# Patient Record
Sex: MALE | Race: WHITE | Employment: OTHER | URBAN - METROPOLITAN AREA
[De-identification: names, ages, dates, MRNs, and addresses within clinical notes are randomized per-mention and may not be internally consistent; named-entity substitution may affect disease eponyms.]

---

## 2017-01-06 ENCOUNTER — TELEPHONE (OUTPATIENT)
Dept: NEPHROLOGY | Facility: CLINIC | Age: 62
End: 2017-01-06

## 2017-01-06 DIAGNOSIS — E11.9 TYPE II OR UNSPECIFIED TYPE DIABETES MELLITUS WITHOUT MENTION OF COMPLICATION, NOT STATED AS UNCONTROLLED: Primary | ICD-10-CM

## 2017-01-06 NOTE — TELEPHONE ENCOUNTER
Spoke to wife. Patient has been fasting for 14 hours and she is afraid his blood sugar is going to go too low. I suggested they go home and we would call when lab orders are placed but she said they would not go home and have to come back again.  Dr. Gamal Wolf

## 2017-01-06 NOTE — TELEPHONE ENCOUNTER
Spoke to Dr. Zeeshan Cevallos order given for Renal panel, CBC and Glyco hgb. Lab orders were entered in Epic. Notified Shiraz Lynch at [address] [place] [address] lab. Patient's wife Marco Antonio Saldivar also contacted and informed.

## 2017-01-06 NOTE — TELEPHONE ENCOUNTER
Jeny/ZHANNA lab states that pt is there to have labs and orders not in EPIC. Please advise. Attempted to reach RN/no answer. Please call Kandice Kovacs at ext [phone].

## 2017-01-07 ENCOUNTER — LAB ENCOUNTER (OUTPATIENT)
Dept: LAB | Age: 62
End: 2017-01-07
Attending: INTERNAL MEDICINE
Payer: MEDICARE

## 2017-01-07 DIAGNOSIS — E11.9 TYPE II OR UNSPECIFIED TYPE DIABETES MELLITUS WITHOUT MENTION OF COMPLICATION, NOT STATED AS UNCONTROLLED: ICD-10-CM

## 2017-01-07 LAB
ALBUMIN SERPL BCP-MCNC: 4 G/DL (ref 3.5–4.8)
ANION GAP SERPL CALC-SCNC: 9 MMOL/L (ref 0–18)
BASOPHILS # BLD: 0 K/UL (ref 0–0.2)
BASOPHILS NFR BLD: 1 %
BUN SERPL-MCNC: 56 MG/DL (ref 8–20)
BUN/CREAT SERPL: 16.5 (ref 10–20)
CALCIUM SERPL-MCNC: 9.1 MG/DL (ref 8.5–10.5)
CHLORIDE SERPL-SCNC: 108 MMOL/L (ref 95–110)
CO2 SERPL-SCNC: 24 MMOL/L (ref 22–32)
CREAT SERPL-MCNC: 3.39 MG/DL (ref 0.5–1.5)
EOSINOPHIL # BLD: 0.1 K/UL (ref 0–0.7)
EOSINOPHIL NFR BLD: 3 %
ERYTHROCYTE [DISTWIDTH] IN BLOOD BY AUTOMATED COUNT: 14.5 % (ref 11–15)
GLUCOSE SERPL-MCNC: 223 MG/DL (ref 70–99)
HCT VFR BLD AUTO: 33.5 % (ref 41–52)
HGB BLD-MCNC: 11.2 G/DL (ref 13.5–17.5)
LYMPHOCYTES # BLD: 1.2 K/UL (ref 1–4)
LYMPHOCYTES NFR BLD: 26 %
MCH RBC QN AUTO: 33.5 PG (ref 27–32)
MCHC RBC AUTO-ENTMCNC: 33.4 G/DL (ref 32–37)
MCV RBC AUTO: 100.5 FL (ref 80–100)
MONOCYTES # BLD: 0.4 K/UL (ref 0–1)
MONOCYTES NFR BLD: 9 %
NEUTROPHILS # BLD AUTO: 2.9 K/UL (ref 1.8–7.7)
NEUTROPHILS NFR BLD: 63 %
OSMOLALITY UR CALC.SUM OF ELEC: 314 MOSM/KG (ref 275–295)
PHOSPHATE SERPL-MCNC: 3.9 MG/DL (ref 2.4–4.7)
PLATELET # BLD AUTO: 129 K/UL (ref 140–400)
PMV BLD AUTO: 10.1 FL (ref 7.4–10.3)
POTASSIUM SERPL-SCNC: 4.9 MMOL/L (ref 3.3–5.1)
RBC # BLD AUTO: 3.34 M/UL (ref 4.5–5.9)
SODIUM SERPL-SCNC: 141 MMOL/L (ref 136–144)
WBC # BLD AUTO: 4.6 K/UL (ref 4–11)

## 2017-01-07 PROCEDURE — 80069 RENAL FUNCTION PANEL: CPT

## 2017-01-07 PROCEDURE — 83036 HEMOGLOBIN GLYCOSYLATED A1C: CPT

## 2017-01-07 PROCEDURE — 36415 COLL VENOUS BLD VENIPUNCTURE: CPT

## 2017-01-07 PROCEDURE — 85025 COMPLETE CBC W/AUTO DIFF WBC: CPT

## 2017-01-08 LAB — HBA1C MFR BLD: 7.1 % (ref 4–6)

## 2017-01-09 RX ORDER — FENOFIBRATE 48 MG/1
TABLET, COATED ORAL
Qty: 90 TABLET | Refills: 1 | Status: SHIPPED | OUTPATIENT
Start: 2017-01-09 | End: 2017-01-11

## 2017-01-11 ENCOUNTER — OFFICE VISIT (OUTPATIENT)
Dept: ENDOCRINOLOGY CLINIC | Facility: CLINIC | Age: 62
End: 2017-01-11

## 2017-01-11 VITALS
WEIGHT: 250.19 LBS | HEART RATE: 91 BPM | BODY MASS INDEX: 37.05 KG/M2 | HEIGHT: 69 IN | SYSTOLIC BLOOD PRESSURE: 131 MMHG | DIASTOLIC BLOOD PRESSURE: 80 MMHG

## 2017-01-11 DIAGNOSIS — E11.65 UNCONTROLLED TYPE 2 DIABETES MELLITUS WITH HYPERGLYCEMIA, WITH LONG-TERM CURRENT USE OF INSULIN (HCC): Primary | ICD-10-CM

## 2017-01-11 DIAGNOSIS — Z79.4 UNCONTROLLED TYPE 2 DIABETES MELLITUS WITH HYPERGLYCEMIA, WITH LONG-TERM CURRENT USE OF INSULIN (HCC): Primary | ICD-10-CM

## 2017-01-11 LAB
GLUCOSE BLOOD: 300
TEST STRIP LOT #: NORMAL NUMERIC

## 2017-01-11 PROCEDURE — 82962 GLUCOSE BLOOD TEST: CPT | Performed by: INTERNAL MEDICINE

## 2017-01-11 PROCEDURE — 99213 OFFICE O/P EST LOW 20 MIN: CPT | Performed by: INTERNAL MEDICINE

## 2017-01-11 PROCEDURE — 36416 COLLJ CAPILLARY BLOOD SPEC: CPT | Performed by: INTERNAL MEDICINE

## 2017-01-11 RX ORDER — FENOFIBRATE 48 MG/1
TABLET, COATED ORAL
Qty: 90 TABLET | Refills: 1 | Status: SHIPPED | OUTPATIENT
Start: 2017-01-11 | End: 2017-04-12

## 2017-01-11 RX ORDER — ATORVASTATIN CALCIUM 40 MG/1
TABLET, FILM COATED ORAL
Qty: 30 TABLET | Refills: 11 | Status: SHIPPED | OUTPATIENT
Start: 2017-01-11 | End: 2017-01-16

## 2017-01-11 NOTE — PROGRESS NOTES
Name: Elizabeth Hills  Date: 1/11/2017    Referring Physician: No ref. provider found    HISTORY OF PRESENT ILLNESS   Elizabeth Hills is a 59year old male who presents for diabetes mellitus.       Prior HbA, C or glycohemoglobin were 9.8% 7/2014; 7.7 pregabalin 300 MG Oral Cap, Take 1 capsule (300 mg total) by mouth daily. , Disp: 90 capsule, Rfl: 1  •  HYDROcodone-acetaminophen  MG Oral Tab, Take 1 tablet by mouth every 8 (eight) hours as needed for Pain., Disp: 90 tablet, Rfl: 0  •  Insulin Syri Concern  Caffeine Concern        Yes    Comment:1 cup coffee per day  Exercise                No        Medical History:   Past Medical History   Diagnosis Date   • Hyperlipidemia    • Diabetes type 2, uncontrolled (HCC)    • Proteinuria    • Chronic importance of SBGM  -Discussed importance of low CHO diet  -Concerned about difficulty with weight loss although unable to give weight loss medication or GLP-2 given CKD  -Safest for renal function to continue insulin at this time  -Continue lantus 34 unit

## 2017-01-16 ENCOUNTER — TELEPHONE (OUTPATIENT)
Dept: NEPHROLOGY | Facility: CLINIC | Age: 62
End: 2017-01-16

## 2017-01-16 ENCOUNTER — OFFICE VISIT (OUTPATIENT)
Dept: NEPHROLOGY | Facility: CLINIC | Age: 62
End: 2017-01-16

## 2017-01-16 VITALS
WEIGHT: 249 LBS | RESPIRATION RATE: 20 BRPM | HEART RATE: 74 BPM | TEMPERATURE: 99 F | HEIGHT: 69 IN | BODY MASS INDEX: 36.88 KG/M2 | SYSTOLIC BLOOD PRESSURE: 124 MMHG | DIASTOLIC BLOOD PRESSURE: 77 MMHG

## 2017-01-16 DIAGNOSIS — E11.22 CKD STAGE 4 DUE TO TYPE 2 DIABETES MELLITUS (HCC): Primary | ICD-10-CM

## 2017-01-16 DIAGNOSIS — N18.4 CKD STAGE 4 DUE TO TYPE 2 DIABETES MELLITUS (HCC): Primary | ICD-10-CM

## 2017-01-16 PROCEDURE — G0463 HOSPITAL OUTPT CLINIC VISIT: HCPCS | Performed by: INTERNAL MEDICINE

## 2017-01-16 PROCEDURE — 99214 OFFICE O/P EST MOD 30 MIN: CPT | Performed by: INTERNAL MEDICINE

## 2017-01-16 RX ORDER — FLUOCINONIDE 0.5 MG/G
OINTMENT TOPICAL
Qty: 15 G | Refills: 0 | Status: SHIPPED | OUTPATIENT
Start: 2017-01-16 | End: 2017-12-22

## 2017-01-16 RX ORDER — GLIMEPIRIDE 2 MG/1
2 TABLET ORAL
Qty: 90 TABLET | Refills: 3 | Status: SHIPPED | OUTPATIENT
Start: 2017-01-16 | End: 2017-10-23

## 2017-01-16 RX ORDER — ATORVASTATIN CALCIUM 40 MG/1
TABLET, FILM COATED ORAL
Qty: 90 TABLET | Refills: 4 | Status: SHIPPED | OUTPATIENT
Start: 2017-01-16 | End: 2018-06-06

## 2017-01-16 RX ORDER — HYDROCODONE BITARTRATE AND ACETAMINOPHEN 10; 325 MG/1; MG/1
1 TABLET ORAL EVERY 8 HOURS PRN
Qty: 90 TABLET | Refills: 0 | Status: SHIPPED | OUTPATIENT
Start: 2017-01-16 | End: 2017-01-16

## 2017-01-16 RX ORDER — PANTOPRAZOLE SODIUM 40 MG/1
40 TABLET, DELAYED RELEASE ORAL
Qty: 90 TABLET | Refills: 3 | Status: SHIPPED | OUTPATIENT
Start: 2017-01-16 | End: 2017-02-20

## 2017-01-16 RX ORDER — PREGABALIN 300 MG/1
300 CAPSULE ORAL DAILY
Qty: 90 CAPSULE | Refills: 3 | Status: SHIPPED | OUTPATIENT
Start: 2017-01-16 | End: 2017-04-11

## 2017-01-16 RX ORDER — HYDROCODONE BITARTRATE AND ACETAMINOPHEN 10; 325 MG/1; MG/1
1 TABLET ORAL EVERY 8 HOURS PRN
Qty: 90 TABLET | Refills: 0 | Status: SHIPPED | OUTPATIENT
Start: 2017-03-14 | End: 2017-01-18

## 2017-01-16 RX ORDER — HYDROCODONE BITARTRATE AND ACETAMINOPHEN 10; 325 MG/1; MG/1
1 TABLET ORAL EVERY 8 HOURS PRN
Qty: 90 TABLET | Refills: 0 | Status: SHIPPED | OUTPATIENT
Start: 2017-02-15 | End: 2017-01-16

## 2017-01-16 NOTE — TELEPHONE ENCOUNTER
Current Outpatient Prescriptions:  Clotrimazole 1 % External Ointment Apply 1 Application topically every morning.  Disp: 15 g Rfl: 1     Per pharmacy Alevazol 1% ointment not covered pls send for Clotrimazole Cream

## 2017-01-16 NOTE — PATIENT INSTRUCTIONS
1.  meds    2. See me in July   CALL FOR ORDERS    3. LET ME KNOW HOW RASH IS DOING    4. One ointment in am,, one in evening    5. Add baby aspirin 81mg day    6.   Good job Bianca crooks labs

## 2017-01-16 NOTE — TELEPHONE ENCOUNTER
Patient in seeing [facility] today.  Saw [facility] [place] [facility] on Wednesday but requesting refills for freestyle strips with dx code to go to [facility] and atorvastatin 90 days sent to [facility]

## 2017-01-17 NOTE — PROGRESS NOTES
NEPHROLOGY PROGRESS NOTE  Tootie Petersen    MRN:  [id number]   Date of Service:  1/16/17     HISTORY OF PRESENT ILLNESS: The patient is here for followup. He is a type 2 diabetic. He is doing fairly well.  He has no complaints except for a rash on his left

## 2017-01-18 ENCOUNTER — TELEPHONE (OUTPATIENT)
Dept: NEPHROLOGY | Facility: CLINIC | Age: 62
End: 2017-01-18

## 2017-01-18 RX ORDER — HYDROCODONE BITARTRATE AND ACETAMINOPHEN 10; 325 MG/1; MG/1
1 TABLET ORAL EVERY 8 HOURS PRN
Qty: 90 TABLET | Refills: 0 | Status: SHIPPED | OUTPATIENT
Start: 2017-03-14 | End: 2017-04-11

## 2017-02-20 ENCOUNTER — TELEPHONE (OUTPATIENT)
Dept: NEPHROLOGY | Facility: CLINIC | Age: 62
End: 2017-02-20

## 2017-02-20 RX ORDER — PANTOPRAZOLE SODIUM 40 MG/1
40 TABLET, DELAYED RELEASE ORAL
Qty: 60 TABLET | Refills: 3 | Status: SHIPPED | OUTPATIENT
Start: 2017-02-20 | End: 2017-03-13

## 2017-02-20 NOTE — TELEPHONE ENCOUNTER
Pts wife states that pt is having stomach pain and heartburn and went to refill and RX is not correct. She states that pt was told to take 1 in AM and 1 in PM so she is requesting corrected RX. Please call.       Current outpatient prescriptions:     Pant

## 2017-03-13 ENCOUNTER — TELEPHONE (OUTPATIENT)
Dept: NEPHROLOGY | Facility: CLINIC | Age: 62
End: 2017-03-13

## 2017-03-13 RX ORDER — PANTOPRAZOLE SODIUM 40 MG/1
40 TABLET, DELAYED RELEASE ORAL
Qty: 60 TABLET | Refills: 6 | Status: SHIPPED | OUTPATIENT
Start: 2017-03-13 | End: 2017-07-28

## 2017-04-11 ENCOUNTER — OFFICE VISIT (OUTPATIENT)
Dept: NEPHROLOGY | Facility: CLINIC | Age: 62
End: 2017-04-11

## 2017-04-11 VITALS
WEIGHT: 242.81 LBS | HEART RATE: 103 BPM | BODY MASS INDEX: 35.96 KG/M2 | SYSTOLIC BLOOD PRESSURE: 92 MMHG | TEMPERATURE: 98 F | HEIGHT: 68.75 IN | DIASTOLIC BLOOD PRESSURE: 60 MMHG

## 2017-04-11 DIAGNOSIS — E11.22 CKD STAGE 4 DUE TO TYPE 2 DIABETES MELLITUS (HCC): ICD-10-CM

## 2017-04-11 DIAGNOSIS — N18.4 CKD STAGE 4 DUE TO TYPE 2 DIABETES MELLITUS (HCC): ICD-10-CM

## 2017-04-11 DIAGNOSIS — I10 ESSENTIAL HYPERTENSION: Primary | ICD-10-CM

## 2017-04-11 DIAGNOSIS — K62.89 ANAL PAIN: ICD-10-CM

## 2017-04-11 DIAGNOSIS — E78.9 DISORDER OF LIPID METABOLISM: ICD-10-CM

## 2017-04-11 PROCEDURE — G0463 HOSPITAL OUTPT CLINIC VISIT: HCPCS | Performed by: INTERNAL MEDICINE

## 2017-04-11 PROCEDURE — 99214 OFFICE O/P EST MOD 30 MIN: CPT | Performed by: INTERNAL MEDICINE

## 2017-04-11 RX ORDER — PREGABALIN 300 MG/1
300 CAPSULE ORAL DAILY
Qty: 90 CAPSULE | Refills: 6 | Status: SHIPPED | OUTPATIENT
Start: 2017-04-11 | End: 2017-07-28

## 2017-04-11 RX ORDER — HYDROCODONE BITARTRATE AND ACETAMINOPHEN 10; 325 MG/1; MG/1
1 TABLET ORAL EVERY 8 HOURS PRN
Qty: 90 TABLET | Refills: 0 | Status: SHIPPED | OUTPATIENT
Start: 2017-04-25 | End: 2017-04-11

## 2017-04-11 RX ORDER — HYDROCODONE BITARTRATE AND ACETAMINOPHEN 10; 325 MG/1; MG/1
1 TABLET ORAL EVERY 8 HOURS PRN
Qty: 90 TABLET | Refills: 0 | Status: SHIPPED | OUTPATIENT
Start: 2017-05-25 | End: 2017-04-11

## 2017-04-11 RX ORDER — HYDROCODONE BITARTRATE AND ACETAMINOPHEN 10; 325 MG/1; MG/1
1 TABLET ORAL EVERY 8 HOURS PRN
Qty: 90 TABLET | Refills: 0 | Status: SHIPPED | OUTPATIENT
Start: 2017-06-26 | End: 2017-07-28

## 2017-04-11 RX ORDER — IBUPROFEN 800 MG
1 TABLET ORAL DAILY
COMMUNITY
End: 2017-12-22

## 2017-04-12 ENCOUNTER — TELEPHONE (OUTPATIENT)
Dept: ENDOCRINOLOGY CLINIC | Facility: CLINIC | Age: 62
End: 2017-04-12

## 2017-04-12 ENCOUNTER — OFFICE VISIT (OUTPATIENT)
Dept: ENDOCRINOLOGY CLINIC | Facility: CLINIC | Age: 62
End: 2017-04-12

## 2017-04-12 VITALS
WEIGHT: 245 LBS | HEIGHT: 69 IN | BODY MASS INDEX: 36.29 KG/M2 | SYSTOLIC BLOOD PRESSURE: 120 MMHG | HEART RATE: 96 BPM | DIASTOLIC BLOOD PRESSURE: 67 MMHG

## 2017-04-12 DIAGNOSIS — E11.65 UNCONTROLLED TYPE 2 DIABETES MELLITUS WITH HYPERGLYCEMIA, WITH LONG-TERM CURRENT USE OF INSULIN (HCC): Primary | ICD-10-CM

## 2017-04-12 DIAGNOSIS — Z79.4 UNCONTROLLED TYPE 2 DIABETES MELLITUS WITH HYPERGLYCEMIA, WITH LONG-TERM CURRENT USE OF INSULIN (HCC): Primary | ICD-10-CM

## 2017-04-12 PROCEDURE — 36416 COLLJ CAPILLARY BLOOD SPEC: CPT | Performed by: INTERNAL MEDICINE

## 2017-04-12 PROCEDURE — 83036 HEMOGLOBIN GLYCOSYLATED A1C: CPT | Performed by: INTERNAL MEDICINE

## 2017-04-12 PROCEDURE — 82962 GLUCOSE BLOOD TEST: CPT | Performed by: INTERNAL MEDICINE

## 2017-04-12 PROCEDURE — 99214 OFFICE O/P EST MOD 30 MIN: CPT | Performed by: INTERNAL MEDICINE

## 2017-04-12 RX ORDER — FENOFIBRATE 48 MG/1
TABLET, COATED ORAL
Qty: 90 TABLET | Refills: 1 | Status: SHIPPED | OUTPATIENT
Start: 2017-04-12 | End: 2017-09-06

## 2017-04-12 NOTE — TELEPHONE ENCOUNTER
Humalog and Toujeo have been removed from patient's medication list already. Just lists current medications of Novolog and Lantus. Possible this didn't update before he received after visit paperwork.

## 2017-04-12 NOTE — TELEPHONE ENCOUNTER
Shayan Youngblood requesting changes on pt's medication list.  States pt does not take Toujeo and Humalog- requesting this to be taken off. Pls call. Thank you.

## 2017-04-12 NOTE — PROGRESS NOTES
Name: Brandon Libman  Date: 4/12/2017    Referring Physician: No ref. provider found    HISTORY OF PRESENT ILLNESS   Brandon Libman is a 59year old male who presents for diabetes mellitus.   Since last visit he has been ill with significant viral g Take 1 tablet (40 mg total) by mouth 2 (two) times daily before meals. , Disp: 60 tablet, Rfl: 6  •  Glucose Blood (FREESTYLE LITE TEST) In Vitro Strip, Use to check BG level 3 times per day., Disp: 300 each, Rfl: 5  •  Atorvastatin Calcium 40 MG Oral Tab, Suppl (FREESTYLE FREEDOM LITE) W/DEVICE Does not apply Kit, , Disp: , Rfl:      Allergies:   No Known Allergies    Social History:   Social History    Marital Status:              Spouse Name:                       Years of Education: organomegaly or tenderness   Musculoskeletal:  normal muscle strength and tone  Skin:  normal moisture and skin texture  Neuro:  sensory grossly intact and motor grossly intact  Psychiatric:  oriented to time, self, and place  Nutritional:  no abnormal oralia

## 2017-04-12 NOTE — PROGRESS NOTES
NEPHROLOGY PROGRESS NOTE  Janelle Perez     MRN:  [id number]   Date of Service:    4/11/2017    HISTORY OF PRESENT ILLNESS: The patient is here. He has a history of diabetes, CKD stage 4, and hypertension.  He recently got over a stomach virus that lasted

## 2017-04-12 NOTE — PATIENT INSTRUCTIONS
Toujeo 34 units SQ daily    Humalog  INSULIN SLIDING SCALE  Base Values  Breakfast: 8  Lunch: 12  Dinner: 12  Ranges:  80-99: -2  100-119: 0  120-139: 0  140-159: 1  160-179: 1  180-199: 2  200-219: 2  220-239: 3  240-259: 3  260-279: 4  280-299: 4  300-31

## 2017-04-25 ENCOUNTER — OFFICE VISIT (OUTPATIENT)
Dept: INTERNAL MEDICINE CLINIC | Facility: CLINIC | Age: 62
End: 2017-04-25

## 2017-04-25 VITALS
WEIGHT: 169.13 LBS | DIASTOLIC BLOOD PRESSURE: 72 MMHG | HEIGHT: 69 IN | TEMPERATURE: 98 F | RESPIRATION RATE: 18 BRPM | BODY MASS INDEX: 25.05 KG/M2 | SYSTOLIC BLOOD PRESSURE: 120 MMHG | HEART RATE: 75 BPM

## 2017-04-25 DIAGNOSIS — E11.21 UNCONTROLLED TYPE 2 DIABETES MELLITUS WITH DIABETIC NEPHROPATHY, WITHOUT LONG-TERM CURRENT USE OF INSULIN (HCC): ICD-10-CM

## 2017-04-25 DIAGNOSIS — E11.65 UNCONTROLLED TYPE 2 DIABETES MELLITUS WITH DIABETIC NEPHROPATHY, WITHOUT LONG-TERM CURRENT USE OF INSULIN (HCC): ICD-10-CM

## 2017-04-25 DIAGNOSIS — J06.9 UPPER RESPIRATORY TRACT INFECTION, UNSPECIFIED TYPE: Primary | ICD-10-CM

## 2017-04-25 PROCEDURE — G0463 HOSPITAL OUTPT CLINIC VISIT: HCPCS | Performed by: INTERNAL MEDICINE

## 2017-04-25 PROCEDURE — 99213 OFFICE O/P EST LOW 20 MIN: CPT | Performed by: INTERNAL MEDICINE

## 2017-04-25 RX ORDER — LIDOCAINE AND PRILOCAINE 25; 25 MG/G; MG/G
CREAM TOPICAL
COMMUNITY
Start: 2017-04-11 | End: 2018-03-15 | Stop reason: ALTCHOICE

## 2017-04-25 RX ORDER — AZITHROMYCIN 250 MG/1
TABLET, FILM COATED ORAL
Qty: 6 TABLET | Refills: 0 | Status: SHIPPED | OUTPATIENT
Start: 2017-04-25 | End: 2017-05-02

## 2017-04-25 RX ORDER — CODEINE PHOSPHATE AND GUAIFENESIN 10; 100 MG/5ML; MG/5ML
5 SOLUTION ORAL EVERY 6 HOURS PRN
Qty: 180 ML | Refills: 0 | Status: SHIPPED | OUTPATIENT
Start: 2017-04-25 | End: 2017-05-02

## 2017-04-25 NOTE — PROGRESS NOTES
HPI:    Patient ID: Waylon Addison is a 59year old male. Cough  This is a new problem. The current episode started in the past 7 days. The problem has been unchanged. The problem occurs every few minutes. The cough is productive of purulent sputum. Tube topically daily. Apply topically to affected area in the morning Disp: 15 g Rfl: 1   insulin glargine 100 UNIT/ML Subcutaneous Solution Take 40 units of Lantus insulin at bedtime.  (Patient taking differently: Take 34 units of Lantus insulin at bedtime respiratory distress. Lymphadenopathy:     He has no cervical adenopathy. ASSESSMENT/PLAN:   1. Upper respiratory tract infection, unspecified type  Odell written information given to pt  - azithromycin (ZITHROMAX Z-PUJA) 250 MG Oral Tab;  Ta

## 2017-05-02 ENCOUNTER — HOSPITAL ENCOUNTER (OUTPATIENT)
Dept: GENERAL RADIOLOGY | Age: 62
Discharge: HOME | End: 2017-05-02
Attending: INTERNAL MEDICINE | Admitting: INTERNAL MEDICINE
Payer: MEDICARE

## 2017-05-02 ENCOUNTER — OFFICE VISIT (OUTPATIENT)
Dept: INTERNAL MEDICINE CLINIC | Facility: CLINIC | Age: 62
End: 2017-05-02

## 2017-05-02 VITALS
RESPIRATION RATE: 20 BRPM | TEMPERATURE: 98 F | HEART RATE: 86 BPM | BODY MASS INDEX: 36 KG/M2 | WEIGHT: 246 LBS | DIASTOLIC BLOOD PRESSURE: 71 MMHG | SYSTOLIC BLOOD PRESSURE: 127 MMHG

## 2017-05-02 DIAGNOSIS — J20.9 BRONCHITIS WITH BRONCHOSPASM: ICD-10-CM

## 2017-05-02 DIAGNOSIS — J20.9 BRONCHITIS WITH BRONCHOSPASM: Primary | ICD-10-CM

## 2017-05-02 PROCEDURE — 71020 XR CHEST PA + LAT CHEST (CPT=71020): CPT

## 2017-05-02 PROCEDURE — G0463 HOSPITAL OUTPT CLINIC VISIT: HCPCS | Performed by: INTERNAL MEDICINE

## 2017-05-02 PROCEDURE — 99214 OFFICE O/P EST MOD 30 MIN: CPT | Performed by: INTERNAL MEDICINE

## 2017-05-02 RX ORDER — CEFDINIR 300 MG/1
300 CAPSULE ORAL 2 TIMES DAILY
Qty: 20 CAPSULE | Refills: 0 | Status: SHIPPED | OUTPATIENT
Start: 2017-05-02 | End: 2017-07-28

## 2017-05-02 RX ORDER — ALBUTEROL SULFATE 90 UG/1
2 AEROSOL, METERED RESPIRATORY (INHALATION) EVERY 4 HOURS PRN
Qty: 1 INHALER | Refills: 1 | Status: SHIPPED | OUTPATIENT
Start: 2017-05-02 | End: 2017-09-06 | Stop reason: ALTCHOICE

## 2017-05-02 RX ORDER — ALBUTEROL SULFATE 2.5 MG/3ML
2.5 SOLUTION RESPIRATORY (INHALATION) ONCE
Status: DISCONTINUED | OUTPATIENT
Start: 2017-05-02 | End: 2018-03-15 | Stop reason: ALTCHOICE

## 2017-05-05 ENCOUNTER — TELEPHONE (OUTPATIENT)
Dept: INTERNAL MEDICINE CLINIC | Facility: CLINIC | Age: 62
End: 2017-05-05

## 2017-05-05 NOTE — TELEPHONE ENCOUNTER
Pt has been talking the antibiotics given to him and now he has very bad diarrhea. Very watery and now painful to go. Pt doesn't want to take the antibiotics anymore because of this.

## 2017-05-05 NOTE — TELEPHONE ENCOUNTER
Pt's wife called back &  informed of MD pending response. She said pt on cefdinir for bronchitis but he stopped taking it due to he developed watery stool. Pt had rice for lunch but still having diarrhea. .  pls advise, thanks in advance.    pls call pt's

## 2017-05-05 NOTE — TELEPHONE ENCOUNTER
Informed pt per DR. Chakraborty's message below. Continue to monitor and better off antibiotics for now. Verbalized understanding.

## 2017-05-05 NOTE — TELEPHONE ENCOUNTER
Actions Requested: Dr. Anjelica Salinas, please advise if anything can be given for diarrhea.     Situation/Background   Problem: diarrhea   Onset: 2 days ago (WED)   Associated Symptoms: constant bouts of diarrhea every hour, denied stomach pain; no weakness, no na Food and Nutrition during Mild-Moderate Diarrhea

## 2017-05-05 NOTE — TELEPHONE ENCOUNTER
Informed spouse per Dr. Morgan Home instructions to stop antibiotics. Take probiotics. Call back in 2 days if no change or symptoms worsen. Spouse verbalized understanding. Spouse would like to know what will pt take for Bronchitis? Worried it will return.

## 2017-05-11 NOTE — PROGRESS NOTES
HPI:    Patient ID: Drew Vogt is a 59year old male. HPI    Review of Systems   Constitutional: Positive for fatigue. Negative for fever, chills and activity change. HENT: Positive for congestion and postnasal drip.  Negative for sinus pressur application at bed Disp: 15 g Rfl: 0   Clotrimazole 1 % External Ointment Apply 1 Tube topically daily.  Apply topically to affected area in the morning Disp: 15 g Rfl: 1   insulin glargine 100 UNIT/ML Subcutaneous Solution Take 40 units of Lantus insulin a CHOLECYSTECTOMY  2012    APPENDECTOMY      HERNIA SURGERY      ELECTROCARDIOGRAM, COMPLETE  4/23/2012    Comment scanned to media tab      Family History   Problem Relation Age of Onset   • Diabetes Other      close relative   • Diabetes Maternal Grandmoth solution 2.5 mg, XR CHEST PA +        LAT CHEST (PDK=50881)        No orders of the defined types were placed in this encounter.        Meds This Visit:  Signed Prescriptions Disp Refills    cefdinir 300 MG Oral Cap 20 capsule 0      Sig: Take 1 capsule (30

## 2017-07-05 NOTE — TELEPHONE ENCOUNTER
Pts wife called to request 90 day supply. Pt is going out of town tomorrow AM and needs this before he leaves. Current Outpatient Prescriptions:     •  insulin glargine 100 UNIT/ML Subcutaneous Solution, Take 40 units of Lantus insulin at bedtime.  (P

## 2017-07-26 NOTE — TELEPHONE ENCOUNTER
Insulin Syringe 31G X 5/16\" 0.5 ML Does not apply Misc Use with insulin 4 times daily Disp: 400 each Rfl: 3

## 2017-07-27 RX ORDER — NAPROXEN SODIUM 220 MG
TABLET ORAL
Qty: 400 EACH | Refills: 3 | Status: SHIPPED | OUTPATIENT
Start: 2017-07-27 | End: 2018-03-15

## 2017-07-28 ENCOUNTER — OFFICE VISIT (OUTPATIENT)
Dept: NEPHROLOGY | Facility: CLINIC | Age: 62
End: 2017-07-28

## 2017-07-28 VITALS
DIASTOLIC BLOOD PRESSURE: 67 MMHG | SYSTOLIC BLOOD PRESSURE: 104 MMHG | HEIGHT: 69 IN | BODY MASS INDEX: 36.49 KG/M2 | HEART RATE: 77 BPM | WEIGHT: 246.38 LBS

## 2017-07-28 DIAGNOSIS — N18.30 CKD STAGE 3 DUE TO TYPE 1 DIABETES MELLITUS (HCC): ICD-10-CM

## 2017-07-28 DIAGNOSIS — I10 ESSENTIAL HYPERTENSION: ICD-10-CM

## 2017-07-28 DIAGNOSIS — E11.9 TYPE 2 DIABETES MELLITUS WITHOUT COMPLICATION, WITHOUT LONG-TERM CURRENT USE OF INSULIN (HCC): ICD-10-CM

## 2017-07-28 DIAGNOSIS — E78.9 DISORDER OF LIPID METABOLISM: Primary | ICD-10-CM

## 2017-07-28 DIAGNOSIS — E10.22 CKD STAGE 3 DUE TO TYPE 1 DIABETES MELLITUS (HCC): ICD-10-CM

## 2017-07-28 PROCEDURE — G0463 HOSPITAL OUTPT CLINIC VISIT: HCPCS | Performed by: INTERNAL MEDICINE

## 2017-07-28 PROCEDURE — 99214 OFFICE O/P EST MOD 30 MIN: CPT | Performed by: INTERNAL MEDICINE

## 2017-07-28 RX ORDER — PREGABALIN 300 MG/1
300 CAPSULE ORAL DAILY
Qty: 90 CAPSULE | Refills: 6 | Status: SHIPPED | OUTPATIENT
Start: 2017-07-28 | End: 2018-02-21

## 2017-07-28 RX ORDER — HYDROCODONE BITARTRATE AND ACETAMINOPHEN 10; 325 MG/1; MG/1
1 TABLET ORAL EVERY 8 HOURS PRN
Qty: 90 TABLET | Refills: 0 | Status: SHIPPED | OUTPATIENT
Start: 2017-07-28 | End: 2017-07-28

## 2017-07-28 RX ORDER — LISINOPRIL 5 MG/1
TABLET ORAL
Qty: 90 TABLET | Refills: 3 | Status: SHIPPED | OUTPATIENT
Start: 2017-07-28 | End: 2017-09-06 | Stop reason: ALTCHOICE

## 2017-07-28 RX ORDER — PANTOPRAZOLE SODIUM 40 MG/1
40 TABLET, DELAYED RELEASE ORAL
Qty: 90 TABLET | Refills: 6 | Status: SHIPPED | OUTPATIENT
Start: 2017-07-28 | End: 2018-08-20

## 2017-07-28 RX ORDER — HYDROCODONE BITARTRATE AND ACETAMINOPHEN 10; 325 MG/1; MG/1
1 TABLET ORAL EVERY 8 HOURS PRN
Qty: 90 TABLET | Refills: 0 | Status: SHIPPED | OUTPATIENT
Start: 2017-08-28 | End: 2017-07-28

## 2017-07-28 RX ORDER — HYDROCODONE BITARTRATE AND ACETAMINOPHEN 10; 325 MG/1; MG/1
1 TABLET ORAL EVERY 8 HOURS PRN
Qty: 90 TABLET | Refills: 0 | Status: SHIPPED | OUTPATIENT
Start: 2017-09-28 | End: 2017-10-23

## 2017-07-28 NOTE — PATIENT INSTRUCTIONS
1   meds    2. Capsaicin  To affected area  2-3 x a day  Watch eyes    3..  getlabs august      4.  See me three months

## 2017-08-03 NOTE — PROGRESS NOTES
NEPHROLOGY PROGRESS NOTE  Rik Blizzard     MRN:  [id number]   Date of Service:        HISTORY OF PRESENT ILLNESS: Toi Collier is a white male who has  history of type 2 diabetes, hypertension, peripheral vascular disease, severe neuropathy of his feet.  He i

## 2017-08-21 ENCOUNTER — TELEPHONE (OUTPATIENT)
Dept: NEPHROLOGY | Facility: CLINIC | Age: 62
End: 2017-08-21

## 2017-08-21 ENCOUNTER — APPOINTMENT (OUTPATIENT)
Dept: LAB | Age: 62
End: 2017-08-21
Attending: INTERNAL MEDICINE
Payer: MEDICARE

## 2017-08-21 DIAGNOSIS — N18.30 CKD STAGE 3 DUE TO TYPE 1 DIABETES MELLITUS (HCC): ICD-10-CM

## 2017-08-21 DIAGNOSIS — E78.9 DISORDER OF LIPID METABOLISM: ICD-10-CM

## 2017-08-21 DIAGNOSIS — E10.22 CKD STAGE 3 DUE TO TYPE 1 DIABETES MELLITUS (HCC): ICD-10-CM

## 2017-08-21 DIAGNOSIS — E11.9 TYPE 2 DIABETES MELLITUS WITHOUT COMPLICATION, WITHOUT LONG-TERM CURRENT USE OF INSULIN (HCC): ICD-10-CM

## 2017-08-21 DIAGNOSIS — I10 ESSENTIAL HYPERTENSION: ICD-10-CM

## 2017-08-21 LAB
ALBUMIN SERPL BCP-MCNC: 3.8 G/DL (ref 3.5–4.8)
ANION GAP SERPL CALC-SCNC: 7 MMOL/L (ref 0–18)
BASOPHILS # BLD: 0 K/UL (ref 0–0.2)
BASOPHILS NFR BLD: 1 %
BUN SERPL-MCNC: 92 MG/DL (ref 8–20)
BUN/CREAT SERPL: 20.4 (ref 10–20)
CALCIUM SERPL-MCNC: 9.6 MG/DL (ref 8.5–10.5)
CHLORIDE SERPL-SCNC: 112 MMOL/L (ref 95–110)
CO2 SERPL-SCNC: 23 MMOL/L (ref 22–32)
CREAT SERPL-MCNC: 4.5 MG/DL (ref 0.5–1.5)
CREAT UR-MCNC: 82.5 MG/DL
EOSINOPHIL # BLD: 0.1 K/UL (ref 0–0.7)
EOSINOPHIL NFR BLD: 3 %
ERYTHROCYTE [DISTWIDTH] IN BLOOD BY AUTOMATED COUNT: 14.6 % (ref 11–15)
GLUCOSE SERPL-MCNC: 186 MG/DL (ref 70–99)
HBA1C MFR BLD: 7.1 % (ref 4–6)
HCT VFR BLD AUTO: 28.5 % (ref 41–52)
HGB BLD-MCNC: 9.6 G/DL (ref 13.5–17.5)
LYMPHOCYTES # BLD: 1.2 K/UL (ref 1–4)
LYMPHOCYTES NFR BLD: 23 %
MCH RBC QN AUTO: 34.2 PG (ref 27–32)
MCHC RBC AUTO-ENTMCNC: 33.8 G/DL (ref 32–37)
MCV RBC AUTO: 101.2 FL (ref 80–100)
MICROALBUMIN UR-MCNC: 5.4 MG/DL (ref 0–1.8)
MICROALBUMIN/CREAT UR: 65.5 MG/G{CREAT} (ref 0–20)
MONOCYTES # BLD: 0.5 K/UL (ref 0–1)
MONOCYTES NFR BLD: 10 %
NEUTROPHILS # BLD AUTO: 3.1 K/UL (ref 1.8–7.7)
NEUTROPHILS NFR BLD: 63 %
OSMOLALITY UR CALC.SUM OF ELEC: 327 MOSM/KG (ref 275–295)
PHOSPHATE SERPL-MCNC: 4 MG/DL (ref 2.4–4.7)
PLATELET # BLD AUTO: 161 K/UL (ref 140–400)
PMV BLD AUTO: 9.6 FL (ref 7.4–10.3)
POTASSIUM SERPL-SCNC: 5.8 MMOL/L (ref 3.3–5.1)
RBC # BLD AUTO: 2.81 M/UL (ref 4.5–5.9)
SODIUM SERPL-SCNC: 142 MMOL/L (ref 136–144)
WBC # BLD AUTO: 5 K/UL (ref 4–11)

## 2017-08-21 PROCEDURE — 82043 UR ALBUMIN QUANTITATIVE: CPT

## 2017-08-21 PROCEDURE — 83036 HEMOGLOBIN GLYCOSYLATED A1C: CPT | Performed by: INTERNAL MEDICINE

## 2017-08-21 PROCEDURE — 36415 COLL VENOUS BLD VENIPUNCTURE: CPT

## 2017-08-21 PROCEDURE — 85025 COMPLETE CBC W/AUTO DIFF WBC: CPT | Performed by: INTERNAL MEDICINE

## 2017-08-21 PROCEDURE — 82570 ASSAY OF URINE CREATININE: CPT

## 2017-08-21 PROCEDURE — 80069 RENAL FUNCTION PANEL: CPT

## 2017-08-21 NOTE — TELEPHONE ENCOUNTER
Pts wife states that pt was released from [address] [place] [address] on 8/9/17 and was told to follow up in 10 days with [facility]. No appts available. Please call.

## 2017-08-22 NOTE — TELEPHONE ENCOUNTER
Spoke to [employer], patient's SO. Advised that per Dr. Violeta Galvez he can see patient on Wednesday 8/30/17 as add on 4:30PM. MINE will book appointment as this date is closed and I am unable to access this date.

## 2017-08-30 ENCOUNTER — OFFICE VISIT (OUTPATIENT)
Dept: NEPHROLOGY | Facility: CLINIC | Age: 62
End: 2017-08-30

## 2017-08-30 VITALS
BODY MASS INDEX: 35.34 KG/M2 | WEIGHT: 238.63 LBS | HEIGHT: 69 IN | HEART RATE: 74 BPM | SYSTOLIC BLOOD PRESSURE: 87 MMHG | DIASTOLIC BLOOD PRESSURE: 54 MMHG

## 2017-08-30 DIAGNOSIS — N17.0 ACUTE RENAL FAILURE WITH TUBULAR NECROSIS (HCC): Primary | ICD-10-CM

## 2017-08-30 PROCEDURE — 99213 OFFICE O/P EST LOW 20 MIN: CPT | Performed by: INTERNAL MEDICINE

## 2017-08-30 PROCEDURE — G0463 HOSPITAL OUTPT CLINIC VISIT: HCPCS | Performed by: INTERNAL MEDICINE

## 2017-08-30 NOTE — PATIENT INSTRUCTIONS
1  HOLD LISINOPRIL. Garrett Husseino NIACIN    2. STAY HYDRATED    3.   LAB TEST NON FASTING TWO WEEKS AND CALL ME     THANKS FOR COMING IN

## 2017-08-31 NOTE — PROGRESS NOTES
NEPHROLOGY PROGRESS NOTE  Eli Iraheta     MRN:  [id number]   Date of Service:  8/30/17     HISTORY OF PRESENT ILLNESS: The patient is here for followup. He was at [facility] recently with acute renal failure, had dehydration.  Currently today pr

## 2017-09-06 ENCOUNTER — OFFICE VISIT (OUTPATIENT)
Dept: ENDOCRINOLOGY CLINIC | Facility: CLINIC | Age: 62
End: 2017-09-06

## 2017-09-06 ENCOUNTER — TELEPHONE (OUTPATIENT)
Dept: ENDOCRINOLOGY CLINIC | Facility: CLINIC | Age: 62
End: 2017-09-06

## 2017-09-06 VITALS
HEART RATE: 69 BPM | WEIGHT: 237.81 LBS | BODY MASS INDEX: 35.22 KG/M2 | DIASTOLIC BLOOD PRESSURE: 70 MMHG | HEIGHT: 69 IN | SYSTOLIC BLOOD PRESSURE: 116 MMHG

## 2017-09-06 DIAGNOSIS — E11.65 UNCONTROLLED TYPE 2 DIABETES MELLITUS WITH HYPERGLYCEMIA, WITH LONG-TERM CURRENT USE OF INSULIN (HCC): Primary | ICD-10-CM

## 2017-09-06 DIAGNOSIS — Z79.4 UNCONTROLLED TYPE 2 DIABETES MELLITUS WITH HYPERGLYCEMIA, WITH LONG-TERM CURRENT USE OF INSULIN (HCC): Primary | ICD-10-CM

## 2017-09-06 LAB
GLUCOSE BLOOD: 262
TEST STRIP LOT #: NORMAL NUMERIC

## 2017-09-06 PROCEDURE — 99214 OFFICE O/P EST MOD 30 MIN: CPT | Performed by: INTERNAL MEDICINE

## 2017-09-06 PROCEDURE — 36416 COLLJ CAPILLARY BLOOD SPEC: CPT | Performed by: INTERNAL MEDICINE

## 2017-09-06 PROCEDURE — 82962 GLUCOSE BLOOD TEST: CPT | Performed by: INTERNAL MEDICINE

## 2017-09-06 RX ORDER — INSULIN ASPART 100 [IU]/ML
INJECTION, SOLUTION INTRAVENOUS; SUBCUTANEOUS
Qty: 60 ML | Refills: 2 | Status: SHIPPED | OUTPATIENT
Start: 2017-09-06 | End: 2018-10-11

## 2017-09-06 RX ORDER — FENOFIBRATE 48 MG/1
TABLET, COATED ORAL
Qty: 90 TABLET | Refills: 1 | Status: SHIPPED | OUTPATIENT
Start: 2017-09-06 | End: 2018-05-10

## 2017-09-06 NOTE — PROGRESS NOTES
Name: Ana Martinez  Date: 9/6/2017    Referring Physician: No ref. provider found    HISTORY OF PRESENT ILLNESS   Ana Martinez is a 57year old male who presents for diabetes mellitus.       He was in the hospital 8/5-8/9 due to recurrent N/V an 9/28/2017] HYDROcodone-acetaminophen  MG Oral Tab, Take 1 tablet by mouth every 8 (eight) hours as needed for Pain., Disp: 90 tablet, Rfl: 0  •  Fluocinonide 0.05 % External Cream, Apply 1 Tube topically 2 (two) times daily. , Disp: 30 g, Rfl: 2  •  I Disp: , Rfl:   •  Multiple Vitamins-Minerals (CENTRUM SILVER) Oral Tab, Take 1 tablet by mouth daily. , Disp: , Rfl:   •  Lancets 28G Does not apply Misc, , Disp: , Rfl:   •  Blood Glucose Monitoring Suppl (FREESTYLE FREEDOM LITE) W/DEVICE Does not apply Ki scanned to media tab  No date: HERNIA SURGERY      PHYSICAL EXAM  /70 (BP Location: Right arm, Patient Position: Sitting, Cuff Size: large)   Pulse 69   Ht 5' 9\" (1.753 m)   Wt 237 lb 12.8 oz (107.9 kg)   BMI 35.12 kg/m²     General Appearance:  merry

## 2017-09-06 NOTE — PATIENT INSTRUCTIONS
Lantus 30 units SQ bedtime    INSULIN SLIDING SCALE  Base Values  Breakfast: 6  Lunch: 6  Dinner: 10  Ranges:  80-99: -2  100-119: 0  120-139: 0  140-159: 1  160-179: 1  180-199: 2  200-219: 2  220-239: 3  240-259: 3  260-279: 4  280-299: 4  300-319: 5  32

## 2017-09-18 ENCOUNTER — APPOINTMENT (OUTPATIENT)
Dept: LAB | Age: 62
End: 2017-09-18
Attending: INTERNAL MEDICINE
Payer: MEDICARE

## 2017-09-18 DIAGNOSIS — E11.22 CKD STAGE 4 DUE TO TYPE 2 DIABETES MELLITUS (HCC): ICD-10-CM

## 2017-09-18 DIAGNOSIS — N17.0 ACUTE RENAL FAILURE WITH TUBULAR NECROSIS (HCC): ICD-10-CM

## 2017-09-18 DIAGNOSIS — N18.4 CKD STAGE 4 DUE TO TYPE 2 DIABETES MELLITUS (HCC): ICD-10-CM

## 2017-09-18 LAB
ALBUMIN SERPL BCP-MCNC: 3.9 G/DL (ref 3.5–4.8)
ANION GAP SERPL CALC-SCNC: 7 MMOL/L (ref 0–18)
BASOPHILS # BLD: 0 K/UL (ref 0–0.2)
BASOPHILS NFR BLD: 0 %
BUN SERPL-MCNC: 51 MG/DL (ref 8–20)
BUN/CREAT SERPL: 18.1 (ref 10–20)
CALCIUM SERPL-MCNC: 9.5 MG/DL (ref 8.5–10.5)
CHLORIDE SERPL-SCNC: 110 MMOL/L (ref 95–110)
CO2 SERPL-SCNC: 23 MMOL/L (ref 22–32)
CREAT SERPL-MCNC: 2.81 MG/DL (ref 0.5–1.5)
EOSINOPHIL # BLD: 0.1 K/UL (ref 0–0.7)
EOSINOPHIL NFR BLD: 3 %
ERYTHROCYTE [DISTWIDTH] IN BLOOD BY AUTOMATED COUNT: 15.3 % (ref 11–15)
GLUCOSE SERPL-MCNC: 105 MG/DL (ref 70–99)
HCT VFR BLD AUTO: 27.4 % (ref 41–52)
HGB BLD-MCNC: 9.3 G/DL (ref 13.5–17.5)
LYMPHOCYTES # BLD: 0.8 K/UL (ref 1–4)
LYMPHOCYTES NFR BLD: 19 %
MCH RBC QN AUTO: 34.8 PG (ref 27–32)
MCHC RBC AUTO-ENTMCNC: 33.8 G/DL (ref 32–37)
MCV RBC AUTO: 103 FL (ref 80–100)
MONOCYTES # BLD: 0.4 K/UL (ref 0–1)
MONOCYTES NFR BLD: 9 %
NEUTROPHILS # BLD AUTO: 2.9 K/UL (ref 1.8–7.7)
NEUTROPHILS NFR BLD: 69 %
OSMOLALITY UR CALC.SUM OF ELEC: 304 MOSM/KG (ref 275–295)
PHOSPHATE SERPL-MCNC: 3.2 MG/DL (ref 2.4–4.7)
PLATELET # BLD AUTO: 128 K/UL (ref 140–400)
PMV BLD AUTO: 9.7 FL (ref 7.4–10.3)
POTASSIUM SERPL-SCNC: 5.1 MMOL/L (ref 3.3–5.1)
PTH-INTACT SERPL-MCNC: 256.3 PG/ML (ref 12–88)
RBC # BLD AUTO: 2.66 M/UL (ref 4.5–5.9)
SODIUM SERPL-SCNC: 140 MMOL/L (ref 136–144)
VIT B12 SERPL-MCNC: >1500 PG/ML (ref 181–914)
WBC # BLD AUTO: 4.2 K/UL (ref 4–11)

## 2017-09-18 PROCEDURE — 83970 ASSAY OF PARATHORMONE: CPT

## 2017-09-18 PROCEDURE — 80069 RENAL FUNCTION PANEL: CPT

## 2017-09-18 PROCEDURE — 85025 COMPLETE CBC W/AUTO DIFF WBC: CPT | Performed by: INTERNAL MEDICINE

## 2017-09-18 PROCEDURE — 82607 VITAMIN B-12: CPT

## 2017-09-18 PROCEDURE — 36415 COLL VENOUS BLD VENIPUNCTURE: CPT

## 2017-09-20 ENCOUNTER — TELEPHONE (OUTPATIENT)
Dept: NEPHROLOGY | Facility: CLINIC | Age: 62
End: 2017-09-20

## 2017-09-20 DIAGNOSIS — D50.8 IRON DEFICIENCY ANEMIA SECONDARY TO INADEQUATE DIETARY IRON INTAKE: Primary | ICD-10-CM

## 2017-09-20 DIAGNOSIS — D64.9 ANEMIA, UNSPECIFIED TYPE: Primary | ICD-10-CM

## 2017-09-20 NOTE — TELEPHONE ENCOUNTER
Can we please add an iron/tibc level to labs done yeasterday?   I may have plaCED THE ORDER BUT NOT SURE

## 2017-09-29 ENCOUNTER — TELEPHONE (OUTPATIENT)
Dept: ENDOCRINOLOGY CLINIC | Facility: CLINIC | Age: 62
End: 2017-09-29

## 2017-09-29 RX ORDER — BLOOD-GLUCOSE METER
1 EACH MISCELLANEOUS DAILY
Qty: 1 KIT | Refills: 0 | Status: SHIPPED | OUTPATIENT
Start: 2017-09-29 | End: 2017-10-05

## 2017-09-29 RX ORDER — BLOOD SUGAR DIAGNOSTIC
4 STRIP MISCELLANEOUS DAILY
Qty: 200 STRIP | Refills: 3 | Status: SHIPPED | OUTPATIENT
Start: 2017-09-29 | End: 2017-10-05

## 2017-09-29 RX ORDER — LANCETS
EACH MISCELLANEOUS
Qty: 200 EACH | Refills: 4 | Status: SHIPPED | OUTPATIENT
Start: 2017-09-29 | End: 2017-10-10

## 2017-09-29 NOTE — TELEPHONE ENCOUNTER
Per [facility] protocol ok to switch to preferred brand of testing meter and supplies.

## 2017-10-05 ENCOUNTER — TELEPHONE (OUTPATIENT)
Dept: ENDOCRINOLOGY CLINIC | Facility: CLINIC | Age: 62
End: 2017-10-05

## 2017-10-05 RX ORDER — BLOOD-GLUCOSE METER
1 EACH MISCELLANEOUS DAILY
Qty: 1 KIT | Refills: 0 | Status: SHIPPED | OUTPATIENT
Start: 2017-10-05 | End: 2017-10-09

## 2017-10-05 RX ORDER — BLOOD SUGAR DIAGNOSTIC
4 STRIP MISCELLANEOUS DAILY
Qty: 200 STRIP | Refills: 3 | Status: SHIPPED | OUTPATIENT
Start: 2017-10-05 | End: 2017-10-09

## 2017-10-09 ENCOUNTER — TELEPHONE (OUTPATIENT)
Dept: ENDOCRINOLOGY CLINIC | Facility: CLINIC | Age: 62
End: 2017-10-09

## 2017-10-09 RX ORDER — BLOOD SUGAR DIAGNOSTIC
STRIP MISCELLANEOUS
Qty: 200 STRIP | Refills: 3 | Status: SHIPPED | OUTPATIENT
Start: 2017-10-09 | End: 2017-10-10

## 2017-10-09 RX ORDER — BLOOD-GLUCOSE METER
EACH MISCELLANEOUS
Qty: 1 KIT | Refills: 0 | Status: SHIPPED | OUTPATIENT
Start: 2017-10-09 | End: 2017-10-23

## 2017-10-09 NOTE — TELEPHONE ENCOUNTER
Received fax from [facility] [employer]. Accu check Guide meter is not covered but Radha Mings is. Per  protocol changed prescription to preferred meter with supplies. Dx code included for Medicare.

## 2017-10-10 ENCOUNTER — TELEPHONE (OUTPATIENT)
Dept: ENDOCRINOLOGY CLINIC | Facility: CLINIC | Age: 62
End: 2017-10-10

## 2017-10-10 RX ORDER — BLOOD SUGAR DIAGNOSTIC
STRIP MISCELLANEOUS
Qty: 200 STRIP | Refills: 3 | Status: SHIPPED | OUTPATIENT
Start: 2017-10-10 | End: 2017-10-23

## 2017-10-10 RX ORDER — LANCETS
EACH MISCELLANEOUS
Qty: 200 EACH | Refills: 4 | Status: SHIPPED | OUTPATIENT
Start: 2017-10-10 | End: 2017-10-23

## 2017-10-10 NOTE — TELEPHONE ENCOUNTER
Fax received from [facility] requesting Rx for accu chek fast clix lancets and ismael test strips be resent with dx codes written on Rx. Sent.

## 2017-10-17 ENCOUNTER — APPOINTMENT (OUTPATIENT)
Dept: LAB | Age: 62
End: 2017-10-17
Attending: INTERNAL MEDICINE
Payer: MEDICARE

## 2017-10-17 DIAGNOSIS — D50.8 IRON DEFICIENCY ANEMIA SECONDARY TO INADEQUATE DIETARY IRON INTAKE: ICD-10-CM

## 2017-10-17 DIAGNOSIS — D64.9 ANEMIA, UNSPECIFIED TYPE: ICD-10-CM

## 2017-10-17 PROCEDURE — 85025 COMPLETE CBC W/AUTO DIFF WBC: CPT | Performed by: INTERNAL MEDICINE

## 2017-10-17 PROCEDURE — 36415 COLL VENOUS BLD VENIPUNCTURE: CPT

## 2017-10-17 PROCEDURE — 82043 UR ALBUMIN QUANTITATIVE: CPT

## 2017-10-17 PROCEDURE — 83540 ASSAY OF IRON: CPT

## 2017-10-17 PROCEDURE — 84466 ASSAY OF TRANSFERRIN: CPT

## 2017-10-17 PROCEDURE — 80069 RENAL FUNCTION PANEL: CPT

## 2017-10-17 PROCEDURE — 82570 ASSAY OF URINE CREATININE: CPT

## 2017-10-20 ENCOUNTER — TELEPHONE (OUTPATIENT)
Dept: NEPHROLOGY | Facility: CLINIC | Age: 62
End: 2017-10-20

## 2017-10-20 DIAGNOSIS — E86.0 DEHYDRATION, MODERATE: Primary | ICD-10-CM

## 2017-10-20 NOTE — TELEPHONE ENCOUNTER
Spoke to wife. They went out to eat to [address] last night. 2 hours ago, he threw up a couple times and then 30-45 minutes ago he started having diarrhea. He didn't eat any raw food (believes he ate rice & shrimp) and no one else is sick. The last time he went to a buffet-type place, he ended up in the hospital; this was in August. That time, diarrhea and vomiting were more constant and he ended up having kidney problems. She is trying to get him to drink some Gatorade; he's reluctant but drinking small amounts. He has not eaten breakfast. Blood sugar 1 hour ago was 179. He hasn't taken insulin in fear BS will drop. She's not going to be able to bring him in without him having an accident in the car so she'll plan on rescheduling him. She mentioned he gets hydrocodone from [facility] so she's wondering if [address] [place] [address] Road would be able to refill this.

## 2017-10-20 NOTE — TELEPHONE ENCOUNTER
Wife states pt has an appt today and for the past hour and a half he has been throwing up and having diarrhea. Wife would like to receive a call regarding what she should do.  Please call [phone] thank you

## 2017-10-23 ENCOUNTER — OFFICE VISIT (OUTPATIENT)
Dept: NEPHROLOGY | Facility: CLINIC | Age: 62
End: 2017-10-23

## 2017-10-23 ENCOUNTER — APPOINTMENT (OUTPATIENT)
Dept: LAB | Age: 62
End: 2017-10-23
Attending: INTERNAL MEDICINE
Payer: MEDICARE

## 2017-10-23 VITALS
HEART RATE: 64 BPM | HEIGHT: 69 IN | WEIGHT: 235.63 LBS | BODY MASS INDEX: 34.9 KG/M2 | DIASTOLIC BLOOD PRESSURE: 60 MMHG | SYSTOLIC BLOOD PRESSURE: 100 MMHG

## 2017-10-23 DIAGNOSIS — N18.30 CKD STAGE 3 DUE TO TYPE 2 DIABETES MELLITUS (HCC): Primary | ICD-10-CM

## 2017-10-23 DIAGNOSIS — E11.22 CKD STAGE 3 DUE TO TYPE 2 DIABETES MELLITUS (HCC): Primary | ICD-10-CM

## 2017-10-23 DIAGNOSIS — A09 DIARRHEA OF INFECTIOUS ORIGIN: ICD-10-CM

## 2017-10-23 PROCEDURE — 99213 OFFICE O/P EST LOW 20 MIN: CPT | Performed by: INTERNAL MEDICINE

## 2017-10-23 PROCEDURE — 85025 COMPLETE CBC W/AUTO DIFF WBC: CPT | Performed by: INTERNAL MEDICINE

## 2017-10-23 PROCEDURE — 80048 BASIC METABOLIC PNL TOTAL CA: CPT | Performed by: INTERNAL MEDICINE

## 2017-10-23 PROCEDURE — G0463 HOSPITAL OUTPT CLINIC VISIT: HCPCS | Performed by: INTERNAL MEDICINE

## 2017-10-23 RX ORDER — HYDROCODONE BITARTRATE AND ACETAMINOPHEN 10; 325 MG/1; MG/1
1 TABLET ORAL EVERY 8 HOURS PRN
Qty: 90 TABLET | Refills: 0 | Status: SHIPPED | OUTPATIENT
Start: 2017-10-23 | End: 2017-10-23

## 2017-10-23 RX ORDER — GLIMEPIRIDE 2 MG/1
2 TABLET ORAL
Qty: 90 TABLET | Refills: 3 | Status: SHIPPED | OUTPATIENT
Start: 2017-10-23 | End: 2018-10-08

## 2017-10-23 RX ORDER — HYDROCODONE BITARTRATE AND ACETAMINOPHEN 10; 325 MG/1; MG/1
1 TABLET ORAL EVERY 8 HOURS PRN
Qty: 90 TABLET | Refills: 0 | Status: SHIPPED | OUTPATIENT
Start: 2017-11-22 | End: 2017-12-22

## 2017-10-23 NOTE — PROGRESS NOTES
Olivier Yoandy is here after an episode of food food poisoning last week.   He went to a buffet and he feels he got food poisoning from that had some nausea or vomiting I instructed him over the phone to have Jell-O clear liquids etc.  He states his sugars are doi

## 2017-12-06 ENCOUNTER — OFFICE VISIT (OUTPATIENT)
Dept: ENDOCRINOLOGY CLINIC | Facility: CLINIC | Age: 62
End: 2017-12-06

## 2017-12-06 VITALS
HEART RATE: 76 BPM | DIASTOLIC BLOOD PRESSURE: 74 MMHG | WEIGHT: 238 LBS | HEIGHT: 69 IN | SYSTOLIC BLOOD PRESSURE: 128 MMHG | BODY MASS INDEX: 35.25 KG/M2

## 2017-12-06 DIAGNOSIS — Z79.4 UNCONTROLLED TYPE 2 DIABETES MELLITUS WITH COMPLICATION, WITH LONG-TERM CURRENT USE OF INSULIN (HCC): Primary | ICD-10-CM

## 2017-12-06 DIAGNOSIS — E11.8 UNCONTROLLED TYPE 2 DIABETES MELLITUS WITH COMPLICATION, WITH LONG-TERM CURRENT USE OF INSULIN (HCC): Primary | ICD-10-CM

## 2017-12-06 DIAGNOSIS — E11.65 UNCONTROLLED TYPE 2 DIABETES MELLITUS WITH COMPLICATION, WITH LONG-TERM CURRENT USE OF INSULIN (HCC): Primary | ICD-10-CM

## 2017-12-06 PROCEDURE — 36416 COLLJ CAPILLARY BLOOD SPEC: CPT | Performed by: INTERNAL MEDICINE

## 2017-12-06 PROCEDURE — 82962 GLUCOSE BLOOD TEST: CPT | Performed by: INTERNAL MEDICINE

## 2017-12-06 PROCEDURE — 99214 OFFICE O/P EST MOD 30 MIN: CPT | Performed by: INTERNAL MEDICINE

## 2017-12-06 PROCEDURE — 90686 IIV4 VACC NO PRSV 0.5 ML IM: CPT | Performed by: INTERNAL MEDICINE

## 2017-12-06 PROCEDURE — 83036 HEMOGLOBIN GLYCOSYLATED A1C: CPT | Performed by: INTERNAL MEDICINE

## 2017-12-06 PROCEDURE — G0008 ADMIN INFLUENZA VIRUS VAC: HCPCS | Performed by: INTERNAL MEDICINE

## 2017-12-06 NOTE — PROGRESS NOTES
Name: Justice Macias  Date: 12/6/2017    Referring Physician: No ref. provider found    HISTORY OF PRESENT ILLNESS   Justice Macias is a 57year old male who presents for diabetes mellitus.       Prior HbA, C or glycohemoglobin were 9.8% 7/2014; 7.7 ONCE DAILY, Disp: 90 tablet, Rfl: 1  •  insulin aspart (NOVOLOG) 100 UNIT/ML Subcutaneous Solution, 8 units with breakfast, 8 units with lunch, 12 units with dinner (Patient taking differently: 6 units with breakfast, 6 units with lunch, 8 units with dinne Packs/day: 0.00      Years: 0.00           Quit date: 11/11/1989    Smokeless tobacco: Never Used                        Comment: quit about 30 years ago. Alcohol use:  No              Drug use: No            Other Topics            Concern  Caf 7.2% -->stable   -Congratulated patient on significantly improved BG levels and improved diet  -Discussed importance of glycemic control to prevent complications of diabetes  -Discussed complications of diabetes include retinopathy, neuropathy, nephropathy

## 2017-12-22 ENCOUNTER — OFFICE VISIT (OUTPATIENT)
Dept: NEPHROLOGY | Facility: CLINIC | Age: 62
End: 2017-12-22

## 2017-12-22 VITALS
HEIGHT: 69 IN | WEIGHT: 236.63 LBS | HEART RATE: 73 BPM | BODY MASS INDEX: 35.05 KG/M2 | SYSTOLIC BLOOD PRESSURE: 99 MMHG | DIASTOLIC BLOOD PRESSURE: 62 MMHG

## 2017-12-22 DIAGNOSIS — E10.22 CKD STAGE 4 DUE TO TYPE 1 DIABETES MELLITUS (HCC): ICD-10-CM

## 2017-12-22 DIAGNOSIS — N18.4 CKD STAGE 4 DUE TO TYPE 1 DIABETES MELLITUS (HCC): ICD-10-CM

## 2017-12-22 DIAGNOSIS — I10 ESSENTIAL HYPERTENSION: ICD-10-CM

## 2017-12-22 DIAGNOSIS — E11.9 TYPE 2 DIABETES MELLITUS WITHOUT COMPLICATION, WITHOUT LONG-TERM CURRENT USE OF INSULIN (HCC): Primary | ICD-10-CM

## 2017-12-22 PROCEDURE — G0463 HOSPITAL OUTPT CLINIC VISIT: HCPCS | Performed by: INTERNAL MEDICINE

## 2017-12-22 PROCEDURE — 99213 OFFICE O/P EST LOW 20 MIN: CPT | Performed by: INTERNAL MEDICINE

## 2017-12-22 RX ORDER — HYDROCODONE BITARTRATE AND ACETAMINOPHEN 10; 325 MG/1; MG/1
1 TABLET ORAL EVERY 8 HOURS PRN
Qty: 90 TABLET | Refills: 0 | Status: SHIPPED | OUTPATIENT
Start: 2018-01-29 | End: 2018-02-21

## 2017-12-22 RX ORDER — HYDROCODONE BITARTRATE AND ACETAMINOPHEN 10; 325 MG/1; MG/1
1 TABLET ORAL EVERY 8 HOURS PRN
Qty: 90 TABLET | Refills: 0 | Status: SHIPPED | OUTPATIENT
Start: 2017-12-22 | End: 2017-12-22

## 2017-12-22 RX ORDER — SYRINGE AND NEEDLE,INSULIN,1ML 31GX15/64"
SYRINGE, EMPTY DISPOSABLE MISCELLANEOUS
COMMUNITY
Start: 2017-11-09 | End: 2018-03-15

## 2017-12-22 RX ORDER — MELATONIN
325
COMMUNITY
End: 2017-12-22 | Stop reason: CLARIF

## 2017-12-27 NOTE — PROGRESS NOTES
Salina Mccarthy is here with his wife Boogie Marti for follow-up he is type II diabetic with CKD stage IV.   He has been stable his blood pressures been good denies any chest pain shortness of breath his neuropathy still persists he is taking Lyrica and he is taking Nor

## 2018-01-26 ENCOUNTER — HOSPITAL ENCOUNTER (OUTPATIENT)
Dept: GENERAL RADIOLOGY | Age: 63
Discharge: HOME | End: 2018-01-26
Attending: INTERNAL MEDICINE
Payer: MEDICARE

## 2018-01-26 ENCOUNTER — HOSPITAL ENCOUNTER (OUTPATIENT)
Dept: GENERAL RADIOLOGY | Age: 63
Discharge: HOME | End: 2018-01-26
Attending: INTERNAL MEDICINE | Admitting: INTERNAL MEDICINE
Payer: MEDICARE

## 2018-01-26 ENCOUNTER — OFFICE VISIT (OUTPATIENT)
Dept: INTERNAL MEDICINE CLINIC | Facility: CLINIC | Age: 63
End: 2018-01-26

## 2018-01-26 VITALS
HEIGHT: 69 IN | SYSTOLIC BLOOD PRESSURE: 130 MMHG | HEART RATE: 70 BPM | DIASTOLIC BLOOD PRESSURE: 79 MMHG | BODY MASS INDEX: 34.07 KG/M2 | TEMPERATURE: 98 F | WEIGHT: 230 LBS

## 2018-01-26 DIAGNOSIS — M25.511 PAIN OF BOTH SHOULDER JOINTS: Primary | ICD-10-CM

## 2018-01-26 DIAGNOSIS — M25.511 PAIN OF BOTH SHOULDER JOINTS: ICD-10-CM

## 2018-01-26 DIAGNOSIS — E78.5 HYPERLIPIDEMIA, UNSPECIFIED HYPERLIPIDEMIA TYPE: ICD-10-CM

## 2018-01-26 DIAGNOSIS — M25.512 PAIN OF BOTH SHOULDER JOINTS: Primary | ICD-10-CM

## 2018-01-26 DIAGNOSIS — M25.512 PAIN OF BOTH SHOULDER JOINTS: ICD-10-CM

## 2018-01-26 DIAGNOSIS — I10 ESSENTIAL HYPERTENSION: ICD-10-CM

## 2018-01-26 DIAGNOSIS — E11.65 UNCONTROLLED TYPE 2 DIABETES MELLITUS WITH DIABETIC NEPHROPATHY, WITHOUT LONG-TERM CURRENT USE OF INSULIN (HCC): ICD-10-CM

## 2018-01-26 DIAGNOSIS — E11.21 UNCONTROLLED TYPE 2 DIABETES MELLITUS WITH DIABETIC NEPHROPATHY, WITHOUT LONG-TERM CURRENT USE OF INSULIN (HCC): ICD-10-CM

## 2018-01-26 DIAGNOSIS — M47.22 OSTEOARTHRITIS OF SPINE WITH RADICULOPATHY, CERVICAL REGION: ICD-10-CM

## 2018-01-26 PROCEDURE — 73030 X-RAY EXAM OF SHOULDER: CPT | Performed by: INTERNAL MEDICINE

## 2018-01-26 PROCEDURE — G0463 HOSPITAL OUTPT CLINIC VISIT: HCPCS | Performed by: INTERNAL MEDICINE

## 2018-01-26 PROCEDURE — 99214 OFFICE O/P EST MOD 30 MIN: CPT | Performed by: INTERNAL MEDICINE

## 2018-02-01 PROBLEM — M47.22 OSTEOARTHRITIS OF SPINE WITH RADICULOPATHY, CERVICAL REGION: Status: ACTIVE | Noted: 2018-02-01

## 2018-02-01 PROBLEM — M19.019 PRIMARY OSTEOARTHRITIS OF SHOULDER: Status: ACTIVE | Noted: 2018-02-01

## 2018-02-01 PROBLEM — M48.02 SPINAL STENOSIS OF CERVICAL REGION: Status: ACTIVE | Noted: 2018-02-01

## 2018-02-06 ENCOUNTER — HOSPITAL ENCOUNTER (OUTPATIENT)
Dept: MRI IMAGING | Age: 63
Discharge: HOME | End: 2018-02-06
Attending: ORTHOPAEDIC SURGERY
Payer: MEDICARE

## 2018-02-06 DIAGNOSIS — M19.011 PRIMARY OSTEOARTHRITIS OF BOTH SHOULDERS: ICD-10-CM

## 2018-02-06 DIAGNOSIS — M47.22 OSTEOARTHRITIS OF SPINE WITH RADICULOPATHY, CERVICAL REGION: ICD-10-CM

## 2018-02-06 DIAGNOSIS — M19.012 PRIMARY OSTEOARTHRITIS OF BOTH SHOULDERS: ICD-10-CM

## 2018-02-06 DIAGNOSIS — M48.02 SPINAL STENOSIS OF CERVICAL REGION: ICD-10-CM

## 2018-02-06 PROCEDURE — 72141 MRI NECK SPINE W/O DYE: CPT | Performed by: ORTHOPAEDIC SURGERY

## 2018-02-06 NOTE — PROGRESS NOTES
HPI:    Patient ID: Lucina Lei is a 57year old male.     HPI    Bilateral shoulder pain chronic  Worsening  Hx of cervical spinal osteoarthritis and  Stenosis    /79 (BP Location: Left arm, Patient Position: Sitting, Cuff Size: large)   Pulse and headaches. Hematological: Negative for adenopathy. Does not bruise/bleed easily. Psychiatric/Behavioral: Negative for agitation and behavioral problems.            Current Outpatient Prescriptions:  RELION INSULIN SYRINGE 31G X 15/64\" 0.5 ML Does n Lozenge Take 5,000 mcg by mouth daily. Disp: 30 lozenge Rfl: 0   Omega-3 Fatty Acids (FISH OIL) 600 MG Oral Cap Take 1 capsule by mouth daily. Disp:  Rfl:    Multiple Vitamins-Minerals (CENTRUM SILVER) Oral Tab Take 1 tablet by mouth daily.  Disp:  Rfl: icterus. Neck: Neck supple. No thyromegaly present. Cardiovascular: Normal rate, regular rhythm and normal heart sounds. No murmur heard. Pulmonary/Chest: Effort normal and breath sounds normal. No respiratory distress. He has no wheezes.  He has no CAD discussed. Patient voiced understanding and agrees with current plan and management.   Patient voiced understanding  and agrees with plan        Meds This Visit:  No prescriptions requested or ordered in this encounter    Imaging & Referrals:  Carter Camara

## 2018-02-15 RX ORDER — BLOOD-GLUCOSE METER
KIT MISCELLANEOUS
Qty: 300 STRIP | Refills: 1 | Status: SHIPPED | OUTPATIENT
Start: 2018-02-15 | End: 2018-02-22

## 2018-02-16 ENCOUNTER — APPOINTMENT (OUTPATIENT)
Dept: LAB | Age: 63
End: 2018-02-16
Attending: INTERNAL MEDICINE
Payer: MEDICARE

## 2018-02-16 DIAGNOSIS — E11.8 UNCONTROLLED TYPE 2 DIABETES MELLITUS WITH COMPLICATION, WITH LONG-TERM CURRENT USE OF INSULIN (HCC): ICD-10-CM

## 2018-02-16 DIAGNOSIS — E11.65 UNCONTROLLED TYPE 2 DIABETES MELLITUS WITH COMPLICATION, WITH LONG-TERM CURRENT USE OF INSULIN (HCC): ICD-10-CM

## 2018-02-16 DIAGNOSIS — Z79.4 UNCONTROLLED TYPE 2 DIABETES MELLITUS WITH COMPLICATION, WITH LONG-TERM CURRENT USE OF INSULIN (HCC): ICD-10-CM

## 2018-02-16 LAB
CHOLEST SERPL-MCNC: 99 MG/DL (ref 110–200)
HDLC SERPL-MCNC: 32 MG/DL
LDLC SERPL CALC-MCNC: 42 MG/DL (ref 0–99)
NONHDLC SERPL-MCNC: 67 MG/DL
TRIGL SERPL-MCNC: 124 MG/DL (ref 1–149)

## 2018-02-16 PROCEDURE — 80061 LIPID PANEL: CPT

## 2018-02-16 PROCEDURE — 36415 COLL VENOUS BLD VENIPUNCTURE: CPT

## 2018-02-21 ENCOUNTER — OFFICE VISIT (OUTPATIENT)
Dept: NEPHROLOGY | Facility: CLINIC | Age: 63
End: 2018-02-21

## 2018-02-21 VITALS
HEIGHT: 69 IN | BODY MASS INDEX: 34.42 KG/M2 | WEIGHT: 232.38 LBS | HEART RATE: 62 BPM | DIASTOLIC BLOOD PRESSURE: 66 MMHG | SYSTOLIC BLOOD PRESSURE: 101 MMHG

## 2018-02-21 DIAGNOSIS — I10 ESSENTIAL HYPERTENSION: ICD-10-CM

## 2018-02-21 DIAGNOSIS — E11.9 TYPE 2 DIABETES MELLITUS WITHOUT COMPLICATION, WITHOUT LONG-TERM CURRENT USE OF INSULIN (HCC): Primary | ICD-10-CM

## 2018-02-21 DIAGNOSIS — N18.4 CKD (CHRONIC KIDNEY DISEASE) STAGE 4, GFR 15-29 ML/MIN (HCC): ICD-10-CM

## 2018-02-21 PROCEDURE — 99213 OFFICE O/P EST LOW 20 MIN: CPT | Performed by: INTERNAL MEDICINE

## 2018-02-21 PROCEDURE — G0463 HOSPITAL OUTPT CLINIC VISIT: HCPCS | Performed by: INTERNAL MEDICINE

## 2018-02-21 RX ORDER — HYDROCODONE BITARTRATE AND ACETAMINOPHEN 10; 325 MG/1; MG/1
1 TABLET ORAL EVERY 8 HOURS PRN
Qty: 90 TABLET | Refills: 0 | Status: SHIPPED | OUTPATIENT
Start: 2018-02-21 | End: 2018-02-21

## 2018-02-21 RX ORDER — PREGABALIN 300 MG/1
300 CAPSULE ORAL DAILY
Qty: 90 CAPSULE | Refills: 6 | Status: SHIPPED | OUTPATIENT
Start: 2018-02-21 | End: 2018-09-12

## 2018-02-21 RX ORDER — HYDROCODONE BITARTRATE AND ACETAMINOPHEN 10; 325 MG/1; MG/1
1 TABLET ORAL EVERY 8 HOURS PRN
Qty: 90 TABLET | Refills: 0 | Status: SHIPPED | OUTPATIENT
Start: 2018-03-21 | End: 2018-04-18

## 2018-02-22 ENCOUNTER — TELEPHONE (OUTPATIENT)
Dept: ENDOCRINOLOGY CLINIC | Facility: CLINIC | Age: 63
End: 2018-02-22

## 2018-02-22 RX ORDER — BLOOD-GLUCOSE METER
KIT MISCELLANEOUS
Qty: 300 STRIP | Refills: 1 | Status: SHIPPED | OUTPATIENT
Start: 2018-02-22 | End: 2018-03-15

## 2018-02-22 NOTE — TELEPHONE ENCOUNTER
Pts wife states that RX for test strips needs ICD 10 codes on it for Medicare. Pt is out of strips. Please call when RX is refaxed.       Current Outpatient Prescriptions:     •  FREESTYLE LITE TEST In Vitro Strip, USE ONE STRIP TO CHECK GLUCOSE THREE T

## 2018-02-22 NOTE — TELEPHONE ENCOUNTER
[facility] faxed request for new test strip prescription with ICD-10 codes written on prescription. Called pharmacy and verified order is being processed. No action needed at this time.

## 2018-02-22 NOTE — PROGRESS NOTES
Ck Mcmahon is stable today. Refilled his Lyrica for his neuropathy I also filled his Norco which he takes 3 times a day. He states his sugars are doing well in his pressures are doing well. He has had no GI disturbances he will follow-up with Dr. Tashia Sy.   H

## 2018-03-02 ENCOUNTER — OFFICE VISIT (OUTPATIENT)
Dept: NEUROLOGY | Facility: CLINIC | Age: 63
End: 2018-03-02

## 2018-03-02 VITALS — BODY MASS INDEX: 34.36 KG/M2 | WEIGHT: 232 LBS | HEIGHT: 69 IN

## 2018-03-02 DIAGNOSIS — G56.23 ULNAR NEUROPATHY OF BOTH UPPER EXTREMITIES: ICD-10-CM

## 2018-03-02 DIAGNOSIS — G56.03 BILATERAL CARPAL TUNNEL SYNDROME: Primary | ICD-10-CM

## 2018-03-02 DIAGNOSIS — G62.9 PERIPHERAL POLYNEUROPATHY: ICD-10-CM

## 2018-03-02 DIAGNOSIS — M54.12 CERVICAL RADICULOPATHY: ICD-10-CM

## 2018-03-02 PROCEDURE — 95913 NRV CNDJ TEST 13/> STUDIES: CPT | Performed by: OTHER

## 2018-03-02 PROCEDURE — 95886 MUSC TEST DONE W/N TEST COMP: CPT | Performed by: OTHER

## 2018-03-02 NOTE — PROCEDURES
[address]  [employer]  Phone: [phone]  Fax: [phone]    ELECTRODIAGNOSTIC REPORT          Patient: Tolu Mobley YOB: 1955  Patient ID: [id number] Hand Dominance: o the peak-to-peak amplitude was reduced for Wrist stimulation  ? In the R ULNAR - Digit  V study  o the response was considered absent for Wrist stimulation  ?  In the L RADIAL - Wrist study  o the peak amplitude was reduced for Forearm stimulation    The Wrist ADM 2.97 8.4 6.30 Wrist - ADM 8        B. Elbow ADM 8.91 8.6 6.77 B. Elbow - Wrist 30 5.94 51       Sensory NCS      Nerve / Sites Rec.  Site Onset Lat Peak Lat NP Amp PP Amp Segments Distance Velocity     ms ms µV µV  cm m/s   L MEDIAN - Digit II

## 2018-03-03 ENCOUNTER — LAB ENCOUNTER (OUTPATIENT)
Dept: LAB | Age: 63
End: 2018-03-03
Attending: INTERNAL MEDICINE
Payer: MEDICARE

## 2018-03-03 DIAGNOSIS — N18.4 CKD STAGE 4 DUE TO TYPE 1 DIABETES MELLITUS (HCC): ICD-10-CM

## 2018-03-03 DIAGNOSIS — E11.9 TYPE 2 DIABETES MELLITUS WITHOUT COMPLICATION, WITHOUT LONG-TERM CURRENT USE OF INSULIN (HCC): ICD-10-CM

## 2018-03-03 DIAGNOSIS — I10 ESSENTIAL HYPERTENSION: ICD-10-CM

## 2018-03-03 DIAGNOSIS — E10.22 CKD STAGE 4 DUE TO TYPE 1 DIABETES MELLITUS (HCC): ICD-10-CM

## 2018-03-03 LAB
ALBUMIN SERPL BCP-MCNC: 3.9 G/DL (ref 3.5–4.8)
ANION GAP SERPL CALC-SCNC: 7 MMOL/L (ref 0–18)
BASOPHILS # BLD: 0 K/UL (ref 0–0.2)
BASOPHILS NFR BLD: 1 %
BUN SERPL-MCNC: 50 MG/DL (ref 8–20)
BUN/CREAT SERPL: 18.5 (ref 10–20)
CALCIUM SERPL-MCNC: 9.7 MG/DL (ref 8.5–10.5)
CHLORIDE SERPL-SCNC: 109 MMOL/L (ref 95–110)
CO2 SERPL-SCNC: 27 MMOL/L (ref 22–32)
CREAT SERPL-MCNC: 2.7 MG/DL (ref 0.5–1.5)
EOSINOPHIL # BLD: 0.1 K/UL (ref 0–0.7)
EOSINOPHIL NFR BLD: 3 %
ERYTHROCYTE [DISTWIDTH] IN BLOOD BY AUTOMATED COUNT: 16 % (ref 11–15)
GLUCOSE SERPL-MCNC: 205 MG/DL (ref 70–99)
HCT VFR BLD AUTO: 31.2 % (ref 41–52)
HGB BLD-MCNC: 10.6 G/DL (ref 13.5–17.5)
LYMPHOCYTES # BLD: 1.1 K/UL (ref 1–4)
LYMPHOCYTES NFR BLD: 31 %
MCH RBC QN AUTO: 34.1 PG (ref 27–32)
MCHC RBC AUTO-ENTMCNC: 33.8 G/DL (ref 32–37)
MCV RBC AUTO: 100.8 FL (ref 80–100)
MONOCYTES # BLD: 0.3 K/UL (ref 0–1)
MONOCYTES NFR BLD: 10 %
NEUTROPHILS # BLD AUTO: 2 K/UL (ref 1.8–7.7)
NEUTROPHILS NFR BLD: 57 %
OSMOLALITY UR CALC.SUM OF ELEC: 315 MOSM/KG (ref 275–295)
PHOSPHATE SERPL-MCNC: 3.5 MG/DL (ref 2.4–4.7)
PLATELET # BLD AUTO: 102 K/UL (ref 140–400)
PMV BLD AUTO: 9.6 FL (ref 7.4–10.3)
POTASSIUM SERPL-SCNC: 5.4 MMOL/L (ref 3.3–5.1)
RBC # BLD AUTO: 3.1 M/UL (ref 4.5–5.9)
SODIUM SERPL-SCNC: 143 MMOL/L (ref 136–144)
WBC # BLD AUTO: 3.5 K/UL (ref 4–11)

## 2018-03-03 PROCEDURE — 36415 COLL VENOUS BLD VENIPUNCTURE: CPT

## 2018-03-03 PROCEDURE — 80069 RENAL FUNCTION PANEL: CPT

## 2018-03-03 PROCEDURE — 85025 COMPLETE CBC W/AUTO DIFF WBC: CPT

## 2018-03-06 PROBLEM — G56.03 BILATERAL CARPAL TUNNEL SYNDROME: Status: ACTIVE | Noted: 2018-03-06

## 2018-03-06 PROBLEM — M19.012 PRIMARY OSTEOARTHRITIS OF BOTH SHOULDERS: Status: ACTIVE | Noted: 2018-02-01

## 2018-03-06 PROBLEM — M19.011 PRIMARY OSTEOARTHRITIS OF BOTH SHOULDERS: Status: ACTIVE | Noted: 2018-02-01

## 2018-03-07 ENCOUNTER — OFFICE VISIT (OUTPATIENT)
Dept: ENDOCRINOLOGY CLINIC | Facility: CLINIC | Age: 63
End: 2018-03-07

## 2018-03-07 VITALS
HEIGHT: 69 IN | WEIGHT: 238 LBS | DIASTOLIC BLOOD PRESSURE: 67 MMHG | BODY MASS INDEX: 35.25 KG/M2 | HEART RATE: 73 BPM | SYSTOLIC BLOOD PRESSURE: 107 MMHG

## 2018-03-07 DIAGNOSIS — E11.65 UNCONTROLLED TYPE 2 DIABETES MELLITUS WITH HYPERGLYCEMIA, WITH LONG-TERM CURRENT USE OF INSULIN (HCC): Primary | ICD-10-CM

## 2018-03-07 DIAGNOSIS — Z79.4 UNCONTROLLED TYPE 2 DIABETES MELLITUS WITH HYPERGLYCEMIA, WITH LONG-TERM CURRENT USE OF INSULIN (HCC): Primary | ICD-10-CM

## 2018-03-07 LAB
CARTRIDGE LOT#: ABNORMAL NUMERIC
GLUCOSE BLOOD: 193
HEMOGLOBIN A1C: 7.4 % (ref 4.3–5.6)
TEST STRIP LOT #: NORMAL NUMERIC

## 2018-03-07 PROCEDURE — 36416 COLLJ CAPILLARY BLOOD SPEC: CPT | Performed by: INTERNAL MEDICINE

## 2018-03-07 PROCEDURE — 83036 HEMOGLOBIN GLYCOSYLATED A1C: CPT | Performed by: INTERNAL MEDICINE

## 2018-03-07 PROCEDURE — 82962 GLUCOSE BLOOD TEST: CPT | Performed by: INTERNAL MEDICINE

## 2018-03-07 PROCEDURE — 99214 OFFICE O/P EST MOD 30 MIN: CPT | Performed by: INTERNAL MEDICINE

## 2018-03-07 NOTE — PATIENT INSTRUCTIONS
Dexcom Continuous Glucose Monitor     Lantus 34 units SQ daily     INSULIN SLIDING SCALE  Base Values  Breakfast: 6  Lunch: 6  Dinner: 8  Ranges:  80-99: -2  100-119: 0  120-139: 0  140-159: 1  160-179: 1  180-199: 1  200-219: 2  220-239: 2  240-259: 2  26

## 2018-03-07 NOTE — PROGRESS NOTES
Name: Drew Vogt  Date: 3/7/2018    Referring Physician: No ref. provider found    HISTORY OF PRESENT ILLNESS   Drew Vogt is a 57year old male who presents for diabetes mellitus.       He has developed significant exacerbation of pain in h tablet by mouth every 8 (eight) hours as needed for Pain., Disp: 90 tablet, Rfl: 0  •  pregabalin 300 MG Oral Cap, Take 1 capsule (300 mg total) by mouth daily. , Disp: 90 capsule, Rfl: 6  •  RELION INSULIN SYRINGE 31G X 15/64\" 0.5 ML Does not apply Misc, Vitamins-Minerals (CENTRUM SILVER) Oral Tab, Take 1 tablet by mouth daily. , Disp: , Rfl:   •  Lancets 28G Does not apply Misc, , Disp: , Rfl:      Allergies:     Cefdinir                Diarrhea    Social History:   Social History    Marital status: [place] tenderness  Respiratory:  clear to auscultation bilaterally  Cardiovascular:  regular rate, rhythm, , no murmurs, S3 or S4  Gastrointestinal:  normal bowel sounds and no palpable masses in abdomen, organomegaly or tenderness   Musculoskeletal:  normal musc

## 2018-03-10 ENCOUNTER — TELEPHONE (OUTPATIENT)
Dept: NEPHROLOGY | Facility: CLINIC | Age: 63
End: 2018-03-10

## 2018-03-11 PROBLEM — G56.21 ULNAR NEUROPATHY OF RIGHT UPPER EXTREMITY: Status: ACTIVE | Noted: 2018-03-11

## 2018-03-12 RX ORDER — ATORVASTATIN CALCIUM 40 MG/1
TABLET, FILM COATED ORAL
Qty: 90 TABLET | Refills: 1 | Status: SHIPPED | OUTPATIENT
Start: 2018-03-12 | End: 2018-10-03

## 2018-03-15 ENCOUNTER — TELEPHONE (OUTPATIENT)
Dept: NEUROLOGY | Facility: CLINIC | Age: 63
End: 2018-03-15

## 2018-03-15 ENCOUNTER — OFFICE VISIT (OUTPATIENT)
Dept: NEUROLOGY | Facility: CLINIC | Age: 63
End: 2018-03-15

## 2018-03-15 VITALS
WEIGHT: 242 LBS | HEART RATE: 84 BPM | BODY MASS INDEX: 35.84 KG/M2 | SYSTOLIC BLOOD PRESSURE: 110 MMHG | HEIGHT: 69 IN | RESPIRATION RATE: 17 BRPM | DIASTOLIC BLOOD PRESSURE: 64 MMHG

## 2018-03-15 DIAGNOSIS — M50.20 CERVICAL HERNIATED DISC: ICD-10-CM

## 2018-03-15 DIAGNOSIS — M54.2 NECK PAIN: ICD-10-CM

## 2018-03-15 DIAGNOSIS — M25.512 CHRONIC PAIN OF BOTH SHOULDERS: ICD-10-CM

## 2018-03-15 DIAGNOSIS — M19.012 PRIMARY OSTEOARTHRITIS OF BOTH SHOULDERS: ICD-10-CM

## 2018-03-15 DIAGNOSIS — M79.642 LEFT HAND PAIN: ICD-10-CM

## 2018-03-15 DIAGNOSIS — G89.29 CHRONIC PAIN OF BOTH SHOULDERS: ICD-10-CM

## 2018-03-15 DIAGNOSIS — M19.011 PRIMARY OSTEOARTHRITIS OF BOTH SHOULDERS: ICD-10-CM

## 2018-03-15 DIAGNOSIS — G56.22 ULNAR NEUROPATHY AT ELBOW OF LEFT UPPER EXTREMITY: ICD-10-CM

## 2018-03-15 DIAGNOSIS — G56.03 BILATERAL CARPAL TUNNEL SYNDROME: Primary | ICD-10-CM

## 2018-03-15 DIAGNOSIS — M48.02 SPINAL STENOSIS OF CERVICAL REGION: ICD-10-CM

## 2018-03-15 DIAGNOSIS — M54.12 CERVICAL RADICULOPATHY: ICD-10-CM

## 2018-03-15 DIAGNOSIS — M50.90 CERVICAL DISC DISEASE: ICD-10-CM

## 2018-03-15 DIAGNOSIS — M25.511 CHRONIC PAIN OF BOTH SHOULDERS: ICD-10-CM

## 2018-03-15 PROBLEM — G56.21 ULNAR NEUROPATHY OF RIGHT UPPER EXTREMITY: Status: RESOLVED | Noted: 2018-03-11 | Resolved: 2018-03-15

## 2018-03-15 PROCEDURE — 99204 OFFICE O/P NEW MOD 45 MIN: CPT | Performed by: PHYSICAL MEDICINE & REHABILITATION

## 2018-03-15 NOTE — TELEPHONE ENCOUNTER
Medicare Online for insurance coverage of left C7-T1 ILESI  cpt code 29477. Insurance was verified and procedure is a covered benefit and does not require authorization. Procedure is scheduled on 03/20/18. Will inform Nursing.

## 2018-03-15 NOTE — PATIENT INSTRUCTIONS
As of October 6th 2014, the Drug Enforcement Agency [employer]) is reclassifying all hydrocodone combination medications from Schedule III to Schedule II. This includes medications such as Norco, Vicodin, Lortab, Zohydro, and Vicoprofen.     What this means for y will do a C7-T1 ILESI under MAC. The patient will follow up in 3 months, but the patient will call me 2 weeks after having the injection to let me know how the injection worked.     He will need about 4 sessions of PT after the injections to make sure th

## 2018-03-15 NOTE — PROGRESS NOTES
Patient has been scheduled for a  Left C7-T1 ILESI under MAC   on 3/20/18 at the [facility]. Medications and allergies reviewed. Patient informed to hold aspirins, nsaids, blood thinners, vitamins and fish oils 3-7 days prior to procedure.  Patient informed we wi

## 2018-03-15 NOTE — PROGRESS NOTES
Cervical Pain H & P    Chief Complaint:  Patient presents with:  Neck Pain: former [facility] patient. new right handed patient here with chronic hx of neck pain radiating in both shoulders with tingling in the left arm.  pt had cervical injections with Dr. Yolande Mancilla gland dysfunction    • Pancreatitis 2012   • Proteinuria    • Type II or unspecified type diabetes mellitus without mention of complication, not stated as uncontrolled     Pills & Insulin   • Vitreous floaters        Past Surgical History   Past Surgical H environment allergies, food allergies, seasonal allergies. Gait:   The patient has no difficulty walking. PE:  The patient does appear in his stated age in no distress. The patient is well groomed.     Psychiatric:  The patient is alert and oriented bilateral upper extremities. Darby's sign Right: Negative   Darby's sigh Left: Negative     Hands:  Left 1st CMC joint is tender to palpation    Shoulder: The shoulders are stable.     Medial Border Scapular Winging: absent   Right Scapula: normal ali injection in the future. The patient understands and agrees with the stated plan. Bill Cates MD  3/15/2018

## 2018-03-20 ENCOUNTER — OFFICE VISIT (OUTPATIENT)
Dept: SURGERY | Facility: CLINIC | Age: 63
End: 2018-03-20

## 2018-03-20 DIAGNOSIS — M50.20 CERVICAL HERNIATED DISC: ICD-10-CM

## 2018-03-20 DIAGNOSIS — M50.90 CERVICAL DISC DISEASE: ICD-10-CM

## 2018-03-20 DIAGNOSIS — M54.12 CERVICAL RADICULOPATHY: Primary | ICD-10-CM

## 2018-03-20 PROCEDURE — 62321 NJX INTERLAMINAR CRV/THRC: CPT | Performed by: PHYSICAL MEDICINE & REHABILITATION

## 2018-03-20 NOTE — PROCEDURES
[facility].    CERVICAL INTERLAMINAR  NAME:  Diana Long    MR #:    [id number] :  1955     PHYSICIAN:  Aneesh Tate        Operative Report    DATE OF PROCEDURE: 3/20/2018   PREOPERATIVE DIAGNOSES: 1. left > right C5 The patient was given discharge instructions and will follow up in the clinic as scheduled. Throughout the whole procedure, the patient's pulse oximetry and vital signs were monitored and they remained completely stable.   Also, throughout the whole proce

## 2018-04-17 ENCOUNTER — PATIENT OUTREACH (OUTPATIENT)
Dept: CASE MANAGEMENT | Age: 63
End: 2018-04-17

## 2018-04-17 NOTE — PROGRESS NOTES
Outreached to patient in regards to enrollment to Chronic Care Management program. Spoke to patient's spouse (verified consent), and she stated that he would not be interest.     Patient identified with a potential need for Chronic Care Management services

## 2018-04-18 ENCOUNTER — OFFICE VISIT (OUTPATIENT)
Dept: NEPHROLOGY | Facility: CLINIC | Age: 63
End: 2018-04-18

## 2018-04-18 VITALS
WEIGHT: 235.81 LBS | HEART RATE: 73 BPM | BODY MASS INDEX: 34.93 KG/M2 | DIASTOLIC BLOOD PRESSURE: 73 MMHG | SYSTOLIC BLOOD PRESSURE: 123 MMHG | HEIGHT: 69 IN

## 2018-04-18 DIAGNOSIS — I10 ESSENTIAL HYPERTENSION: Primary | ICD-10-CM

## 2018-04-18 DIAGNOSIS — E11.9 TYPE 2 DIABETES MELLITUS WITHOUT COMPLICATION, WITHOUT LONG-TERM CURRENT USE OF INSULIN (HCC): ICD-10-CM

## 2018-04-18 DIAGNOSIS — N18.4 CKD STAGE 4 DUE TO TYPE 2 DIABETES MELLITUS (HCC): ICD-10-CM

## 2018-04-18 DIAGNOSIS — E11.22 CKD STAGE 4 DUE TO TYPE 2 DIABETES MELLITUS (HCC): ICD-10-CM

## 2018-04-18 PROCEDURE — 99212 OFFICE O/P EST SF 10 MIN: CPT | Performed by: INTERNAL MEDICINE

## 2018-04-18 PROCEDURE — G0463 HOSPITAL OUTPT CLINIC VISIT: HCPCS | Performed by: INTERNAL MEDICINE

## 2018-04-18 RX ORDER — HYDROCODONE BITARTRATE AND ACETAMINOPHEN 10; 325 MG/1; MG/1
1 TABLET ORAL EVERY 8 HOURS PRN
Qty: 90 TABLET | Refills: 0 | Status: SHIPPED | OUTPATIENT
Start: 2018-06-18 | End: 2018-05-23

## 2018-04-18 RX ORDER — HYDROCODONE BITARTRATE AND ACETAMINOPHEN 10; 325 MG/1; MG/1
1 TABLET ORAL EVERY 8 HOURS PRN
Qty: 90 TABLET | Refills: 0 | Status: SHIPPED | OUTPATIENT
Start: 2018-05-18 | End: 2018-04-18

## 2018-04-18 RX ORDER — HYDROCODONE BITARTRATE AND ACETAMINOPHEN 10; 325 MG/1; MG/1
1 TABLET ORAL EVERY 8 HOURS PRN
Qty: 90 TABLET | Refills: 0 | Status: SHIPPED | OUTPATIENT
Start: 2018-04-18 | End: 2018-04-18

## 2018-04-19 NOTE — PROGRESS NOTES
Dion Daily is here with his wife Gold Churchill. He is doing fine he is CKD stage IV and diabetes. He sees Dr. Sommer Hurley for his diabetes he has chronic neuropathy for which he takes Norco 3 times per day.   He does not abuse he says there is nothing different with his

## 2018-04-26 ENCOUNTER — TELEPHONE (OUTPATIENT)
Dept: NEPHROLOGY | Facility: CLINIC | Age: 63
End: 2018-04-26

## 2018-04-26 RX ORDER — HYDROCODONE BITARTRATE AND ACETAMINOPHEN 7.5; 325 MG/1; MG/1
1 TABLET ORAL EVERY 6 HOURS PRN
Qty: 90 TABLET | Refills: 0 | Status: SHIPPED | OUTPATIENT
Start: 2018-04-26 | End: 2018-05-23

## 2018-04-26 RX ORDER — HYDROCODONE BITARTRATE AND ACETAMINOPHEN 7.5; 325 MG/1; MG/1
1 TABLET ORAL EVERY 6 HOURS PRN
Qty: 90 TABLET | Refills: 0 | Status: SHIPPED | OUTPATIENT
Start: 2018-04-26 | End: 2018-06-06

## 2018-04-26 NOTE — TELEPHONE ENCOUNTER
Spoke to wife. States [facility] has told them that they do not have Norco  mg tablets available and they are not sure when they will be available. They do have the 5-325 mg tablets available.  He has some 7.5-325 mg tablets available but pt will need to

## 2018-04-26 NOTE — TELEPHONE ENCOUNTER
[facility] is going to write for the 7.5-325 tablets. Notified wife that I will call her in the morning to let her know script is available. [facility] is going to sign tonight.

## 2018-04-26 NOTE — TELEPHONE ENCOUNTER
Vanesa states [address] does not have Hydrocodone in stock - requesting to speak with RN. Pls call. Thank you.

## 2018-05-10 RX ORDER — FENOFIBRATE 48 MG/1
TABLET, COATED ORAL
Qty: 90 TABLET | Refills: 1 | Status: SHIPPED | OUTPATIENT
Start: 2018-05-10 | End: 2018-11-03

## 2018-05-22 PROBLEM — H35.00 BACKGROUND RETINOPATHY: Status: ACTIVE | Noted: 2018-05-22

## 2018-05-23 RX ORDER — HYDROCODONE BITARTRATE AND ACETAMINOPHEN 7.5; 325 MG/1; MG/1
1 TABLET ORAL EVERY 6 HOURS PRN
Qty: 90 TABLET | Refills: 0 | Status: SHIPPED | OUTPATIENT
Start: 2018-05-23 | End: 2018-07-09

## 2018-05-23 RX ORDER — HYDROCODONE BITARTRATE AND ACETAMINOPHEN 10; 325 MG/1; MG/1
1 TABLET ORAL EVERY 8 HOURS PRN
Qty: 90 TABLET | Refills: 0 | Status: SHIPPED | OUTPATIENT
Start: 2018-06-18 | End: 2018-07-09

## 2018-05-23 NOTE — TELEPHONE ENCOUNTER
Pt  is having a issue with rx for hydrocodone pharmacy is not getting it in. Needs to adjust dosage.        Current Outpatient Prescriptions:     •  hydrocodone-acetaminophen 7.5-325 MG Oral Tab, Take 1 tablet by mouth every 6 (six) hours as needed for Pain

## 2018-05-23 NOTE — TELEPHONE ENCOUNTER
Prescription for Hydrocodone cannot be faxed. Patient has to  the written, signed prescription from the doctor at the office and hand carry it to the pharmacy. This is a [place] law for the last several years.

## 2018-05-23 NOTE — TELEPHONE ENCOUNTER
What wife meant when she called is that when Beverli Burkitt had his Norco filled last time, the pharmacy didn't have [place]  mg tabs available. I spoke to the pharmacist and he confirms they still do not have the 10's available.  They do have some of the 7.5's l

## 2018-05-23 NOTE — TELEPHONE ENCOUNTER
[facility] signed script for Norco 7.5-325 mg tabs. Script that he approved earlier today for  mg discarded. Left pt's wife message that script is ready for .

## 2018-05-29 ENCOUNTER — TELEPHONE (OUTPATIENT)
Dept: ENDOCRINOLOGY CLINIC | Facility: CLINIC | Age: 63
End: 2018-05-29

## 2018-05-29 NOTE — TELEPHONE ENCOUNTER
Pt wife states pt is having cataract surgery and was told he needs a preop . Pt wife would like to know if dr Swetha Shah can see pt sooner then scheduled 06/13/18 and cataract surgery is scheduled for 06/14/18 Please call thank you.

## 2018-05-29 NOTE — TELEPHONE ENCOUNTER
Dr. Chucky Gonzalez please see below. Can you double book the patient sooner? Checked schedule and there are no MD approvals or openings in [facility]. However there is an MD approval slot tomorrow afternoon in [place]. OK to offer that appt slot?      Called patient's wife an

## 2018-05-30 NOTE — TELEPHONE ENCOUNTER
I am happy to provide clearance from a diabetic standpoint but looking at his records it does appear that he needs clearance from his PCP which includes cardiac clearance. I'm sure one of Dr. Yao Outhouse partners will be happy to see him prior to surgery.

## 2018-05-30 NOTE — TELEPHONE ENCOUNTER
Spoke with wife. She was able to get appt with Dr. Daniel Pizarro office on 6/6.  She did state again wasn't sure why [facility] couldn't provide full clearance but just explained that we don't do general surgical clearance in our office and better done with PCP office

## 2018-06-06 ENCOUNTER — APPOINTMENT (OUTPATIENT)
Dept: LAB | Age: 63
End: 2018-06-06
Attending: INTERNAL MEDICINE
Payer: MEDICARE

## 2018-06-06 ENCOUNTER — LAB ENCOUNTER (OUTPATIENT)
Dept: LAB | Age: 63
End: 2018-06-06
Attending: INTERNAL MEDICINE
Payer: MEDICARE

## 2018-06-06 ENCOUNTER — OFFICE VISIT (OUTPATIENT)
Dept: INTERNAL MEDICINE CLINIC | Facility: CLINIC | Age: 63
End: 2018-06-06

## 2018-06-06 ENCOUNTER — TELEPHONE (OUTPATIENT)
Dept: INTERNAL MEDICINE CLINIC | Facility: CLINIC | Age: 63
End: 2018-06-06

## 2018-06-06 VITALS
BODY MASS INDEX: 34.51 KG/M2 | TEMPERATURE: 98 F | HEART RATE: 70 BPM | SYSTOLIC BLOOD PRESSURE: 127 MMHG | DIASTOLIC BLOOD PRESSURE: 74 MMHG | HEIGHT: 69 IN | WEIGHT: 233 LBS

## 2018-06-06 DIAGNOSIS — E11.65 UNCONTROLLED TYPE 2 DIABETES MELLITUS WITH DIABETIC NEPHROPATHY, WITH LONG-TERM CURRENT USE OF INSULIN (HCC): ICD-10-CM

## 2018-06-06 DIAGNOSIS — Z79.4 TYPE 2 DIABETES MELLITUS WITH DIABETIC POLYNEUROPATHY, WITH LONG-TERM CURRENT USE OF INSULIN (HCC): ICD-10-CM

## 2018-06-06 DIAGNOSIS — Z01.818 PREOP EXAM FOR INTERNAL MEDICINE: Primary | ICD-10-CM

## 2018-06-06 DIAGNOSIS — E11.21 UNCONTROLLED TYPE 2 DIABETES MELLITUS WITH DIABETIC NEPHROPATHY, WITH LONG-TERM CURRENT USE OF INSULIN (HCC): ICD-10-CM

## 2018-06-06 DIAGNOSIS — E11.42 TYPE 2 DIABETES MELLITUS WITH DIABETIC POLYNEUROPATHY, WITH LONG-TERM CURRENT USE OF INSULIN (HCC): ICD-10-CM

## 2018-06-06 DIAGNOSIS — E11.22 CKD STAGE 4 DUE TO TYPE 2 DIABETES MELLITUS (HCC): ICD-10-CM

## 2018-06-06 DIAGNOSIS — M15.9 PRIMARY OSTEOARTHRITIS INVOLVING MULTIPLE JOINTS: ICD-10-CM

## 2018-06-06 DIAGNOSIS — E78.5 HYPERLIPIDEMIA, UNSPECIFIED HYPERLIPIDEMIA TYPE: ICD-10-CM

## 2018-06-06 DIAGNOSIS — H25.13 AGE-RELATED NUCLEAR CATARACT OF BOTH EYES: ICD-10-CM

## 2018-06-06 DIAGNOSIS — E11.9 TYPE 2 DIABETES MELLITUS WITHOUT COMPLICATION, WITHOUT LONG-TERM CURRENT USE OF INSULIN (HCC): ICD-10-CM

## 2018-06-06 DIAGNOSIS — N18.4 CKD STAGE 4 DUE TO TYPE 2 DIABETES MELLITUS (HCC): ICD-10-CM

## 2018-06-06 DIAGNOSIS — H91.90 HEARING LOSS, UNSPECIFIED HEARING LOSS TYPE, UNSPECIFIED LATERALITY: ICD-10-CM

## 2018-06-06 DIAGNOSIS — R80.9 TYPE 2 DIABETES MELLITUS WITH MICROALBUMINURIA, WITH LONG-TERM CURRENT USE OF INSULIN (HCC): ICD-10-CM

## 2018-06-06 DIAGNOSIS — Z79.4 UNCONTROLLED TYPE 2 DIABETES MELLITUS WITH DIABETIC NEPHROPATHY, WITH LONG-TERM CURRENT USE OF INSULIN (HCC): ICD-10-CM

## 2018-06-06 DIAGNOSIS — I10 ESSENTIAL HYPERTENSION: ICD-10-CM

## 2018-06-06 DIAGNOSIS — E11.29 TYPE 2 DIABETES MELLITUS WITH MICROALBUMINURIA, WITH LONG-TERM CURRENT USE OF INSULIN (HCC): ICD-10-CM

## 2018-06-06 DIAGNOSIS — Z01.818 PREOP EXAM FOR INTERNAL MEDICINE: ICD-10-CM

## 2018-06-06 DIAGNOSIS — Z79.4 TYPE 2 DIABETES MELLITUS WITH MICROALBUMINURIA, WITH LONG-TERM CURRENT USE OF INSULIN (HCC): ICD-10-CM

## 2018-06-06 PROCEDURE — 93005 ELECTROCARDIOGRAM TRACING: CPT

## 2018-06-06 PROCEDURE — 36415 COLL VENOUS BLD VENIPUNCTURE: CPT

## 2018-06-06 PROCEDURE — 81015 MICROSCOPIC EXAM OF URINE: CPT

## 2018-06-06 PROCEDURE — 83036 HEMOGLOBIN GLYCOSYLATED A1C: CPT

## 2018-06-06 PROCEDURE — 85025 COMPLETE CBC W/AUTO DIFF WBC: CPT

## 2018-06-06 PROCEDURE — 80053 COMPREHEN METABOLIC PANEL: CPT

## 2018-06-06 PROCEDURE — 84100 ASSAY OF PHOSPHORUS: CPT

## 2018-06-06 PROCEDURE — G0463 HOSPITAL OUTPT CLINIC VISIT: HCPCS | Performed by: INTERNAL MEDICINE

## 2018-06-06 PROCEDURE — 93010 ELECTROCARDIOGRAM REPORT: CPT | Performed by: INTERNAL MEDICINE

## 2018-06-06 PROCEDURE — 99214 OFFICE O/P EST MOD 30 MIN: CPT | Performed by: INTERNAL MEDICINE

## 2018-06-06 NOTE — TELEPHONE ENCOUNTER
Phone call from [address] [place] [address] lab. Courtesy call reporting lab results from [facility]. Naima Nipple states the glucose is 54. Message fwd to Dr. Robert Calero.

## 2018-06-06 NOTE — PROGRESS NOTES
HPI:    Patient ID: Hayde Lowery is a 57year old male.     HPI  pre-op surgery 06/14/18 Dr Lauren Hernández right eye catarcact removal 07/09/18 left eye cataract removal Phone:[phone] Connie no fax information     /74 (BP Location: Right arm, Eliane kg)  04/18/18 : 235 lb 12.8 oz (107 kg)  03/15/18 : 242 lb (109.8 kg)  03/07/18 : 238 lb (108 kg)  03/02/18 : 232 lb (105.2 kg)  02/21/18 : 232 lb 6.4 oz (105.4 kg)    Body mass index is 34.41 kg/m².   HGBA1C:    Lab Results  Component Value Date   A1C 7.4 1 tablet (40 mg total) by mouth every morning before breakfast. Disp: 90 tablet Rfl: 6   Fluocinonide 0.05 % External Cream Apply 1 Tube topically 2 (two) times daily. Disp: 30 g Rfl: 2   Clotrimazole 1 % External Ointment Apply 1 Tube topically daily.  Edson atraumatic. Right Ear: External ear normal.   Left Ear: External ear normal.   Mouth/Throat: Oropharynx is clear and moist. No oropharyngeal exudate. Eyes: Conjunctivae are normal. Pupils are equal, round, and reactive to light.  Right eye exhibits no d Hemoglobin      13.5 - 17.5 g/dL 10.1 (L)   Hematocrit      41.0 - 52.0 % 30.0 (L)   MCV      80.0 - 100.0 fL 102.6 (H)   MCH      27.0 - 32.0 pg 34.5 (H)   MCHC      32.0 - 37.0 g/dl 33.7   RDW      11.0 - 15.0 % 15.2 (H)   Platelet Count      603 - 400 Results  Component Value Date   A1C 6.5 (H) 06/06/2018   A1C 7.4 (A) 03/07/2018   A1C 7.2 (A) 12/06/2017     Hypokalemia  Asymptomatic  DM controlled    (E11.42,  Z79.4) Type 2 diabetes mellitus with diabetic polyneuropathy, with long-term current use of i

## 2018-06-11 ENCOUNTER — TELEPHONE (OUTPATIENT)
Dept: NEPHROLOGY | Facility: CLINIC | Age: 63
End: 2018-06-11

## 2018-06-11 ENCOUNTER — TELEPHONE (OUTPATIENT)
Dept: INTERNAL MEDICINE CLINIC | Facility: CLINIC | Age: 63
End: 2018-06-11

## 2018-06-11 NOTE — TELEPHONE ENCOUNTER
Waited for the Pharmacist for over 7 minutes. Garland Rodríguezter up as another call came in. Will try calling the pharmacy back later when they are not so busy. There was no question in this encounter message so I don't know what they were asking about.

## 2018-06-11 NOTE — TELEPHONE ENCOUNTER
Pharmacy requesting to speak to RN regarding medication questions for Lyrica and Norco. Please call thank you [phone]

## 2018-06-12 NOTE — TELEPHONE ENCOUNTER
[address]. They are asking for a verbal ok from Dr. Yaneth Enamorado to fill Hydrocodone and Lyrica sooner than due. Routed to Dr. Yaneth Enamorado to advise.

## 2018-06-12 NOTE — TELEPHONE ENCOUNTER
Spoke to Pharmacist at [facility] and informed them that Dr. Dillan Calzada said not to fill either prescription early.

## 2018-06-13 ENCOUNTER — OFFICE VISIT (OUTPATIENT)
Dept: ENDOCRINOLOGY CLINIC | Facility: CLINIC | Age: 63
End: 2018-06-13

## 2018-06-13 VITALS
SYSTOLIC BLOOD PRESSURE: 107 MMHG | DIASTOLIC BLOOD PRESSURE: 63 MMHG | HEART RATE: 75 BPM | HEIGHT: 69 IN | WEIGHT: 230 LBS | BODY MASS INDEX: 34.07 KG/M2

## 2018-06-13 DIAGNOSIS — Z79.4 UNCONTROLLED TYPE 2 DIABETES MELLITUS WITH COMPLICATION, WITH LONG-TERM CURRENT USE OF INSULIN (HCC): Primary | ICD-10-CM

## 2018-06-13 DIAGNOSIS — E11.65 UNCONTROLLED TYPE 2 DIABETES MELLITUS WITH COMPLICATION, WITH LONG-TERM CURRENT USE OF INSULIN (HCC): Primary | ICD-10-CM

## 2018-06-13 DIAGNOSIS — E11.8 UNCONTROLLED TYPE 2 DIABETES MELLITUS WITH COMPLICATION, WITH LONG-TERM CURRENT USE OF INSULIN (HCC): Primary | ICD-10-CM

## 2018-06-13 PROCEDURE — 36416 COLLJ CAPILLARY BLOOD SPEC: CPT | Performed by: INTERNAL MEDICINE

## 2018-06-13 PROCEDURE — 99213 OFFICE O/P EST LOW 20 MIN: CPT | Performed by: INTERNAL MEDICINE

## 2018-06-13 PROCEDURE — 82962 GLUCOSE BLOOD TEST: CPT | Performed by: INTERNAL MEDICINE

## 2018-06-13 NOTE — PROGRESS NOTES
Name: Drew Vogt  Date: 6/13/2018    Referring Physician: No ref. provider found    HISTORY OF PRESENT ILLNESS   Drew Vogt is a 57year old male who presents for diabetes mellitus.       Prior HbA, C or glycohemoglobin were 9.8% 7/2014; 7.7 1  •  FENOFIBRATE 48 MG Oral Tab, TAKE ONE TABLET BY MOUTH ONCE DAILY, Disp: 90 tablet, Rfl: 1  •  ATORVASTATIN 40 MG Oral Tab, TAKE 1 TABLET BY MOUTH ONE TIME A DAY , Disp: 90 tablet, Rfl: 1  •  aspirin 81 MG Oral Tab, Take 81 mg by mouth daily. , Disp: , Number of children:               Social History Main Topics    Smoking status: Former Smoker                                                                Packs/day: 0.00      Years: 0.00           Quit date: 11/11/1989    Smokeless tobacco: Never Used and place  Nutritional:  no abnormal weight gain or loss    ASSESSMENT/PLAN:      1.  Diabetes Mellitus, Type 2 Uncontrolled  -Uncontrolled, HgA1c 6.5% -->improved   -Congratulated patient on overall glycemic control   -Discussed importance of glycemic cont

## 2018-07-09 ENCOUNTER — OFFICE VISIT (OUTPATIENT)
Dept: NEPHROLOGY | Facility: CLINIC | Age: 63
End: 2018-07-09

## 2018-07-09 VITALS
BODY MASS INDEX: 34.69 KG/M2 | HEIGHT: 69 IN | HEART RATE: 65 BPM | WEIGHT: 234.19 LBS | DIASTOLIC BLOOD PRESSURE: 69 MMHG | SYSTOLIC BLOOD PRESSURE: 113 MMHG

## 2018-07-09 DIAGNOSIS — E11.9 TYPE 2 DIABETES MELLITUS WITHOUT COMPLICATION, WITHOUT LONG-TERM CURRENT USE OF INSULIN (HCC): Primary | ICD-10-CM

## 2018-07-09 DIAGNOSIS — N18.4 CKD STAGE 4 DUE TO TYPE 1 DIABETES MELLITUS (HCC): ICD-10-CM

## 2018-07-09 DIAGNOSIS — E10.22 CKD STAGE 4 DUE TO TYPE 1 DIABETES MELLITUS (HCC): ICD-10-CM

## 2018-07-09 PROCEDURE — G0463 HOSPITAL OUTPT CLINIC VISIT: HCPCS | Performed by: INTERNAL MEDICINE

## 2018-07-09 PROCEDURE — 99213 OFFICE O/P EST LOW 20 MIN: CPT | Performed by: INTERNAL MEDICINE

## 2018-07-09 RX ORDER — HYDROCODONE BITARTRATE AND ACETAMINOPHEN 10; 325 MG/1; MG/1
1 TABLET ORAL EVERY 8 HOURS PRN
Qty: 90 TABLET | Refills: 0 | Status: SHIPPED | OUTPATIENT
Start: 2018-08-10 | End: 2018-07-09

## 2018-07-09 RX ORDER — HYDROCODONE BITARTRATE AND ACETAMINOPHEN 10; 325 MG/1; MG/1
1 TABLET ORAL EVERY 8 HOURS PRN
Qty: 90 TABLET | Refills: 0 | Status: SHIPPED | OUTPATIENT
Start: 2018-07-11 | End: 2018-07-09

## 2018-07-09 RX ORDER — HYDROCODONE BITARTRATE AND ACETAMINOPHEN 10; 325 MG/1; MG/1
1 TABLET ORAL EVERY 8 HOURS PRN
Qty: 90 TABLET | Refills: 0 | Status: SHIPPED | OUTPATIENT
Start: 2018-09-11 | End: 2018-10-08

## 2018-07-09 NOTE — PROGRESS NOTES
Julianna Alvarenga is here for follow-up. He needs a refill on his Norco which he takes 3 times a day he is not abusing. He states his diabetes is doing well he sees Dr. Lindquist Se sees Dr. Kj Bowman for medical issues. He is CKD stage IV diabetes.   Denies any problems w

## 2018-07-16 ENCOUNTER — TELEPHONE (OUTPATIENT)
Dept: NEPHROLOGY | Facility: CLINIC | Age: 63
End: 2018-07-16

## 2018-07-16 NOTE — TELEPHONE ENCOUNTER
Per [address] [facility] its their new policy that they requires a dx  Code for norco prescriptions. States they need to know if its a chronic or an acute issue. Note dx code on problem list. Given to pharmacist. No further questions.

## 2018-07-24 RX ORDER — SYRINGE-NEEDLE,INSULIN,0.5 ML 31 GX5/16"
SYRINGE, EMPTY DISPOSABLE MISCELLANEOUS
Qty: 400 EACH | Refills: 0 | Status: SHIPPED | OUTPATIENT
Start: 2018-07-24 | End: 2018-07-30

## 2018-07-24 NOTE — TELEPHONE ENCOUNTER
Faxed refill request received for relion insulin syringes from [facility] in [place]. LOV 6/13/18. OK to refill 6 months per [facility] protocol.

## 2018-07-27 ENCOUNTER — TELEPHONE (OUTPATIENT)
Dept: ENDOCRINOLOGY CLINIC | Facility: CLINIC | Age: 63
End: 2018-07-27

## 2018-07-30 RX ORDER — SYRINGE-NEEDLE,INSULIN,0.5 ML 31 GX5/16"
SYRINGE, EMPTY DISPOSABLE MISCELLANEOUS
Qty: 400 EACH | Refills: 0 | Status: SHIPPED | OUTPATIENT
Start: 2018-07-30 | End: 2019-02-15

## 2018-08-09 ENCOUNTER — NURSE TRIAGE (OUTPATIENT)
Dept: OTHER | Age: 63
End: 2018-08-09

## 2018-08-09 NOTE — TELEPHONE ENCOUNTER
Action Requested: Summary for Provider     []  Critical Lab, Recommendations Needed  [] Need Additional Advice  []   FYI    []   Need Orders  [] Need Medications Sent to Pharmacy  []  Other     SUMMARY: appt scheduled 8/13/18  Spouse called stated onset >

## 2018-08-13 ENCOUNTER — OFFICE VISIT (OUTPATIENT)
Dept: INTERNAL MEDICINE CLINIC | Facility: CLINIC | Age: 63
End: 2018-08-13
Payer: MEDICARE

## 2018-08-13 VITALS
WEIGHT: 234 LBS | HEART RATE: 70 BPM | BODY MASS INDEX: 34.66 KG/M2 | TEMPERATURE: 98 F | DIASTOLIC BLOOD PRESSURE: 80 MMHG | SYSTOLIC BLOOD PRESSURE: 142 MMHG | HEIGHT: 69 IN

## 2018-08-13 DIAGNOSIS — L21.9 SEBORRHEIC DERMATITIS: ICD-10-CM

## 2018-08-13 DIAGNOSIS — R21 RASH: Primary | ICD-10-CM

## 2018-08-13 PROCEDURE — 99213 OFFICE O/P EST LOW 20 MIN: CPT | Performed by: INTERNAL MEDICINE

## 2018-08-13 PROCEDURE — G0463 HOSPITAL OUTPT CLINIC VISIT: HCPCS | Performed by: INTERNAL MEDICINE

## 2018-08-13 RX ORDER — HYDROXYZINE HYDROCHLORIDE 25 MG/1
25 TABLET, FILM COATED ORAL 3 TIMES DAILY PRN
Qty: 40 TABLET | Refills: 0 | Status: SHIPPED | OUTPATIENT
Start: 2018-08-13 | End: 2018-08-23

## 2018-08-20 RX ORDER — PANTOPRAZOLE SODIUM 40 MG/1
TABLET, DELAYED RELEASE ORAL
Qty: 90 TABLET | Refills: 3 | Status: SHIPPED | OUTPATIENT
Start: 2018-08-20 | End: 2019-08-30

## 2018-08-27 NOTE — PROGRESS NOTES
HPI:    Patient ID: Cira King is a 70year old male.     HPI    Scaly rash eyebrow forehead  Pruritic and pain  /80 (BP Location: Right arm, Patient Position: Sitting, Cuff Size: large)   Pulse 70   Temp 98.1 °F (36.7 °C) (Oral)   Ht 5' 9\" ( UNIT/ML Subcutaneous Solution 8 units with breakfast, 8 units with lunch, 12 units with dinner (Patient taking differently: 6 units with breakfast, 6 units with lunch, 8 units with dinner ) Disp: 60 mL Rfl: 2   Fluocinonide 0.05 % External Cream Apply 1 Tu • Glaucoma Neg      family h/o      Social History: Smoking status: Former Smoker                                                              Packs/day: 0.00      Years: 0.00         Quit date: 11/11/1989  Smokeless tobacco: Never Used Itching.            Imaging & Referrals:  [facility] - INTERNAL        BQ#[id number]

## 2018-08-30 ENCOUNTER — OFFICE VISIT (OUTPATIENT)
Dept: DERMATOLOGY CLINIC | Facility: CLINIC | Age: 63
End: 2018-08-30
Payer: MEDICARE

## 2018-08-30 DIAGNOSIS — L30.9 DERMATITIS: Primary | ICD-10-CM

## 2018-08-30 DIAGNOSIS — L21.9 SEBORRHEIC DERMATITIS: ICD-10-CM

## 2018-08-30 PROCEDURE — 99202 OFFICE O/P NEW SF 15 MIN: CPT | Performed by: DERMATOLOGY

## 2018-08-30 PROCEDURE — G0463 HOSPITAL OUTPT CLINIC VISIT: HCPCS | Performed by: DERMATOLOGY

## 2018-08-30 RX ORDER — HYDROXYZINE HYDROCHLORIDE 25 MG/1
25 TABLET, FILM COATED ORAL 3 TIMES DAILY PRN
COMMUNITY
End: 2019-12-30 | Stop reason: ALTCHOICE

## 2018-08-30 RX ORDER — CICLOPIROX 7.7 MG/G
GEL TOPICAL
Qty: 60 G | Refills: 3 | Status: SHIPPED | OUTPATIENT
Start: 2018-08-30 | End: 2018-12-10 | Stop reason: ALTCHOICE

## 2018-08-30 RX ORDER — HYDROCORTISONE 25 MG/ML
LOTION TOPICAL
Qty: 60 G | Refills: 3 | Status: SHIPPED | OUTPATIENT
Start: 2018-08-30 | End: 2019-11-07

## 2018-09-03 ENCOUNTER — HOSPITAL ENCOUNTER (EMERGENCY)
Facility: HOSPITAL | Age: 63
Discharge: HOME | End: 2018-09-03
Attending: EMERGENCY MEDICINE
Payer: MEDICARE

## 2018-09-03 ENCOUNTER — APPOINTMENT (OUTPATIENT)
Dept: CT IMAGING | Facility: HOSPITAL | Age: 63
End: 2018-09-03
Attending: EMERGENCY MEDICINE
Payer: MEDICARE

## 2018-09-03 VITALS
RESPIRATION RATE: 13 BRPM | HEART RATE: 88 BPM | OXYGEN SATURATION: 97 % | TEMPERATURE: 99 F | HEIGHT: 69 IN | BODY MASS INDEX: 34.8 KG/M2 | WEIGHT: 235 LBS | SYSTOLIC BLOOD PRESSURE: 158 MMHG | DIASTOLIC BLOOD PRESSURE: 80 MMHG

## 2018-09-03 DIAGNOSIS — E83.42 HYPOMAGNESEMIA: ICD-10-CM

## 2018-09-03 DIAGNOSIS — K52.9 GASTROENTERITIS: Primary | ICD-10-CM

## 2018-09-03 LAB
ALBUMIN SERPL BCP-MCNC: 3.7 G/DL (ref 3.5–4.8)
ALP SERPL-CCNC: 44 U/L (ref 32–100)
ALT SERPL-CCNC: 18 U/L (ref 17–63)
ANION GAP SERPL CALC-SCNC: 8 MMOL/L (ref 0–18)
AST SERPL-CCNC: 14 U/L (ref 15–41)
BASOPHILS # BLD: 0 K/UL (ref 0–0.2)
BASOPHILS NFR BLD: 0 %
BILIRUB DIRECT SERPL-MCNC: 0.2 MG/DL (ref 0–0.2)
BILIRUB SERPL-MCNC: 1.1 MG/DL (ref 0.3–1.2)
BILIRUB UR QL: NEGATIVE
BUN SERPL-MCNC: 57 MG/DL (ref 8–20)
BUN/CREAT SERPL: 20.4 (ref 10–20)
CALCIUM SERPL-MCNC: 9.2 MG/DL (ref 8.5–10.5)
CHLORIDE SERPL-SCNC: 110 MMOL/L (ref 95–110)
CLARITY UR: CLEAR
CO2 SERPL-SCNC: 23 MMOL/L (ref 22–32)
COLOR UR: YELLOW
CREAT SERPL-MCNC: 2.79 MG/DL (ref 0.5–1.5)
EOSINOPHIL # BLD: 0.1 K/UL (ref 0–0.7)
EOSINOPHIL NFR BLD: 2 %
ERYTHROCYTE [DISTWIDTH] IN BLOOD BY AUTOMATED COUNT: 14.6 % (ref 11–15)
GLUCOSE SERPL-MCNC: 232 MG/DL (ref 70–99)
GLUCOSE UR-MCNC: >=500 MG/DL
HCT VFR BLD AUTO: 29.7 % (ref 41–52)
HGB BLD-MCNC: 10 G/DL (ref 13.5–17.5)
HYALINE CASTS #/AREA URNS AUTO: 1 /LPF
LEUKOCYTE ESTERASE UR QL STRIP.AUTO: NEGATIVE
LIPASE SERPL-CCNC: 29 U/L (ref 22–51)
LYMPHOCYTES # BLD: 0.8 K/UL (ref 1–4)
LYMPHOCYTES NFR BLD: 16 %
MAGNESIUM SERPL-MCNC: 1.5 MG/DL (ref 1.8–2.5)
MCH RBC QN AUTO: 34.3 PG (ref 27–32)
MCHC RBC AUTO-ENTMCNC: 33.8 G/DL (ref 32–37)
MCV RBC AUTO: 101.5 FL (ref 80–100)
MONOCYTES # BLD: 0.5 K/UL (ref 0–1)
MONOCYTES NFR BLD: 10 %
NEUTROPHILS # BLD AUTO: 3.7 K/UL (ref 1.8–7.7)
NEUTROPHILS NFR BLD: 72 %
NITRITE UR QL STRIP.AUTO: NEGATIVE
OSMOLALITY UR CALC.SUM OF ELEC: 315 MOSM/KG (ref 275–295)
PH UR: 5 [PH] (ref 5–8)
PLATELET # BLD AUTO: 107 K/UL (ref 140–400)
PMV BLD AUTO: 9.2 FL (ref 7.4–10.3)
POTASSIUM SERPL-SCNC: 5 MMOL/L (ref 3.3–5.1)
PROT SERPL-MCNC: 6.8 G/DL (ref 5.9–8.4)
PROT UR-MCNC: NEGATIVE MG/DL
RBC # BLD AUTO: 2.92 M/UL (ref 4.5–5.9)
RBC #/AREA URNS AUTO: <1 /HPF
SODIUM SERPL-SCNC: 141 MMOL/L (ref 136–144)
SP GR UR STRIP: 1.01 (ref 1–1.03)
UROBILINOGEN UR STRIP-ACNC: <2
VIT C UR-MCNC: NEGATIVE MG/DL
WBC # BLD AUTO: 5.1 K/UL (ref 4–11)
WBC #/AREA URNS AUTO: <1 /HPF

## 2018-09-03 PROCEDURE — 81001 URINALYSIS AUTO W/SCOPE: CPT | Performed by: EMERGENCY MEDICINE

## 2018-09-03 PROCEDURE — 83690 ASSAY OF LIPASE: CPT | Performed by: EMERGENCY MEDICINE

## 2018-09-03 PROCEDURE — 80048 BASIC METABOLIC PNL TOTAL CA: CPT

## 2018-09-03 PROCEDURE — 93005 ELECTROCARDIOGRAM TRACING: CPT

## 2018-09-03 PROCEDURE — A4216 STERILE WATER/SALINE, 10 ML: HCPCS | Performed by: EMERGENCY MEDICINE

## 2018-09-03 PROCEDURE — 96361 HYDRATE IV INFUSION ADD-ON: CPT

## 2018-09-03 PROCEDURE — 83735 ASSAY OF MAGNESIUM: CPT | Performed by: EMERGENCY MEDICINE

## 2018-09-03 PROCEDURE — C9113 INJ PANTOPRAZOLE SODIUM, VIA: HCPCS | Performed by: EMERGENCY MEDICINE

## 2018-09-03 PROCEDURE — 85025 COMPLETE CBC W/AUTO DIFF WBC: CPT | Performed by: EMERGENCY MEDICINE

## 2018-09-03 PROCEDURE — 93010 ELECTROCARDIOGRAM REPORT: CPT | Performed by: EMERGENCY MEDICINE

## 2018-09-03 PROCEDURE — 96365 THER/PROPH/DIAG IV INF INIT: CPT

## 2018-09-03 PROCEDURE — 80076 HEPATIC FUNCTION PANEL: CPT | Performed by: EMERGENCY MEDICINE

## 2018-09-03 PROCEDURE — 99285 EMERGENCY DEPT VISIT HI MDM: CPT

## 2018-09-03 PROCEDURE — 80048 BASIC METABOLIC PNL TOTAL CA: CPT | Performed by: EMERGENCY MEDICINE

## 2018-09-03 PROCEDURE — 85025 COMPLETE CBC W/AUTO DIFF WBC: CPT

## 2018-09-03 PROCEDURE — 74176 CT ABD & PELVIS W/O CONTRAST: CPT | Performed by: EMERGENCY MEDICINE

## 2018-09-03 PROCEDURE — 96375 TX/PRO/DX INJ NEW DRUG ADDON: CPT

## 2018-09-03 RX ORDER — METOCLOPRAMIDE 10 MG/1
5 TABLET ORAL 3 TIMES DAILY PRN
Qty: 5 TABLET | Refills: 0 | Status: SHIPPED | OUTPATIENT
Start: 2018-09-03 | End: 2018-09-12 | Stop reason: ALTCHOICE

## 2018-09-03 RX ORDER — PREGABALIN 75 MG/1
300 CAPSULE ORAL ONCE
Status: COMPLETED | OUTPATIENT
Start: 2018-09-03 | End: 2018-09-03

## 2018-09-03 RX ORDER — MAGNESIUM SULFATE HEPTAHYDRATE 40 MG/ML
2 INJECTION, SOLUTION INTRAVENOUS ONCE
Status: COMPLETED | OUTPATIENT
Start: 2018-09-03 | End: 2018-09-03

## 2018-09-03 RX ORDER — ONDANSETRON 4 MG/1
4 TABLET, ORALLY DISINTEGRATING ORAL EVERY 8 HOURS PRN
Qty: 6 TABLET | Refills: 0 | Status: SHIPPED | OUTPATIENT
Start: 2018-09-03 | End: 2018-09-12 | Stop reason: ALTCHOICE

## 2018-09-03 RX ORDER — ONDANSETRON 2 MG/ML
4 INJECTION INTRAMUSCULAR; INTRAVENOUS ONCE
Status: COMPLETED | OUTPATIENT
Start: 2018-09-03 | End: 2018-09-03

## 2018-09-03 NOTE — ED INITIAL ASSESSMENT (HPI)
N/v starting last night. States he was released from [facility] yesterday after being admitted for high potassium.

## 2018-09-03 NOTE — ED NOTES
Presents to ER with wife for c/o persistent nausea and poor appetite. Pt was admitted to [facility] on Friday 08/31 for vomiting and diarrhea // hyperkalemia & hypoglycemia, he was discharged home yesterday.  Hx of renal insufficiency but denies uri

## 2018-09-03 NOTE — ED NOTES
No c/o nausea at this time. Pt is resting quietly, watching [employer] [place] [employer] with his wife. Magnesium infusing via pump at 1hr rate.

## 2018-09-03 NOTE — ED PROVIDER NOTES
Patient Seen in: [facility] AND [facility] Emergency Department    History   Patient presents with:  Nausea/Vomiting/Diarrhea (gastrointestinal)    Stated Complaint:     HPI    49-year-old male with chronic kidney disease, poorly controlled diabetes and neuropat reviewed. All other systems reviewed and negative except as noted above.     Physical Exam   ED Triage Vitals [09/03/18 0243]  BP: (!) 161/86  Pulse: 94  Resp: 11  Temp: 98.3 °F (36.8 °C)  Temp src: Temporal  SpO2: 99 %  O2 Device: None (Room air)    C Urine Few (*)     All other components within normal limits   HEPATIC FUNCTION PANEL (7) - Abnormal; Notable for the following:     AST 14 (*)     All other components within normal limits   MAGNESIUM - Abnormal; Notable for the following:     Magnesium 1. on the patient's size. Use of iterative reconstruction technique for dose reduction was used. FINDINGS:  LIVER: Enlarged right lobe of the liver measuring 20.9 cm in longitudinal length. GALLBLADDER: Status post cholecystectomy.  BILIARY: No visible dilat grossly unchanged. Unremarkable right kidney. No hydroureter or kidney stone. 3. Mild hepatosplenomegaly as discussed above. Status post cholecystectomy and appendectomy. Small hiatal hernia. Moderate right inguinal hernia containing fat.  4. Bilateral pars

## 2018-09-05 ENCOUNTER — TELEPHONE (OUTPATIENT)
Dept: NEPHROLOGY | Facility: CLINIC | Age: 63
End: 2018-09-05

## 2018-09-05 NOTE — TELEPHONE ENCOUNTER
Pts wife/Vanesa calling for pt requesting hosp f/up asap, if needed by has appt howard 10/8, pls call at:[phone],thanks.

## 2018-09-09 NOTE — PROGRESS NOTES
Dov Pinedo is a 70year old male.     Patient presents with:  Rash: NEW PT here for rash to his eyebrows that started about a year ago c/o severe itching may have it on his ears as well treated with cortisone for a while but didnt seem to help no 100 UNIT/ML Subcutaneous Solution 8 units with breakfast, 8 units with lunch, 12 units with dinner (Patient taking differently: 6 units with breakfast, 6 units with lunch, 8 units with dinner ) Disp: 60 mL Rfl: 2   Fluocinonide 0.05 % External Cream Apply bid to rash on face Disp: 60 g Rfl: 3   Hydrocortisone 2.5 % External Lotion Use bid to rash on face Disp: 60 g Rfl: 3   PANTOPRAZOLE SODIUM 40 MG Oral Tab EC TAKE ONE TABLET BY MOUTH IN THE MORNING BEFORE BREAKFAST (Patient taking differently: TAKE ONE TA Lozenge Take 5,000 mcg by mouth daily. Disp: 30 lozenge Rfl: 0   Omega-3 Fatty Acids (FISH OIL) 600 MG Oral Cap Take 1 capsule by mouth nightly. Disp:  Rfl:    Multiple Vitamins-Minerals (CENTRUM SILVER) Oral Tab Take 1 tablet by mouth daily.  Disp:  Rfl: Tobacco Use      Smoking status: Former Smoker        Quit date: 1989        Years since quittin.8      Smokeless tobacco: Never Used      Tobacco comment: quit about 30 years ago.     Substance and Sexual Activity      Alcohol use: No      Drug night sweats, photosensitivity, lymph node swelling. No other skin complaints. Physical examination:  Well-developed well-nourished patient alert oriented in no acute distress.       Exam performed, including scalp, head, neck, face,nails, hair, exter results found for this or any previous visit (from the past 50 hour(s)). Meds This Visit:      Imaging Orders:  None     Referral Orders:  No orders of the defined types were placed in this encounter.         9/9/2018  Christofer Arteaga      The patient ind

## 2018-09-12 ENCOUNTER — OFFICE VISIT (OUTPATIENT)
Dept: NEPHROLOGY | Facility: CLINIC | Age: 63
End: 2018-09-12
Payer: MEDICARE

## 2018-09-12 VITALS
DIASTOLIC BLOOD PRESSURE: 62 MMHG | HEART RATE: 67 BPM | BODY MASS INDEX: 34.07 KG/M2 | WEIGHT: 230 LBS | HEIGHT: 69 IN | SYSTOLIC BLOOD PRESSURE: 100 MMHG

## 2018-09-12 DIAGNOSIS — N17.9 AKI (ACUTE KIDNEY INJURY) (HCC): Primary | ICD-10-CM

## 2018-09-12 PROCEDURE — 99213 OFFICE O/P EST LOW 20 MIN: CPT | Performed by: INTERNAL MEDICINE

## 2018-09-12 PROCEDURE — G0463 HOSPITAL OUTPT CLINIC VISIT: HCPCS | Performed by: INTERNAL MEDICINE

## 2018-09-12 RX ORDER — BLOOD-GLUCOSE METER
KIT MISCELLANEOUS
Qty: 300 STRIP | Refills: 1 | Status: SHIPPED | OUTPATIENT
Start: 2018-09-12 | End: 2018-09-17

## 2018-09-12 NOTE — PROGRESS NOTES
Dr. Suhail Sosa dictating a Manual Sprang doing well he was at [facility] with acute renal failure they wanted to dialyze him as his K was high but he refused is now doing fine came back to [facility] ER and his creatinine was 2.79 which is stable.   He is reva

## 2018-09-17 RX ORDER — BLOOD-GLUCOSE METER
KIT MISCELLANEOUS
Qty: 500 STRIP | Refills: 1 | Status: SHIPPED | OUTPATIENT
Start: 2018-09-17 | End: 2019-02-15

## 2018-09-17 NOTE — TELEPHONE ENCOUNTER
Wife states diagnosis code is needed for refill for test strips. Wife states pt is currently out of test strips. Wife also requesting to have quantity increased. Thank you. Wife requesting a call back once script has been sent to pharmacy.   [phone]

## 2018-09-17 NOTE — TELEPHONE ENCOUNTER
Called patient's wife. She states Carlos Hayes has been out of test strips for a few days and was recently discharged from the hospital. She states he checks his sugar up to 5 times per day. Order pending with updated instructions and Dx code.

## 2018-09-19 ENCOUNTER — LAB ENCOUNTER (OUTPATIENT)
Dept: LAB | Age: 63
End: 2018-09-19
Attending: INTERNAL MEDICINE
Payer: MEDICARE

## 2018-09-19 ENCOUNTER — OFFICE VISIT (OUTPATIENT)
Dept: ENDOCRINOLOGY CLINIC | Facility: CLINIC | Age: 63
End: 2018-09-19
Payer: MEDICARE

## 2018-09-19 VITALS
DIASTOLIC BLOOD PRESSURE: 74 MMHG | BODY MASS INDEX: 34.07 KG/M2 | WEIGHT: 230 LBS | HEART RATE: 62 BPM | HEIGHT: 69 IN | SYSTOLIC BLOOD PRESSURE: 116 MMHG

## 2018-09-19 DIAGNOSIS — E11.9 TYPE 2 DIABETES MELLITUS WITHOUT COMPLICATION, WITHOUT LONG-TERM CURRENT USE OF INSULIN (HCC): ICD-10-CM

## 2018-09-19 DIAGNOSIS — E10.22 CKD STAGE 4 DUE TO TYPE 1 DIABETES MELLITUS (HCC): ICD-10-CM

## 2018-09-19 DIAGNOSIS — E11.8 UNCONTROLLED TYPE 2 DIABETES MELLITUS WITH COMPLICATION, WITH LONG-TERM CURRENT USE OF INSULIN (HCC): Primary | ICD-10-CM

## 2018-09-19 DIAGNOSIS — E11.65 UNCONTROLLED TYPE 2 DIABETES MELLITUS WITH COMPLICATION, WITH LONG-TERM CURRENT USE OF INSULIN (HCC): Primary | ICD-10-CM

## 2018-09-19 DIAGNOSIS — N17.9 AKI (ACUTE KIDNEY INJURY) (HCC): ICD-10-CM

## 2018-09-19 DIAGNOSIS — N18.4 CKD STAGE 4 DUE TO TYPE 1 DIABETES MELLITUS (HCC): ICD-10-CM

## 2018-09-19 DIAGNOSIS — Z79.4 UNCONTROLLED TYPE 2 DIABETES MELLITUS WITH COMPLICATION, WITH LONG-TERM CURRENT USE OF INSULIN (HCC): Primary | ICD-10-CM

## 2018-09-19 LAB
ALBUMIN SERPL BCP-MCNC: 3.7 G/DL (ref 3.5–4.8)
ANION GAP SERPL CALC-SCNC: 5 MMOL/L (ref 0–18)
BASOPHILS # BLD: 0 K/UL (ref 0–0.2)
BASOPHILS NFR BLD: 1 %
BUN SERPL-MCNC: 42 MG/DL (ref 8–20)
BUN/CREAT SERPL: 17.7 (ref 10–20)
CALCIUM SERPL-MCNC: 9.2 MG/DL (ref 8.5–10.5)
CARTRIDGE LOT#: ABNORMAL NUMERIC
CHLORIDE SERPL-SCNC: 110 MMOL/L (ref 95–110)
CO2 SERPL-SCNC: 25 MMOL/L (ref 22–32)
CREAT SERPL-MCNC: 2.37 MG/DL (ref 0.5–1.5)
EOSINOPHIL # BLD: 0.1 K/UL (ref 0–0.7)
EOSINOPHIL NFR BLD: 2 %
ERYTHROCYTE [DISTWIDTH] IN BLOOD BY AUTOMATED COUNT: 15.4 % (ref 11–15)
GLUCOSE BLOOD: 199
GLUCOSE SERPL-MCNC: 178 MG/DL (ref 70–99)
HBA1C MFR BLD: 6.6 % (ref 4–6)
HCT VFR BLD AUTO: 27.9 % (ref 41–52)
HEMOGLOBIN A1C: 6.9 % (ref 4.3–5.6)
HGB BLD-MCNC: 9.4 G/DL (ref 13.5–17.5)
LYMPHOCYTES # BLD: 0.8 K/UL (ref 1–4)
LYMPHOCYTES NFR BLD: 22 %
MAGNESIUM SERPL-MCNC: 1.7 MG/DL (ref 1.8–2.5)
MCH RBC QN AUTO: 34.6 PG (ref 27–32)
MCHC RBC AUTO-ENTMCNC: 33.8 G/DL (ref 32–37)
MCV RBC AUTO: 102.3 FL (ref 80–100)
MONOCYTES # BLD: 0.5 K/UL (ref 0–1)
MONOCYTES NFR BLD: 12 %
NEUTROPHILS # BLD AUTO: 2.5 K/UL (ref 1.8–7.7)
NEUTROPHILS NFR BLD: 64 %
OSMOLALITY UR CALC.SUM OF ELEC: 305 MOSM/KG (ref 275–295)
PHOSPHATE SERPL-MCNC: 2.8 MG/DL (ref 2.4–4.7)
PLATELET # BLD AUTO: 117 K/UL (ref 140–400)
PMV BLD AUTO: 10.3 FL (ref 7.4–10.3)
POTASSIUM SERPL-SCNC: 5.5 MMOL/L (ref 3.3–5.1)
RBC # BLD AUTO: 2.73 M/UL (ref 4.5–5.9)
SODIUM SERPL-SCNC: 140 MMOL/L (ref 136–144)
TEST STRIP LOT #: NORMAL NUMERIC
WBC # BLD AUTO: 3.9 K/UL (ref 4–11)

## 2018-09-19 PROCEDURE — 85025 COMPLETE CBC W/AUTO DIFF WBC: CPT

## 2018-09-19 PROCEDURE — 36416 COLLJ CAPILLARY BLOOD SPEC: CPT | Performed by: INTERNAL MEDICINE

## 2018-09-19 PROCEDURE — 83036 HEMOGLOBIN GLYCOSYLATED A1C: CPT | Performed by: INTERNAL MEDICINE

## 2018-09-19 PROCEDURE — 82962 GLUCOSE BLOOD TEST: CPT | Performed by: INTERNAL MEDICINE

## 2018-09-19 PROCEDURE — 83735 ASSAY OF MAGNESIUM: CPT

## 2018-09-19 PROCEDURE — 36415 COLL VENOUS BLD VENIPUNCTURE: CPT

## 2018-09-19 PROCEDURE — 83036 HEMOGLOBIN GLYCOSYLATED A1C: CPT

## 2018-09-19 PROCEDURE — 99213 OFFICE O/P EST LOW 20 MIN: CPT | Performed by: INTERNAL MEDICINE

## 2018-09-19 PROCEDURE — 80069 RENAL FUNCTION PANEL: CPT

## 2018-09-19 NOTE — PROGRESS NOTES
Name: Catracho Triplett  Date: 9/19/2018    Referring Physician: No ref. provider found    HISTORY OF PRESENT ILLNESS   Catracho Triplett is a 70year old male who presents for diabetes mellitus.       Prior HbA, C or glycohemoglobin were 9.8% 7/2014; 7.7 TAKE ONE TABLET BY MOUTH IN THE EVENING), Disp: 90 tablet, Rfl: 3  •  RELION INSULIN SYRINGE 31G X 5/16\" 0.5 ML Does not apply Misc, Use syringes to injection insulin 4 times per day, Disp: 400 each, Rfl: 0  •  HYDROcodone-acetaminophen  MG Oral Tab Itching., Disp: , Rfl:      Allergies:     Cefdinir                DIARRHEA    Social History:   Social History    Socioeconomic History      Marital status:       Spouse name: Not on file      Number of children: Not on file      Years of education unspecified type diabetes mellitus without mention of complication, not stated as uncontrolled     Pills & Insulin   • Vitreous floaters        Surgical history:   Past Surgical History:  No date: APPENDECTOMY  06/14/2018: CATARACT;  Right      Comment:   control to prevent complications of diabetes  -Discussed complications of diabetes include retinopathy, neuropathy, nephropathy and cardiovascular disease  -Discussed importance of SBGM  -Discussed importance of low CHO diet  -Safest for renal function to

## 2018-09-26 ENCOUNTER — OFFICE VISIT (OUTPATIENT)
Dept: INTERNAL MEDICINE CLINIC | Facility: CLINIC | Age: 63
End: 2018-09-26
Payer: MEDICARE

## 2018-09-26 ENCOUNTER — HOSPITAL ENCOUNTER (OUTPATIENT)
Dept: GENERAL RADIOLOGY | Age: 63
Discharge: HOME | End: 2018-09-26
Attending: INTERNAL MEDICINE | Admitting: INTERNAL MEDICINE
Payer: MEDICARE

## 2018-09-26 VITALS
SYSTOLIC BLOOD PRESSURE: 117 MMHG | WEIGHT: 232 LBS | TEMPERATURE: 98 F | HEART RATE: 67 BPM | BODY MASS INDEX: 34.36 KG/M2 | DIASTOLIC BLOOD PRESSURE: 63 MMHG | HEIGHT: 69 IN

## 2018-09-26 DIAGNOSIS — M79.641 RIGHT HAND PAIN: ICD-10-CM

## 2018-09-26 DIAGNOSIS — M79.641 RIGHT HAND PAIN: Primary | ICD-10-CM

## 2018-09-26 DIAGNOSIS — M79.644 PAIN OF RIGHT THUMB: ICD-10-CM

## 2018-09-26 PROCEDURE — G0008 ADMIN INFLUENZA VIRUS VAC: HCPCS | Performed by: INTERNAL MEDICINE

## 2018-09-26 PROCEDURE — G0463 HOSPITAL OUTPT CLINIC VISIT: HCPCS | Performed by: INTERNAL MEDICINE

## 2018-09-26 PROCEDURE — 73130 X-RAY EXAM OF HAND: CPT | Performed by: INTERNAL MEDICINE

## 2018-09-26 PROCEDURE — 99213 OFFICE O/P EST LOW 20 MIN: CPT | Performed by: INTERNAL MEDICINE

## 2018-09-26 PROCEDURE — 90653 IIV ADJUVANT VACCINE IM: CPT | Performed by: INTERNAL MEDICINE

## 2018-09-26 PROCEDURE — 90732 PPSV23 VACC 2 YRS+ SUBQ/IM: CPT | Performed by: INTERNAL MEDICINE

## 2018-09-26 PROCEDURE — G0009 ADMIN PNEUMOCOCCAL VACCINE: HCPCS | Performed by: INTERNAL MEDICINE

## 2018-09-26 RX ORDER — SULFAMETHOXAZOLE AND TRIMETHOPRIM 400; 80 MG/1; MG/1
1 TABLET ORAL 2 TIMES DAILY
Qty: 20 TABLET | Refills: 0 | Status: SHIPPED | OUTPATIENT
Start: 2018-09-26 | End: 2018-12-10 | Stop reason: ALTCHOICE

## 2018-10-03 RX ORDER — ATORVASTATIN CALCIUM 40 MG/1
TABLET, FILM COATED ORAL
Qty: 90 TABLET | Refills: 0 | Status: SHIPPED | OUTPATIENT
Start: 2018-10-03 | End: 2018-12-10

## 2018-10-04 RX ORDER — ATORVASTATIN CALCIUM 40 MG/1
TABLET, FILM COATED ORAL
Qty: 90 TABLET | Refills: 1 | Status: SHIPPED | OUTPATIENT
Start: 2018-10-04 | End: 2018-12-19

## 2018-10-06 NOTE — PROGRESS NOTES
HPI:    Patient ID: Tahira Moya is a 70year old male.     HPI    Swelling and pain right thumb  For more than aweek  Pain moderate   Worse with movment  Not relieved with OTC med    /63 (BP Location: Right arm, Patient Position: Sitting, Cuff tablet Rfl: 0   FENOFIBRATE 48 MG Oral Tab TAKE ONE TABLET BY MOUTH ONCE DAILY Disp: 90 tablet Rfl: 1   Ferrous Gluconate 225 (27 Fe) MG Oral Tab Take 27 mg by mouth daily.  Disp:  Rfl:    glimepiride 2 MG Oral Tab Take 1 tablet (2 mg total) by mouth daily stated as uncontrolled     Pills & Insulin   • Vitreous floaters       Past Surgical History:  No date: APPENDECTOMY  06/14/2018: CATARACT; Right      Comment:  Dr. Fiona Brink  07/12/2018: CATARACT;  Left      Comment:  Dr. Fiona Brink  06/11/2018: CATARACT EXTRAC PNEUMOCOCCAL IMM, 23  Patient voiced understanding  and agrees with plan      Meds This Visit:  Requested Prescriptions     Signed Prescriptions Disp Refills   • sulfamethoxazole-trimethoprim 400-80 MG Oral Tab 20 tablet 0     Sig: Take 1 tablet by mouth 2

## 2018-10-08 ENCOUNTER — OFFICE VISIT (OUTPATIENT)
Dept: NEPHROLOGY | Facility: CLINIC | Age: 63
End: 2018-10-08
Payer: MEDICARE

## 2018-10-08 VITALS
BODY MASS INDEX: 34.6 KG/M2 | HEART RATE: 79 BPM | DIASTOLIC BLOOD PRESSURE: 77 MMHG | SYSTOLIC BLOOD PRESSURE: 125 MMHG | HEIGHT: 69 IN | WEIGHT: 233.63 LBS

## 2018-10-08 DIAGNOSIS — E11.9 TYPE 2 DIABETES MELLITUS WITHOUT COMPLICATION, WITHOUT LONG-TERM CURRENT USE OF INSULIN (HCC): ICD-10-CM

## 2018-10-08 DIAGNOSIS — I10 ESSENTIAL HYPERTENSION: ICD-10-CM

## 2018-10-08 DIAGNOSIS — N18.2 CHRONIC KIDNEY DISEASE, STAGE II (MILD): Primary | ICD-10-CM

## 2018-10-08 PROCEDURE — 99212 OFFICE O/P EST SF 10 MIN: CPT | Performed by: INTERNAL MEDICINE

## 2018-10-08 PROCEDURE — G0463 HOSPITAL OUTPT CLINIC VISIT: HCPCS | Performed by: INTERNAL MEDICINE

## 2018-10-08 RX ORDER — GLIMEPIRIDE 2 MG/1
2 TABLET ORAL
Qty: 90 TABLET | Refills: 3 | Status: SHIPPED | OUTPATIENT
Start: 2018-10-08 | End: 2019-11-01

## 2018-10-08 RX ORDER — HYDROCODONE BITARTRATE AND ACETAMINOPHEN 10; 325 MG/1; MG/1
1 TABLET ORAL EVERY 8 HOURS PRN
Qty: 90 TABLET | Refills: 0 | Status: SHIPPED | OUTPATIENT
Start: 2018-11-08 | End: 2018-12-10

## 2018-10-08 RX ORDER — HYDROCODONE BITARTRATE AND ACETAMINOPHEN 10; 325 MG/1; MG/1
1 TABLET ORAL EVERY 8 HOURS PRN
Qty: 90 TABLET | Refills: 0 | Status: SHIPPED | OUTPATIENT
Start: 2018-10-08 | End: 2018-10-08

## 2018-10-08 NOTE — PROGRESS NOTES
Tesfaye Zhang is here and he is doing well.   I refilled his Norco for a total of 2 months he denies any chest pain or shortness of breath he is watching his diet for high potassium denies any chest pain shortness of breath etc.  States his sugars are doing well

## 2018-10-11 RX ORDER — INSULIN ASPART 100 [IU]/ML
INJECTION, SOLUTION INTRAVENOUS; SUBCUTANEOUS
Qty: 20 ML | Refills: 1 | Status: SHIPPED | OUTPATIENT
Start: 2018-10-11 | End: 2019-03-20

## 2018-10-11 RX ORDER — INSULIN GLARGINE 100 [IU]/ML
INJECTION, SOLUTION SUBCUTANEOUS
Qty: 30 ML | Refills: 1 | Status: SHIPPED | OUTPATIENT
Start: 2018-10-11 | End: 2019-03-20

## 2018-10-11 NOTE — TELEPHONE ENCOUNTER
Pt wife calling for a refill request on the following medication. Pt will be out of town this sat please call       Current Outpatient Medications:     •  insulin glargine 100 UNIT/ML Subcutaneous Solution, Inject 30 Units into the skin nightly.  Take 40 un

## 2018-10-17 ENCOUNTER — TELEPHONE (OUTPATIENT)
Dept: INTERNAL MEDICINE CLINIC | Facility: CLINIC | Age: 63
End: 2018-10-17

## 2018-10-17 NOTE — TELEPHONE ENCOUNTER
Pt's spouse called in requesting pt's XR results from 9/26.   Please advise   Alex Ocampo is on HIP[employer]

## 2018-10-18 NOTE — TELEPHONE ENCOUNTER
PROCEDURE:  XR HAND (MIN 3 VIEWS), RIGHT (CPT=73130)     COMPARISON: None. INDICATIONS:  Pain and swelling in right thumb and 1st metacarpal for 1 week. TECHNIQUE:    3 views were obtained.        FINDINGS:          BONES:             Normal.  No si

## 2018-11-05 RX ORDER — FENOFIBRATE 48 MG/1
TABLET, COATED ORAL
Qty: 90 TABLET | Refills: 1 | Status: SHIPPED | OUTPATIENT
Start: 2018-11-05 | End: 2019-03-20

## 2018-12-03 ENCOUNTER — TELEPHONE (OUTPATIENT)
Dept: DERMATOLOGY CLINIC | Facility: CLINIC | Age: 63
End: 2018-12-03

## 2018-12-03 RX ORDER — FLUOCINOLONE ACETONIDE 0.25 MG/G
CREAM TOPICAL
Qty: 60 G | Refills: 1 | Status: SHIPPED | OUTPATIENT
Start: 2018-12-03 | End: 2019-07-23 | Stop reason: ALTCHOICE

## 2018-12-03 NOTE — TELEPHONE ENCOUNTER
LOV 8/30/18 pt with hx of dermatitis, states his face is dry and itchy. He is using ciclopirox gel and HC lotion 2.5% 1-2 times per day with no results - requesting an alternative. Please advise. He is open to F/U but there is no openings until next week.

## 2018-12-07 ENCOUNTER — LAB ENCOUNTER (OUTPATIENT)
Dept: LAB | Age: 63
End: 2018-12-07
Attending: INTERNAL MEDICINE
Payer: MEDICARE

## 2018-12-07 DIAGNOSIS — E11.9 TYPE 2 DIABETES MELLITUS WITHOUT COMPLICATION, WITHOUT LONG-TERM CURRENT USE OF INSULIN (HCC): ICD-10-CM

## 2018-12-07 DIAGNOSIS — I10 ESSENTIAL HYPERTENSION: ICD-10-CM

## 2018-12-07 PROCEDURE — 85025 COMPLETE CBC W/AUTO DIFF WBC: CPT

## 2018-12-07 PROCEDURE — 80069 RENAL FUNCTION PANEL: CPT

## 2018-12-07 PROCEDURE — 36415 COLL VENOUS BLD VENIPUNCTURE: CPT

## 2018-12-10 ENCOUNTER — OFFICE VISIT (OUTPATIENT)
Dept: NEPHROLOGY | Facility: CLINIC | Age: 63
End: 2018-12-10
Payer: MEDICARE

## 2018-12-10 VITALS
HEIGHT: 69 IN | WEIGHT: 231.81 LBS | DIASTOLIC BLOOD PRESSURE: 62 MMHG | BODY MASS INDEX: 34.33 KG/M2 | SYSTOLIC BLOOD PRESSURE: 112 MMHG | HEART RATE: 68 BPM

## 2018-12-10 DIAGNOSIS — N18.4 CKD STAGE 4 DUE TO TYPE 2 DIABETES MELLITUS (HCC): ICD-10-CM

## 2018-12-10 DIAGNOSIS — I10 ESSENTIAL HYPERTENSION: Primary | ICD-10-CM

## 2018-12-10 DIAGNOSIS — E11.9 TYPE 2 DIABETES MELLITUS WITHOUT COMPLICATION, WITHOUT LONG-TERM CURRENT USE OF INSULIN (HCC): ICD-10-CM

## 2018-12-10 DIAGNOSIS — E11.22 CKD STAGE 4 DUE TO TYPE 2 DIABETES MELLITUS (HCC): ICD-10-CM

## 2018-12-10 PROCEDURE — 99213 OFFICE O/P EST LOW 20 MIN: CPT | Performed by: INTERNAL MEDICINE

## 2018-12-10 PROCEDURE — G0463 HOSPITAL OUTPT CLINIC VISIT: HCPCS | Performed by: INTERNAL MEDICINE

## 2018-12-10 RX ORDER — HYDROCODONE BITARTRATE AND ACETAMINOPHEN 10; 325 MG/1; MG/1
1 TABLET ORAL EVERY 8 HOURS PRN
Qty: 90 TABLET | Refills: 0 | Status: SHIPPED | OUTPATIENT
Start: 2018-12-10 | End: 2018-12-10

## 2018-12-10 RX ORDER — HYDROCODONE BITARTRATE AND ACETAMINOPHEN 10; 325 MG/1; MG/1
1 TABLET ORAL EVERY 8 HOURS PRN
Qty: 90 TABLET | Refills: 0 | Status: SHIPPED | OUTPATIENT
Start: 2019-01-09 | End: 2019-02-11

## 2018-12-13 NOTE — PROGRESS NOTES
Bianca Valerio is here he is doing okay he is type 2 diabetes he sees Dr. Gamaliel Haddad states his sugars are running between 1/21/1942.   He is stage IV kidney disease  And is watching his urination labs etc. denies any chest pain shortness of breath edema or urinary pr

## 2018-12-18 NOTE — PROGRESS NOTES
Name: Brian Soares  Date: 12/19/2018    Referring Physician: Tomas Gill   Brian Soares is a 70year old male who presents for diabetes mellitus.       Prior HbA, C or glycohemoglobin were 9.8% 7/2014; 7.7% 12/2015; 7.2 1  •  FENOFIBRATE 48 MG Oral Tab, TAKE 1 TABLET BY MOUTH ONCE DAILY, Disp: 90 tablet, Rfl: 1  •  LANTUS 100 UNIT/ML Subcutaneous Solution, INJECT 30 UNITS SUBCUTANEOUSLY INTO THE SKIN NIGHTLY, Disp: 30 mL, Rfl: 1  •  insulin aspart (NOVOLOG) 100 UNIT/ML Randall Multiple Vitamins-Minerals (CENTRUM SILVER) Oral Tab, Take 1 tablet by mouth daily. , Disp: , Rfl:      Allergies:     Cefdinir                DIARRHEA    Social History:   Social History    Socioeconomic History      Marital status:       Spouse nam 6/14/2018    Performed by Issa Palma MD at [address]  /74 (BP Location: Left arm)   Pulse 70   Resp 16   Ht 5' 9\" (1.753 m)   Wt 232 lb (105.2 kg)   BMI 34.26 kg/m²     General Appearance:  alert, well develop Diabetes.     FANY Rice    Orders Placed This Encounter      POC Finger stick glucose [59398]      POC Glycohemoglobin [98583]      Lipid Panel [E]          Standing Status: Future          Standing Expiration Date: 12/19/2019      Latisha/Crea

## 2018-12-19 ENCOUNTER — OFFICE VISIT (OUTPATIENT)
Dept: ENDOCRINOLOGY CLINIC | Facility: CLINIC | Age: 63
End: 2018-12-19
Payer: MEDICARE

## 2018-12-19 ENCOUNTER — OFFICE VISIT (OUTPATIENT)
Dept: DERMATOLOGY CLINIC | Facility: CLINIC | Age: 63
End: 2018-12-19
Payer: MEDICARE

## 2018-12-19 VITALS
HEART RATE: 70 BPM | SYSTOLIC BLOOD PRESSURE: 119 MMHG | HEIGHT: 69 IN | WEIGHT: 232 LBS | BODY MASS INDEX: 34.36 KG/M2 | DIASTOLIC BLOOD PRESSURE: 74 MMHG | RESPIRATION RATE: 16 BRPM

## 2018-12-19 DIAGNOSIS — E11.65 UNCONTROLLED TYPE 2 DIABETES MELLITUS WITH HYPERGLYCEMIA (HCC): Primary | ICD-10-CM

## 2018-12-19 DIAGNOSIS — Z79.4 UNCONTROLLED TYPE 2 DIABETES MELLITUS WITH COMPLICATION, WITH LONG-TERM CURRENT USE OF INSULIN (HCC): ICD-10-CM

## 2018-12-19 DIAGNOSIS — E11.65 UNCONTROLLED TYPE 2 DIABETES MELLITUS WITH COMPLICATION, WITH LONG-TERM CURRENT USE OF INSULIN (HCC): ICD-10-CM

## 2018-12-19 DIAGNOSIS — L21.9 SEBORRHEIC DERMATITIS: ICD-10-CM

## 2018-12-19 DIAGNOSIS — L30.9 DERMATITIS: Primary | ICD-10-CM

## 2018-12-19 DIAGNOSIS — E11.8 UNCONTROLLED TYPE 2 DIABETES MELLITUS WITH COMPLICATION, WITH LONG-TERM CURRENT USE OF INSULIN (HCC): ICD-10-CM

## 2018-12-19 PROCEDURE — G0463 HOSPITAL OUTPT CLINIC VISIT: HCPCS | Performed by: DERMATOLOGY

## 2018-12-19 PROCEDURE — 99213 OFFICE O/P EST LOW 20 MIN: CPT | Performed by: DERMATOLOGY

## 2018-12-19 PROCEDURE — G0463 HOSPITAL OUTPT CLINIC VISIT: HCPCS | Performed by: NURSE PRACTITIONER

## 2018-12-19 PROCEDURE — 83036 HEMOGLOBIN GLYCOSYLATED A1C: CPT | Performed by: NURSE PRACTITIONER

## 2018-12-19 PROCEDURE — 82962 GLUCOSE BLOOD TEST: CPT | Performed by: NURSE PRACTITIONER

## 2018-12-19 PROCEDURE — 99213 OFFICE O/P EST LOW 20 MIN: CPT | Performed by: NURSE PRACTITIONER

## 2018-12-19 PROCEDURE — 36416 COLLJ CAPILLARY BLOOD SPEC: CPT | Performed by: NURSE PRACTITIONER

## 2018-12-19 RX ORDER — SELENIUM SULFIDE 2.5 MG/100ML
LOTION TOPICAL
Qty: 120 ML | Refills: 3 | Status: SHIPPED | OUTPATIENT
Start: 2018-12-19

## 2018-12-19 RX ORDER — CLOTRIMAZOLE 1 %
CREAM (GRAM) TOPICAL
Qty: 60 G | Refills: 3 | Status: SHIPPED | OUTPATIENT
Start: 2018-12-19

## 2018-12-19 RX ORDER — ATORVASTATIN CALCIUM 40 MG/1
TABLET, FILM COATED ORAL
Qty: 90 TABLET | Refills: 1 | Status: SHIPPED | OUTPATIENT
Start: 2018-12-19 | End: 2019-07-03

## 2018-12-19 NOTE — PATIENT INSTRUCTIONS
No Change in Diabetic medication   Lantus 30 units SQ QHS, if low patient decreases dose to 25 units   Humalog 6-6-8 units SQ TID with meals plus CF    Follow up in 3 months     Dr Susanna Gottron - foot rash follow up

## 2018-12-31 NOTE — PROGRESS NOTES
Charles Kebede is a 70year old male. Patient presents with:  Eczema: established pt, presents with eczema F/U to left foot, eyebrows and chin.  \"it's a little worse\" pt tried ciclopirox gel,  fluocinolone cream and HC 2.5% lotion with no results Oral Tab Take 27 mg by mouth daily. Disp:  Rfl:    Cyanocobalamin (VITAMIN B-12) 5000 MCG Oral Lozenge Take 5,000 mcg by mouth daily. Disp: 30 lozenge Rfl: 0   Omega-3 Fatty Acids (FISH OIL) 600 MG Oral Cap Take 1 capsule by mouth nightly.    Disp:  Rfl: HYDROcodone-acetaminophen  MG Oral Tab Take 1 tablet by mouth every 8 (eight) hours as needed for Pain.  Disp: 90 tablet Rfl: 0   FENOFIBRATE 48 MG Oral Tab TAKE 1 TABLET BY MOUTH ONCE DAILY Disp: 90 tablet Rfl: 1   LANTUS 100 UNIT/ML Subcutaneous Sol Fluocinonide 0.05 % External Cream Apply 1 Tube topically 2 (two) times daily. Disp: 30 g Rfl: 2   Clotrimazole 1 % External Ointment Apply 1 Tube topically daily.  Apply topically to affected area in the morning Disp: 15 g Rfl: 1     Allergies:     Cefdi Not on file    Tobacco Use      Smoking status: Former Smoker        Quit date: 1989        Years since quittin.1      Smokeless tobacco: Never Used      Tobacco comment: quit about 30 years ago.     Substance and Sexual Activity      Alcohol use allergies as noted. Nothing new or different no unusual exposures. No changes in soaps, detergents, skin care products. No recent travel. No else at home itching. No recent illnesses. ROS:   Denies any other systemic complaints.   No fevers, maintenance therapy will be needed and may have flareups intermittently stressed. No other suspicious lesions presently    Meds in grid. Skin care instructions reviewed.   The use various products including moisturizers, creams soaps cleansers detergent

## 2019-01-24 ENCOUNTER — LAB ENCOUNTER (OUTPATIENT)
Dept: LAB | Age: 64
End: 2019-01-24
Attending: INTERNAL MEDICINE
Payer: MEDICARE

## 2019-01-24 ENCOUNTER — TELEPHONE (OUTPATIENT)
Dept: ENDOCRINOLOGY CLINIC | Facility: CLINIC | Age: 64
End: 2019-01-24

## 2019-01-24 DIAGNOSIS — E11.9 TYPE 2 DIABETES MELLITUS WITHOUT COMPLICATION, WITHOUT LONG-TERM CURRENT USE OF INSULIN (HCC): ICD-10-CM

## 2019-01-24 DIAGNOSIS — N18.4 CKD STAGE 4 DUE TO TYPE 2 DIABETES MELLITUS (HCC): ICD-10-CM

## 2019-01-24 DIAGNOSIS — E11.22 CKD STAGE 4 DUE TO TYPE 2 DIABETES MELLITUS (HCC): ICD-10-CM

## 2019-01-24 DIAGNOSIS — E11.65 UNCONTROLLED TYPE 2 DIABETES MELLITUS WITH HYPERGLYCEMIA (HCC): ICD-10-CM

## 2019-01-24 DIAGNOSIS — I10 ESSENTIAL HYPERTENSION: ICD-10-CM

## 2019-01-24 LAB
ALBUMIN SERPL BCP-MCNC: 4.1 G/DL (ref 3.5–4.8)
ALBUMIN/GLOB SERPL: 1.6 {RATIO} (ref 1–2)
ALP SERPL-CCNC: 51 U/L (ref 32–100)
ALT SERPL-CCNC: 21 U/L (ref 17–63)
ANION GAP SERPL CALC-SCNC: 8 MMOL/L (ref 0–18)
AST SERPL-CCNC: 19 U/L (ref 15–41)
BASOPHILS # BLD: 0 K/UL (ref 0–0.2)
BASOPHILS NFR BLD: 1 %
BILIRUB SERPL-MCNC: 0.9 MG/DL (ref 0.3–1.2)
BILIRUB UR QL: NEGATIVE
BUN SERPL-MCNC: 57 MG/DL (ref 8–20)
BUN/CREAT SERPL: 18.8 (ref 10–20)
CALCIUM SERPL-MCNC: 9.5 MG/DL (ref 8.5–10.5)
CHLORIDE SERPL-SCNC: 108 MMOL/L (ref 95–110)
CHOLEST SERPL-MCNC: 99 MG/DL (ref 110–200)
CLARITY UR: CLEAR
CO2 SERPL-SCNC: 24 MMOL/L (ref 22–32)
COLOR UR: YELLOW
CREAT SERPL-MCNC: 3.03 MG/DL (ref 0.5–1.5)
CREAT UR-MCNC: 55.1 MG/DL
EOSINOPHIL # BLD: 0.1 K/UL (ref 0–0.7)
EOSINOPHIL NFR BLD: 3 %
ERYTHROCYTE [DISTWIDTH] IN BLOOD BY AUTOMATED COUNT: 15 % (ref 11–15)
GLOBULIN PLAS-MCNC: 2.6 G/DL (ref 2.5–3.7)
GLUCOSE SERPL-MCNC: 165 MG/DL (ref 70–99)
GLUCOSE UR-MCNC: 50 MG/DL
HCT VFR BLD AUTO: 31.3 % (ref 41–52)
HDLC SERPL-MCNC: 38 MG/DL
HGB BLD-MCNC: 10.8 G/DL (ref 13.5–17.5)
HGB UR QL STRIP.AUTO: NEGATIVE
IRON SATN MFR SERPL: 41 % (ref 20–55)
IRON SERPL-MCNC: 113 MCG/DL (ref 45–182)
KETONES UR-MCNC: NEGATIVE MG/DL
LDLC SERPL CALC-MCNC: 45 MG/DL (ref 0–99)
LEUKOCYTE ESTERASE UR QL STRIP.AUTO: NEGATIVE
LYMPHOCYTES # BLD: 1.3 K/UL (ref 1–4)
LYMPHOCYTES NFR BLD: 33 %
MCH RBC QN AUTO: 35 PG (ref 27–32)
MCHC RBC AUTO-ENTMCNC: 34.4 G/DL (ref 32–37)
MCV RBC AUTO: 101.7 FL (ref 80–100)
MICROALBUMIN UR-MCNC: 4 MG/DL (ref 0–1.8)
MICROALBUMIN/CREAT UR: 72.6 MG/G{CREAT} (ref 0–20)
MONOCYTES # BLD: 0.4 K/UL (ref 0–1)
MONOCYTES NFR BLD: 11 %
NEUTROPHILS # BLD AUTO: 2 K/UL (ref 1.8–7.7)
NEUTROPHILS NFR BLD: 53 %
NITRITE UR QL STRIP.AUTO: NEGATIVE
NONHDLC SERPL-MCNC: 61 MG/DL
OSMOLALITY UR CALC.SUM OF ELEC: 310 MOSM/KG (ref 275–295)
PATIENT FASTING: YES
PH UR: 6 [PH] (ref 5–8)
PHOSPHATE SERPL-MCNC: 3.5 MG/DL (ref 2.4–4.7)
PLATELET # BLD AUTO: 114 K/UL (ref 140–400)
PMV BLD AUTO: 9.6 FL (ref 7.4–10.3)
POTASSIUM SERPL-SCNC: 5.7 MMOL/L (ref 3.3–5.1)
PROT SERPL-MCNC: 6.7 G/DL (ref 5.9–8.4)
PROT UR-MCNC: NEGATIVE MG/DL
RBC # BLD AUTO: 3.08 M/UL (ref 4.5–5.9)
SODIUM SERPL-SCNC: 140 MMOL/L (ref 136–144)
SP GR UR STRIP: 1.01 (ref 1–1.03)
TIBC SERPL-MCNC: 277 MCG/DL (ref 228–428)
TRANSFERRIN SERPL-MCNC: 210 MG/DL (ref 180–329)
TRIGL SERPL-MCNC: 82 MG/DL (ref 1–149)
UROBILINOGEN UR STRIP-ACNC: <2
VIT B12 SERPL-MCNC: >1500 PG/ML (ref 181–914)
VIT C UR-MCNC: NEGATIVE MG/DL
WBC # BLD AUTO: 3.8 K/UL (ref 4–11)

## 2019-01-24 PROCEDURE — 83540 ASSAY OF IRON: CPT

## 2019-01-24 PROCEDURE — 80053 COMPREHEN METABOLIC PANEL: CPT

## 2019-01-24 PROCEDURE — 84100 ASSAY OF PHOSPHORUS: CPT

## 2019-01-24 PROCEDURE — 82607 VITAMIN B-12: CPT

## 2019-01-24 PROCEDURE — 84466 ASSAY OF TRANSFERRIN: CPT

## 2019-01-24 PROCEDURE — 82570 ASSAY OF URINE CREATININE: CPT

## 2019-01-24 PROCEDURE — 36415 COLL VENOUS BLD VENIPUNCTURE: CPT

## 2019-01-24 PROCEDURE — 80061 LIPID PANEL: CPT

## 2019-01-24 PROCEDURE — 82043 UR ALBUMIN QUANTITATIVE: CPT

## 2019-01-24 PROCEDURE — 85025 COMPLETE CBC W/AUTO DIFF WBC: CPT

## 2019-02-04 ENCOUNTER — TELEPHONE (OUTPATIENT)
Dept: NEPHROLOGY | Facility: CLINIC | Age: 64
End: 2019-02-04

## 2019-02-04 NOTE — TELEPHONE ENCOUNTER
Pts wife is concerned about the pt. vomiting and having diarrhea this morning, as it is the same symptoms as the last time the pt. Had Ended up at [address] [facility] [place] [facility] Dept.

## 2019-02-04 NOTE — TELEPHONE ENCOUNTER
Spoke to Cintia meléndez, patient's wife. Symptoms started this morning. No visible blood in emesis or stool. Denies fever no chills no thermometer but feels cold. Has been able to hold down [employer] so far. Advised will send this encounter to Dr. Justin Edge and will call back with his advice.

## 2019-02-11 ENCOUNTER — OFFICE VISIT (OUTPATIENT)
Dept: NEPHROLOGY | Facility: CLINIC | Age: 64
End: 2019-02-11
Payer: MEDICARE

## 2019-02-11 VITALS
DIASTOLIC BLOOD PRESSURE: 74 MMHG | HEART RATE: 60 BPM | HEIGHT: 69 IN | WEIGHT: 229.19 LBS | SYSTOLIC BLOOD PRESSURE: 121 MMHG | BODY MASS INDEX: 33.95 KG/M2

## 2019-02-11 DIAGNOSIS — N18.30 CHRONIC KIDNEY DISEASE, STAGE III (MODERATE) (HCC): Primary | ICD-10-CM

## 2019-02-11 DIAGNOSIS — E11.65 UNCONTROLLED TYPE 2 DIABETES MELLITUS WITH HYPERGLYCEMIA (HCC): ICD-10-CM

## 2019-02-11 PROCEDURE — G0463 HOSPITAL OUTPT CLINIC VISIT: HCPCS | Performed by: INTERNAL MEDICINE

## 2019-02-11 PROCEDURE — 99213 OFFICE O/P EST LOW 20 MIN: CPT | Performed by: INTERNAL MEDICINE

## 2019-02-11 RX ORDER — PREGABALIN 300 MG/1
CAPSULE ORAL
Qty: 90 CAPSULE | Refills: 3 | Status: SHIPPED | OUTPATIENT
Start: 2019-03-01 | End: 2019-09-04

## 2019-02-11 RX ORDER — HYDROCODONE BITARTRATE AND ACETAMINOPHEN 10; 325 MG/1; MG/1
1 TABLET ORAL EVERY 8 HOURS PRN
Qty: 90 TABLET | Refills: 0 | Status: SHIPPED | OUTPATIENT
Start: 2019-02-11 | End: 2019-02-11

## 2019-02-11 RX ORDER — HYDROCODONE BITARTRATE AND ACETAMINOPHEN 10; 325 MG/1; MG/1
1 TABLET ORAL EVERY 8 HOURS PRN
Qty: 90 TABLET | Refills: 0 | Status: SHIPPED | OUTPATIENT
Start: 2019-03-12 | End: 2019-04-12

## 2019-02-12 NOTE — PROGRESS NOTES
Funmi Griffin is here for follow-up he recently got over the flu but is doing better now. He states his blood pressure is feeling good sugars are running about 120-130 he denies any edema any shortness of breath or any chest pain.   He is taking Lyrica which she

## 2019-02-18 RX ORDER — BLOOD-GLUCOSE METER
KIT MISCELLANEOUS
Qty: 500 STRIP | Refills: 1 | Status: SHIPPED | OUTPATIENT
Start: 2019-02-18 | End: 2019-04-04

## 2019-02-18 RX ORDER — SYRINGE AND NEEDLE,INSULIN,1ML 31GX15/64"
SYRINGE, EMPTY DISPOSABLE MISCELLANEOUS
Qty: 400 EACH | Refills: 1 | Status: SHIPPED | OUTPATIENT
Start: 2019-02-18 | End: 2019-07-03

## 2019-03-11 ENCOUNTER — LAB ENCOUNTER (OUTPATIENT)
Dept: LAB | Age: 64
End: 2019-03-11
Attending: NURSE PRACTITIONER
Payer: MEDICARE

## 2019-03-11 DIAGNOSIS — E11.65 UNCONTROLLED TYPE 2 DIABETES MELLITUS WITH HYPERGLYCEMIA (HCC): ICD-10-CM

## 2019-03-11 LAB
CREAT UR-SCNC: 74 MG/DL
MICROALBUMIN UR-MCNC: 12.3 MG/DL
MICROALBUMIN/CREAT 24H UR-RTO: 166.2 UG/MG (ref ?–30)

## 2019-03-11 PROCEDURE — 82043 UR ALBUMIN QUANTITATIVE: CPT

## 2019-03-11 PROCEDURE — 82570 ASSAY OF URINE CREATININE: CPT

## 2019-03-15 NOTE — PROGRESS NOTES
Name: Blanca Camacho  Date: 03/20 /2019    Referring Physician: Tl KIRBY   Blanca Camacho is a 70year old male who presents for diabetes mellitus follow up     significant medical history includes chronic kidney diseas Current Outpatient Medications:   •  Fenofibrate 48 MG Oral Tab, Take 1 tablet (48 mg total) by mouth once daily. , Disp: 90 tablet, Rfl: 1  •  insulin aspart (NOVOLOG) 100 UNIT/ML Subcutaneous Solution, INJECT 6 UNITS SUBCUTANEOUSLY WITH BREAKFAST, THE Tube topically 2 (two) times daily. , Disp: 30 g, Rfl: 2  •  Clotrimazole 1 % External Ointment, Apply 1 Tube topically daily.  Apply topically to affected area in the morning, Disp: 15 g, Rfl: 1  •  Cyanocobalamin (VITAMIN B-12) 5000 MCG Oral Lozenge, Take Jessica   • CATARACT Left 07/12/2018    Dr. Oma Tuttle   • CATARACT EXTRACTION Right 06/11/2018   • CATARACT EXTRACTION W/  INTRAOCULAR LENS IMPLANT Left 07/12/2018   • CHOLECYSTECTOMY  2012   • ELECTROCARDIOGRAM, COMPLETE  4/23/2012    scanned to media tab Yonis Schmitz today   -Continue Lantus 30 units SQ daily   -Continue Humalog 6-6-8 +CF / reviewed adding CF bolus humalog for high CHO hs snack BS> 200  -Discussed CGM but he is not interested at this time  -Normal lipids/ lipids to goal / cpm statin/ fen

## 2019-03-20 ENCOUNTER — OFFICE VISIT (OUTPATIENT)
Dept: ENDOCRINOLOGY CLINIC | Facility: CLINIC | Age: 64
End: 2019-03-20
Payer: MEDICARE

## 2019-03-20 ENCOUNTER — TELEPHONE (OUTPATIENT)
Dept: ENDOCRINOLOGY CLINIC | Facility: CLINIC | Age: 64
End: 2019-03-20

## 2019-03-20 VITALS
WEIGHT: 231.81 LBS | BODY MASS INDEX: 34.33 KG/M2 | DIASTOLIC BLOOD PRESSURE: 75 MMHG | SYSTOLIC BLOOD PRESSURE: 129 MMHG | HEIGHT: 69 IN | RESPIRATION RATE: 14 BRPM | HEART RATE: 71 BPM

## 2019-03-20 DIAGNOSIS — E11.65 UNCONTROLLED TYPE 2 DIABETES MELLITUS WITH HYPERGLYCEMIA (HCC): Primary | ICD-10-CM

## 2019-03-20 LAB
CARTRIDGE EXPIRATION DATE: ABNORMAL DATE
CARTRIDGE LOT#: ABNORMAL NUMERIC
GLUCOSE BLOOD: 190
HEMOGLOBIN A1C: 6.5 % (ref 4.3–5.6)
TEST STRIP EXPIRATION DATE: NORMAL DATE
TEST STRIP LOT #: NORMAL NUMERIC

## 2019-03-20 PROCEDURE — G0463 HOSPITAL OUTPT CLINIC VISIT: HCPCS | Performed by: NURSE PRACTITIONER

## 2019-03-20 PROCEDURE — 82962 GLUCOSE BLOOD TEST: CPT | Performed by: NURSE PRACTITIONER

## 2019-03-20 PROCEDURE — 83036 HEMOGLOBIN GLYCOSYLATED A1C: CPT | Performed by: NURSE PRACTITIONER

## 2019-03-20 PROCEDURE — 36416 COLLJ CAPILLARY BLOOD SPEC: CPT | Performed by: NURSE PRACTITIONER

## 2019-03-20 PROCEDURE — 99214 OFFICE O/P EST MOD 30 MIN: CPT | Performed by: NURSE PRACTITIONER

## 2019-03-20 RX ORDER — INSULIN ASPART 100 [IU]/ML
INJECTION, SOLUTION INTRAVENOUS; SUBCUTANEOUS
Qty: 20 ML | Refills: 1 | Status: SHIPPED | OUTPATIENT
Start: 2019-03-20 | End: 2019-07-03

## 2019-03-20 RX ORDER — FENOFIBRATE 48 MG/1
48 TABLET, COATED ORAL
Qty: 90 TABLET | Refills: 1 | Status: SHIPPED | OUTPATIENT
Start: 2019-03-20 | End: 2019-11-20

## 2019-03-20 NOTE — TELEPHONE ENCOUNTER
Saw Luis E Pate today reviewed labs and B12 supplementation. Since levels >1500 asked him to f/u with Dr Jake De Leon regarding continued supplementation.    He also asked if he should have a magnesium test. I told him this was not something Endocrine followed for D

## 2019-03-20 NOTE — TELEPHONE ENCOUNTER
MAGNESIUM IS ALREADY ON THE DOCKET FROM ME IN COMPUTER   ALSO B12 NEEDS TO BE ADDRESSED BY HIS PCP. Kathy Kenny THANKS.

## 2019-03-20 NOTE — PATIENT INSTRUCTIONS
Lantus 30-35 units SQ QHS  Humalog 6-6-8 units three time a day with meals plus CF    CF for night time snack for Blood sugars over 200    Follow up in 3 months     Review Vitamin B 12 with Dr Niraj Rodriguez

## 2019-04-04 ENCOUNTER — TELEPHONE (OUTPATIENT)
Dept: ENDOCRINOLOGY CLINIC | Facility: CLINIC | Age: 64
End: 2019-04-04

## 2019-04-04 ENCOUNTER — APPOINTMENT (OUTPATIENT)
Dept: LAB | Age: 64
End: 2019-04-04
Attending: INTERNAL MEDICINE
Payer: MEDICARE

## 2019-04-04 DIAGNOSIS — N18.30 CHRONIC KIDNEY DISEASE, STAGE III (MODERATE) (HCC): ICD-10-CM

## 2019-04-04 DIAGNOSIS — E11.65 UNCONTROLLED TYPE 2 DIABETES MELLITUS WITH HYPERGLYCEMIA (HCC): ICD-10-CM

## 2019-04-04 PROCEDURE — 83036 HEMOGLOBIN GLYCOSYLATED A1C: CPT

## 2019-04-04 PROCEDURE — 83970 ASSAY OF PARATHORMONE: CPT

## 2019-04-04 PROCEDURE — 83735 ASSAY OF MAGNESIUM: CPT

## 2019-04-04 PROCEDURE — 36415 COLL VENOUS BLD VENIPUNCTURE: CPT

## 2019-04-04 PROCEDURE — 80069 RENAL FUNCTION PANEL: CPT

## 2019-04-04 RX ORDER — BLOOD-GLUCOSE METER
KIT MISCELLANEOUS
Qty: 500 STRIP | Refills: 1 | Status: SHIPPED | OUTPATIENT
Start: 2019-04-04 | End: 2019-07-03

## 2019-04-04 NOTE — TELEPHONE ENCOUNTER
Vanesa requesting refills for Freestyle Lite Test Strips - pt tests 5 times daily. Pls call. Thank you.       Current Outpatient Medications:  FREESTYLE LITE TEST In Vitro Strip USE ONE STRIP TO CHECK BLOOD SUGAR FIVE TIMES DAILY Disp: 500 strip Rfl: 1

## 2019-04-12 ENCOUNTER — OFFICE VISIT (OUTPATIENT)
Dept: NEPHROLOGY | Facility: CLINIC | Age: 64
End: 2019-04-12
Payer: MEDICARE

## 2019-04-12 VITALS
HEART RATE: 72 BPM | WEIGHT: 230 LBS | RESPIRATION RATE: 18 BRPM | BODY MASS INDEX: 34.07 KG/M2 | HEIGHT: 69 IN | DIASTOLIC BLOOD PRESSURE: 62 MMHG | TEMPERATURE: 97 F | SYSTOLIC BLOOD PRESSURE: 109 MMHG

## 2019-04-12 DIAGNOSIS — N18.4 CKD (CHRONIC KIDNEY DISEASE) STAGE 4, GFR 15-29 ML/MIN (HCC): ICD-10-CM

## 2019-04-12 DIAGNOSIS — E11.9 TYPE 2 DIABETES MELLITUS WITHOUT COMPLICATION, WITHOUT LONG-TERM CURRENT USE OF INSULIN (HCC): Primary | ICD-10-CM

## 2019-04-12 PROCEDURE — 99213 OFFICE O/P EST LOW 20 MIN: CPT | Performed by: INTERNAL MEDICINE

## 2019-04-12 PROCEDURE — G0463 HOSPITAL OUTPT CLINIC VISIT: HCPCS | Performed by: INTERNAL MEDICINE

## 2019-04-12 RX ORDER — HYDROCODONE BITARTRATE AND ACETAMINOPHEN 10; 325 MG/1; MG/1
1 TABLET ORAL EVERY 8 HOURS PRN
Qty: 90 TABLET | Refills: 0 | Status: SHIPPED | OUTPATIENT
Start: 2019-07-01 | End: 2019-07-26

## 2019-04-12 RX ORDER — HYDROCODONE BITARTRATE AND ACETAMINOPHEN 10; 325 MG/1; MG/1
1 TABLET ORAL EVERY 8 HOURS PRN
Qty: 90 TABLET | Refills: 0 | Status: SHIPPED | OUTPATIENT
Start: 2019-06-01 | End: 2019-04-12

## 2019-04-12 RX ORDER — HYDROCODONE BITARTRATE AND ACETAMINOPHEN 10; 325 MG/1; MG/1
1 TABLET ORAL EVERY 8 HOURS PRN
Qty: 90 TABLET | Refills: 0 | Status: SHIPPED | OUTPATIENT
Start: 2019-05-01 | End: 2019-04-12

## 2019-04-12 NOTE — PATIENT INSTRUCTIONS
Great job [employer] for labs prior to next appt    See me later in July     thanks for the girl  cookies and the good effort!!    Good rashmi with the garden

## 2019-04-13 NOTE — PROGRESS NOTES
Ryanne Pereira is here he is doing well. He just got his haircut he looks great his pressures are doing good he has had no GI symptoms diarrhea he is urinating fine and his neuropathy is stable.   He did ask me to refill his Norco which I will do he takes 3 a d

## 2019-07-03 ENCOUNTER — OFFICE VISIT (OUTPATIENT)
Dept: ENDOCRINOLOGY CLINIC | Facility: CLINIC | Age: 64
End: 2019-07-03
Payer: MEDICARE

## 2019-07-03 VITALS
SYSTOLIC BLOOD PRESSURE: 122 MMHG | DIASTOLIC BLOOD PRESSURE: 71 MMHG | WEIGHT: 230 LBS | HEART RATE: 84 BPM | BODY MASS INDEX: 34 KG/M2

## 2019-07-03 DIAGNOSIS — Z79.4 UNCONTROLLED TYPE 2 DIABETES MELLITUS WITH COMPLICATION, WITH LONG-TERM CURRENT USE OF INSULIN (HCC): Primary | ICD-10-CM

## 2019-07-03 DIAGNOSIS — E11.8 UNCONTROLLED TYPE 2 DIABETES MELLITUS WITH COMPLICATION, WITH LONG-TERM CURRENT USE OF INSULIN (HCC): Primary | ICD-10-CM

## 2019-07-03 DIAGNOSIS — E11.65 UNCONTROLLED TYPE 2 DIABETES MELLITUS WITH COMPLICATION, WITH LONG-TERM CURRENT USE OF INSULIN (HCC): Primary | ICD-10-CM

## 2019-07-03 DIAGNOSIS — E11.65 UNCONTROLLED TYPE 2 DIABETES MELLITUS WITH HYPERGLYCEMIA (HCC): ICD-10-CM

## 2019-07-03 LAB
CARTRIDGE LOT#: ABNORMAL NUMERIC
GLUCOSE BLOOD: 238
HEMOGLOBIN A1C: 6.7 % (ref 4.3–5.6)
TEST STRIP LOT #: NORMAL NUMERIC

## 2019-07-03 PROCEDURE — 36416 COLLJ CAPILLARY BLOOD SPEC: CPT | Performed by: INTERNAL MEDICINE

## 2019-07-03 PROCEDURE — 99213 OFFICE O/P EST LOW 20 MIN: CPT | Performed by: INTERNAL MEDICINE

## 2019-07-03 PROCEDURE — 82962 GLUCOSE BLOOD TEST: CPT | Performed by: INTERNAL MEDICINE

## 2019-07-03 PROCEDURE — G0463 HOSPITAL OUTPT CLINIC VISIT: HCPCS | Performed by: INTERNAL MEDICINE

## 2019-07-03 PROCEDURE — 83036 HEMOGLOBIN GLYCOSYLATED A1C: CPT | Performed by: INTERNAL MEDICINE

## 2019-07-03 RX ORDER — BLOOD-GLUCOSE METER
KIT MISCELLANEOUS
Qty: 500 STRIP | Refills: 1 | Status: SHIPPED | OUTPATIENT
Start: 2019-07-03 | End: 2020-03-24

## 2019-07-03 RX ORDER — INSULIN ASPART 100 [IU]/ML
INJECTION, SOLUTION INTRAVENOUS; SUBCUTANEOUS
Qty: 30 ML | Refills: 3 | Status: SHIPPED | OUTPATIENT
Start: 2019-07-03 | End: 2020-12-07

## 2019-07-03 RX ORDER — ATORVASTATIN CALCIUM 40 MG/1
TABLET, FILM COATED ORAL
Qty: 90 TABLET | Refills: 1 | Status: SHIPPED | OUTPATIENT
Start: 2019-07-03 | End: 2019-10-03

## 2019-07-03 RX ORDER — SYRINGE AND NEEDLE,INSULIN,1ML 31GX15/64"
1 SYRINGE, EMPTY DISPOSABLE MISCELLANEOUS 4 TIMES DAILY
Qty: 400 EACH | Refills: 1 | Status: SHIPPED | OUTPATIENT
Start: 2019-07-03 | End: 2020-02-03

## 2019-07-03 NOTE — PROGRESS NOTES
Name: Sandrine Cardenas  Date: 7/3/2019    Referring Physician: No ref. provider found    HISTORY OF PRESENT ILLNESS   Sandrine Cardenas is a 70year old male who presents for diabetes mellitus.       Prior HbA, C or glycohemoglobin were 9.8% 7/2014; 7.7% UNIT/ML Subcutaneous Solution, INJECT 6 UNITS SUBCUTANEOUSLY WITH BREAKFAST, THEN 6 UNITS WITH LUNCH AND 8 UNITS WITH DINNER, Disp: 20 mL, Rfl: 1  •  insulin glargine (LANTUS) 100 UNIT/ML Subcutaneous Solution, INJECT 30 UNITS SUBCUTANEOUSLY INTO THE SKIN Vitamins-Minerals (CENTRUM SILVER) Oral Tab, Take 1 tablet by mouth daily. , Disp: , Rfl:      Allergies:     Cefdinir                DIARRHEA  Zosyn [Piperacillin*    OTHER (SEE COMMENTS)    Comment:Heightened sense of smell; dry heaves, vomiting    [employer]   • RIGHT PHACOEMULSIFICATION OF CATARACT WITH INTRAOCULAR LENS IMPLANT 34100 Right 6/14/2018    Performed by Cherrie Maurer MD at [address] [place] [address]         PHYSICAL EXAM  /71   Pulse 84   Wt 230 lb (104.3 kg)   BMI 33.97 kg/m²     [employer]

## 2019-07-22 ENCOUNTER — TELEPHONE (OUTPATIENT)
Dept: NEPHROLOGY | Facility: CLINIC | Age: 64
End: 2019-07-22

## 2019-07-22 DIAGNOSIS — N18.30 CHRONIC KIDNEY DISEASE, STAGE III (MODERATE) (HCC): Primary | ICD-10-CM

## 2019-07-22 NOTE — TELEPHONE ENCOUNTER
Pls call pts wife at [phone] today. Pt has an appt with [facility] Friday 7/26/19. She stated he needs an order for labs prior to his appt so that he can complete and have results by Friday.

## 2019-07-23 ENCOUNTER — OFFICE VISIT (OUTPATIENT)
Dept: DERMATOLOGY CLINIC | Facility: CLINIC | Age: 64
End: 2019-07-23
Payer: MEDICARE

## 2019-07-23 DIAGNOSIS — L40.8 SEBOPSORIASIS: Primary | ICD-10-CM

## 2019-07-23 PROCEDURE — 99213 OFFICE O/P EST LOW 20 MIN: CPT | Performed by: DERMATOLOGY

## 2019-07-23 PROCEDURE — G0463 HOSPITAL OUTPT CLINIC VISIT: HCPCS | Performed by: DERMATOLOGY

## 2019-07-23 RX ORDER — KETOCONAZOLE 20 MG/ML
SHAMPOO TOPICAL
Qty: 120 ML | Refills: 3 | Status: SHIPPED | OUTPATIENT
Start: 2019-07-23 | End: 2019-11-07

## 2019-07-23 RX ORDER — TACROLIMUS 1 MG/G
1 OINTMENT TOPICAL 2 TIMES DAILY
Qty: 60 G | Refills: 3 | Status: SHIPPED | OUTPATIENT
Start: 2019-07-23

## 2019-07-23 NOTE — PROGRESS NOTES
HPI:     Chief Complaint     Rash        HPI     Rash      Additional comments: lov 12/2018 with [address] Patient presents with flaking rash to eyebrows, chin, elbows, eyelids, and scalp, forehead. Itching, flaking, red, peeling, tender.  Using fluocinonide at Disp: 30 mL Rfl: 1   FREESTYLE LITE TEST In Vitro Strip USE ONE STRIP TO CHECK BLOOD SUGAR FIVE TIMES DAILY. E11.65 with insulin use.  Disp: 500 strip Rfl: 1   RELION INSULIN SYRINGE 31G X 15/64\" 0.5 ML Does not apply Misc Inject 1 Device into the skin 4 ( topically to affected area in the morning Disp: 15 g Rfl: 1     Allergies:     Cefdinir                DIARRHEA  Zosyn [Piperacillin*    OTHER (SEE COMMENTS)    Comment:Heightened sense of smell; dry heaves, vomiting    Past Medical History:   Diagnosis Da 1989        Years since quittin.7      Smokeless tobacco: Never Used      Tobacco comment: quit about 30 years ago.     Substance and Sexual Activity      Alcohol use: No      Drug use: No      Sexual activity: Not on file    Lifestyle      Physi There are some erythematous crusted scaly patches covering the eyebrows bilaterally. Some extension of scaling onto the eyelid. 2.  There is some erythema and scaling appreciated in the outer ear canals.   3.  Presently no significant scale or erythema in

## 2019-07-26 ENCOUNTER — OFFICE VISIT (OUTPATIENT)
Dept: NEPHROLOGY | Facility: CLINIC | Age: 64
End: 2019-07-26
Payer: MEDICARE

## 2019-07-26 VITALS
HEART RATE: 76 BPM | BODY MASS INDEX: 34.78 KG/M2 | DIASTOLIC BLOOD PRESSURE: 56 MMHG | SYSTOLIC BLOOD PRESSURE: 97 MMHG | WEIGHT: 234.81 LBS | HEIGHT: 69 IN

## 2019-07-26 DIAGNOSIS — E11.65 UNCONTROLLED TYPE 2 DIABETES MELLITUS WITH HYPERGLYCEMIA (HCC): Primary | ICD-10-CM

## 2019-07-26 DIAGNOSIS — N18.30 CHRONIC KIDNEY DISEASE, STAGE III (MODERATE) (HCC): ICD-10-CM

## 2019-07-26 PROCEDURE — 99213 OFFICE O/P EST LOW 20 MIN: CPT | Performed by: INTERNAL MEDICINE

## 2019-07-26 PROCEDURE — G0463 HOSPITAL OUTPT CLINIC VISIT: HCPCS | Performed by: INTERNAL MEDICINE

## 2019-07-26 RX ORDER — HYDROCODONE BITARTRATE AND ACETAMINOPHEN 10; 325 MG/1; MG/1
1 TABLET ORAL EVERY 8 HOURS PRN
Qty: 90 TABLET | Refills: 0 | Status: SHIPPED | OUTPATIENT
Start: 2019-09-10 | End: 2019-07-26

## 2019-07-26 RX ORDER — HYDROCODONE BITARTRATE AND ACETAMINOPHEN 10; 325 MG/1; MG/1
1 TABLET ORAL EVERY 8 HOURS PRN
Qty: 90 TABLET | Refills: 0 | Status: SHIPPED | OUTPATIENT
Start: 2019-08-10 | End: 2019-07-26

## 2019-07-26 RX ORDER — HYDROCODONE BITARTRATE AND ACETAMINOPHEN 10; 325 MG/1; MG/1
1 TABLET ORAL EVERY 8 HOURS PRN
Qty: 90 TABLET | Refills: 0 | Status: SHIPPED | OUTPATIENT
Start: 2019-10-10 | End: 2019-11-08

## 2019-07-26 NOTE — PROGRESS NOTES
Julianna Alvarenga is here and is doing well he just got a job as a part-time  and is very excited about that I did tell him to avoid being out in the sun when it is very hot.   He needs his Norco he is not abusing it and I will give him a 3-month supply sugars

## 2019-07-26 NOTE — PATIENT INSTRUCTIONS
Please do labs mid September or late sept  See me around nov 6th    Be careful on roof.  Stay hydrated    Have a great summer

## 2019-08-30 RX ORDER — PANTOPRAZOLE SODIUM 40 MG/1
TABLET, DELAYED RELEASE ORAL
Qty: 90 TABLET | Refills: 3 | Status: SHIPPED | OUTPATIENT
Start: 2019-08-30 | End: 2020-09-03

## 2019-09-04 RX ORDER — PREGABALIN 300 MG/1
CAPSULE ORAL
Qty: 90 CAPSULE | Refills: 3 | Status: SHIPPED | OUTPATIENT
Start: 2019-09-04 | End: 2020-03-06

## 2019-10-03 ENCOUNTER — TELEPHONE (OUTPATIENT)
Dept: ENDOCRINOLOGY CLINIC | Facility: CLINIC | Age: 64
End: 2019-10-03

## 2019-10-03 DIAGNOSIS — E11.65 UNCONTROLLED TYPE 2 DIABETES MELLITUS WITH HYPERGLYCEMIA (HCC): ICD-10-CM

## 2019-10-03 RX ORDER — ATORVASTATIN CALCIUM 40 MG/1
TABLET, FILM COATED ORAL
Qty: 90 TABLET | Refills: 1 | Status: SHIPPED | OUTPATIENT
Start: 2019-10-03 | End: 2019-10-04

## 2019-10-03 NOTE — TELEPHONE ENCOUNTER
Patients wife/Vanesa calling to request script refill for rx: Atorvastin 40 MG, to be sent to [facility], please call at:[phone],thanks.     Current Outpatient Medications:   •  atorvastatin 40 MG Oral Tab, TAKE 1 TABLET BY MOUTH ONE TI

## 2019-10-04 RX ORDER — ATORVASTATIN CALCIUM 40 MG/1
TABLET, FILM COATED ORAL
Qty: 90 TABLET | Refills: 1 | Status: SHIPPED | OUTPATIENT
Start: 2019-10-04 | End: 2020-03-16

## 2019-10-04 NOTE — TELEPHONE ENCOUNTER
Script sent to [facility] as requested. Left detailed VM for Amanda (wife) notifying her.

## 2019-10-04 NOTE — TELEPHONE ENCOUNTER
Wyatt Gunter requesting prescription to be sent to the correct pharmacy. States patient is using [facility] for Atorvastatin and not [facility]. Please call when rx has been sent to the pharmacy. Thank you.

## 2019-10-16 ENCOUNTER — LAB ENCOUNTER (OUTPATIENT)
Dept: LAB | Age: 64
End: 2019-10-16
Attending: INTERNAL MEDICINE
Payer: MEDICARE

## 2019-10-16 DIAGNOSIS — E11.65 UNCONTROLLED TYPE 2 DIABETES MELLITUS WITH HYPERGLYCEMIA (HCC): ICD-10-CM

## 2019-10-16 DIAGNOSIS — N18.30 CHRONIC KIDNEY DISEASE, STAGE III (MODERATE) (HCC): ICD-10-CM

## 2019-10-16 PROCEDURE — 85025 COMPLETE CBC W/AUTO DIFF WBC: CPT

## 2019-10-16 PROCEDURE — 36415 COLL VENOUS BLD VENIPUNCTURE: CPT

## 2019-10-16 PROCEDURE — 80069 RENAL FUNCTION PANEL: CPT

## 2019-10-17 ENCOUNTER — TELEPHONE (OUTPATIENT)
Dept: NEPHROLOGY | Facility: CLINIC | Age: 64
End: 2019-10-17

## 2019-10-17 NOTE — TELEPHONE ENCOUNTER
Notes recorded by Royal Jag RN on 10/16/2019 at 2:56 PM CDT  Per chart pt does NOT want test results left on voicemail. LMTCB.   ------    Notes recorded by Mitali Godinez MD on 10/16/2019 at 2:25 PM CDT  Please notify tests stable

## 2019-10-18 NOTE — TELEPHONE ENCOUNTER
Pt's wife, Loida Barclay, returned call. Notified of pt's stable lab results per [place] (phone consent on file).

## 2019-11-01 RX ORDER — GLIMEPIRIDE 2 MG/1
TABLET ORAL
Qty: 90 TABLET | Refills: 3 | Status: SHIPPED | OUTPATIENT
Start: 2019-11-01 | End: 2020-02-05

## 2019-11-07 ENCOUNTER — OFFICE VISIT (OUTPATIENT)
Dept: DERMATOLOGY CLINIC | Facility: CLINIC | Age: 64
End: 2019-11-07
Payer: MEDICARE

## 2019-11-07 ENCOUNTER — OFFICE VISIT (OUTPATIENT)
Dept: OPHTHALMOLOGY | Facility: CLINIC | Age: 64
End: 2019-11-07
Payer: MEDICARE

## 2019-11-07 DIAGNOSIS — Z79.4 TYPE 2 DIABETES MELLITUS WITH BOTH EYES AFFECTED BY MILD NONPROLIFERATIVE RETINOPATHY WITHOUT MACULAR EDEMA, WITH LONG-TERM CURRENT USE OF INSULIN (HCC): Primary | ICD-10-CM

## 2019-11-07 DIAGNOSIS — Z96.1 PSEUDOPHAKIA OF BOTH EYES: ICD-10-CM

## 2019-11-07 DIAGNOSIS — L40.8 SEBOPSORIASIS: Primary | ICD-10-CM

## 2019-11-07 DIAGNOSIS — H43.393 FLOATER, VITREOUS, BILATERAL: ICD-10-CM

## 2019-11-07 DIAGNOSIS — E11.3293 TYPE 2 DIABETES MELLITUS WITH BOTH EYES AFFECTED BY MILD NONPROLIFERATIVE RETINOPATHY WITHOUT MACULAR EDEMA, WITH LONG-TERM CURRENT USE OF INSULIN (HCC): Primary | ICD-10-CM

## 2019-11-07 PROCEDURE — 99213 OFFICE O/P EST LOW 20 MIN: CPT | Performed by: DERMATOLOGY

## 2019-11-07 PROCEDURE — G0463 HOSPITAL OUTPT CLINIC VISIT: HCPCS | Performed by: DERMATOLOGY

## 2019-11-07 PROCEDURE — 92015 DETERMINE REFRACTIVE STATE: CPT | Performed by: OPHTHALMOLOGY

## 2019-11-07 PROCEDURE — 92004 COMPRE OPH EXAM NEW PT 1/>: CPT | Performed by: OPHTHALMOLOGY

## 2019-11-07 RX ORDER — PIMECROLIMUS 10 MG/G
CREAM TOPICAL
Qty: 60 G | Refills: 2 | Status: SHIPPED | OUTPATIENT
Start: 2019-11-07 | End: 2021-02-08

## 2019-11-07 RX ORDER — MOMETASONE FUROATE 1 MG/ML
SOLUTION TOPICAL
Qty: 60 ML | Refills: 3 | Status: SHIPPED | OUTPATIENT
Start: 2019-11-07 | End: 2020-02-08

## 2019-11-07 RX ORDER — KETOCONAZOLE 20 MG/ML
SHAMPOO TOPICAL
Qty: 120 ML | Refills: 11 | Status: SHIPPED | OUTPATIENT
Start: 2019-11-07 | End: 2020-02-08

## 2019-11-07 NOTE — PROGRESS NOTES
HPI:     Chief Complaint     Rash        HPI     Rash      Additional comments: LOV 7/23/2019 Patient present to f/u on rash on face .  Patient c/o rash has no improvement           Last edited by Rip Cushing, Ranjan Hammer on 11/7/2019 10:56 AM. (History) aspart (NOVOLOG) 100 UNIT/ML Subcutaneous Solution INJECT 6 UNITS SUBCUTANEOUSLY WITH BREAKFAST, THEN 6 UNITS WITH LUNCH AND 8 UNITS WITH DINNER 30 mL 3   • insulin glargine (LANTUS) 100 UNIT/ML Subcutaneous Solution INJECT 30 UNITS SUBCUTANEOUSLY INTO THE floaters      Past Surgical History:   Procedure Laterality Date   • APPENDECTOMY     • CATARACT Right 06/14/2018    Dr. Sonja Saini   • CATARACT Left 07/12/2018    Dr. Sonja Saini   • CATARACT EXTRACTION Right 06/11/2018   • CATARACT EXTRACTION W/  INTRAOCULAR LE file      Intimate partner violence:        Fear of current or ex partner: Not on file        Emotionally abused: Not on file        Physically abused: Not on file        Forced sexual activity: Not on file    Other Topics      Concerns:         Se will try Elidel cream for him to use around the brows also twice daily as needed. Give him mometasone solution for the ears. May also use a little of this in the beard area if necessary but will first try the Elidel.   Would recommend follow-up in 3 month

## 2019-11-07 NOTE — PROGRESS NOTES
Hayde Lowery is a 75year old male.     HPI:     HPI     Diabetic Eye Exam      Additional comments: Pt has been a diabetic for 20 years  20 years on pills/  12 years on Insulin   Pt checks his BS 4x a day  Pt's last blood sugar was 160  Last HA1C was No      Medications:  Current Outpatient Medications   Medication Sig Dispense Refill   • Pimecrolimus (ELIDEL) 1 % External Cream Apply to affected areas face 1-2x/day 60 g 2   • Mometasone Furoate 0.1 % External Solution Apply to ears daily as needed 60 External Cream Apply 1 Tube topically 2 (two) times daily. 30 g 2   • Cyanocobalamin (VITAMIN B-12) 5000 MCG Oral Lozenge Take 5,000 mcg by mouth daily. 30 lozenge 0   • Omega-3 Fatty Acids (FISH OIL) 600 MG Oral Cap Take 1 capsule by mouth nightly. nasal to disc  few MA above and temp to fovea, few MA nasal to disc     Vessels Normal Normal    Periphery Normal Normal            Refraction     Wearing Rx       Sphere Cylinder    Right +3.00 Sphere    Left +3.00 Sphere    Type:  OTC reading only

## 2019-11-07 NOTE — PATIENT INSTRUCTIONS
Pseudophakia of both eyes  New glasses today for reading only or patient can try +3.50 or +3.75 over the counter for reading.         Type 2 diabetes mellitus with both eyes affected by mild nonproliferative retinopathy without macular edema, with long-term

## 2019-11-08 ENCOUNTER — OFFICE VISIT (OUTPATIENT)
Dept: NEPHROLOGY | Facility: CLINIC | Age: 64
End: 2019-11-08
Payer: MEDICARE

## 2019-11-08 ENCOUNTER — TELEPHONE (OUTPATIENT)
Dept: NEPHROLOGY | Facility: CLINIC | Age: 64
End: 2019-11-08

## 2019-11-08 VITALS
WEIGHT: 234.19 LBS | HEART RATE: 69 BPM | DIASTOLIC BLOOD PRESSURE: 71 MMHG | BODY MASS INDEX: 37.64 KG/M2 | SYSTOLIC BLOOD PRESSURE: 126 MMHG | HEIGHT: 66 IN

## 2019-11-08 DIAGNOSIS — G89.29 CHRONIC PAIN OF BOTH SHOULDERS: ICD-10-CM

## 2019-11-08 DIAGNOSIS — E11.3293 TYPE 2 DIABETES MELLITUS WITH BOTH EYES AFFECTED BY MILD NONPROLIFERATIVE RETINOPATHY WITHOUT MACULAR EDEMA, WITH LONG-TERM CURRENT USE OF INSULIN (HCC): ICD-10-CM

## 2019-11-08 DIAGNOSIS — M25.512 CHRONIC PAIN OF BOTH SHOULDERS: ICD-10-CM

## 2019-11-08 DIAGNOSIS — M25.511 CHRONIC PAIN OF BOTH SHOULDERS: ICD-10-CM

## 2019-11-08 DIAGNOSIS — N18.6 ESRD (END STAGE RENAL DISEASE) (HCC): Primary | ICD-10-CM

## 2019-11-08 DIAGNOSIS — E10.3299 TYPE 1 DIABETES MELLITUS WITH MILD NONPROLIFERATIVE RETINOPATHY WITHOUT MACULAR EDEMA, UNSPECIFIED LATERALITY (HCC): ICD-10-CM

## 2019-11-08 DIAGNOSIS — Z79.4 TYPE 2 DIABETES MELLITUS WITH BOTH EYES AFFECTED BY MILD NONPROLIFERATIVE RETINOPATHY WITHOUT MACULAR EDEMA, WITH LONG-TERM CURRENT USE OF INSULIN (HCC): ICD-10-CM

## 2019-11-08 DIAGNOSIS — N18.30 CHRONIC KIDNEY DISEASE, STAGE III (MODERATE) (HCC): ICD-10-CM

## 2019-11-08 PROCEDURE — 90662 IIV NO PRSV INCREASED AG IM: CPT | Performed by: INTERNAL MEDICINE

## 2019-11-08 PROCEDURE — 99214 OFFICE O/P EST MOD 30 MIN: CPT | Performed by: INTERNAL MEDICINE

## 2019-11-08 PROCEDURE — 96372 THER/PROPH/DIAG INJ SC/IM: CPT | Performed by: INTERNAL MEDICINE

## 2019-11-08 PROCEDURE — G0008 ADMIN INFLUENZA VIRUS VAC: HCPCS | Performed by: INTERNAL MEDICINE

## 2019-11-08 PROCEDURE — G0463 HOSPITAL OUTPT CLINIC VISIT: HCPCS | Performed by: INTERNAL MEDICINE

## 2019-11-08 RX ORDER — HYDROCODONE BITARTRATE AND ACETAMINOPHEN 10; 325 MG/1; MG/1
1 TABLET ORAL EVERY 8 HOURS PRN
Qty: 90 TABLET | Refills: 0 | Status: SHIPPED | OUTPATIENT
Start: 2019-11-08 | End: 2019-11-08

## 2019-11-08 RX ORDER — HYDROCODONE BITARTRATE AND ACETAMINOPHEN 10; 325 MG/1; MG/1
1 TABLET ORAL EVERY 8 HOURS PRN
Qty: 90 TABLET | Refills: 0 | Status: SHIPPED | OUTPATIENT
Start: 2019-12-06 | End: 2019-12-30

## 2019-11-08 NOTE — TELEPHONE ENCOUNTER
Paper work completed and faxed to [employer]. Will contact patient if approved to schedule the first injection.

## 2019-11-08 NOTE — PATIENT INSTRUCTIONS
Start aranesp  100mcg every four weeks     meds    Do labs one day before third aranesp shot    See me two months

## 2019-11-08 NOTE — TELEPHONE ENCOUNTER
Pt is approved for [facility]. No prior Hilda Fortune is required. Fax will be sent showing benefits in detail. Please call [employer] if any questions .

## 2019-11-08 NOTE — PROGRESS NOTES
Julianna Alvarenga is here he is type 2 diabetes CKD 4 and now anemia. Hemoglobin down to 10.1 he is fatigued at times but he is working full-time    He does not abuse his Norco and does take it 2-3 times per day.     I discussed Aranesp with him and he would like to

## 2019-11-11 NOTE — TELEPHONE ENCOUNTER
Patient contacted to schedule his first [place] appointment for Rhea. He needs to check his availability and will call back to schedule.

## 2019-11-14 ENCOUNTER — NURSE ONLY (OUTPATIENT)
Dept: NEPHROLOGY | Facility: CLINIC | Age: 64
End: 2019-11-14
Payer: MEDICARE

## 2019-11-14 VITALS — DIASTOLIC BLOOD PRESSURE: 64 MMHG | SYSTOLIC BLOOD PRESSURE: 116 MMHG

## 2019-11-14 DIAGNOSIS — N18.4 ANEMIA IN STAGE 4 CHRONIC KIDNEY DISEASE (HCC): Primary | ICD-10-CM

## 2019-11-14 DIAGNOSIS — D63.1 ANEMIA IN STAGE 4 CHRONIC KIDNEY DISEASE (HCC): Primary | ICD-10-CM

## 2019-11-14 PROCEDURE — 96372 THER/PROPH/DIAG INJ SC/IM: CPT | Performed by: INTERNAL MEDICINE

## 2019-11-14 NOTE — PROGRESS NOTES
Patient presents to clinic today for his 1st Aranesp injection. On 10/16/19, Hgb was 10.1 and Hct 31.1. Dr. Governor Mcclure has ordered for Aranesp 100 mcg every 4 weeks. BP today is 116/64 manually.  Aranesp 100 mcg administered to left upper arm SC without complica

## 2019-11-20 DIAGNOSIS — E11.65 UNCONTROLLED TYPE 2 DIABETES MELLITUS WITH HYPERGLYCEMIA (HCC): ICD-10-CM

## 2019-11-20 RX ORDER — FENOFIBRATE 48 MG/1
48 TABLET, COATED ORAL
Qty: 90 TABLET | Refills: 1 | Status: SHIPPED | OUTPATIENT
Start: 2019-11-20 | End: 2020-05-08

## 2019-11-20 NOTE — TELEPHONE ENCOUNTER
Current Outpatient Medications   Medication Sig Dispense Refill   • Fenofibrate 48 MG Oral Tab Take 1 tablet (48 mg total) by mouth once daily.  90 tablet 1     Refill

## 2019-12-12 ENCOUNTER — NURSE ONLY (OUTPATIENT)
Dept: NEPHROLOGY | Facility: CLINIC | Age: 64
End: 2019-12-12
Payer: MEDICARE

## 2019-12-12 VITALS — DIASTOLIC BLOOD PRESSURE: 77 MMHG | HEART RATE: 62 BPM | SYSTOLIC BLOOD PRESSURE: 132 MMHG

## 2019-12-12 DIAGNOSIS — D63.1 ANEMIA IN STAGE 4 CHRONIC KIDNEY DISEASE (HCC): Primary | ICD-10-CM

## 2019-12-12 DIAGNOSIS — N18.4 ANEMIA IN STAGE 4 CHRONIC KIDNEY DISEASE (HCC): Primary | ICD-10-CM

## 2019-12-12 PROCEDURE — 96372 THER/PROPH/DIAG INJ SC/IM: CPT | Performed by: INTERNAL MEDICINE

## 2019-12-12 NOTE — PROGRESS NOTES
Patient presents to clinic today for Aranesp injection. On 10/16/19, Hgb was 10.1 and Hct 31.1. BP today is 132/77. Aranesp 100 mcg administered to left upper arm SC without complications. Patient tolerated injection well.  Patient will RTC in 4 weeks (sche

## 2019-12-27 ENCOUNTER — LAB ENCOUNTER (OUTPATIENT)
Dept: LAB | Age: 64
End: 2019-12-27
Attending: INTERNAL MEDICINE
Payer: MEDICARE

## 2019-12-27 DIAGNOSIS — Z79.4 TYPE 2 DIABETES MELLITUS WITH BOTH EYES AFFECTED BY MILD NONPROLIFERATIVE RETINOPATHY WITHOUT MACULAR EDEMA, WITH LONG-TERM CURRENT USE OF INSULIN (HCC): ICD-10-CM

## 2019-12-27 DIAGNOSIS — E11.3293 TYPE 2 DIABETES MELLITUS WITH BOTH EYES AFFECTED BY MILD NONPROLIFERATIVE RETINOPATHY WITHOUT MACULAR EDEMA, WITH LONG-TERM CURRENT USE OF INSULIN (HCC): ICD-10-CM

## 2019-12-27 DIAGNOSIS — N18.6 ESRD (END STAGE RENAL DISEASE) (HCC): ICD-10-CM

## 2019-12-27 PROCEDURE — 83540 ASSAY OF IRON: CPT

## 2019-12-27 PROCEDURE — 83036 HEMOGLOBIN GLYCOSYLATED A1C: CPT

## 2019-12-27 PROCEDURE — 36415 COLL VENOUS BLD VENIPUNCTURE: CPT

## 2019-12-27 PROCEDURE — 85025 COMPLETE CBC W/AUTO DIFF WBC: CPT

## 2019-12-27 PROCEDURE — 80069 RENAL FUNCTION PANEL: CPT

## 2019-12-27 PROCEDURE — 84466 ASSAY OF TRANSFERRIN: CPT

## 2019-12-30 ENCOUNTER — OFFICE VISIT (OUTPATIENT)
Dept: NEPHROLOGY | Facility: CLINIC | Age: 64
End: 2019-12-30
Payer: MEDICARE

## 2019-12-30 VITALS
DIASTOLIC BLOOD PRESSURE: 64 MMHG | HEART RATE: 81 BPM | SYSTOLIC BLOOD PRESSURE: 103 MMHG | HEIGHT: 69 IN | WEIGHT: 237 LBS | BODY MASS INDEX: 35.1 KG/M2

## 2019-12-30 DIAGNOSIS — E10.3299 TYPE 1 DIABETES MELLITUS WITH MILD NONPROLIFERATIVE RETINOPATHY WITHOUT MACULAR EDEMA, UNSPECIFIED LATERALITY (HCC): Primary | ICD-10-CM

## 2019-12-30 DIAGNOSIS — R80.9 PROTEINURIA, UNSPECIFIED TYPE: ICD-10-CM

## 2019-12-30 DIAGNOSIS — N18.30 CHRONIC KIDNEY DISEASE, STAGE III (MODERATE) (HCC): ICD-10-CM

## 2019-12-30 PROCEDURE — G0463 HOSPITAL OUTPT CLINIC VISIT: HCPCS | Performed by: INTERNAL MEDICINE

## 2019-12-30 PROCEDURE — 99213 OFFICE O/P EST LOW 20 MIN: CPT | Performed by: INTERNAL MEDICINE

## 2019-12-30 RX ORDER — HYDROCODONE BITARTRATE AND ACETAMINOPHEN 10; 325 MG/1; MG/1
1 TABLET ORAL EVERY 8 HOURS PRN
Qty: 90 TABLET | Refills: 0 | Status: SHIPPED | OUTPATIENT
Start: 2020-02-28 | End: 2019-12-30

## 2019-12-30 RX ORDER — HYDROCODONE BITARTRATE AND ACETAMINOPHEN 10; 325 MG/1; MG/1
1 TABLET ORAL EVERY 8 HOURS PRN
Qty: 90 TABLET | Refills: 0 | Status: SHIPPED | OUTPATIENT
Start: 2020-01-30 | End: 2019-12-30

## 2019-12-30 RX ORDER — HYDROCODONE BITARTRATE AND ACETAMINOPHEN 10; 325 MG/1; MG/1
1 TABLET ORAL EVERY 8 HOURS PRN
Qty: 90 TABLET | Refills: 0 | Status: SHIPPED | OUTPATIENT
Start: 2019-12-30 | End: 2020-03-30

## 2019-12-30 NOTE — PATIENT INSTRUCTIONS
Good job chaz flores around 12th each month    See me early April    Happy new year.     Do labs first week march

## 2019-12-31 ENCOUNTER — NURSE TRIAGE (OUTPATIENT)
Dept: INTERNAL MEDICINE CLINIC | Facility: CLINIC | Age: 64
End: 2019-12-31

## 2019-12-31 NOTE — TELEPHONE ENCOUNTER
Action Requested: Summary for Provider     []  Critical Lab, Recommendations Needed  [] Need Additional Advice  [x]   FYI    []   Need Orders  [] Need Medications Sent to Pharmacy  []  Other     SUMMARY: Patient was advised to go to the ER now due to sky

## 2019-12-31 NOTE — TELEPHONE ENCOUNTER
Patient's wife calling for patient (on DARIA). Stating patient was unable to urinate upon awakening this morning. Asked if I could speak with patient. He is out running an errand and will call office upon his return. Please triage.

## 2020-01-02 ENCOUNTER — APPOINTMENT (OUTPATIENT)
Dept: LAB | Age: 65
End: 2020-01-02
Attending: INTERNAL MEDICINE
Payer: MEDICARE

## 2020-01-02 ENCOUNTER — OFFICE VISIT (OUTPATIENT)
Dept: INTERNAL MEDICINE CLINIC | Facility: CLINIC | Age: 65
End: 2020-01-02
Payer: MEDICARE

## 2020-01-02 VITALS
BODY MASS INDEX: 34.96 KG/M2 | HEART RATE: 75 BPM | DIASTOLIC BLOOD PRESSURE: 68 MMHG | WEIGHT: 236 LBS | SYSTOLIC BLOOD PRESSURE: 135 MMHG | TEMPERATURE: 98 F | HEIGHT: 69 IN

## 2020-01-02 DIAGNOSIS — R33.9 URINARY RETENTION: ICD-10-CM

## 2020-01-02 DIAGNOSIS — R33.9 URINARY RETENTION: Primary | ICD-10-CM

## 2020-01-02 LAB — PSA SERPL-MCNC: 0.32 NG/ML (ref ?–4)

## 2020-01-02 PROCEDURE — 84153 ASSAY OF PSA TOTAL: CPT

## 2020-01-02 PROCEDURE — 36415 COLL VENOUS BLD VENIPUNCTURE: CPT

## 2020-01-02 PROCEDURE — G0463 HOSPITAL OUTPT CLINIC VISIT: HCPCS | Performed by: INTERNAL MEDICINE

## 2020-01-02 PROCEDURE — 99214 OFFICE O/P EST MOD 30 MIN: CPT | Performed by: INTERNAL MEDICINE

## 2020-01-02 NOTE — PROGRESS NOTES
Patient ID: Dov Pinedo is a 75year old male. Patient presents with:  Retention: Per patient had urinary retention, per patient only has one kidney. Retention has been resolved, here to discuss issue.         HISTORY OF PRESENT ILLNESS:   LAURENT Bates scanned to media tab   • HERNIA SURGERY     • LEFT PHACOEMULSIFICATION OF CATARACT WITH INTRAOCULAR LENS IMPLANT [id number] Left 7/12/2018    Performed by Edrick Oppenheim, MD at [facility]   • RIGHT PHACOEMULSIFICATION OF CATARACT WITH INTRAOCULAR 1  •  RELION INSULIN SYRINGE 31G X 15/64\" 0.5 ML Does not apply Misc, Inject 1 Device into the skin 4 (four) times daily. , Disp: 400 each, Rfl: 1  •  clotrimazole 1 % External Cream, Use bid, Disp: 60 g, Rfl: 3  •  selenium sulfide 2.5 % External Lotion, Relationships      Social connections:        Talks on phone: Not on file        Gets together: Not on file        Attends Buddhism service: Not on file        Active member of club or organization: Not on file        Attends meetings of clubs or [place] start medications if persist in the future. · Told to space out his fluid intake. Return if symptoms worsen or fail to improve.     Ren Birch MD  1/2/2020

## 2020-01-02 NOTE — PATIENT INSTRUCTIONS
Urinary Retention (Male)  Urinary retention is the medical term for difficulty or inability to pass urine, even though your bladder is full. Causes  The most common cause of urinary retention in men is the bladder outlet being blocked.  This can be due t If a catheter was left in place, it can usually be removed within 3 to 7 days. Some conditions require the catheter to stay in longer. Your healthcare provider will tell you when to return to have the catheter removed.   When to seek medical advice  Call yo

## 2020-01-07 NOTE — PROGRESS NOTES
Olivier Yoandy is here is doing better with Aranesp he has CKD 4 diabetes and neuropathy. I did refill his Norco which he does take about 3 times a day is also on Lyrica.   He has been feeling fine no chest pain no shortness of breath no edema breathing well ravi

## 2020-01-08 ENCOUNTER — TELEPHONE (OUTPATIENT)
Dept: INTERNAL MEDICINE CLINIC | Facility: CLINIC | Age: 65
End: 2020-01-08

## 2020-01-08 NOTE — TELEPHONE ENCOUNTER
Patient wife Tarah Christian called per DARIA (Name and  of pt verified). All results and recommendations reviewed. Wife verbalizes understanding, denies further questions and agrees with plan of care.       Notes recorded by Ranjan Pretty on 2020 at 4:3

## 2020-01-10 ENCOUNTER — NURSE ONLY (OUTPATIENT)
Dept: NEPHROLOGY | Facility: CLINIC | Age: 65
End: 2020-01-10
Payer: MEDICARE

## 2020-01-10 DIAGNOSIS — D63.1 ANEMIA IN STAGE 4 CHRONIC KIDNEY DISEASE (HCC): Primary | ICD-10-CM

## 2020-01-10 DIAGNOSIS — N18.4 ANEMIA IN STAGE 4 CHRONIC KIDNEY DISEASE (HCC): Primary | ICD-10-CM

## 2020-01-10 PROCEDURE — 96372 THER/PROPH/DIAG INJ SC/IM: CPT | Performed by: INTERNAL MEDICINE

## 2020-01-10 NOTE — PROGRESS NOTES
Patient presents to clinic today for Aranesp injection. On 12/27/19, Hgb was 10.7 and Hct 32.7. BP today is 96/60. Aranesp 100 mcg administered to left upper arm SC without complications. Patient tolerated injection well.  Patient will RTC in 1 month for ne

## 2020-02-01 ENCOUNTER — TELEPHONE (OUTPATIENT)
Dept: ENDOCRINOLOGY CLINIC | Facility: CLINIC | Age: 65
End: 2020-02-01

## 2020-02-03 RX ORDER — SYRINGE AND NEEDLE,INSULIN,1ML 31GX15/64"
SYRINGE, EMPTY DISPOSABLE MISCELLANEOUS
Qty: 400 EACH | Refills: 0 | Status: SHIPPED | OUTPATIENT
Start: 2020-02-03 | End: 2020-05-06

## 2020-02-04 RX ORDER — PEN NEEDLE, DIABETIC 29 G X1/2"
NEEDLE, DISPOSABLE MISCELLANEOUS
Qty: 400 EACH | Refills: 0 | Status: SHIPPED | OUTPATIENT
Start: 2020-02-04

## 2020-02-05 ENCOUNTER — OFFICE VISIT (OUTPATIENT)
Dept: ENDOCRINOLOGY CLINIC | Facility: CLINIC | Age: 65
End: 2020-02-05
Payer: MEDICARE

## 2020-02-05 VITALS
SYSTOLIC BLOOD PRESSURE: 118 MMHG | DIASTOLIC BLOOD PRESSURE: 61 MMHG | BODY MASS INDEX: 35 KG/M2 | HEART RATE: 77 BPM | WEIGHT: 236 LBS

## 2020-02-05 DIAGNOSIS — E11.65 UNCONTROLLED TYPE 2 DIABETES MELLITUS WITH HYPERGLYCEMIA (HCC): Primary | ICD-10-CM

## 2020-02-05 LAB
GLUCOSE BLOOD: 177
TEST STRIP LOT #: NORMAL NUMERIC

## 2020-02-05 PROCEDURE — 99213 OFFICE O/P EST LOW 20 MIN: CPT | Performed by: INTERNAL MEDICINE

## 2020-02-05 PROCEDURE — G0463 HOSPITAL OUTPT CLINIC VISIT: HCPCS | Performed by: INTERNAL MEDICINE

## 2020-02-05 PROCEDURE — 36416 COLLJ CAPILLARY BLOOD SPEC: CPT | Performed by: INTERNAL MEDICINE

## 2020-02-05 PROCEDURE — 82962 GLUCOSE BLOOD TEST: CPT | Performed by: INTERNAL MEDICINE

## 2020-02-05 NOTE — PROGRESS NOTES
Name: Jeannie Barber  Date: 2/5/2020    Referring Physician: No ref. provider found    HISTORY OF PRESENT ILLNESS   Jeannie Barber is a 75year old male who presents for diabetes mellitus.       Prior HbA, C or glycohemoglobin were 9.8% 7/2014; 7.7% ML Does not apply Misc, USE 1 SYRINGE 4 TIMES DAILY, Disp: 400 each, Rfl: 0  •  HYDROcodone-acetaminophen  MG Oral Tab, Take 1 tablet by mouth every 8 (eight) hours as needed for Pain., Disp: 90 tablet, Rfl: 0  •  Fenofibrate 48 MG Oral Tab, Take 1 t Apply 1 Tube topically 2 (two) times daily. , Disp: 30 g, Rfl: 2  •  Cyanocobalamin (VITAMIN B-12) 5000 MCG Oral Lozenge, Take 5,000 mcg by mouth daily. , Disp: 30 lozenge, Rfl: 0  •  Omega-3 Fatty Acids (FISH OIL) 600 MG Oral Cap, Take 1 capsule by mouth ni 07/12/2018    Dr. Marshall Landon   • CHOLECYSTECTOMY  2012   • ELECTROCARDIOGRAM, COMPLETE  4/23/2012    scanned to media tab   • HERNIA SURGERY     • LEFT PHACOEMULSIFICATION OF CATARACT WITH INTRAOCULAR LENS IMPLANT [id number] Left 7/12/2018    Performed by Marshall Landon, recheck labs   -Normotensive  -Renal function stable and followed by Dr. Singleton Human    RTC 6 months     2/5/2020  Kimberly Silva MD

## 2020-02-05 NOTE — TELEPHONE ENCOUNTER
Pharmacy question:    Drug: BD Insulin Syringe U/F 31GX5/16' 0.5ML. Only comes in 15/64, can we change it?

## 2020-02-07 ENCOUNTER — NURSE ONLY (OUTPATIENT)
Dept: NEPHROLOGY | Facility: CLINIC | Age: 65
End: 2020-02-07
Payer: MEDICARE

## 2020-02-07 VITALS — SYSTOLIC BLOOD PRESSURE: 126 MMHG | DIASTOLIC BLOOD PRESSURE: 69 MMHG | HEART RATE: 69 BPM

## 2020-02-07 DIAGNOSIS — N18.4 ANEMIA IN STAGE 4 CHRONIC KIDNEY DISEASE (HCC): Primary | ICD-10-CM

## 2020-02-07 DIAGNOSIS — D63.1 ANEMIA IN STAGE 4 CHRONIC KIDNEY DISEASE (HCC): Primary | ICD-10-CM

## 2020-02-07 PROCEDURE — 96372 THER/PROPH/DIAG INJ SC/IM: CPT | Performed by: INTERNAL MEDICINE

## 2020-02-07 NOTE — PROGRESS NOTES
Patient presents to clinic today for Aranesp injection. On 12/27/19, Hgb was 10.7 and Hct 32.7. Dr. Sagar Hugo advised pt to repeat labs the first week of March. BP today is 126/69. Aranesp 100 mcg administered to left upper arm SC without complications.  Malia

## 2020-02-08 ENCOUNTER — OFFICE VISIT (OUTPATIENT)
Dept: DERMATOLOGY CLINIC | Facility: CLINIC | Age: 65
End: 2020-02-08
Payer: MEDICARE

## 2020-02-08 ENCOUNTER — APPOINTMENT (OUTPATIENT)
Dept: LAB | Age: 65
End: 2020-02-08
Attending: INTERNAL MEDICINE
Payer: MEDICARE

## 2020-02-08 DIAGNOSIS — L40.8 SEBOPSORIASIS: Primary | ICD-10-CM

## 2020-02-08 DIAGNOSIS — E11.65 UNCONTROLLED TYPE 2 DIABETES MELLITUS WITH HYPERGLYCEMIA (HCC): ICD-10-CM

## 2020-02-08 LAB
CHOLEST SMN-MCNC: 99 MG/DL (ref ?–200)
CREAT UR-SCNC: 112 MG/DL
HDLC SERPL-MCNC: 39 MG/DL (ref 40–59)
LDLC SERPL CALC-MCNC: 44 MG/DL (ref ?–100)
MICROALBUMIN UR-MCNC: 21.8 MG/DL
MICROALBUMIN/CREAT 24H UR-RTO: 194.6 UG/MG (ref ?–30)
NONHDLC SERPL-MCNC: 60 MG/DL (ref ?–130)
PATIENT FASTING Y/N/NP: YES
TRIGL SERPL-MCNC: 81 MG/DL (ref 30–149)
VLDLC SERPL CALC-MCNC: 16 MG/DL (ref 0–30)

## 2020-02-08 PROCEDURE — 99213 OFFICE O/P EST LOW 20 MIN: CPT | Performed by: DERMATOLOGY

## 2020-02-08 PROCEDURE — 36415 COLL VENOUS BLD VENIPUNCTURE: CPT

## 2020-02-08 PROCEDURE — 80061 LIPID PANEL: CPT

## 2020-02-08 PROCEDURE — 82570 ASSAY OF URINE CREATININE: CPT

## 2020-02-08 PROCEDURE — 82043 UR ALBUMIN QUANTITATIVE: CPT

## 2020-02-08 PROCEDURE — G0463 HOSPITAL OUTPT CLINIC VISIT: HCPCS | Performed by: DERMATOLOGY

## 2020-02-08 RX ORDER — KETOCONAZOLE 20 MG/ML
SHAMPOO TOPICAL
Qty: 120 ML | Refills: 11 | Status: SHIPPED | OUTPATIENT
Start: 2020-02-08 | End: 2021-02-08

## 2020-02-08 RX ORDER — MOMETASONE FUROATE 1 MG/ML
SOLUTION TOPICAL
Qty: 120 ML | Refills: 3 | Status: SHIPPED | OUTPATIENT
Start: 2020-02-08

## 2020-02-08 NOTE — PROGRESS NOTES
HPI:     Chief Complaint     Rash        HPI     Rash      Additional comments: LOV 11/17/2019 Patient present to f/u on rash on face .  Patient currently using tacrolimus  and pimecrolimus with no improvement           Last edited by Salina Segura, [address]. 90 tablet 0   • Fenofibrate 48 MG Oral Tab Take 1 tablet (48 mg total) by mouth once daily.  90 tablet 1   • Pimecrolimus (ELIDEL) 1 % External Cream Apply to affected areas face 1-2x/day 60 g 2   • atorvastatin 40 MG Oral Tab TAKE 1 TABLET BY MOUTH O dysfunction    • Pancreatitis 2012   • Proteinuria    • Type II or unspecified type diabetes mellitus without mention of complication, not stated as uncontrolled     Pills & Insulin   • Vitreous floaters      Past Surgical History:   Procedure Laterality D Spiritism service: Not on file        Active member of club or organization: Not on file        Attends meetings of clubs or organizations: Not on file        Relationship status: Not on file      Intimate partner violence:        Fear of current or ex par areas in the beard if necessary. Prescription given for the fluocinonide which he may use for other dry patches that he sometimes gets on legs and ankles. Patient understands all side effects. Proper use of sunblock with his wife.   If all is well he rohan

## 2020-02-10 ENCOUNTER — TELEPHONE (OUTPATIENT)
Dept: ENDOCRINOLOGY CLINIC | Facility: CLINIC | Age: 65
End: 2020-02-10

## 2020-02-10 NOTE — TELEPHONE ENCOUNTER
Called pt and gave message from Dr [facility]. No questions from pt.

## 2020-03-03 ENCOUNTER — LAB ENCOUNTER (OUTPATIENT)
Dept: LAB | Age: 65
End: 2020-03-03
Attending: INTERNAL MEDICINE
Payer: MEDICARE

## 2020-03-03 DIAGNOSIS — N18.30 CHRONIC KIDNEY DISEASE, STAGE III (MODERATE) (HCC): ICD-10-CM

## 2020-03-03 DIAGNOSIS — E10.3299 TYPE 1 DIABETES MELLITUS WITH MILD NONPROLIFERATIVE RETINOPATHY WITHOUT MACULAR EDEMA, UNSPECIFIED LATERALITY (HCC): ICD-10-CM

## 2020-03-03 LAB
ALBUMIN SERPL-MCNC: 3.7 G/DL (ref 3.4–5)
ANION GAP SERPL CALC-SCNC: 4 MMOL/L (ref 0–18)
BASOPHILS # BLD AUTO: 0.02 X10(3) UL (ref 0–0.2)
BASOPHILS NFR BLD AUTO: 0.5 %
BUN BLD-MCNC: 66 MG/DL (ref 7–18)
BUN/CREAT SERPL: 20.9 (ref 10–20)
CALCIUM BLD-MCNC: 9.3 MG/DL (ref 8.5–10.1)
CHLORIDE SERPL-SCNC: 112 MMOL/L (ref 98–112)
CO2 SERPL-SCNC: 27 MMOL/L (ref 21–32)
CREAT BLD-MCNC: 3.16 MG/DL (ref 0.7–1.3)
DEPRECATED RDW RBC AUTO: 53.9 FL (ref 35.1–46.3)
EOSINOPHIL # BLD AUTO: 0.08 X10(3) UL (ref 0–0.7)
EOSINOPHIL NFR BLD AUTO: 2.1 %
ERYTHROCYTE [DISTWIDTH] IN BLOOD BY AUTOMATED COUNT: 14.4 % (ref 11–15)
GLUCOSE BLD-MCNC: 117 MG/DL (ref 70–99)
HCT VFR BLD AUTO: 31.8 % (ref 39–53)
HGB BLD-MCNC: 10.4 G/DL (ref 13–17.5)
IMM GRANULOCYTES # BLD AUTO: 0.02 X10(3) UL (ref 0–1)
IMM GRANULOCYTES NFR BLD: 0.5 %
LYMPHOCYTES # BLD AUTO: 1.09 X10(3) UL (ref 1–4)
LYMPHOCYTES NFR BLD AUTO: 28 %
MCH RBC QN AUTO: 33.7 PG (ref 26–34)
MCHC RBC AUTO-ENTMCNC: 32.7 G/DL (ref 31–37)
MCV RBC AUTO: 102.9 FL (ref 80–100)
MONOCYTES # BLD AUTO: 0.38 X10(3) UL (ref 0.1–1)
MONOCYTES NFR BLD AUTO: 9.8 %
NEUTROPHILS # BLD AUTO: 2.3 X10 (3) UL (ref 1.5–7.7)
NEUTROPHILS # BLD AUTO: 2.3 X10(3) UL (ref 1.5–7.7)
NEUTROPHILS NFR BLD AUTO: 59.1 %
OSMOLALITY SERPL CALC.SUM OF ELEC: 316 MOSM/KG (ref 275–295)
PHOSPHATE SERPL-MCNC: 3.8 MG/DL (ref 2.5–4.9)
PLATELET # BLD AUTO: 103 10(3)UL (ref 150–450)
POTASSIUM SERPL-SCNC: 4.9 MMOL/L (ref 3.5–5.1)
PTH-INTACT SERPL-MCNC: 73.7 PG/ML (ref 18.5–88)
RBC # BLD AUTO: 3.09 X10(6)UL (ref 3.8–5.8)
SODIUM SERPL-SCNC: 143 MMOL/L (ref 136–145)
WBC # BLD AUTO: 3.9 X10(3) UL (ref 4–11)

## 2020-03-03 PROCEDURE — 85025 COMPLETE CBC W/AUTO DIFF WBC: CPT

## 2020-03-03 PROCEDURE — 83970 ASSAY OF PARATHORMONE: CPT

## 2020-03-03 PROCEDURE — 80069 RENAL FUNCTION PANEL: CPT

## 2020-03-03 PROCEDURE — 36415 COLL VENOUS BLD VENIPUNCTURE: CPT

## 2020-03-04 ENCOUNTER — TELEPHONE (OUTPATIENT)
Dept: ENDOCRINOLOGY CLINIC | Facility: CLINIC | Age: 65
End: 2020-03-04

## 2020-03-04 ENCOUNTER — TELEPHONE (OUTPATIENT)
Dept: NEPHROLOGY | Facility: CLINIC | Age: 65
End: 2020-03-04

## 2020-03-04 DIAGNOSIS — E11.65 UNCONTROLLED TYPE 2 DIABETES MELLITUS WITH HYPERGLYCEMIA (HCC): ICD-10-CM

## 2020-03-04 RX ORDER — PREGABALIN 300 MG/1
CAPSULE ORAL
Qty: 90 CAPSULE | Refills: 0 | OUTPATIENT
Start: 2020-03-04

## 2020-03-04 RX ORDER — PREGABALIN 300 MG/1
CAPSULE ORAL
Qty: 90 CAPSULE | Refills: 3 | OUTPATIENT
Start: 2020-03-04

## 2020-03-04 NOTE — TELEPHONE ENCOUNTER
Spoke with Chava Huber (pharm tech) and they wanted to confirm if the patient would like the pen or vials. She states patient usually gets the vials.   RN gave verbal okay to change to vials (3 vials for 90 days) based on calculation and confirmed with [facility]

## 2020-03-04 NOTE — TELEPHONE ENCOUNTER
LOV: 02/05/20  LR: 07/03/19    Future Appointments   Date Time Provider Tomas Ibrahim   8/5/2020  9:15 AM Sudha Arango MD [facility]

## 2020-03-04 NOTE — TELEPHONE ENCOUNTER
Notes recorded by Luis Yeh MD on 3/4/2020 at 4:04 PM CST  Please tell patient labs are stable keep up the good work

## 2020-03-06 ENCOUNTER — NURSE ONLY (OUTPATIENT)
Dept: NEPHROLOGY | Facility: CLINIC | Age: 65
End: 2020-03-06
Payer: MEDICARE

## 2020-03-06 VITALS — SYSTOLIC BLOOD PRESSURE: 107 MMHG | HEART RATE: 72 BPM | DIASTOLIC BLOOD PRESSURE: 64 MMHG

## 2020-03-06 DIAGNOSIS — N18.4 ANEMIA IN STAGE 4 CHRONIC KIDNEY DISEASE (HCC): Primary | ICD-10-CM

## 2020-03-06 DIAGNOSIS — D63.1 ANEMIA IN STAGE 4 CHRONIC KIDNEY DISEASE (HCC): Primary | ICD-10-CM

## 2020-03-06 PROCEDURE — 96372 THER/PROPH/DIAG INJ SC/IM: CPT | Performed by: INTERNAL MEDICINE

## 2020-03-06 RX ORDER — PREGABALIN 300 MG/1
CAPSULE ORAL
Qty: 90 CAPSULE | Refills: 0 | Status: SHIPPED | OUTPATIENT
Start: 2020-03-06 | End: 2020-06-08

## 2020-03-06 NOTE — TELEPHONE ENCOUNTER
pts wife calling to f/up on refill request for Pregabalin med. Wife states that pts insurance no longer covers the Lyrica and they will only cover the Pregabalin. Wife states that PCP did not order this med., and that she will not authorize a refill.  Wife

## 2020-03-06 NOTE — TELEPHONE ENCOUNTER
Radha/ Technician of [facility] [place] is requesting refill of patient's medication PREGABALIN 300 MG Oral Cap. Radha states patient will be out of medication soon. Radha states script was prescribed by a differen't doctor, however, patient states that he spoke with Dr. Jose Fuller and Dr. Jose Fuller is aware that patient is requesting refill from her.

## 2020-03-06 NOTE — PROGRESS NOTES
Patient presents to clinic today for Aranesp injection. On 3/3/20, Hgb was 10.4 and Hct 31.8. BP today is 107/64. Aranesp 100 mcg administered to right upper arm SC without complications. Patient tolerated injection well.  Patient will RTC in 4 weeks for ne

## 2020-03-08 NOTE — TELEPHONE ENCOUNTER
Sent to provider to review, Noted Promise Hoang addressed refill however per pharmacist message below they rec'd script but pt still requests refill from [facility] not the other provider, please advise

## 2020-03-09 RX ORDER — PREGABALIN 300 MG/1
CAPSULE ORAL
Qty: 90 CAPSULE | Refills: 3 | OUTPATIENT
Start: 2020-03-09

## 2020-03-16 DIAGNOSIS — E11.65 UNCONTROLLED TYPE 2 DIABETES MELLITUS WITH HYPERGLYCEMIA (HCC): ICD-10-CM

## 2020-03-16 RX ORDER — ATORVASTATIN CALCIUM 40 MG/1
TABLET, FILM COATED ORAL
Qty: 90 TABLET | Refills: 1 | Status: SHIPPED | OUTPATIENT
Start: 2020-03-16 | End: 2020-10-02

## 2020-03-16 NOTE — TELEPHONE ENCOUNTER
Vanesa requesting 90 days refills for Atorvastatin. Please call. Thank you.     Current Outpatient Medications   Medication Sig Dispense Refill   • atorvastatin 40 MG Oral Tab TAKE 1 TABLET BY MOUTH ONE TIME A DAY 90 tablet 1

## 2020-03-23 ENCOUNTER — TELEPHONE (OUTPATIENT)
Dept: NEPHROLOGY | Facility: CLINIC | Age: 65
End: 2020-03-23

## 2020-03-23 NOTE — TELEPHONE ENCOUNTER
Pt scheduled for 4/3/20 for Aranesp. Should pt keep injection appt or postpone?  Most recent labs posted below:    Component      Latest Ref Rng & Units 3/3/2020   Hemoglobin      13.0 - 17.5 g/dL 10.4 (L)   Hematocrit      39.0 - 53.0 % 31.8 (L)

## 2020-03-23 NOTE — TELEPHONE ENCOUNTER
Spoke to patients spouse (HIPPA verified) and relayed Dr. Lana Wolfe message as shown below.  Verbalized understanding and had no further questions at this time

## 2020-03-23 NOTE — TELEPHONE ENCOUNTER
Pts wife Aurora Nixon called to see if pt should still keep appt for 4/3/20 for injection due to Covid 19 concerns. Please call.

## 2020-03-24 ENCOUNTER — TELEPHONE (OUTPATIENT)
Dept: ENDOCRINOLOGY CLINIC | Facility: CLINIC | Age: 65
End: 2020-03-24

## 2020-03-24 RX ORDER — BLOOD-GLUCOSE METER
KIT MISCELLANEOUS
Qty: 500 STRIP | Refills: 0 | Status: SHIPPED | OUTPATIENT
Start: 2020-03-24 | End: 2020-03-24

## 2020-03-24 RX ORDER — BLOOD-GLUCOSE METER
KIT MISCELLANEOUS
Qty: 500 STRIP | Refills: 0 | Status: SHIPPED | OUTPATIENT
Start: 2020-03-24 | End: 2020-07-14

## 2020-03-24 NOTE — TELEPHONE ENCOUNTER
Per pharmacy tech someone ran the claim under part D instead of part B. Only issue is that the prescription was missing dx code. Per pharmacy tech verbal cannot be accepted and must have the script sent again.     Wife and patient was notified and per wif

## 2020-03-24 NOTE — TELEPHONE ENCOUNTER
Patient's wife called asking if freestyle test strips have been sent to the pharmacy. Informed her Rx has been sent this morning. She had no further questions at this time.

## 2020-03-24 NOTE — TELEPHONE ENCOUNTER
Patients wife/Vanesa calling to advise that rx:Freestyle Lite Test Stirp not covered by insurance and prior authorization is required. Please call at:[phone], thanks. *ask for a call back as soon as possible.

## 2020-03-25 DIAGNOSIS — E11.65 UNCONTROLLED TYPE 2 DIABETES MELLITUS WITH HYPERGLYCEMIA (HCC): ICD-10-CM

## 2020-03-25 RX ORDER — ATORVASTATIN CALCIUM 40 MG/1
TABLET, FILM COATED ORAL
Qty: 90 TABLET | Refills: 0 | OUTPATIENT
Start: 2020-03-25

## 2020-03-25 NOTE — TELEPHONE ENCOUNTER
Called patient and per wife preferred pharmacy is [facility]. Do not sent rx to [facility].   rx sent to [facility] on 3/16

## 2020-03-27 ENCOUNTER — TELEPHONE (OUTPATIENT)
Dept: ENDOCRINOLOGY CLINIC | Facility: CLINIC | Age: 65
End: 2020-03-27

## 2020-03-27 RX ORDER — LANCETS
EACH MISCELLANEOUS
Qty: 300 EACH | Refills: 1 | Status: SHIPPED | OUTPATIENT
Start: 2020-03-27

## 2020-03-27 RX ORDER — BLOOD SUGAR DIAGNOSTIC
STRIP MISCELLANEOUS
Qty: 300 STRIP | Refills: 1 | Status: SHIPPED | OUTPATIENT
Start: 2020-03-27

## 2020-03-27 RX ORDER — BLOOD-GLUCOSE METER
EACH MISCELLANEOUS
Qty: 1 KIT | Refills: 0 | Status: SHIPPED | OUTPATIENT
Start: 2020-03-27

## 2020-03-27 NOTE — TELEPHONE ENCOUNTER
Need For Clarification      •  FREESTYLE LITE TEST In Vitro Strip, USE 1 STRIP TO CHECK GLUCOSE FIVE TIMES DAILY.  DX: E11.65, insulin dependent, Disp: 500 strip, Rfl: 0    Notes to Prescriber: NOT COVERED, SWITCH TO ACCU-CHECK AND SEND NEW PRESCRIPTION FOR

## 2020-03-30 ENCOUNTER — TELEPHONE (OUTPATIENT)
Dept: NEPHROLOGY | Facility: CLINIC | Age: 65
End: 2020-03-30

## 2020-03-30 RX ORDER — HYDROCODONE BITARTRATE AND ACETAMINOPHEN 10; 325 MG/1; MG/1
1 TABLET ORAL EVERY 8 HOURS PRN
Qty: 90 TABLET | Refills: 0 | Status: SHIPPED | OUTPATIENT
Start: 2020-03-30 | End: 2020-05-11

## 2020-04-01 NOTE — TELEPHONE ENCOUNTER
Spoke to [employer], patient's wife and informed that prescription is ready for  at the  (patient has a scheduled Nurse visit for injection)

## 2020-04-01 NOTE — TELEPHONE ENCOUNTER
Prescription is on Dr. Kamila Aiken desk to be signed. Will call patient when ready. He was out of the office until today.

## 2020-04-03 ENCOUNTER — NURSE ONLY (OUTPATIENT)
Dept: NEPHROLOGY | Facility: CLINIC | Age: 65
End: 2020-04-03
Payer: MEDICARE

## 2020-04-03 DIAGNOSIS — D63.1 ANEMIA IN STAGE 4 CHRONIC KIDNEY DISEASE (HCC): Primary | ICD-10-CM

## 2020-04-03 DIAGNOSIS — N18.4 ANEMIA IN STAGE 4 CHRONIC KIDNEY DISEASE (HCC): Primary | ICD-10-CM

## 2020-04-03 PROCEDURE — 96372 THER/PROPH/DIAG INJ SC/IM: CPT | Performed by: INTERNAL MEDICINE

## 2020-04-03 NOTE — PROGRESS NOTES
Patient presents for Aranesp injection. B/P 113/74 P. 73. 100 mcg Aranesp administered  to left upper arm sub Q, tolerated well. Discharged without complaint.

## 2020-04-29 ENCOUNTER — TELEPHONE (OUTPATIENT)
Dept: NEPHROLOGY | Facility: CLINIC | Age: 65
End: 2020-04-29

## 2020-04-29 DIAGNOSIS — D63.1 ANEMIA OF CHRONIC RENAL FAILURE, STAGE 3 (MODERATE) (HCC): Primary | ICD-10-CM

## 2020-04-29 DIAGNOSIS — N18.30 ANEMIA OF CHRONIC RENAL FAILURE, STAGE 3 (MODERATE) (HCC): Primary | ICD-10-CM

## 2020-04-29 NOTE — TELEPHONE ENCOUNTER
Patient contacted and advised of orders from Dr. Finley Current entered in system. Patient is aware no fasting is needed.

## 2020-04-29 NOTE — TELEPHONE ENCOUNTER
Pt currently at [address] [place] [address] and states orders are not in the system. Pt requesting to have orders for labs.  Please call pt once orders have been entered [phone]

## 2020-04-30 ENCOUNTER — LAB ENCOUNTER (OUTPATIENT)
Dept: LAB | Age: 65
End: 2020-04-30
Attending: INTERNAL MEDICINE
Payer: MEDICARE

## 2020-04-30 DIAGNOSIS — D63.1 ANEMIA OF CHRONIC RENAL FAILURE, STAGE 3 (MODERATE) (HCC): ICD-10-CM

## 2020-04-30 DIAGNOSIS — N18.30 ANEMIA OF CHRONIC RENAL FAILURE, STAGE 3 (MODERATE) (HCC): ICD-10-CM

## 2020-04-30 PROCEDURE — 80048 BASIC METABOLIC PNL TOTAL CA: CPT

## 2020-04-30 PROCEDURE — 36415 COLL VENOUS BLD VENIPUNCTURE: CPT

## 2020-04-30 PROCEDURE — 85025 COMPLETE CBC W/AUTO DIFF WBC: CPT

## 2020-05-01 ENCOUNTER — TELEPHONE (OUTPATIENT)
Dept: NEPHROLOGY | Facility: CLINIC | Age: 65
End: 2020-05-01

## 2020-05-01 ENCOUNTER — NURSE ONLY (OUTPATIENT)
Dept: NEPHROLOGY | Facility: CLINIC | Age: 65
End: 2020-05-01
Payer: MEDICARE

## 2020-05-01 DIAGNOSIS — N18.30 STAGE 3 CHRONIC KIDNEY DISEASE (HCC): ICD-10-CM

## 2020-05-01 PROCEDURE — 96372 THER/PROPH/DIAG INJ SC/IM: CPT | Performed by: INTERNAL MEDICINE

## 2020-05-01 NOTE — TELEPHONE ENCOUNTER
Notes recorded by Brigid Hung MD on 4/30/2020 at 7:11 PM CDT  These let patient know labs are all good

## 2020-05-01 NOTE — TELEPHONE ENCOUNTER
Spoke to patient and relayed results message. Wife wants to know if patient should get another lab test before his next Aranesp injection.

## 2020-05-01 NOTE — PROGRESS NOTES
Patient presents for Aranesp injection. B?P 108 67 p 71. 100 mcg Aranesp admiinistered to Right upper arm Sub Q, tolerated well. Discharged without complaints.

## 2020-05-06 RX ORDER — SYRINGE AND NEEDLE,INSULIN,1ML 31GX15/64"
SYRINGE, EMPTY DISPOSABLE MISCELLANEOUS
Qty: 400 EACH | Refills: 0 | Status: SHIPPED | OUTPATIENT
Start: 2020-05-06 | End: 2021-06-28

## 2020-05-08 ENCOUNTER — TELEPHONE (OUTPATIENT)
Dept: NEPHROLOGY | Facility: CLINIC | Age: 65
End: 2020-05-08

## 2020-05-08 DIAGNOSIS — E11.65 UNCONTROLLED TYPE 2 DIABETES MELLITUS WITH HYPERGLYCEMIA (HCC): ICD-10-CM

## 2020-05-08 RX ORDER — FENOFIBRATE 48 MG/1
48 TABLET, COATED ORAL
Qty: 90 TABLET | Refills: 1 | Status: SHIPPED | OUTPATIENT
Start: 2020-05-08 | End: 2020-11-27

## 2020-05-08 NOTE — TELEPHONE ENCOUNTER
Wife requesting refill. Current Outpatient Medications:     •  Fenofibrate 48 MG Oral Tab, Take 1 tablet (48 mg total) by mouth once daily. , Disp: 90 tablet, Rfl: 1

## 2020-05-11 ENCOUNTER — TELEMEDICINE (OUTPATIENT)
Dept: NEPHROLOGY | Facility: CLINIC | Age: 65
End: 2020-05-11

## 2020-05-11 DIAGNOSIS — Z79.4 TYPE 2 DIABETES MELLITUS WITH BOTH EYES AFFECTED BY MILD NONPROLIFERATIVE RETINOPATHY WITHOUT MACULAR EDEMA, WITH LONG-TERM CURRENT USE OF INSULIN (HCC): ICD-10-CM

## 2020-05-11 DIAGNOSIS — E11.3293 TYPE 2 DIABETES MELLITUS WITH BOTH EYES AFFECTED BY MILD NONPROLIFERATIVE RETINOPATHY WITHOUT MACULAR EDEMA, WITH LONG-TERM CURRENT USE OF INSULIN (HCC): ICD-10-CM

## 2020-05-11 DIAGNOSIS — N18.30 CHRONIC KIDNEY DISEASE, STAGE III (MODERATE) (HCC): Primary | ICD-10-CM

## 2020-05-11 PROCEDURE — 99213 OFFICE O/P EST LOW 20 MIN: CPT | Performed by: INTERNAL MEDICINE

## 2020-05-11 RX ORDER — HYDROCODONE BITARTRATE AND ACETAMINOPHEN 10; 325 MG/1; MG/1
1 TABLET ORAL EVERY 8 HOURS PRN
Qty: 90 TABLET | Refills: 0 | Status: SHIPPED | OUTPATIENT
Start: 2020-05-12 | End: 2020-05-11

## 2020-05-11 RX ORDER — HYDROCODONE BITARTRATE AND ACETAMINOPHEN 10; 325 MG/1; MG/1
1 TABLET ORAL EVERY 8 HOURS PRN
Qty: 90 TABLET | Refills: 0 | Status: SHIPPED | OUTPATIENT
Start: 2020-07-10 | End: 2020-07-27

## 2020-05-11 RX ORDER — HYDROCODONE BITARTRATE AND ACETAMINOPHEN 10; 325 MG/1; MG/1
1 TABLET ORAL EVERY 8 HOURS PRN
Qty: 90 TABLET | Refills: 0 | Status: SHIPPED | OUTPATIENT
Start: 2020-06-12 | End: 2020-05-11

## 2020-05-12 NOTE — PROGRESS NOTES
I spoke with Alisa Mccann and his wife Tk Avila today he is doing well he is a type II diabetic sugars are running well blood pressures running good he has enough of his medications except he takes his Norco 3 times a day which he does need  No worsening neuropa

## 2020-05-26 ENCOUNTER — LAB ENCOUNTER (OUTPATIENT)
Dept: LAB | Age: 65
End: 2020-05-26
Attending: INTERNAL MEDICINE
Payer: MEDICARE

## 2020-05-26 DIAGNOSIS — E11.3293 TYPE 2 DIABETES MELLITUS WITH BOTH EYES AFFECTED BY MILD NONPROLIFERATIVE RETINOPATHY WITHOUT MACULAR EDEMA, WITH LONG-TERM CURRENT USE OF INSULIN (HCC): ICD-10-CM

## 2020-05-26 DIAGNOSIS — N18.30 CHRONIC KIDNEY DISEASE, STAGE III (MODERATE) (HCC): ICD-10-CM

## 2020-05-26 DIAGNOSIS — Z79.4 TYPE 2 DIABETES MELLITUS WITH BOTH EYES AFFECTED BY MILD NONPROLIFERATIVE RETINOPATHY WITHOUT MACULAR EDEMA, WITH LONG-TERM CURRENT USE OF INSULIN (HCC): ICD-10-CM

## 2020-05-26 PROCEDURE — 80069 RENAL FUNCTION PANEL: CPT

## 2020-05-26 PROCEDURE — 36415 COLL VENOUS BLD VENIPUNCTURE: CPT

## 2020-05-26 PROCEDURE — 85025 COMPLETE CBC W/AUTO DIFF WBC: CPT

## 2020-05-26 PROCEDURE — 83036 HEMOGLOBIN GLYCOSYLATED A1C: CPT

## 2020-05-27 ENCOUNTER — TELEPHONE (OUTPATIENT)
Dept: NEPHROLOGY | Facility: CLINIC | Age: 65
End: 2020-05-27

## 2020-05-27 NOTE — TELEPHONE ENCOUNTER
Notes recorded by Nicky Forde MD on 5/27/2020 at 12:53 AM CDT  Please notify chaz labs are stable   thanks  Continue aranesp

## 2020-05-29 ENCOUNTER — NURSE ONLY (OUTPATIENT)
Dept: NEPHROLOGY | Facility: CLINIC | Age: 65
End: 2020-05-29
Payer: MEDICARE

## 2020-05-29 VITALS — DIASTOLIC BLOOD PRESSURE: 68 MMHG | SYSTOLIC BLOOD PRESSURE: 118 MMHG

## 2020-05-29 DIAGNOSIS — D63.1 ANEMIA OF CHRONIC RENAL FAILURE, STAGE 3 (MODERATE) (HCC): Primary | ICD-10-CM

## 2020-05-29 DIAGNOSIS — N18.30 ANEMIA OF CHRONIC RENAL FAILURE, STAGE 3 (MODERATE) (HCC): Primary | ICD-10-CM

## 2020-05-29 PROCEDURE — 96372 THER/PROPH/DIAG INJ SC/IM: CPT | Performed by: INTERNAL MEDICINE

## 2020-05-29 NOTE — PROGRESS NOTES
See comment. Patient was identified by full name and date of birth. Aranesp 100 mcg sc given in left upper arm per written order in the [facility]. Tolerated injection and left in good condition.

## 2020-06-08 RX ORDER — PREGABALIN 300 MG/1
CAPSULE ORAL
Qty: 90 CAPSULE | Refills: 0 | Status: SHIPPED | OUTPATIENT
Start: 2020-06-08 | End: 2020-06-12

## 2020-06-12 RX ORDER — PREGABALIN 300 MG/1
CAPSULE ORAL
Qty: 90 CAPSULE | Refills: 0 | Status: SHIPPED | OUTPATIENT
Start: 2020-06-12 | End: 2020-09-08

## 2020-06-12 NOTE — TELEPHONE ENCOUNTER
Last seen 12/30/19. Followup scheduled for 7/27/2020. Refill pended and routed to Dr. Alexander Santiago.

## 2020-06-24 ENCOUNTER — TELEPHONE (OUTPATIENT)
Dept: NEPHROLOGY | Facility: CLINIC | Age: 65
End: 2020-06-24

## 2020-06-24 DIAGNOSIS — N18.30 CHRONIC KIDNEY DISEASE, STAGE III (MODERATE) (HCC): Primary | ICD-10-CM

## 2020-06-24 NOTE — TELEPHONE ENCOUNTER
[facility] states pt is there now for labs and no order in system.   Call transferred to RN

## 2020-06-24 NOTE — TELEPHONE ENCOUNTER
Reviewed chart.    Patient has a nurse appt in 2 days for his aranesp injeciton    Had labs done recently on 5/26/20:  Notes recorded by Scarlet Lima MD on 5/27/2020 at 12:53 AM CDT  Please notify chaz labs are stable  Ashtyn Lee Dr.

## 2020-06-24 NOTE — TELEPHONE ENCOUNTER
Standing orders entered  LDM with the patient that he will do labs every 2 months. Not due until end of July for labs  Left call back number if he has further questions.

## 2020-06-29 ENCOUNTER — TELEPHONE (OUTPATIENT)
Dept: NEPHROLOGY | Facility: CLINIC | Age: 65
End: 2020-06-29

## 2020-06-29 ENCOUNTER — NURSE ONLY (OUTPATIENT)
Dept: NEPHROLOGY | Facility: CLINIC | Age: 65
End: 2020-06-29
Payer: MEDICARE

## 2020-06-29 VITALS — HEART RATE: 72 BPM | SYSTOLIC BLOOD PRESSURE: 107 MMHG | DIASTOLIC BLOOD PRESSURE: 63 MMHG

## 2020-06-29 DIAGNOSIS — N18.30 ANEMIA OF CHRONIC RENAL FAILURE, STAGE 3 (MODERATE) (HCC): Primary | ICD-10-CM

## 2020-06-29 DIAGNOSIS — D63.1 ANEMIA OF CHRONIC RENAL FAILURE, STAGE 3 (MODERATE) (HCC): Primary | ICD-10-CM

## 2020-06-29 PROCEDURE — 96372 THER/PROPH/DIAG INJ SC/IM: CPT | Performed by: INTERNAL MEDICINE

## 2020-06-29 NOTE — TELEPHONE ENCOUNTER
Returned call to [email or web address]. Advised to do standing orders prior to appt with Dr. Reji Cullen. Will plan to do next injection at appt with Dr. Reji Cullen as he will be due at that time.

## 2020-06-29 NOTE — TELEPHONE ENCOUNTER
Patient asking if you want any lab work done prior to his appt on July 27th. He will be due for his standing order so renal panel and CBC end of July. Anything else required?

## 2020-07-14 ENCOUNTER — TELEPHONE (OUTPATIENT)
Dept: ENDOCRINOLOGY CLINIC | Facility: CLINIC | Age: 65
End: 2020-07-14

## 2020-07-14 RX ORDER — BLOOD-GLUCOSE METER
KIT MISCELLANEOUS
Qty: 500 STRIP | Refills: 0 | Status: SHIPPED | OUTPATIENT
Start: 2020-07-14 | End: 2020-10-05

## 2020-07-14 NOTE — TELEPHONE ENCOUNTER
LOV 2/5/20. Per [facility] protocol can refill x 6 months. Included dx code and insulin usage per Medicare requirements.

## 2020-07-14 NOTE — TELEPHONE ENCOUNTER
FREESTYLE LITE TEST In Vitro Strip, USE 1 STRIP TO CHECK GLUCOSE FIVE TIMES DAILY.  DX: E11.65, insulin dependent, Disp: 500 strip, Rfl: 0    Note to prescriber: Need new Rx to bill insurance

## 2020-07-22 ENCOUNTER — TELEPHONE (OUTPATIENT)
Dept: ENDOCRINOLOGY CLINIC | Facility: CLINIC | Age: 65
End: 2020-07-22

## 2020-07-22 NOTE — TELEPHONE ENCOUNTER
Medicare will only pay for testing 3 times per day. LOV note states pt is only testing 3 times per day but pt states he does test 4 times daily. Faxed to [facility] for x4 testing.

## 2020-07-23 ENCOUNTER — LAB ENCOUNTER (OUTPATIENT)
Dept: LAB | Age: 65
End: 2020-07-23
Attending: INTERNAL MEDICINE
Payer: MEDICARE

## 2020-07-23 DIAGNOSIS — N18.30 CHRONIC KIDNEY DISEASE, STAGE III (MODERATE) (HCC): ICD-10-CM

## 2020-07-23 LAB
ALBUMIN SERPL-MCNC: 3.6 G/DL (ref 3.4–5)
ANION GAP SERPL CALC-SCNC: 3 MMOL/L (ref 0–18)
BASOPHILS # BLD AUTO: 0.02 X10(3) UL (ref 0–0.2)
BASOPHILS NFR BLD AUTO: 0.5 %
BUN BLD-MCNC: 37 MG/DL (ref 7–18)
BUN/CREAT SERPL: 11.7 (ref 10–20)
CALCIUM BLD-MCNC: 8.9 MG/DL (ref 8.5–10.1)
CHLORIDE SERPL-SCNC: 115 MMOL/L (ref 98–112)
CO2 SERPL-SCNC: 28 MMOL/L (ref 21–32)
CREAT BLD-MCNC: 3.16 MG/DL (ref 0.7–1.3)
DEPRECATED RDW RBC AUTO: 54.5 FL (ref 35.1–46.3)
EOSINOPHIL # BLD AUTO: 0.09 X10(3) UL (ref 0–0.7)
EOSINOPHIL NFR BLD AUTO: 2.3 %
ERYTHROCYTE [DISTWIDTH] IN BLOOD BY AUTOMATED COUNT: 14.3 % (ref 11–15)
GLUCOSE BLD-MCNC: 85 MG/DL (ref 70–99)
HCT VFR BLD AUTO: 30.7 % (ref 39–53)
HGB BLD-MCNC: 10 G/DL (ref 13–17.5)
IMM GRANULOCYTES # BLD AUTO: 0.01 X10(3) UL (ref 0–1)
IMM GRANULOCYTES NFR BLD: 0.3 %
LYMPHOCYTES # BLD AUTO: 1.36 X10(3) UL (ref 1–4)
LYMPHOCYTES NFR BLD AUTO: 34.6 %
MCH RBC QN AUTO: 34.6 PG (ref 26–34)
MCHC RBC AUTO-ENTMCNC: 32.6 G/DL (ref 31–37)
MCV RBC AUTO: 106.2 FL (ref 80–100)
MONOCYTES # BLD AUTO: 0.34 X10(3) UL (ref 0.1–1)
MONOCYTES NFR BLD AUTO: 8.7 %
NEUTROPHILS # BLD AUTO: 2.11 X10 (3) UL (ref 1.5–7.7)
NEUTROPHILS # BLD AUTO: 2.11 X10(3) UL (ref 1.5–7.7)
NEUTROPHILS NFR BLD AUTO: 53.6 %
OSMOLALITY SERPL CALC.SUM OF ELEC: 310 MOSM/KG (ref 275–295)
PHOSPHATE SERPL-MCNC: 3.8 MG/DL (ref 2.5–4.9)
PLATELET # BLD AUTO: 134 10(3)UL (ref 150–450)
POTASSIUM SERPL-SCNC: 4.7 MMOL/L (ref 3.5–5.1)
RBC # BLD AUTO: 2.89 X10(6)UL (ref 3.8–5.8)
SODIUM SERPL-SCNC: 146 MMOL/L (ref 136–145)
WBC # BLD AUTO: 3.9 X10(3) UL (ref 4–11)

## 2020-07-23 PROCEDURE — 85025 COMPLETE CBC W/AUTO DIFF WBC: CPT

## 2020-07-23 PROCEDURE — 36415 COLL VENOUS BLD VENIPUNCTURE: CPT

## 2020-07-23 PROCEDURE — 80069 RENAL FUNCTION PANEL: CPT

## 2020-07-27 ENCOUNTER — TELEPHONE (OUTPATIENT)
Dept: NEPHROLOGY | Facility: CLINIC | Age: 65
End: 2020-07-27

## 2020-07-27 ENCOUNTER — OFFICE VISIT (OUTPATIENT)
Dept: NEPHROLOGY | Facility: CLINIC | Age: 65
End: 2020-07-27
Payer: MEDICARE

## 2020-07-27 VITALS
HEART RATE: 62 BPM | SYSTOLIC BLOOD PRESSURE: 113 MMHG | WEIGHT: 228 LBS | HEIGHT: 69 IN | BODY MASS INDEX: 33.77 KG/M2 | DIASTOLIC BLOOD PRESSURE: 70 MMHG

## 2020-07-27 DIAGNOSIS — N18.30 CHRONIC KIDNEY DISEASE, STAGE III (MODERATE) (HCC): ICD-10-CM

## 2020-07-27 DIAGNOSIS — E11.65 UNCONTROLLED TYPE 2 DIABETES MELLITUS WITH HYPERGLYCEMIA (HCC): ICD-10-CM

## 2020-07-27 DIAGNOSIS — N18.4 CKD (CHRONIC KIDNEY DISEASE) STAGE 4, GFR 15-29 ML/MIN (HCC): Primary | ICD-10-CM

## 2020-07-27 PROCEDURE — 99214 OFFICE O/P EST MOD 30 MIN: CPT | Performed by: INTERNAL MEDICINE

## 2020-07-27 PROCEDURE — 96372 THER/PROPH/DIAG INJ SC/IM: CPT | Performed by: INTERNAL MEDICINE

## 2020-07-27 PROCEDURE — G0463 HOSPITAL OUTPT CLINIC VISIT: HCPCS | Performed by: INTERNAL MEDICINE

## 2020-07-27 RX ORDER — HYDROCODONE BITARTRATE AND ACETAMINOPHEN 10; 325 MG/1; MG/1
1 TABLET ORAL EVERY 8 HOURS PRN
Qty: 90 TABLET | Refills: 0 | Status: SHIPPED | OUTPATIENT
Start: 2020-08-07 | End: 2020-07-27

## 2020-07-27 RX ORDER — HYDROCODONE BITARTRATE AND ACETAMINOPHEN 10; 325 MG/1; MG/1
1 TABLET ORAL EVERY 8 HOURS PRN
Qty: 90 TABLET | Refills: 0 | Status: SHIPPED | OUTPATIENT
Start: 2020-09-07 | End: 2020-07-27

## 2020-07-27 RX ORDER — HYDROCODONE BITARTRATE AND ACETAMINOPHEN 10; 325 MG/1; MG/1
1 TABLET ORAL EVERY 8 HOURS PRN
Qty: 90 TABLET | Refills: 0 | Status: SHIPPED | OUTPATIENT
Start: 2020-10-07 | End: 2021-01-04

## 2020-07-27 NOTE — PATIENT INSTRUCTIONS
Get a shot in 4 weeks get a shot in 8 weeks and see me in 12 weeks    In 8 weeks you will also need your labs which are ordered     prescriptions  Good job with your weight and your health Cayetano Read    Have a safe summer and say ralph to [place]

## 2020-07-27 NOTE — PROGRESS NOTES
Progress Note     Darius Ortiz    Is here to  [address] he is overall doing fine is also here for Aranesp is working outside staying safe no chest pain or shortness of breath  Says his blood pressures been excellent      HISTORY:  Past Medical STRIP TO CHECK GLUCOSE FIVE TIMES DAILY. DX: E11.65, insulin dependent 500 strip 0   • PREGABALIN 300 MG Oral Cap Take 1 capsule by mouth once daily 90 capsule 0   • Fenofibrate 48 MG Oral Tab Take 1 tablet (48 mg total) by mouth once daily.  90 tablet 1 (FISH OIL) 600 MG Oral Cap Take 1 capsule by mouth nightly. • Multiple Vitamins-Minerals (CENTRUM SILVER) Oral Tab Take 1 tablet by mouth daily.      • Blood Glucose Monitoring Suppl (ACCU-CHEK INÉS PLUS) w/Device Does not apply Kit Use as directed t lungs are clear to auscultation bilaterally  Cardiovascular: regular rate and rhythm   Abdomen: soft, non-tender, non-distended, BS normal  Skin/Hair: no unusual rashes present, no abnormal bruising noted  Back/Spine: no abnormalities noted  Musculoskeleta

## 2020-08-05 ENCOUNTER — OFFICE VISIT (OUTPATIENT)
Dept: ENDOCRINOLOGY CLINIC | Facility: CLINIC | Age: 65
End: 2020-08-05
Payer: MEDICARE

## 2020-08-05 ENCOUNTER — TELEPHONE (OUTPATIENT)
Dept: ENDOCRINOLOGY CLINIC | Facility: CLINIC | Age: 65
End: 2020-08-05

## 2020-08-05 VITALS
DIASTOLIC BLOOD PRESSURE: 70 MMHG | SYSTOLIC BLOOD PRESSURE: 130 MMHG | WEIGHT: 226 LBS | BODY MASS INDEX: 33 KG/M2 | HEART RATE: 65 BPM

## 2020-08-05 DIAGNOSIS — E11.65 UNCONTROLLED TYPE 2 DIABETES MELLITUS WITH HYPERGLYCEMIA (HCC): Primary | ICD-10-CM

## 2020-08-05 LAB
CARTRIDGE LOT#: ABNORMAL NUMERIC
GLUCOSE BLOOD: 283
HEMOGLOBIN A1C: 7.2 % (ref 4.3–5.6)
TEST STRIP LOT #: NORMAL NUMERIC

## 2020-08-05 PROCEDURE — 83036 HEMOGLOBIN GLYCOSYLATED A1C: CPT | Performed by: INTERNAL MEDICINE

## 2020-08-05 PROCEDURE — 36416 COLLJ CAPILLARY BLOOD SPEC: CPT | Performed by: INTERNAL MEDICINE

## 2020-08-05 PROCEDURE — G0463 HOSPITAL OUTPT CLINIC VISIT: HCPCS | Performed by: INTERNAL MEDICINE

## 2020-08-05 PROCEDURE — 99213 OFFICE O/P EST LOW 20 MIN: CPT | Performed by: INTERNAL MEDICINE

## 2020-08-05 PROCEDURE — 82962 GLUCOSE BLOOD TEST: CPT | Performed by: INTERNAL MEDICINE

## 2020-08-05 NOTE — PROGRESS NOTES
Name: Prentice Leventhal  Date: 8/5/2020    Referring Physician: No ref. provider found    HISTORY OF PRESENT ILLNESS   Prentice Leventhal is a 75year old male who presents for diabetes mellitus.       Prior HbA, C or glycohemoglobin were 9.8% 7/2014; 7.7% E11.65, insulin dependent, Disp: 500 strip, Rfl: 0  •  PREGABALIN 300 MG Oral Cap, Take 1 capsule by mouth once daily, Disp: 90 capsule, Rfl: 0  •  Fenofibrate 48 MG Oral Tab, Take 1 tablet (48 mg total) by mouth once daily. , Disp: 90 tablet, Rfl: 1  •  RE Disp: 60 g, Rfl: 3  •  insulin aspart (NOVOLOG) 100 UNIT/ML Subcutaneous Solution, INJECT 6 UNITS SUBCUTANEOUSLY WITH BREAKFAST, THEN 6 UNITS WITH LUNCH AND 8 UNITS WITH DINNER, Disp: 30 mL, Rfl: 3  •  clotrimazole 1 % External Cream, Use bid, Disp: 60 g, Proteinuria    • Type II or unspecified type diabetes mellitus without mention of complication, not stated as uncontrolled     Pills & Insulin   • Vitreous floaters        Surgical history:   Past Surgical History:   Procedure Laterality Date   • APPENDECT Lantus 30 units SQ daily   -Continue Humalog 6-6-8  -Normal lipids   -Normotensive  -Renal function stable and followed by Dr. Samuel Mahmood    Discussed starting Aye Moralez at visit - patient is checking 4 times per day and injecting insulin 4 times per day.

## 2020-08-05 NOTE — TELEPHONE ENCOUNTER
Rachelle order form filled out, signed by provider and faxed to [facility] at [phone].   Chart postponed x1 week to follow up with Rachelle.

## 2020-08-22 ENCOUNTER — LAB ENCOUNTER (OUTPATIENT)
Dept: LAB | Age: 65
End: 2020-08-22
Attending: INTERNAL MEDICINE
Payer: MEDICARE

## 2020-08-22 DIAGNOSIS — E11.65 UNCONTROLLED TYPE 2 DIABETES MELLITUS WITH HYPERGLYCEMIA (HCC): ICD-10-CM

## 2020-08-22 DIAGNOSIS — N18.30 CHRONIC KIDNEY DISEASE, STAGE III (MODERATE) (HCC): ICD-10-CM

## 2020-08-22 LAB
ALBUMIN SERPL-MCNC: 3.6 G/DL (ref 3.4–5)
ANION GAP SERPL CALC-SCNC: 5 MMOL/L (ref 0–18)
BASOPHILS # BLD AUTO: 0.01 X10(3) UL (ref 0–0.2)
BASOPHILS NFR BLD AUTO: 0.3 %
BUN BLD-MCNC: 59 MG/DL (ref 7–18)
BUN/CREAT SERPL: 20.6 (ref 10–20)
CALCIUM BLD-MCNC: 9 MG/DL (ref 8.5–10.1)
CHLORIDE SERPL-SCNC: 115 MMOL/L (ref 98–112)
CO2 SERPL-SCNC: 25 MMOL/L (ref 21–32)
CREAT BLD-MCNC: 2.87 MG/DL (ref 0.7–1.3)
DEPRECATED RDW RBC AUTO: 55.8 FL (ref 35.1–46.3)
EOSINOPHIL # BLD AUTO: 0.09 X10(3) UL (ref 0–0.7)
EOSINOPHIL NFR BLD AUTO: 2.7 %
ERYTHROCYTE [DISTWIDTH] IN BLOOD BY AUTOMATED COUNT: 14.6 % (ref 11–15)
GLUCOSE BLD-MCNC: 141 MG/DL (ref 70–99)
HCT VFR BLD AUTO: 30 % (ref 39–53)
HGB BLD-MCNC: 10 G/DL (ref 13–17.5)
IMM GRANULOCYTES # BLD AUTO: 0 X10(3) UL (ref 0–1)
IMM GRANULOCYTES NFR BLD: 0 %
IRON SATURATION: 52 % (ref 20–50)
IRON SERPL-MCNC: 145 UG/DL (ref 65–175)
LYMPHOCYTES # BLD AUTO: 1.3 X10(3) UL (ref 1–4)
LYMPHOCYTES NFR BLD AUTO: 38.3 %
MCH RBC QN AUTO: 34.8 PG (ref 26–34)
MCHC RBC AUTO-ENTMCNC: 33.3 G/DL (ref 31–37)
MCV RBC AUTO: 104.5 FL (ref 80–100)
MONOCYTES # BLD AUTO: 0.3 X10(3) UL (ref 0.1–1)
MONOCYTES NFR BLD AUTO: 8.8 %
NEUTROPHILS # BLD AUTO: 1.69 X10 (3) UL (ref 1.5–7.7)
NEUTROPHILS # BLD AUTO: 1.69 X10(3) UL (ref 1.5–7.7)
NEUTROPHILS NFR BLD AUTO: 49.9 %
OSMOLALITY SERPL CALC.SUM OF ELEC: 319 MOSM/KG (ref 275–295)
PHOSPHATE SERPL-MCNC: 3.6 MG/DL (ref 2.5–4.9)
PLATELET # BLD AUTO: 104 10(3)UL (ref 150–450)
POTASSIUM SERPL-SCNC: 4.6 MMOL/L (ref 3.5–5.1)
RBC # BLD AUTO: 2.87 X10(6)UL (ref 3.8–5.8)
SODIUM SERPL-SCNC: 145 MMOL/L (ref 136–145)
TOTAL IRON BINDING CAPACITY: 279 UG/DL (ref 240–450)
TRANSFERRIN SERPL-MCNC: 187 MG/DL (ref 200–360)
WBC # BLD AUTO: 3.4 X10(3) UL (ref 4–11)

## 2020-08-22 PROCEDURE — 84466 ASSAY OF TRANSFERRIN: CPT

## 2020-08-22 PROCEDURE — 85025 COMPLETE CBC W/AUTO DIFF WBC: CPT

## 2020-08-22 PROCEDURE — 36415 COLL VENOUS BLD VENIPUNCTURE: CPT

## 2020-08-22 PROCEDURE — 83540 ASSAY OF IRON: CPT

## 2020-08-22 PROCEDURE — 80069 RENAL FUNCTION PANEL: CPT

## 2020-08-25 ENCOUNTER — NURSE ONLY (OUTPATIENT)
Dept: NEPHROLOGY | Facility: CLINIC | Age: 65
End: 2020-08-25
Payer: MEDICARE

## 2020-08-25 DIAGNOSIS — N18.30 CHRONIC KIDNEY DISEASE, STAGE III (MODERATE) (HCC): ICD-10-CM

## 2020-08-25 PROCEDURE — 96372 THER/PROPH/DIAG INJ SC/IM: CPT | Performed by: INTERNAL MEDICINE

## 2020-08-25 NOTE — PROGRESS NOTES
Patient was here for Aranesp administration. BP of 107/61 Pulse 61. 150 mcg Aranesp administered to left upper arm subcutaneous. Tolerated discharged without complaints.

## 2020-08-31 ENCOUNTER — TELEPHONE (OUTPATIENT)
Dept: NEPHROLOGY | Facility: CLINIC | Age: 65
End: 2020-08-31

## 2020-08-31 NOTE — TELEPHONE ENCOUNTER
Received fax from Rachelle:    Referral Status Update: Cancelled     Cancellation Reason: Patient Unresponsive    Please advise.

## 2020-09-01 NOTE — TELEPHONE ENCOUNTER
Spoke with Tesfaye Zhang:    States he has been contacted by [employer], although has not gotten back to them. Patient is still interested in trying [employer].      Called Rachelle:    They advised RN to have patient call Rachelle to restart process of obtaining CGM s

## 2020-09-03 ENCOUNTER — TELEPHONE (OUTPATIENT)
Dept: NEPHROLOGY | Facility: CLINIC | Age: 65
End: 2020-09-03

## 2020-09-03 RX ORDER — PANTOPRAZOLE SODIUM 40 MG/1
TABLET, DELAYED RELEASE ORAL
Qty: 90 TABLET | Refills: 1 | Status: SHIPPED | OUTPATIENT
Start: 2020-09-03 | End: 2021-02-23

## 2020-09-03 NOTE — TELEPHONE ENCOUNTER
Patient's wife called in to follow up on refill request PANTOPRAZOLE SODIUM 40 MG Oral Tab EC.  Please advise

## 2020-09-10 ENCOUNTER — HOSPITAL ENCOUNTER (OUTPATIENT)
Dept: GENERAL RADIOLOGY | Age: 65
Discharge: HOME | End: 2020-09-10
Attending: INTERNAL MEDICINE | Admitting: INTERNAL MEDICINE
Payer: MEDICARE

## 2020-09-10 ENCOUNTER — OFFICE VISIT (OUTPATIENT)
Dept: INTERNAL MEDICINE CLINIC | Facility: CLINIC | Age: 65
End: 2020-09-10
Payer: MEDICARE

## 2020-09-10 VITALS
TEMPERATURE: 97 F | DIASTOLIC BLOOD PRESSURE: 63 MMHG | WEIGHT: 228 LBS | HEIGHT: 69 IN | BODY MASS INDEX: 33.77 KG/M2 | SYSTOLIC BLOOD PRESSURE: 107 MMHG | HEART RATE: 74 BPM

## 2020-09-10 DIAGNOSIS — M25.551 RIGHT HIP PAIN: Primary | ICD-10-CM

## 2020-09-10 DIAGNOSIS — R19.7 DIARRHEA, UNSPECIFIED TYPE: ICD-10-CM

## 2020-09-10 DIAGNOSIS — M25.551 RIGHT HIP PAIN: ICD-10-CM

## 2020-09-10 PROCEDURE — G0463 HOSPITAL OUTPT CLINIC VISIT: HCPCS | Performed by: INTERNAL MEDICINE

## 2020-09-10 PROCEDURE — 99214 OFFICE O/P EST MOD 30 MIN: CPT | Performed by: INTERNAL MEDICINE

## 2020-09-10 PROCEDURE — 73502 X-RAY EXAM HIP UNI 2-3 VIEWS: CPT | Performed by: INTERNAL MEDICINE

## 2020-09-10 NOTE — PROGRESS NOTES
Patient ID: Lucina Lei is a 77year old male. Patient presents with:  Hip Pain: right side        HISTORY OF PRESENT ILLNESS:   HPI  Patient presents for above. Here with his wife.     Having a 1 year history of progressively worsening right hip EXTRACTION W/  INTRAOCULAR LENS IMPLANT Right 06/11/2018    Dr. Anel Wise   • CATARACT EXTRACTION W/  INTRAOCULAR LENS IMPLANT Left 07/12/2018    Dr. Anel Wise   • CHOLECYSTECTOMY  2012   • ELECTROCARDIOGRAM, COMPLETE  4/23/2012    scanned to media tab   • HER SUBCUTANEOUSLY NIGHTLY, Disp: 9 pen, Rfl: 1  •  Mometasone Furoate 0.1 % External Solution, Apply to ears daily and scalp daily as needed, Disp: 120 mL, Rfl: 3  •  Ketoconazole 2 % External Shampoo, Apply to scalp 2 times per week., Disp: 120 mL, Rfl: 11 Not on file      Years of education: Not on file      Highest education level: Not on file    Occupational History      Not on file    Social Needs      Financial resource strain: Not on file      Food insecurity:        Worry: Not on file        Inability Reaction to local anesthetic: No    Social History Narrative      Not on file          PHYSICAL EXAM:      09/10/20  1155   BP: 107/63   Pulse: 74   Temp: 97.2 °F (36.2 °C)   TempSrc: Temporal   Weight: 228 lb (103.4 kg)   Height: 5' 9\" (1.753 m)

## 2020-09-13 NOTE — PROGRESS NOTES
MAB increased/ following with Dr Davidson Salazar Nephrology Consult Note        Patient Name: Mary Tan  MRN: [id number]    Patient Class: IP- Inpatient   Admission Date: 9/10/2020  Length of Stay: 3 days  Date of Service: 9/13/2020    Attending Physician: Jenna Love MD  Primary Care Provider: Bushra Bellamy MD    Reason for Consult: hyponatremia/hypokalemia/diana/anemia/chf/c.diff colitis    SUBJECTIVE:     HPI: 89F with dCHF, severe AS, recurrent UTIs, chronic hyponatremia admitted with hyponatremia/hypokalemia in setting of c. diff colitis. Also had COVID last month, and UTI. She reportedly gained 11 pounds last week alone despite diuretics at home. Notably takes thiazide - metolazone. Clear UA, + c.diff. Normal kidneys on CT.    9/12 VSS, no new complains.  9/13 VSS, no new complains. sNa better.     Past Medical History:   Diagnosis Date    Atrial fib/flutter, transient     GERD (gastroesophageal reflux disease)     High cholesterol     Hypertension     SCC (squamous cell carcinoma) 02/17/2020    left shin inferior    Shingles     Squamous cell carcinoma of skin 02/17/2020    left shin superior     Past Surgical History:   Procedure Laterality Date    BLADDER SURGERY      FACIAL COSMETIC SURGERY      HYSTERECTOMY       Family History   Problem Relation Age of Onset    Melanoma Brother     Allergic rhinitis Neg Hx     Allergies Neg Hx     Angioedema Neg Hx     Asthma Neg Hx     Atopy Neg Hx     Eczema Neg Hx     Immunodeficiency Neg Hx     Rhinitis Neg Hx     Urticaria Neg Hx     Lupus Neg Hx     Suicidality Neg Hx      Social History     Tobacco Use    Smoking status: Never Smoker    Smokeless tobacco: Current User   Substance Use Topics    Alcohol use: No    Drug use: No       Review of patient's allergies indicates:   Allergen Reactions    Augmentin [amoxicillin-pot clavulanate] Diarrhea     Patient at boby risk for cdif    Ceftriaxone Diarrhea     Patient at boby risk for cdif    Celestone [betamethasone sodium  phosphate] Swelling     Had injection site turned red    Cephalexin Diarrhea     Patient at boby risk for cdif    Clindamycin Diarrhea     Patient at boby risk for cdif    Lisinopril      Cough    Phenol Swelling       Outpatient meds:  No current facility-administered medications on file prior to encounter.      Current Outpatient Medications on File Prior to Encounter   Medication Sig Dispense Refill    azelastine (ASTELIN) 137 mcg (0.1 %) nasal spray 1 spray by Nasal route 2 (two) times daily.      brimonidine 0.15 % OPTH DROP (ALPHAGAN) 0.15 % ophthalmic solution Place 1 drop into the right eye 2 (two) times a day.      famotidine (PEPCID) 20 MG tablet Take 20 mg by mouth 2 (two) times daily.      gabapentin (NEURONTIN) 100 MG capsule Take 100 mg by mouth 2 (two) times daily.      loteprednol (LOTEMAX) 0.5 % ophthalmic suspension Place 1 drop into the right eye every other day.      albuterol-ipratropium (DUO-NEB) 2.5 mg-0.5 mg/3 mL nebulizer solution Inhale 1 vial into the lungs.      alprazolam (XANAX) 0.5 MG tablet Take 0.5 mg by mouth 3 (three) times daily.      benzonatate (TESSALON) 100 MG capsule       budesonide-formoterol 160-4.5 mcg (SYMBICORT) 160-4.5 mcg/actuation HFAA Inhale 2 puffs into the lungs every 12 (twelve) hours.        bumetanide (BUMEX) 1 MG tablet 1 mg once daily.       calcium-vitamin D (OSCAL) 250 (625)-125 mg-unit per tablet Take 1 tablet by mouth 2 (two) times daily.       cetirizine (ZYRTEC) 10 MG tablet Take 10 mg by mouth once daily.      clonidine (CATAPRES) 0.1 MG tablet Take 0.1 mg by mouth 2 (two) times daily.        cranberry 400 mg Cap Take by mouth.      dabigatran etexilate (PRADAXA) 150 mg Cap Take 1 capsule by mouth 2 (two) times daily.        fluticasone (FLONASE) 50 mcg/actuation nasal spray 1 spray by Each Nare route once daily.      hyoscyamine (LEVSIN/SL) 0.125 mg Subl 1 tablet by mouth twice daily as needed      lansoprazole (PREVACID) 30 MG  capsule 30 mg once daily.       levalbuterol (XOPENEX HFA) 45 mcg/actuation inhaler Inhale 1-2 puffs into the lungs.      losartan (COZAAR) 100 MG tablet 50 mg once daily.       mesalamine (CANASA) 1000 MG Supp Place 500 mg rectally 2 (two) times daily.      metOLazone (ZAROXOLYN) 2.5 MG tablet Take 2.5 mg by mouth.      multivit-iron-min-folic acid (MULTIVITAMIN-IRON-MINERALS-FOLIC ACID) 3,500-18-0.4 unit-mg-mg Chew Take by mouth.        phenazopyridine (PYRIDIUM) 200 MG tablet TAKE 1 TABLET BY MOUTH THREE TIMES DAILY AS NEEDED FOR PAIN (Patient taking differently: 100 mg. ) 60 tablet 0    potassium chloride SA (K-DUR,KLOR-CON) 10 MEQ tablet 20 mEq once daily.   6    PREMARIN vaginal cream   5    tramadol (ULTRAM) 50 mg tablet Take 50 mg by mouth every 6 (six) hours as needed for Pain.      vit C-vit E-lutein-min-om-3 (OCUVITE) 205-36-2-150 mg-unit-mg-mg Cap Take by mouth.         Scheduled meds:   azelastine  1 spray Nasal BID    brimonidine 0.15 % OPTH DROP  1 drop Right Eye BID    bumetanide  1 mg Oral Daily    dabigatran etexilate  75 mg Oral BID    famotidine  20 mg Oral BID    fluticasone furoate-vilanteroL  1 puff Inhalation Daily    gabapentin  100 mg Oral BID    Lactobacillus acidoph-L.bulgar  2 tablet Oral TID WM    metoprolol tartrate  25 mg Oral BID    metronidazole  500 mg Intravenous Q8H    potassium chloride in water  10 mEq Intravenous Once    potassium chloride  40 mEq Oral BID    vancomycin  125 mg Oral Q6H       Infusions:      PRN meds:  albuterol sulfate, ALPRAZolam, calcium chloride IVPB, calcium chloride IVPB, calcium chloride IVPB, magnesium oxide, magnesium sulfate IVPB, magnesium sulfate IVPB, magnesium sulfate IVPB, magnesium sulfate IVPB, ondansetron, potassium chloride in water, potassium chloride in water, potassium chloride in water, potassium chloride in water, potassium chloride, potassium chloride, potassium chloride, potassium chloride, sodium phosphate  IVPB, sodium phosphate IVPB, sodium phosphate IVPB, sodium phosphate IVPB, sodium phosphate IVPB, traMADoL    Review of Systems:  ROS    OBJECTIVE:     Vital Signs and IO (Last 24H):  Temp:  [97 °F (36.1 °C)-98.3 °F (36.8 °C)]   Pulse:  [62-91]   Resp:  [15-18]   BP: ()/(45-64)   SpO2:  [98 %-100 %]   I/O last 3 completed shifts:  In: 1400 [P.O.:850; I.V.:150; IV Piggyback:400]  Out: 2 [Urine:2]    Wt Readings from Last 5 Encounters:   09/11/20 74.8 kg (164 lb 14.5 oz)   03/09/20 78.5 kg (173 lb)   02/24/20 78.5 kg (173 lb)   01/29/20 78.7 kg (173 lb 8 oz)   10/22/18 80.7 kg (178 lb)         Physical Exam:  Physical Exam  Constitutional:       Appearance: She is well-developed. She is not diaphoretic.   HENT:      Head: Normocephalic and atraumatic.   Eyes:      General: No scleral icterus.     Pupils: Pupils are equal, round, and reactive to light.   Neck:      Musculoskeletal: Neck supple.   Cardiovascular:      Rate and Rhythm: Normal rate and regular rhythm.   Pulmonary:      Effort: Pulmonary effort is normal. No respiratory distress.      Breath sounds: No stridor.   Abdominal:      General: There is no distension.      Palpations: Abdomen is soft.   Musculoskeletal: Normal range of motion.         General: No deformity.   Skin:     General: Skin is warm and dry.      Findings: No erythema or rash.   Neurological:      Mental Status: She is alert and oriented to person, place, and time.      Cranial Nerves: No cranial nerve deficit.   Psychiatric:         Behavior: Behavior normal.         Body mass index is 25.83 kg/m².    Laboratory:  Recent Labs   Lab 09/11/20  1600 09/12/20  0300 09/13/20  0500   * 130* 130*   K 2.8* 2.8* 3.6  3.6   CL 90* 86* 89*   CO2 29 30* 27   BUN 24 22 27   CREATININE 0.9 0.9 1.1   ESTGFRAFRICA >60.0 >60.0 47.6*   EGFRNONAA 52.6* 52.6* 41.3*   * 108 135*       Recent Labs   Lab 09/11/20  0441 09/11/20  1600 09/12/20  0300 09/13/20  0500   CALCIUM 7.4* 7.1* 7.5*  7.8*   ALBUMIN 3.0*  --  2.8* 2.7*   MG 0.9* 1.7 1.9 1.6             No results for input(s): POCTGLUCOSE in the last 168 hours.          Recent Labs   Lab 09/11/20 0441 09/12/20  0300 09/13/20  0500   WBC 7.58 6.20 4.90   HGB 9.3* 9.1* 8.5*   HCT 27.5* 27.4* 25.9*    321 258   MCV 97 98 99*   MCHC 33.8 33.2 32.8   MONO 12.0 16.3*  1.0 15.9*  0.8       Recent Labs   Lab 09/11/20 0441 09/12/20  0300 09/13/20  0500   BILITOT 1.0 0.9 0.5   PROT 5.7* 5.6* 5.3*   ALBUMIN 3.0* 2.8* 2.7*   ALKPHOS 64 60 58   ALT 15 15 14   AST 20 17 17       Recent Labs   Lab 09/10/20  2350   Color, UA Yellow   Appearance, UA Clear   pH, UA 6.0   Specific Gravity, UA 1.010   Protein, UA Negative   Glucose, UA Negative   Ketones, UA Negative   Urobilinogen, UA Negative   Bilirubin (UA) Negative   Occult Blood UA 1+ A   Nitrite, UA Negative   RBC, UA 3   WBC, UA 4   Bacteria Many A   Hyaline Casts, UA 3 A             Microbiology Results (last 7 days)     Procedure Component Value Units Date/Time    Blood culture #1 **CANNOT BE ORDERED STAT** [[id number]] Collected: 09/10/20 2229    Order Status: Completed Specimen: Blood from Peripheral, Hand, Left Updated: 09/13/20 0232     Blood Culture, Routine No Growth to date      No Growth to date      No Growth to date    Blood culture #2 **CANNOT BE ORDERED STAT** [[id number]] Collected: 09/10/20 2229    Order Status: Completed Specimen: Blood from Peripheral, Hand, Left Updated: 09/13/20 0232     Blood Culture, Routine No Growth to date      No Growth to date      No Growth to date    Stool culture **CANNOT BE ORDERED STAT** [[id number]] Collected: 09/10/20 2055    Order Status: Completed Specimen: Stool Updated: 09/12/20 0835     Stool Culture No Salmonella,Shigella,Vibrio,Campylobacter.      No E coli 0157:H7 isolated.    Clostridium difficile EIA [[id number]]  (Abnormal) Collected: 09/10/20 2055    Order Status: Completed Specimen: Stool Updated: 09/10/20 2156     C. diff Antigen  Positive     C difficile Toxins A+B, EIA Positive     Comment: Testing not recommended for children <24 months old.       Narrative:         C. Diff POS critical result(s) called and verbal readback obtained   from Andrei Johnson, RN-ED by CD3 09/10/2020 21:56          ASSESSMENT/PLAN:     Active Hospital Problems    Diagnosis  POA    *Acute on chronic hyponatremia [E87.1]  Yes    Encephalopathy, metabolic [G93.41]  Yes    Acute hypokalemia [E87.6]  Yes    Clostridium difficile colitis [A04.72]  Yes    DNR (do not resuscitate) [Z66]  Yes    HLD (hyperlipidemia) [E78.5]  Yes    HTN (hypertension) [I10]  Yes    Squamous cell carcinoma, leg [C44.721]  Yes    Chronic atrial fibrillation [I48.91]  Yes    Acute on chronic diastolic heart failure [I50.33]  Yes    Anemia [D64.9]  Yes     Chronic    Frailty syndrome in geriatric patient [R54]  Yes     Chronic    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  Keep MAP > 60, SBP > 100.  Dose meds for GFR < 30 ml/min.  Treat infection, optimize PO intake.  On oral protein supplement to help hyponatremia.  Regular diet, replete K PO - now on ATC KCL.  STOP metolazone - thiazide, she should never take it again.  Will not add salt tabs due to her poor heart function and need for diuretics, will consider outpatient therapy with Ure-Na on f/u..    Anemia of CKD  Hgb and HCT are acceptable. Monitor.    HTN  dCHF  BP seem controlled.   Tolerate asymptomatic HTN up to -160.  Hold BP home meds.    Thank you for allowing us to participate  in the care of your patient!   We will follow the patient and provide recommendations as needed.    Srinath Oseguera MD    [facility]  [address] [place] [id number]    [phone] - tel  [phone] - fax    9/13/2020 4:33 PM

## 2020-09-16 RX ORDER — PREGABALIN 300 MG/1
CAPSULE ORAL
Qty: 90 CAPSULE | Refills: 0 | Status: SHIPPED | OUTPATIENT
Start: 2020-09-16 | End: 2020-11-27

## 2020-09-24 DIAGNOSIS — E11.65 UNCONTROLLED TYPE 2 DIABETES MELLITUS WITH HYPERGLYCEMIA (HCC): ICD-10-CM

## 2020-09-24 RX ORDER — INSULIN GLARGINE 100 [IU]/ML
INJECTION, SOLUTION SUBCUTANEOUS
Qty: 30 ML | Refills: 0 | Status: SHIPPED | OUTPATIENT
Start: 2020-09-24 | End: 2020-12-28

## 2020-09-24 NOTE — TELEPHONE ENCOUNTER
Vanesa calling to inform Dr Nancy Christian that patient is completely out of Lantus insulin and requesting rx to be sent today. For additional questions please call. Thank you.

## 2020-09-24 NOTE — TELEPHONE ENCOUNTER
Med was sent to [facility] at 10:25 this morning. RN left message that med was sent and to call us if there are any further issues.

## 2020-09-29 ENCOUNTER — NURSE ONLY (OUTPATIENT)
Dept: NEPHROLOGY | Facility: CLINIC | Age: 65
End: 2020-09-29
Payer: MEDICARE

## 2020-09-29 PROCEDURE — 96372 THER/PROPH/DIAG INJ SC/IM: CPT | Performed by: INTERNAL MEDICINE

## 2020-09-29 NOTE — PROGRESS NOTES
9/29/2020. Patient came for Aranesp injection. Injection given in left upper arm subcutaneously. Given 150mcg. /55 Pulse 74. Patient left with no complaints.

## 2020-10-02 DIAGNOSIS — E11.65 UNCONTROLLED TYPE 2 DIABETES MELLITUS WITH HYPERGLYCEMIA (HCC): ICD-10-CM

## 2020-10-03 RX ORDER — ATORVASTATIN CALCIUM 40 MG/1
TABLET, FILM COATED ORAL
Qty: 90 TABLET | Refills: 0 | Status: SHIPPED | OUTPATIENT
Start: 2020-10-03 | End: 2021-01-06

## 2020-10-05 RX ORDER — BLOOD-GLUCOSE METER
KIT MISCELLANEOUS
Qty: 500 STRIP | Refills: 0 | Status: SHIPPED | OUTPATIENT
Start: 2020-10-05 | End: 2020-10-16

## 2020-10-05 RX ORDER — SYRINGE AND NEEDLE,INSULIN,1ML 31GX15/64"
SYRINGE, EMPTY DISPOSABLE MISCELLANEOUS
Qty: 400 EACH | Refills: 0 | Status: SHIPPED | OUTPATIENT
Start: 2020-10-05 | End: 2020-12-28

## 2020-10-08 ENCOUNTER — TELEPHONE (OUTPATIENT)
Dept: ENDOCRINOLOGY CLINIC | Facility: CLINIC | Age: 65
End: 2020-10-08

## 2020-10-16 ENCOUNTER — TELEPHONE (OUTPATIENT)
Dept: ENDOCRINOLOGY CLINIC | Facility: CLINIC | Age: 65
End: 2020-10-16

## 2020-10-16 DIAGNOSIS — E11.65 UNCONTROLLED TYPE 2 DIABETES MELLITUS WITH HYPERGLYCEMIA (HCC): Primary | ICD-10-CM

## 2020-10-16 RX ORDER — BLOOD-GLUCOSE METER
KIT MISCELLANEOUS
Qty: 500 STRIP | Refills: 0 | Status: SHIPPED | OUTPATIENT
Start: 2020-10-16 | End: 2021-01-04

## 2020-10-16 NOTE — TELEPHONE ENCOUNTER
Called [facility] pharmacy:    Requesting dx code on patients prescription. Prescription resent to pharmacy per protocol.

## 2020-10-19 ENCOUNTER — TELEPHONE (OUTPATIENT)
Dept: NEPHROLOGY | Facility: CLINIC | Age: 65
End: 2020-10-19

## 2020-10-19 ENCOUNTER — LAB ENCOUNTER (OUTPATIENT)
Dept: LAB | Age: 65
End: 2020-10-19
Attending: INTERNAL MEDICINE
Payer: MEDICARE

## 2020-10-19 DIAGNOSIS — N18.30 CHRONIC KIDNEY DISEASE, STAGE III (MODERATE) (HCC): ICD-10-CM

## 2020-10-19 PROCEDURE — 36415 COLL VENOUS BLD VENIPUNCTURE: CPT

## 2020-10-19 PROCEDURE — 80069 RENAL FUNCTION PANEL: CPT

## 2020-10-19 PROCEDURE — 85025 COMPLETE CBC W/AUTO DIFF WBC: CPT

## 2020-10-20 ENCOUNTER — TELEPHONE (OUTPATIENT)
Dept: NEPHROLOGY | Facility: CLINIC | Age: 65
End: 2020-10-20

## 2020-10-20 ENCOUNTER — NURSE ONLY (OUTPATIENT)
Dept: NEPHROLOGY | Facility: CLINIC | Age: 65
End: 2020-10-20
Payer: MEDICARE

## 2020-10-20 VITALS — SYSTOLIC BLOOD PRESSURE: 138 MMHG | DIASTOLIC BLOOD PRESSURE: 62 MMHG

## 2020-10-20 DIAGNOSIS — N18.30 ANEMIA OF CHRONIC RENAL FAILURE, STAGE 3 (MODERATE), UNSPECIFIED WHETHER STAGE 3A OR 3B CKD (HCC): Primary | ICD-10-CM

## 2020-10-20 DIAGNOSIS — D63.1 ANEMIA OF CHRONIC RENAL FAILURE, STAGE 3 (MODERATE), UNSPECIFIED WHETHER STAGE 3A OR 3B CKD (HCC): Primary | ICD-10-CM

## 2020-10-20 PROCEDURE — 96372 THER/PROPH/DIAG INJ SC/IM: CPT | Performed by: INTERNAL MEDICINE

## 2020-10-20 NOTE — TELEPHONE ENCOUNTER
Patient came in for Aranesp injection today. He did his lab work on Monday. He is going fishing in [place] this week and will be back on Thursday. He is hopeing that you can call him so he doesn't have to schedule an in office visit.  He also said he needs teresa

## 2020-10-20 NOTE — TELEPHONE ENCOUNTER
Schedule ct olvera friday See Routing comment. Dr. Niraj Rodriguez would like patient scheduled for a Video visit this Friday 10/23/2020.

## 2020-10-20 NOTE — TELEPHONE ENCOUNTER
Pt thought appt was today with Dr Priscila Mccormack- appt is tomorrow (Wed) - pt is going out of town- would like to know if Dr Priscila Mccormack can do phone/video visit soon- pt also st he needs refills- but needs written rx's - when can pt ?  Pl call pt-

## 2020-10-20 NOTE — PROGRESS NOTES
See comment. Patient was identified by full name and date of birth. Aranesp 150 mcg sc given in right upper arm per written order in the [facility]. Tolerated injection and left in good condition.

## 2020-10-21 NOTE — TELEPHONE ENCOUNTER
Please tell pt labs are stable   I will do video visit Friday   and then he can  rx whenever he wants at desk after visit

## 2020-10-23 ENCOUNTER — TELEMEDICINE (OUTPATIENT)
Dept: NEPHROLOGY | Facility: CLINIC | Age: 65
End: 2020-10-23
Payer: MEDICARE

## 2020-10-23 DIAGNOSIS — N18.4 CKD (CHRONIC KIDNEY DISEASE) STAGE 4, GFR 15-29 ML/MIN (HCC): Primary | ICD-10-CM

## 2020-10-23 DIAGNOSIS — E11.3293 TYPE 2 DIABETES MELLITUS WITH BOTH EYES AFFECTED BY MILD NONPROLIFERATIVE RETINOPATHY WITHOUT MACULAR EDEMA, WITH LONG-TERM CURRENT USE OF INSULIN (HCC): ICD-10-CM

## 2020-10-23 DIAGNOSIS — Z79.4 TYPE 2 DIABETES MELLITUS WITH BOTH EYES AFFECTED BY MILD NONPROLIFERATIVE RETINOPATHY WITHOUT MACULAR EDEMA, WITH LONG-TERM CURRENT USE OF INSULIN (HCC): ICD-10-CM

## 2020-10-23 PROCEDURE — 99443 PHONE E/M BY PHYS 21-30 MIN: CPT | Performed by: INTERNAL MEDICINE

## 2020-10-23 NOTE — PROGRESS NOTES
Virtual Telephone Check-In    Drew Sin verbally consents to  [employer] visit on 10/23/20. Patient has been referred to the [facility] website at [email or web address] to review the yearly Consent to Treat document.     Patient under

## 2020-10-26 RX ORDER — HYDROCODONE BITARTRATE AND ACETAMINOPHEN 10; 325 MG/1; MG/1
1 TABLET ORAL EVERY 6 HOURS PRN
Qty: 90 TABLET | Refills: 0 | Status: SHIPPED | OUTPATIENT
Start: 2020-11-25 | End: 2021-01-04

## 2020-10-26 RX ORDER — HYDROCODONE BITARTRATE AND ACETAMINOPHEN 10; 325 MG/1; MG/1
1 TABLET ORAL EVERY 6 HOURS PRN
Qty: 90 TABLET | Refills: 0 | Status: SHIPPED | OUTPATIENT
Start: 2020-12-24 | End: 2021-04-05

## 2020-10-26 RX ORDER — HYDROCODONE BITARTRATE AND ACETAMINOPHEN 10; 325 MG/1; MG/1
1 TABLET ORAL EVERY 8 HOURS PRN
Qty: 90 TABLET | Refills: 0 | Status: SHIPPED | OUTPATIENT
Start: 2020-10-26 | End: 2021-04-05

## 2020-11-13 ENCOUNTER — LAB ENCOUNTER (OUTPATIENT)
Dept: LAB | Age: 65
End: 2020-11-13
Attending: INTERNAL MEDICINE
Payer: MEDICARE

## 2020-11-13 DIAGNOSIS — N18.4 CKD (CHRONIC KIDNEY DISEASE) STAGE 4, GFR 15-29 ML/MIN (HCC): ICD-10-CM

## 2020-11-13 DIAGNOSIS — E11.3293 TYPE 2 DIABETES MELLITUS WITH BOTH EYES AFFECTED BY MILD NONPROLIFERATIVE RETINOPATHY WITHOUT MACULAR EDEMA, WITH LONG-TERM CURRENT USE OF INSULIN (HCC): ICD-10-CM

## 2020-11-13 DIAGNOSIS — Z79.4 TYPE 2 DIABETES MELLITUS WITH BOTH EYES AFFECTED BY MILD NONPROLIFERATIVE RETINOPATHY WITHOUT MACULAR EDEMA, WITH LONG-TERM CURRENT USE OF INSULIN (HCC): ICD-10-CM

## 2020-11-13 PROCEDURE — 85025 COMPLETE CBC W/AUTO DIFF WBC: CPT

## 2020-11-13 PROCEDURE — 36415 COLL VENOUS BLD VENIPUNCTURE: CPT

## 2020-11-13 PROCEDURE — 83036 HEMOGLOBIN GLYCOSYLATED A1C: CPT

## 2020-11-13 PROCEDURE — 80069 RENAL FUNCTION PANEL: CPT

## 2020-11-17 ENCOUNTER — NURSE ONLY (OUTPATIENT)
Dept: NEPHROLOGY | Facility: CLINIC | Age: 65
End: 2020-11-17
Payer: MEDICARE

## 2020-11-17 VITALS — SYSTOLIC BLOOD PRESSURE: 120 MMHG | DIASTOLIC BLOOD PRESSURE: 69 MMHG

## 2020-11-17 DIAGNOSIS — N18.30 ANEMIA OF CHRONIC RENAL FAILURE, STAGE 3 (MODERATE), UNSPECIFIED WHETHER STAGE 3A OR 3B CKD (HCC): Primary | ICD-10-CM

## 2020-11-17 DIAGNOSIS — D63.1 ANEMIA OF CHRONIC RENAL FAILURE, STAGE 3 (MODERATE), UNSPECIFIED WHETHER STAGE 3A OR 3B CKD (HCC): Primary | ICD-10-CM

## 2020-11-17 PROCEDURE — 96372 THER/PROPH/DIAG INJ SC/IM: CPT | Performed by: INTERNAL MEDICINE

## 2020-11-17 NOTE — PROGRESS NOTES
See comment. Patient was identified by full name and date of birth. Aranesp 150 mcg sc given in left upper arm per written order. Tolerated injection and released in good condition.

## 2020-11-18 ENCOUNTER — TELEPHONE (OUTPATIENT)
Dept: NEPHROLOGY | Facility: CLINIC | Age: 65
End: 2020-11-18

## 2020-11-18 NOTE — TELEPHONE ENCOUNTER
Spoke to patient and relayed Dr. Dylan Cardona message as shown below. Patient verbalized understanding and had no further questions at this time.

## 2020-11-27 DIAGNOSIS — E11.65 UNCONTROLLED TYPE 2 DIABETES MELLITUS WITH HYPERGLYCEMIA (HCC): ICD-10-CM

## 2020-11-27 RX ORDER — PREGABALIN 300 MG/1
CAPSULE ORAL
Qty: 90 CAPSULE | Refills: 0 | Status: SHIPPED | OUTPATIENT
Start: 2020-11-27 | End: 2020-12-02

## 2020-11-27 RX ORDER — FENOFIBRATE 48 MG/1
TABLET, COATED ORAL
Qty: 90 TABLET | Refills: 0 | Status: SHIPPED | OUTPATIENT
Start: 2020-11-27 | End: 2021-02-10

## 2020-12-01 NOTE — TELEPHONE ENCOUNTER
Wife states that the [facility] in [place] has not received Rx for Pregabalin med. from our office on 11/27/2020.

## 2020-12-02 ENCOUNTER — OFFICE VISIT (OUTPATIENT)
Dept: INTERNAL MEDICINE CLINIC | Facility: CLINIC | Age: 65
End: 2020-12-02
Payer: MEDICARE

## 2020-12-02 VITALS
BODY MASS INDEX: 33.77 KG/M2 | DIASTOLIC BLOOD PRESSURE: 65 MMHG | WEIGHT: 228 LBS | SYSTOLIC BLOOD PRESSURE: 124 MMHG | HEART RATE: 79 BPM | HEIGHT: 69 IN | TEMPERATURE: 98 F

## 2020-12-02 DIAGNOSIS — N18.32 STAGE 3B CHRONIC KIDNEY DISEASE (HCC): ICD-10-CM

## 2020-12-02 DIAGNOSIS — E78.5 HYPERLIPIDEMIA, UNSPECIFIED HYPERLIPIDEMIA TYPE: ICD-10-CM

## 2020-12-02 DIAGNOSIS — E11.22 TYPE 2 DIABETES MELLITUS WITH STAGE 3B CHRONIC KIDNEY DISEASE, WITH LONG-TERM CURRENT USE OF INSULIN (HCC): ICD-10-CM

## 2020-12-02 DIAGNOSIS — Z00.00 ENCOUNTER FOR MEDICARE ANNUAL WELLNESS EXAM: Primary | ICD-10-CM

## 2020-12-02 DIAGNOSIS — Z79.4 TYPE 2 DIABETES MELLITUS WITH STAGE 3B CHRONIC KIDNEY DISEASE, WITH LONG-TERM CURRENT USE OF INSULIN (HCC): ICD-10-CM

## 2020-12-02 DIAGNOSIS — Z12.5 PROSTATE CANCER SCREENING: ICD-10-CM

## 2020-12-02 DIAGNOSIS — Z12.11 COLON CANCER SCREENING: ICD-10-CM

## 2020-12-02 DIAGNOSIS — R80.9 PROTEINURIA, UNSPECIFIED TYPE: ICD-10-CM

## 2020-12-02 DIAGNOSIS — N18.32 TYPE 2 DIABETES MELLITUS WITH STAGE 3B CHRONIC KIDNEY DISEASE, WITH LONG-TERM CURRENT USE OF INSULIN (HCC): ICD-10-CM

## 2020-12-02 PROBLEM — G89.29 CHRONIC PAIN OF BOTH SHOULDERS: Status: RESOLVED | Noted: 2018-03-15 | Resolved: 2020-12-02

## 2020-12-02 PROBLEM — M25.512 CHRONIC PAIN OF BOTH SHOULDERS: Status: RESOLVED | Noted: 2018-03-15 | Resolved: 2020-12-02

## 2020-12-02 PROBLEM — M50.90 CERVICAL DISC DISEASE: Status: RESOLVED | Noted: 2018-03-15 | Resolved: 2020-12-02

## 2020-12-02 PROBLEM — M25.511 CHRONIC PAIN OF BOTH SHOULDERS: Status: RESOLVED | Noted: 2018-03-15 | Resolved: 2020-12-02

## 2020-12-02 PROBLEM — G56.22 ULNAR NEUROPATHY AT ELBOW OF LEFT UPPER EXTREMITY: Status: RESOLVED | Noted: 2018-03-15 | Resolved: 2020-12-02

## 2020-12-02 PROBLEM — M54.2 NECK PAIN: Status: RESOLVED | Noted: 2018-03-15 | Resolved: 2020-12-02

## 2020-12-02 PROBLEM — G56.03 BILATERAL CARPAL TUNNEL SYNDROME: Status: RESOLVED | Noted: 2018-03-06 | Resolved: 2020-12-02

## 2020-12-02 PROBLEM — M48.02 SPINAL STENOSIS OF CERVICAL REGION: Status: RESOLVED | Noted: 2018-02-01 | Resolved: 2020-12-02

## 2020-12-02 PROBLEM — M54.12 CERVICAL RADICULOPATHY: Status: RESOLVED | Noted: 2018-03-15 | Resolved: 2020-12-02

## 2020-12-02 PROBLEM — M19.011 PRIMARY OSTEOARTHRITIS OF BOTH SHOULDERS: Status: RESOLVED | Noted: 2018-02-01 | Resolved: 2020-12-02

## 2020-12-02 PROBLEM — Z96.1 PSEUDOPHAKIA OF BOTH EYES: Status: RESOLVED | Noted: 2019-11-07 | Resolved: 2020-12-02

## 2020-12-02 PROBLEM — H35.00 BACKGROUND RETINOPATHY: Status: RESOLVED | Noted: 2018-05-22 | Resolved: 2020-12-02

## 2020-12-02 PROBLEM — M19.012 PRIMARY OSTEOARTHRITIS OF BOTH SHOULDERS: Status: RESOLVED | Noted: 2018-02-01 | Resolved: 2020-12-02

## 2020-12-02 PROCEDURE — G0439 PPPS, SUBSEQ VISIT: HCPCS | Performed by: INTERNAL MEDICINE

## 2020-12-02 RX ORDER — PREGABALIN 300 MG/1
CAPSULE ORAL
Qty: 90 CAPSULE | Refills: 3 | Status: SHIPPED | OUTPATIENT
Start: 2020-12-02 | End: 2020-12-03

## 2020-12-02 NOTE — PROGRESS NOTES
HPI:   Waylon Addison is a 77year old male who presents for a Medicare Subsequent Annual Wellness visit (Pt already had Initial Annual Wellness).       His last annual assessment has been over 1 year: Annual Physical due on 08/07/1958         Fall/Risk Alcohol abuse and had a score of 0 so is at low risk.      Patient Care Team: Patient Care Team:  Yovana Burton MD as PCP - General (Internal Medicine)  Jessica Peralta MD [facility]CP - [facility]  Bria Lee as Consulting Physician (Masha Gruber)  [address] [place] [id number], [facility] by mouth once daily    •  insulin glargine (LANTUS) 100 UNIT/ML Subcutaneous Solution, INJECT 30 UNITS SUBCUTANEOUSLY NIGHTLY    •  PANTOPRAZOLE SODIUM 40 MG Oral Tab EC, TAKE 1 TABLET BY MOUTH ONCE DAILY IN THE MORNING BEFORE BREAKFAST    •  HYDROcodone-a Take 1 tablet by mouth daily.       •  Darbepoetin Jaya (ARANESP) 150 MCG/0.3ML injection SOSY 150 mcg       MEDICAL INFORMATION:   He  has a past medical history of C2-3 mild central, C3-4 mild-mod diffuse, C4-5 mild diffuse, C5-6 mod diffuse bulging discs Negative for dysuria, frequency, hematuria and difficulty urinating. Musculoskeletal: Negative for joint swelling and joint pain. Skin: Negative for rash and wound. Neurological: Positive for numbness (neuropathy).  Negative for dizziness, syncope, we Visual Acuity: Corrected Right Eye Chart Acuity: 20/20   Left Eye Visual Acuity: Corrected Left Eye Chart Acuity: 20/20   Both Eyes Visual Acuity: Corrected Both Eyes Chart Acuity: 20/20   Able To Tolerate Visual Acuity: Yes(per pt glasses only for reading Vaccine, Preserv Free 11/07/2007   • Influenza Virus Vaccine, H1N1 01/28/2010   • Pneumococcal (Prevnar 13) 11/18/2015   • Pneumococcal (Prevnar 7) 09/28/2011   • Pneumovax 23 10/24/2006, 09/28/2011, 09/26/2018        ASSESSMENT AND OTHER RELEVANT CHRONIC PLAN:  The patient indicates understanding of these issues and agrees to the plan. Reinforced healthy diet, lifestyle, and exercise. No follow-ups on file.      Yesenia Easton MD, 12/2/2020     General Health     In the past six months, have you lo Annually 09/04/2020 Please get every year    Pneumococcal 13 (Prevnar)  Covered Once after 65 11/18/2015 Please get once after your 65th birthday    Pneumococcal 23 (Pneumovax)  Covered Once after 65 09/26/2018 Please get once after your 65th birthday    H No flowsheet data found.

## 2020-12-02 NOTE — PATIENT INSTRUCTIONS
Ardie Angelucci Besinger's SCREENING SCHEDULE   Tests on this list are recommended by your physician but may not be covered, or covered at this frequency, by your insurer. Please check with your insurance carrier before scheduling to verify coverage.     PREVEN Covered up to Age 76     Colonoscopy Screen   Covered every 10 years- more often if abnormal Colonoscopy due on 08/07/2003 Update Health Maintenance if applicable    Flex Sigmoidoscopy Screen  Covered every 5 years No results found for this or any previous disease   Hemophiliacs who received Factor VIII or IX concentrates   Clients of institutions for the mentally retarded   Persons who live in the same house as a HepB virus carrier   Homosexual men   Illicit injectable drug abusers     Tetanus Toxoid- Only

## 2020-12-02 NOTE — TELEPHONE ENCOUNTER
Wife is also requesting if someone can contact her once the prescription is sent to the pharmacy.  Please follow up

## 2020-12-03 DIAGNOSIS — E11.65 UNCONTROLLED TYPE 2 DIABETES MELLITUS WITH HYPERGLYCEMIA (HCC): ICD-10-CM

## 2020-12-03 RX ORDER — PREGABALIN 300 MG/1
CAPSULE ORAL
Qty: 90 CAPSULE | Refills: 0 | Status: SHIPPED | OUTPATIENT
Start: 2020-12-03 | End: 2021-08-02

## 2020-12-03 NOTE — TELEPHONE ENCOUNTER
Noted refill has been faxed to [id number] and confirmation received. Called and notified the patient's wife. Dr. Elizabeth Wilson may refuse duplicate refill request. Thanks.

## 2020-12-03 NOTE — TELEPHONE ENCOUNTER
Prescription was faxed to [facility] last evening but fax failed. Refaxed again today. Will call Pharmacy if it fails again.

## 2020-12-03 NOTE — TELEPHONE ENCOUNTER
Also see TE from 12/1/ 20 - regarding Pregabalin rx - Vanesa checking status on refill request.  Please call - anxious to get a call back. Thank you.

## 2020-12-03 NOTE — TELEPHONE ENCOUNTER
Dr. Governor Mcclure please see refill request from 12/1/20. Vanesa checking on status of pregabalin refill. She states she has been requesting since Friday. He is out today. She does not want to drive in the dark to the office ot  Rx. Can it be called in?  Herbie Retana

## 2020-12-07 RX ORDER — INSULIN ASPART 100 [IU]/ML
INJECTION, SOLUTION INTRAVENOUS; SUBCUTANEOUS
Qty: 20 ML | Refills: 0 | Status: SHIPPED | OUTPATIENT
Start: 2020-12-07 | End: 2021-04-01

## 2020-12-12 ENCOUNTER — LAB ENCOUNTER (OUTPATIENT)
Dept: LAB | Age: 65
End: 2020-12-12
Attending: INTERNAL MEDICINE
Payer: MEDICARE

## 2020-12-12 DIAGNOSIS — N18.30 CHRONIC KIDNEY DISEASE, STAGE III (MODERATE) (HCC): ICD-10-CM

## 2020-12-12 DIAGNOSIS — E78.5 HYPERLIPIDEMIA, UNSPECIFIED HYPERLIPIDEMIA TYPE: ICD-10-CM

## 2020-12-12 PROCEDURE — 84450 TRANSFERASE (AST) (SGOT): CPT

## 2020-12-12 PROCEDURE — 80061 LIPID PANEL: CPT

## 2020-12-12 PROCEDURE — 84460 ALANINE AMINO (ALT) (SGPT): CPT

## 2020-12-12 PROCEDURE — 85025 COMPLETE CBC W/AUTO DIFF WBC: CPT

## 2020-12-12 PROCEDURE — 36415 COLL VENOUS BLD VENIPUNCTURE: CPT

## 2020-12-12 PROCEDURE — 84443 ASSAY THYROID STIM HORMONE: CPT

## 2020-12-12 PROCEDURE — 80069 RENAL FUNCTION PANEL: CPT

## 2020-12-13 PROBLEM — N18.6 ESRD (END STAGE RENAL DISEASE) (HCC): Status: ACTIVE | Noted: 2020-12-13

## 2020-12-13 PROBLEM — M47.22 OSTEOARTHRITIS OF SPINE WITH RADICULOPATHY, CERVICAL REGION: Status: RESOLVED | Noted: 2018-02-01 | Resolved: 2020-12-13

## 2020-12-13 PROBLEM — M79.642 LEFT HAND PAIN: Status: RESOLVED | Noted: 2018-03-15 | Resolved: 2020-12-13

## 2020-12-13 PROBLEM — M50.20 CERVICAL HERNIATED DISC: Status: RESOLVED | Noted: 2018-03-15 | Resolved: 2020-12-13

## 2020-12-13 PROBLEM — L40.8 SEBOPSORIASIS: Status: RESOLVED | Noted: 2019-07-23 | Resolved: 2020-12-13

## 2020-12-14 ENCOUNTER — TELEPHONE (OUTPATIENT)
Dept: NEPHROLOGY | Facility: CLINIC | Age: 65
End: 2020-12-14

## 2020-12-14 NOTE — TELEPHONE ENCOUNTER
Patient contacted. Results message relayed.  Patient already has an appointment with Dr. Rome Leach scheduled for 1/4/2021 at 1:00pm.

## 2020-12-15 ENCOUNTER — NURSE ONLY (OUTPATIENT)
Dept: NEPHROLOGY | Facility: CLINIC | Age: 65
End: 2020-12-15
Payer: MEDICARE

## 2020-12-15 VITALS — DIASTOLIC BLOOD PRESSURE: 60 MMHG | SYSTOLIC BLOOD PRESSURE: 106 MMHG

## 2020-12-15 DIAGNOSIS — N18.30 STAGE 3 CHRONIC KIDNEY DISEASE, UNSPECIFIED WHETHER STAGE 3A OR 3B CKD (HCC): Primary | ICD-10-CM

## 2020-12-15 PROCEDURE — 96372 THER/PROPH/DIAG INJ SC/IM: CPT | Performed by: INTERNAL MEDICINE

## 2020-12-15 NOTE — PROGRESS NOTES
See comment---Patient was identified by full name and date of birth. Aranesp 150 mcg sc given in left upper arm per written order. Tolerated injection without problem and released in good condition.

## 2020-12-28 DIAGNOSIS — E11.65 UNCONTROLLED TYPE 2 DIABETES MELLITUS WITH HYPERGLYCEMIA (HCC): ICD-10-CM

## 2020-12-28 RX ORDER — SYRINGE AND NEEDLE,INSULIN,1ML 31GX15/64"
1 SYRINGE, EMPTY DISPOSABLE MISCELLANEOUS 4 TIMES DAILY
Qty: 400 EACH | Refills: 0 | Status: SHIPPED | OUTPATIENT
Start: 2020-12-28

## 2020-12-28 RX ORDER — INSULIN GLARGINE 100 [IU]/ML
INJECTION, SOLUTION SUBCUTANEOUS
Qty: 30 ML | Refills: 0 | Status: SHIPPED | OUTPATIENT
Start: 2020-12-28 | End: 2021-03-25

## 2020-12-28 NOTE — TELEPHONE ENCOUNTER
Called patient to confirm which insulin he needed a refill on. Patient asked to have wife confirm as he did not know. Patient's wife confirmed needs a refill on lantus. LOV 8/5/20. FU 2/10/21. Pended refills for review.      Patient's wife is requesting

## 2020-12-28 NOTE — TELEPHONE ENCOUNTER
pts wife is calling to f/up on getting Lantus insulin and syringes sent to [facility] in [place]. Pts wife would like to get a call back to confirm that the refills have been sent.          Current Outpatient Medications   Medication Sig Dispense Refill

## 2021-01-02 ENCOUNTER — LAB ENCOUNTER (OUTPATIENT)
Dept: LAB | Age: 66
End: 2021-01-02
Attending: INTERNAL MEDICINE
Payer: MEDICARE

## 2021-01-02 DIAGNOSIS — N18.30 CHRONIC KIDNEY DISEASE, STAGE III (MODERATE) (HCC): ICD-10-CM

## 2021-01-02 DIAGNOSIS — Z12.5 PROSTATE CANCER SCREENING: ICD-10-CM

## 2021-01-02 LAB
ALBUMIN SERPL-MCNC: 3.5 G/DL (ref 3.4–5)
ANION GAP SERPL CALC-SCNC: 4 MMOL/L (ref 0–18)
BASOPHILS # BLD AUTO: 0.02 X10(3) UL (ref 0–0.2)
BASOPHILS NFR BLD AUTO: 0.7 %
BUN BLD-MCNC: 55 MG/DL (ref 7–18)
BUN/CREAT SERPL: 19.5 (ref 10–20)
CALCIUM BLD-MCNC: 8.9 MG/DL (ref 8.5–10.1)
CHLORIDE SERPL-SCNC: 116 MMOL/L (ref 98–112)
CO2 SERPL-SCNC: 25 MMOL/L (ref 21–32)
COMPLEXED PSA SERPL-MCNC: 0.27 NG/ML (ref ?–4)
CREAT BLD-MCNC: 2.82 MG/DL
DEPRECATED RDW RBC AUTO: 55.8 FL (ref 35.1–46.3)
EOSINOPHIL # BLD AUTO: 0.09 X10(3) UL (ref 0–0.7)
EOSINOPHIL NFR BLD AUTO: 3.1 %
ERYTHROCYTE [DISTWIDTH] IN BLOOD BY AUTOMATED COUNT: 14.6 % (ref 11–15)
GLUCOSE BLD-MCNC: 202 MG/DL (ref 70–99)
HCT VFR BLD AUTO: 32.4 %
HGB BLD-MCNC: 10.6 G/DL
IMM GRANULOCYTES # BLD AUTO: 0 X10(3) UL (ref 0–1)
IMM GRANULOCYTES NFR BLD: 0 %
LYMPHOCYTES # BLD AUTO: 1 X10(3) UL (ref 1–4)
LYMPHOCYTES NFR BLD AUTO: 34.8 %
MCH RBC QN AUTO: 34.1 PG (ref 26–34)
MCHC RBC AUTO-ENTMCNC: 32.7 G/DL (ref 31–37)
MCV RBC AUTO: 104.2 FL
MONOCYTES # BLD AUTO: 0.26 X10(3) UL (ref 0.1–1)
MONOCYTES NFR BLD AUTO: 9.1 %
NEUTROPHILS # BLD AUTO: 1.5 X10 (3) UL (ref 1.5–7.7)
NEUTROPHILS # BLD AUTO: 1.5 X10(3) UL (ref 1.5–7.7)
NEUTROPHILS NFR BLD AUTO: 52.3 %
OSMOLALITY SERPL CALC.SUM OF ELEC: 321 MOSM/KG (ref 275–295)
PHOSPHATE SERPL-MCNC: 3.1 MG/DL (ref 2.5–4.9)
PLATELET # BLD AUTO: 96 10(3)UL (ref 150–450)
POTASSIUM SERPL-SCNC: 4.8 MMOL/L (ref 3.5–5.1)
RBC # BLD AUTO: 3.11 X10(6)UL
SODIUM SERPL-SCNC: 145 MMOL/L (ref 136–145)
WBC # BLD AUTO: 2.9 X10(3) UL (ref 4–11)

## 2021-01-02 PROCEDURE — 85025 COMPLETE CBC W/AUTO DIFF WBC: CPT

## 2021-01-02 PROCEDURE — 36415 COLL VENOUS BLD VENIPUNCTURE: CPT

## 2021-01-02 PROCEDURE — 80069 RENAL FUNCTION PANEL: CPT

## 2021-01-04 ENCOUNTER — OFFICE VISIT (OUTPATIENT)
Dept: NEPHROLOGY | Facility: CLINIC | Age: 66
End: 2021-01-04
Payer: MEDICARE

## 2021-01-04 VITALS
TEMPERATURE: 98 F | DIASTOLIC BLOOD PRESSURE: 77 MMHG | BODY MASS INDEX: 34.07 KG/M2 | HEART RATE: 67 BPM | WEIGHT: 230 LBS | SYSTOLIC BLOOD PRESSURE: 126 MMHG | HEIGHT: 69 IN

## 2021-01-04 DIAGNOSIS — N18.6 ESRD (END STAGE RENAL DISEASE) (HCC): ICD-10-CM

## 2021-01-04 DIAGNOSIS — E11.65 UNCONTROLLED TYPE 2 DIABETES MELLITUS WITH HYPERGLYCEMIA (HCC): ICD-10-CM

## 2021-01-04 DIAGNOSIS — N18.32 STAGE 3B CHRONIC KIDNEY DISEASE (HCC): Primary | ICD-10-CM

## 2021-01-04 PROCEDURE — 99214 OFFICE O/P EST MOD 30 MIN: CPT | Performed by: INTERNAL MEDICINE

## 2021-01-04 RX ORDER — BLOOD-GLUCOSE METER
KIT MISCELLANEOUS
Qty: 500 STRIP | Refills: 0 | Status: SHIPPED | OUTPATIENT
Start: 2021-01-04 | End: 2021-01-22

## 2021-01-04 RX ORDER — NALOXONE HYDROCHLORIDE 4 MG/.1ML
4 SPRAY, METERED NASAL AS NEEDED
Qty: 1 KIT | Refills: 0 | Status: SHIPPED | OUTPATIENT
Start: 2021-01-04

## 2021-01-04 RX ORDER — HYDROCODONE BITARTRATE AND ACETAMINOPHEN 10; 325 MG/1; MG/1
1 TABLET ORAL EVERY 8 HOURS PRN
Qty: 90 TABLET | Refills: 0 | Status: SHIPPED | OUTPATIENT
Start: 2021-01-08 | End: 2021-01-04

## 2021-01-04 RX ORDER — HYDROCODONE BITARTRATE AND ACETAMINOPHEN 10; 325 MG/1; MG/1
1 TABLET ORAL EVERY 6 HOURS PRN
Qty: 90 TABLET | Refills: 0 | Status: SHIPPED | OUTPATIENT
Start: 2021-03-08 | End: 2021-06-28

## 2021-01-04 RX ORDER — HYDROCODONE BITARTRATE AND ACETAMINOPHEN 10; 325 MG/1; MG/1
1 TABLET ORAL EVERY 8 HOURS PRN
Qty: 90 TABLET | Refills: 0 | Status: SHIPPED | OUTPATIENT
Start: 2021-02-08 | End: 2021-04-05

## 2021-01-04 NOTE — PROGRESS NOTES
Progress Note     Jeannie Barber    Is here for an office visit he has CKD 4 anemia he is on Aranesp every month hypertension although has been well controlled lately and chronic pain he takes Lyrica for neuropathy sees Dr. Melania Akins for his diabetes a 2018    Performed by Bettie Mcgregor MD at [address] [place] [address] History    Tobacco Use      Smoking status: Former Smoker        Packs/day: 0.00        Quit date: 1989        Years since quittin.1      Smokeless tobacco: For • PANTOPRAZOLE SODIUM 40 MG Oral Tab EC TAKE 1 TABLET BY MOUTH ONCE DAILY IN THE MORNING BEFORE BREAKFAST 90 tablet 1   • RELION INSULIN SYRINGE 31G X 15/64\" 0.5 ML Does not apply Misc USE 1 SYRINGE 4 TIMES DAILY 400 each 0   • Blood Glucose Monitoring drainage, hearing loss and nasal drainage  Eyes:  Negative for eye discharge and vision loss  Cardiovascular:  Negative for chest pain, sob  Respiratory:  Negative for cough, dyspnea and wheezing  Gastrointestinal:  Negative for abdominal pain, constipatio Visit:  No orders of the defined types were placed in this encounter.       Meds This Visit:  Requested Prescriptions     Signed Prescriptions Disp Refills   • HYDROcodone-acetaminophen  MG Oral Tab 90 tablet 0     Sig: Take 1 tablet by mouth every 6

## 2021-01-04 NOTE — PATIENT INSTRUCTIONS
Please stay on Aranesp    Labs are completely stable good job Katharina Almodovar    I gave you 3 months of prescriptions see me again in 3 months please call me before visit for lab orders    Give my regards to Catalina

## 2021-01-06 DIAGNOSIS — E11.65 UNCONTROLLED TYPE 2 DIABETES MELLITUS WITH HYPERGLYCEMIA (HCC): ICD-10-CM

## 2021-01-06 RX ORDER — ATORVASTATIN CALCIUM 40 MG/1
TABLET, FILM COATED ORAL
Qty: 90 TABLET | Refills: 0 | Status: SHIPPED | OUTPATIENT
Start: 2021-01-06 | End: 2021-04-08

## 2021-01-08 ENCOUNTER — TELEPHONE (OUTPATIENT)
Dept: ENDOCRINOLOGY CLINIC | Facility: CLINIC | Age: 66
End: 2021-01-08

## 2021-01-08 NOTE — TELEPHONE ENCOUNTER
Received fax from [facility] requesting additional information to refill Freestyle Lite Test strips. Completed and faxed back chart notes from last 6 months.

## 2021-01-12 ENCOUNTER — NURSE ONLY (OUTPATIENT)
Dept: NEPHROLOGY | Facility: CLINIC | Age: 66
End: 2021-01-12
Payer: MEDICARE

## 2021-01-12 ENCOUNTER — TELEPHONE (OUTPATIENT)
Dept: ENDOCRINOLOGY CLINIC | Facility: CLINIC | Age: 66
End: 2021-01-12

## 2021-01-12 VITALS — SYSTOLIC BLOOD PRESSURE: 118 MMHG | DIASTOLIC BLOOD PRESSURE: 60 MMHG

## 2021-01-12 DIAGNOSIS — E11.65 UNCONTROLLED TYPE 2 DIABETES MELLITUS WITH HYPERGLYCEMIA (HCC): ICD-10-CM

## 2021-01-12 DIAGNOSIS — D63.1 ANEMIA OF CHRONIC RENAL FAILURE, STAGE 3 (MODERATE), UNSPECIFIED WHETHER STAGE 3A OR 3B CKD (HCC): Primary | ICD-10-CM

## 2021-01-12 DIAGNOSIS — N18.30 ANEMIA OF CHRONIC RENAL FAILURE, STAGE 3 (MODERATE), UNSPECIFIED WHETHER STAGE 3A OR 3B CKD (HCC): Primary | ICD-10-CM

## 2021-01-12 PROCEDURE — 96372 THER/PROPH/DIAG INJ SC/IM: CPT | Performed by: INTERNAL MEDICINE

## 2021-01-12 NOTE — TELEPHONE ENCOUNTER
Wife called in to get the refill she states the pharmacy has been trying to reach out to the office to refill FREESTYLE LITE TEST In Vitro Strip. Patient is running low.  Please follow up

## 2021-01-12 NOTE — PROGRESS NOTES
See comment. Patient was identified by full name and date of birth. Aranesp 150 mcg sub c given in left upper arm per written order. Tolerated injection and was released in good condition.

## 2021-01-12 NOTE — TELEPHONE ENCOUNTER
Per Jim Riley, pharmacist, they need additional documentation in order to dispense strips because he exceeds Medicare guidelines (300 per 3 months if insulin treated).  Wife was advised that last office note was sent on 01/08/20     Dr. Laure Brink -- per wife, Eduardo Menezes

## 2021-01-13 NOTE — TELEPHONE ENCOUNTER
Note has been updated. Unfortunately we have had trouble getting more strips in the past.  The patient could consider the kelly CGM to continue checking 5 times per day. Thanks.

## 2021-01-13 NOTE — TELEPHONE ENCOUNTER
Faxed LOV note to [facility]) at [phone] [facility] [place] [facility] [phone]. Called patient and spoke to wife.   They are not interested in CGM because of the cost.  States other medications are already costing them too much

## 2021-01-18 NOTE — TELEPHONE ENCOUNTER
Wife called in stating to please send to correct fax # [phone]  # [phone] ext [phone].  Please follow up

## 2021-01-18 NOTE — TELEPHONE ENCOUNTER
Spoke to Wero at ([phone], ext [phone]) [facility] to clarify they received LOV - he stated they have info from us and it needs to be validated with Medicare. Wero stated:  1. they will let us know if they more info is needed  2.  Patient will be not

## 2021-01-20 NOTE — TELEPHONE ENCOUNTER
Called Blaise at number listed below for update. He states may take several more business days but he will try to get supervisor assistance to see if approval can be obtained today.

## 2021-01-22 RX ORDER — BLOOD-GLUCOSE METER
KIT MISCELLANEOUS
Qty: 500 STRIP | Refills: 0 | Status: SHIPPED | OUTPATIENT
Start: 2021-01-22 | End: 2021-05-26

## 2021-01-22 NOTE — TELEPHONE ENCOUNTER
Wife called in stating that the 5 strips are not being approved and she is requesting for Dr. Karlie Bello to change to 4 strips. She states patient needs this today.  Please follow up

## 2021-01-22 NOTE — TELEPHONE ENCOUNTER
Spoke with patient's wife and she says they aren't getting approval for 5x daily. They are out of strips and she is requesting Rx to ay 4x daily. Rx sent per protocol.

## 2021-01-27 ENCOUNTER — TELEPHONE (OUTPATIENT)
Dept: INTERNAL MEDICINE CLINIC | Facility: CLINIC | Age: 66
End: 2021-01-27

## 2021-01-29 DIAGNOSIS — Z23 NEED FOR VACCINATION: ICD-10-CM

## 2021-02-04 ENCOUNTER — TELEPHONE (OUTPATIENT)
Dept: NEPHROLOGY | Facility: CLINIC | Age: 66
End: 2021-02-04

## 2021-02-04 DIAGNOSIS — D63.1 ANEMIA OF CHRONIC RENAL FAILURE, STAGE 3 (MODERATE), UNSPECIFIED WHETHER STAGE 3A OR 3B CKD (HCC): Primary | ICD-10-CM

## 2021-02-04 DIAGNOSIS — N18.30 ANEMIA OF CHRONIC RENAL FAILURE, STAGE 3 (MODERATE), UNSPECIFIED WHETHER STAGE 3A OR 3B CKD (HCC): Primary | ICD-10-CM

## 2021-02-04 NOTE — TELEPHONE ENCOUNTER
Patient scheduled for aranesp injection 2/9/21. Currently prescribed 150 mcg every 30 days. Do you recommend any lab work prior to this injection?

## 2021-02-04 NOTE — TELEPHONE ENCOUNTER
Pt's wife states that she is wondering if Pt needs a lab done and or fast before his inj he is getting 2-9.  Please advise

## 2021-02-04 NOTE — TELEPHONE ENCOUNTER
I called the patient's wife and informed her no need to fast and no labs prior to this upcoming injection. Advised to do CBC in 4 weeks. Order placed.

## 2021-02-06 ENCOUNTER — HOSPITAL ENCOUNTER (OUTPATIENT)
Age: 66
Discharge: HOME | End: 2021-02-06
Payer: MEDICARE

## 2021-02-06 ENCOUNTER — NURSE TRIAGE (OUTPATIENT)
Dept: INTERNAL MEDICINE CLINIC | Facility: CLINIC | Age: 66
End: 2021-02-06

## 2021-02-06 ENCOUNTER — APPOINTMENT (OUTPATIENT)
Dept: GENERAL RADIOLOGY | Age: 66
End: 2021-02-06
Attending: NURSE PRACTITIONER
Payer: MEDICARE

## 2021-02-06 VITALS
BODY MASS INDEX: 35.25 KG/M2 | HEART RATE: 81 BPM | SYSTOLIC BLOOD PRESSURE: 150 MMHG | RESPIRATION RATE: 18 BRPM | DIASTOLIC BLOOD PRESSURE: 76 MMHG | TEMPERATURE: 98 F | OXYGEN SATURATION: 99 % | HEIGHT: 69 IN | WEIGHT: 238 LBS

## 2021-02-06 DIAGNOSIS — M54.9 MILD BACK PAIN: Primary | ICD-10-CM

## 2021-02-06 PROCEDURE — 72100 X-RAY EXAM L-S SPINE 2/3 VWS: CPT | Performed by: NURSE PRACTITIONER

## 2021-02-06 PROCEDURE — 99213 OFFICE O/P EST LOW 20 MIN: CPT | Performed by: NURSE PRACTITIONER

## 2021-02-06 RX ORDER — DEXAMETHASONE 4 MG/1
10 TABLET ORAL ONCE
Status: DISCONTINUED | OUTPATIENT
Start: 2021-02-06 | End: 2021-02-06

## 2021-02-06 RX ORDER — CYCLOBENZAPRINE HCL 10 MG
10 TABLET ORAL ONCE
Status: COMPLETED | OUTPATIENT
Start: 2021-02-06 | End: 2021-02-06

## 2021-02-06 RX ORDER — DEXAMETHASONE 4 MG/1
4 TABLET ORAL ONCE
Status: DISCONTINUED | OUTPATIENT
Start: 2021-02-06 | End: 2021-02-06

## 2021-02-06 RX ORDER — DIAZEPAM 2 MG/1
2 TABLET ORAL 3 TIMES DAILY PRN
Qty: 20 TABLET | Refills: 0 | Status: SHIPPED | OUTPATIENT
Start: 2021-02-06 | End: 2021-02-13

## 2021-02-06 NOTE — ED NOTES
Unable to void. Discharge inst and follow up inst given and reviewed s/e discussed.  Follow up with pcp in office go to the ed for new or worse concerns

## 2021-02-06 NOTE — TELEPHONE ENCOUNTER
LELIA Medina. Wife called and she stated that Pt was in a sitting position and wanted to go to a standing position when he felt a sharp pain on just above his tailbone between his right hip and the pain radiates to his leg. This has been going on for a c

## 2021-02-06 NOTE — ED NOTES
Plan of care reviewed. DM po decadron held flexeril given. Unable to void po water given. Ambulatory to xray.

## 2021-02-06 NOTE — ED INITIAL ASSESSMENT (HPI)
Has been shoveling snow and developed low back pain increase pain with all movement hx of neuropathy used gabapentin and norco

## 2021-02-06 NOTE — ED PROVIDER NOTES
Patient Seen in: [facility]    History   Patient presents with:  Back Pain    Stated Complaint: severe low back pain    HPI    Chief complaint: Back pain    History of present illness:   The patient complains of back pain, that began after shove • Vitreous floaters        Past Surgical History:   Procedure Laterality Date   • APPENDECTOMY     • CATARACT EXTRACTION W/  INTRAOCULAR LENS IMPLANT Right 06/11/2018    Dr. Beatriz Yadav   • CATARACT EXTRACTION W/  INTRAOCULAR LENS IMPLANT Left 07/12/2018 HYDROcodone-acetaminophen  MG Oral Tab,  Take 1 tablet by mouth every 6 (six) hours as needed for Pain.    PANTOPRAZOLE SODIUM 40 MG Oral Tab EC,  TAKE 1 TABLET BY MOUTH ONCE DAILY IN THE MORNING BEFORE BREAKFAST   RELION INSULIN SYRINGE 31G X 15/64\" Quit date: 1989        Years since quittin.2      Smokeless tobacco: Former User      Tobacco comment: quit about 30 years ago.     Alcohol use: No    Drug use: No      Review of Systems    Positive for stated complaint: severe low back pain urinate here for specimen collection) Imaging studies did not reveal any acute fracture or dislocation. Patient is neurologically intact without motor or sensory deficits elicited on exam.  Reflexes are intact. Patient does not have any abdominal pain.

## 2021-02-08 ENCOUNTER — OFFICE VISIT (OUTPATIENT)
Dept: DERMATOLOGY CLINIC | Facility: CLINIC | Age: 66
End: 2021-02-08
Payer: MEDICARE

## 2021-02-08 DIAGNOSIS — L40.8 SEBOPSORIASIS: Primary | ICD-10-CM

## 2021-02-08 DIAGNOSIS — L70.0 NODULAR ELASTOSIS WITH CYSTS AND COMEDONES OF FAVRE AND RACOUCHOT: ICD-10-CM

## 2021-02-08 DIAGNOSIS — L71.9 ROSACEA: ICD-10-CM

## 2021-02-08 DIAGNOSIS — L72.0 EPIDERMAL CYST: ICD-10-CM

## 2021-02-08 DIAGNOSIS — L57.8 NODULAR ELASTOSIS WITH CYSTS AND COMEDONES OF FAVRE AND RACOUCHOT: ICD-10-CM

## 2021-02-08 PROCEDURE — 99214 OFFICE O/P EST MOD 30 MIN: CPT | Performed by: DERMATOLOGY

## 2021-02-08 RX ORDER — KETOCONAZOLE 20 MG/ML
SHAMPOO TOPICAL
Qty: 120 ML | Refills: 11 | Status: SHIPPED | OUTPATIENT
Start: 2021-02-08

## 2021-02-08 RX ORDER — TAZAROTENE 1 MG/G
CREAM TOPICAL
Qty: 60 G | Refills: 3 | Status: SHIPPED | OUTPATIENT
Start: 2021-02-08

## 2021-02-08 RX ORDER — PIMECROLIMUS 10 MG/G
CREAM TOPICAL
Qty: 60 G | Refills: 2 | Status: SHIPPED | OUTPATIENT
Start: 2021-02-08 | End: 2021-06-16
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
## 2021-02-09 ENCOUNTER — TELEPHONE (OUTPATIENT)
Dept: DERMATOLOGY CLINIC | Facility: CLINIC | Age: 66
End: 2021-02-09

## 2021-02-09 ENCOUNTER — TELEPHONE (OUTPATIENT)
Dept: NEPHROLOGY | Facility: CLINIC | Age: 66
End: 2021-02-09

## 2021-02-09 ENCOUNTER — NURSE ONLY (OUTPATIENT)
Dept: NEPHROLOGY | Facility: CLINIC | Age: 66
End: 2021-02-09
Payer: MEDICARE

## 2021-02-09 VITALS — SYSTOLIC BLOOD PRESSURE: 161 MMHG | DIASTOLIC BLOOD PRESSURE: 72 MMHG

## 2021-02-09 DIAGNOSIS — N18.4 CHRONIC KIDNEY DISEASE, STAGE IV (SEVERE) (HCC): Primary | ICD-10-CM

## 2021-02-09 DIAGNOSIS — N18.4 ANEMIA OF CHRONIC RENAL FAILURE, STAGE 4 (SEVERE) (HCC): ICD-10-CM

## 2021-02-09 DIAGNOSIS — D63.1 ANEMIA OF CHRONIC RENAL FAILURE, STAGE 4 (SEVERE) (HCC): ICD-10-CM

## 2021-02-09 DIAGNOSIS — D63.1 ANEMIA OF CHRONIC RENAL FAILURE, STAGE 3 (MODERATE), UNSPECIFIED WHETHER STAGE 3A OR 3B CKD (HCC): Primary | ICD-10-CM

## 2021-02-09 DIAGNOSIS — N18.30 ANEMIA OF CHRONIC RENAL FAILURE, STAGE 3 (MODERATE), UNSPECIFIED WHETHER STAGE 3A OR 3B CKD (HCC): Primary | ICD-10-CM

## 2021-02-09 PROCEDURE — 96372 THER/PROPH/DIAG INJ SC/IM: CPT | Performed by: INTERNAL MEDICINE

## 2021-02-09 NOTE — TELEPHONE ENCOUNTER
PA submitted electronically through Epic. Left message for pt informing of approval.  Approval form sent to scan.

## 2021-02-09 NOTE — TELEPHONE ENCOUNTER
Patient came in today for Aranesp injection. Hgb is 10.6, hematocrit 32.4 drawn 1/2/21. Patient is getting Aranesp 150 mcg every month. No labs have been ordered. Do you want patient to continue the same dose and frequency? Any lab orders? Satisfactory

## 2021-02-09 NOTE — PROGRESS NOTES
See comment. Patient was identified by full name and date of birth. Aranesp 150 mcg sub q given in left upper arm per written order. Tolerated injection and released in good condition.

## 2021-02-10 ENCOUNTER — OFFICE VISIT (OUTPATIENT)
Dept: ENDOCRINOLOGY CLINIC | Facility: CLINIC | Age: 66
End: 2021-02-10
Payer: MEDICARE

## 2021-02-10 VITALS
DIASTOLIC BLOOD PRESSURE: 67 MMHG | WEIGHT: 232 LBS | BODY MASS INDEX: 34 KG/M2 | HEART RATE: 85 BPM | SYSTOLIC BLOOD PRESSURE: 124 MMHG

## 2021-02-10 DIAGNOSIS — E11.65 UNCONTROLLED TYPE 2 DIABETES MELLITUS WITH HYPERGLYCEMIA (HCC): Primary | ICD-10-CM

## 2021-02-10 LAB
CARTRIDGE LOT#: ABNORMAL NUMERIC
GLUCOSE BLOOD: 292
HEMOGLOBIN A1C: 6.6 % (ref 4.3–5.6)
TEST STRIP LOT #: NORMAL NUMERIC

## 2021-02-10 PROCEDURE — 99214 OFFICE O/P EST MOD 30 MIN: CPT | Performed by: INTERNAL MEDICINE

## 2021-02-10 PROCEDURE — 36416 COLLJ CAPILLARY BLOOD SPEC: CPT | Performed by: INTERNAL MEDICINE

## 2021-02-10 PROCEDURE — 82947 ASSAY GLUCOSE BLOOD QUANT: CPT | Performed by: INTERNAL MEDICINE

## 2021-02-10 PROCEDURE — 83036 HEMOGLOBIN GLYCOSYLATED A1C: CPT | Performed by: INTERNAL MEDICINE

## 2021-02-10 RX ORDER — FENOFIBRATE 48 MG/1
48 TABLET, COATED ORAL DAILY
Qty: 90 TABLET | Refills: 1 | Status: SHIPPED | OUTPATIENT
Start: 2021-02-10 | End: 2021-08-02

## 2021-02-10 NOTE — PROGRESS NOTES
Name: Serene Manning  Date: 2/10/2021    Referring Physician: No ref. provider found    HISTORY OF PRESENT ILLNESS   Serene Manning is a 77year old male who presents for diabetes mellitus.       Prior HbA, C or glycohemoglobin were 9.8% 7/2014; 7.7 1-2x/day, Disp: 60 g, Rfl: 2  •  FREESTYLE LITE TEST In Vitro Strip, USE 1 STRIP TO CHECK GLUCOSE FOUR TIMES DAILY, Disp: 500 strip, Rfl: 0  •  ATORVASTATIN 40 MG Oral Tab, Take 1 tablet by mouth once daily, Disp: 90 tablet, Rfl: 0  •  [START ON 3/8/2021] Use to check blood sugars 3 times a day., Disp: 300 strip, Rfl: 1  •  Accu-Chek Soft Touch Lancets Does not apply Misc, Use to check blood sugars 3 times a day., Disp: 300 each, Rfl: 1  •  BD INSULIN SYRINGE U/F 31G X 5/16\" 0.5 ML Does not apply Misc, Inj g, Rfl: 3  •  selenium sulfide 2.5 % External Lotion, Apply topically 2 times every week to affected area(s).  (Patient not taking: Reported on 2/10/2021 ), Disp: 120 mL, Rfl: 3     Allergies:     Cefdinir                DIARRHEA  Zosyn [Piperacillin*    OT Date   • APPENDECTOMY     • CATARACT EXTRACTION W/  INTRAOCULAR LENS IMPLANT Right 06/11/2018    Dr. Joleen Brenner   • CATARACT EXTRACTION W/  INTRAOCULAR LENS IMPLANT Left 07/12/2018    Dr. Joleen Brenner   • CHOLECYSTECTOMY  2012   • ELECTROCARDIOGRAM, COMPLETE  4/23

## 2021-02-10 NOTE — TELEPHONE ENCOUNTER
Orders entered in system. Left detailed message on voicemail and advised patient to call back if he has any questions or concerns.

## 2021-02-18 ENCOUNTER — OFFICE VISIT (OUTPATIENT)
Dept: OPHTHALMOLOGY | Facility: CLINIC | Age: 66
End: 2021-02-18
Payer: MEDICARE

## 2021-02-18 DIAGNOSIS — H43.393 VITREOUS FLOATERS OF BOTH EYES: ICD-10-CM

## 2021-02-18 DIAGNOSIS — E11.3293 TYPE 2 DIABETES MELLITUS WITH BOTH EYES AFFECTED BY MILD NONPROLIFERATIVE RETINOPATHY WITHOUT MACULAR EDEMA, WITH LONG-TERM CURRENT USE OF INSULIN (HCC): Primary | ICD-10-CM

## 2021-02-18 DIAGNOSIS — Z79.4 TYPE 2 DIABETES MELLITUS WITH BOTH EYES AFFECTED BY MILD NONPROLIFERATIVE RETINOPATHY WITHOUT MACULAR EDEMA, WITH LONG-TERM CURRENT USE OF INSULIN (HCC): Primary | ICD-10-CM

## 2021-02-18 DIAGNOSIS — Z96.1 PSEUDOPHAKIA OF BOTH EYES: ICD-10-CM

## 2021-02-18 PROCEDURE — 92014 COMPRE OPH EXAM EST PT 1/>: CPT | Performed by: OPHTHALMOLOGY

## 2021-02-18 PROCEDURE — 92015 DETERMINE REFRACTIVE STATE: CPT | Performed by: OPHTHALMOLOGY

## 2021-02-18 NOTE — PATIENT INSTRUCTIONS
Type 2 diabetes mellitus with both eyes affected by mild nonproliferative retinopathy without macular edema, with long-term current use of insulin (HCC)  Diabetes type II: mild background diabetic retinopathy, no signs of neovascularization noted.   No aletha

## 2021-02-18 NOTE — PROGRESS NOTES
Waylon Addison is a 77year old male.     HPI:     HPI     Consult      Additional comments: Consult per Dr. Elidia Pierre              Diabetic Eye Exam      Additional comments: Pt has been a diabetic for over 20 years       Pt's diabetes is currently contr cholecystectomy (2012); appendectomy; hernia surgery; electrocardiogram, complete (4/23/2012) (scanned to media tab);  Cataract extraction w/  intraocular lens implant (Right, 06/11/2018) (Dr. Lauren Hernández); and Cataract extraction w/  intraocular lens implant ( (LANTUS) 100 UNIT/ML Subcutaneous Solution INJECT 30 UNITS SUBCUTANEOUSLY AT NIGHT AND  DISCARD  VIAL  AFTER 28 DAYS 30 mL 0   • Insulin Syringe-Needle U-100 (RELION INSULIN SYRINGE) 31G X 15/64\" 0.5 ML Does not apply Misc 1 Syringe by Does not apply rout Oral Tab Take 27 mg by mouth daily. • Cyanocobalamin (VITAMIN B-12) 5000 MCG Oral Lozenge Take 5,000 mcg by mouth daily. 30 lozenge 0   • Omega-3 Fatty Acids (FISH OIL) 600 MG Oral Cap Take 1 capsule by mouth nightly.        • Multiple Vitamins-Minerals 0.1    Macula 1 MA below  fovea, few MA and DBH nasal to disc  few MA above and temp to fovea, few MA nasal to disc     Vessels Normal Normal    Periphery Normal Normal            Refraction     Wearing Rx       Sphere Cylinder Axis    Right +3.25 +0.75 15

## 2021-02-23 RX ORDER — PANTOPRAZOLE SODIUM 40 MG/1
TABLET, DELAYED RELEASE ORAL
Qty: 90 TABLET | Refills: 0 | Status: SHIPPED | OUTPATIENT
Start: 2021-02-23 | End: 2021-05-26

## 2021-03-04 ENCOUNTER — LAB ENCOUNTER (OUTPATIENT)
Dept: LAB | Age: 66
End: 2021-03-04
Attending: INTERNAL MEDICINE
Payer: MEDICARE

## 2021-03-04 DIAGNOSIS — D63.1 ANEMIA OF CHRONIC RENAL FAILURE, STAGE 4 (SEVERE) (HCC): ICD-10-CM

## 2021-03-04 DIAGNOSIS — N18.4 CHRONIC KIDNEY DISEASE, STAGE IV (SEVERE) (HCC): ICD-10-CM

## 2021-03-04 DIAGNOSIS — N18.4 ANEMIA OF CHRONIC RENAL FAILURE, STAGE 4 (SEVERE) (HCC): ICD-10-CM

## 2021-03-04 DIAGNOSIS — N18.30 ANEMIA OF CHRONIC RENAL FAILURE, STAGE 3 (MODERATE), UNSPECIFIED WHETHER STAGE 3A OR 3B CKD (HCC): ICD-10-CM

## 2021-03-04 DIAGNOSIS — D63.1 ANEMIA OF CHRONIC RENAL FAILURE, STAGE 3 (MODERATE), UNSPECIFIED WHETHER STAGE 3A OR 3B CKD (HCC): ICD-10-CM

## 2021-03-04 LAB
ALBUMIN SERPL-MCNC: 3.7 G/DL (ref 3.4–5)
ANION GAP SERPL CALC-SCNC: 3 MMOL/L (ref 0–18)
BASOPHILS # BLD AUTO: 0.02 X10(3) UL (ref 0–0.2)
BASOPHILS NFR BLD AUTO: 0.7 %
BUN BLD-MCNC: 57 MG/DL (ref 7–18)
BUN/CREAT SERPL: 18 (ref 10–20)
CALCIUM BLD-MCNC: 9.2 MG/DL (ref 8.5–10.1)
CHLORIDE SERPL-SCNC: 115 MMOL/L (ref 98–112)
CO2 SERPL-SCNC: 28 MMOL/L (ref 21–32)
CREAT BLD-MCNC: 3.17 MG/DL
DEPRECATED RDW RBC AUTO: 55.9 FL (ref 35.1–46.3)
EOSINOPHIL # BLD AUTO: 0.09 X10(3) UL (ref 0–0.7)
EOSINOPHIL NFR BLD AUTO: 2.9 %
ERYTHROCYTE [DISTWIDTH] IN BLOOD BY AUTOMATED COUNT: 14.7 % (ref 11–15)
GLUCOSE BLD-MCNC: 110 MG/DL (ref 70–99)
HCT VFR BLD AUTO: 31.8 %
HGB BLD-MCNC: 10.2 G/DL
IMM GRANULOCYTES # BLD AUTO: 0.01 X10(3) UL (ref 0–1)
IMM GRANULOCYTES NFR BLD: 0.3 %
LYMPHOCYTES # BLD AUTO: 1.1 X10(3) UL (ref 1–4)
LYMPHOCYTES NFR BLD AUTO: 35.9 %
MCH RBC QN AUTO: 33.4 PG (ref 26–34)
MCHC RBC AUTO-ENTMCNC: 32.1 G/DL (ref 31–37)
MCV RBC AUTO: 104.3 FL
MONOCYTES # BLD AUTO: 0.42 X10(3) UL (ref 0.1–1)
MONOCYTES NFR BLD AUTO: 13.7 %
NEUTROPHILS # BLD AUTO: 1.42 X10 (3) UL (ref 1.5–7.7)
NEUTROPHILS # BLD AUTO: 1.42 X10(3) UL (ref 1.5–7.7)
NEUTROPHILS NFR BLD AUTO: 46.5 %
OSMOLALITY SERPL CALC.SUM OF ELEC: 318 MOSM/KG (ref 275–295)
PHOSPHATE SERPL-MCNC: 3.8 MG/DL (ref 2.5–4.9)
PLATELET # BLD AUTO: 107 10(3)UL (ref 150–450)
POTASSIUM SERPL-SCNC: 5 MMOL/L (ref 3.5–5.1)
RBC # BLD AUTO: 3.05 X10(6)UL
SODIUM SERPL-SCNC: 146 MMOL/L (ref 136–145)
WBC # BLD AUTO: 3.1 X10(3) UL (ref 4–11)

## 2021-03-04 PROCEDURE — 36415 COLL VENOUS BLD VENIPUNCTURE: CPT

## 2021-03-04 PROCEDURE — 85025 COMPLETE CBC W/AUTO DIFF WBC: CPT

## 2021-03-04 PROCEDURE — 80069 RENAL FUNCTION PANEL: CPT

## 2021-03-09 NOTE — TELEPHONE ENCOUNTER
3/9 left message to call back.  Dr. Jing Leyva reviewed lab results \"Please tell patient to continue his present medications kidney numbers are stable thank you\"

## 2021-03-10 ENCOUNTER — NURSE ONLY (OUTPATIENT)
Dept: NEPHROLOGY | Facility: CLINIC | Age: 66
End: 2021-03-10
Payer: MEDICARE

## 2021-03-10 VITALS — SYSTOLIC BLOOD PRESSURE: 145 MMHG | DIASTOLIC BLOOD PRESSURE: 67 MMHG | HEART RATE: 72 BPM

## 2021-03-10 DIAGNOSIS — N18.31 ANEMIA IN STAGE 3A CHRONIC KIDNEY DISEASE (HCC): Primary | ICD-10-CM

## 2021-03-10 DIAGNOSIS — D63.1 ANEMIA IN STAGE 3A CHRONIC KIDNEY DISEASE (HCC): Primary | ICD-10-CM

## 2021-03-10 PROCEDURE — 96372 THER/PROPH/DIAG INJ SC/IM: CPT | Performed by: INTERNAL MEDICINE

## 2021-03-10 NOTE — PROGRESS NOTES
Patient presents to clinic today for Aranesp injection. On 3/4/2021, Hgb was 10.2 and Hct 31.8. BP today is 145/67. Aranesp 150 mcg administered to left upper arm Subcutaneous without complications. Patient tolerated injection well.  Patient will RTC in 4 w

## 2021-03-16 NOTE — TELEPHONE ENCOUNTER
Left detailed message for patient. Relayed lab result message from Dr. Franklin Pimentel. Instructed patient to call back with any questions.

## 2021-03-25 DIAGNOSIS — E11.65 UNCONTROLLED TYPE 2 DIABETES MELLITUS WITH HYPERGLYCEMIA (HCC): ICD-10-CM

## 2021-03-25 RX ORDER — INSULIN GLARGINE 100 [IU]/ML
INJECTION, SOLUTION SUBCUTANEOUS
Qty: 30 ML | Refills: 0 | Status: SHIPPED | OUTPATIENT
Start: 2021-03-25 | End: 2021-07-01

## 2021-03-31 DIAGNOSIS — E11.65 UNCONTROLLED TYPE 2 DIABETES MELLITUS WITH HYPERGLYCEMIA (HCC): ICD-10-CM

## 2021-03-31 NOTE — TELEPHONE ENCOUNTER
•  insulin aspart 100 UNIT/ML Subcutaneous Solution, INJECT 6 UNITS SUBCUTANEOUSLY WITH BREAKFAST THEN 6 WITH LUNCH AND THEN 8 WITH SUPPER, Disp: 20 mL, Rfl: 0

## 2021-04-01 RX ORDER — INSULIN ASPART 100 [IU]/ML
INJECTION, SOLUTION INTRAVENOUS; SUBCUTANEOUS
Qty: 20 ML | Refills: 1 | Status: SHIPPED | OUTPATIENT
Start: 2021-04-01 | End: 2021-08-04

## 2021-04-05 ENCOUNTER — OFFICE VISIT (OUTPATIENT)
Dept: NEPHROLOGY | Facility: CLINIC | Age: 66
End: 2021-04-05
Payer: MEDICARE

## 2021-04-05 VITALS
BODY MASS INDEX: 34.8 KG/M2 | SYSTOLIC BLOOD PRESSURE: 102 MMHG | HEIGHT: 69 IN | DIASTOLIC BLOOD PRESSURE: 56 MMHG | WEIGHT: 235 LBS | HEART RATE: 71 BPM

## 2021-04-05 DIAGNOSIS — E11.22 CKD STAGE 4 DUE TO TYPE 2 DIABETES MELLITUS (HCC): ICD-10-CM

## 2021-04-05 DIAGNOSIS — E11.3293 TYPE 2 DIABETES MELLITUS WITH BOTH EYES AFFECTED BY MILD NONPROLIFERATIVE RETINOPATHY WITHOUT MACULAR EDEMA, WITH LONG-TERM CURRENT USE OF INSULIN (HCC): Primary | ICD-10-CM

## 2021-04-05 DIAGNOSIS — N18.4 CKD STAGE 4 DUE TO TYPE 2 DIABETES MELLITUS (HCC): ICD-10-CM

## 2021-04-05 DIAGNOSIS — Z79.4 TYPE 2 DIABETES MELLITUS WITH BOTH EYES AFFECTED BY MILD NONPROLIFERATIVE RETINOPATHY WITHOUT MACULAR EDEMA, WITH LONG-TERM CURRENT USE OF INSULIN (HCC): Primary | ICD-10-CM

## 2021-04-05 PROCEDURE — 96372 THER/PROPH/DIAG INJ SC/IM: CPT | Performed by: INTERNAL MEDICINE

## 2021-04-05 PROCEDURE — 99214 OFFICE O/P EST MOD 30 MIN: CPT | Performed by: INTERNAL MEDICINE

## 2021-04-05 RX ORDER — HYDROCODONE BITARTRATE AND ACETAMINOPHEN 10; 325 MG/1; MG/1
1 TABLET ORAL EVERY 6 HOURS PRN
Qty: 90 TABLET | Refills: 0 | Status: SHIPPED | OUTPATIENT
Start: 2021-05-05 | End: 2021-06-28

## 2021-04-05 RX ORDER — HYDROCODONE BITARTRATE AND ACETAMINOPHEN 10; 325 MG/1; MG/1
1 TABLET ORAL EVERY 8 HOURS PRN
Qty: 90 TABLET | Refills: 0 | Status: SHIPPED | OUTPATIENT
Start: 2021-04-05 | End: 2021-09-27

## 2021-04-05 RX ORDER — HYDROCODONE BITARTRATE AND ACETAMINOPHEN 10; 325 MG/1; MG/1
1 TABLET ORAL EVERY 8 HOURS PRN
Qty: 90 TABLET | Refills: 0 | Status: SHIPPED | OUTPATIENT
Start: 2021-06-07 | End: 2021-09-27

## 2021-04-05 RX ORDER — HYDROCODONE BITARTRATE AND ACETAMINOPHEN 10; 325 MG/1; MG/1
1 TABLET ORAL EVERY 8 HOURS PRN
Qty: 90 TABLET | Refills: 0 | Status: SHIPPED | OUTPATIENT
Start: 2021-05-06 | End: 2021-04-05

## 2021-04-05 NOTE — PATIENT INSTRUCTIONS
Good job on labs    Same medications    See me back in early July please call ahead of time for lab orders continue Aranesp every  4 weeks

## 2021-04-05 NOTE — PROGRESS NOTES
Patient presents to clinic today for Aranesp injection. On 3/4/21, Hgb was 10.2 and Hct 31.8. BP today is 102/56. Aranesp 150mcg administered to right upper arm SC without complications. Patient tolerated injection well.  Patient will RTC in 4 weeks for nex

## 2021-04-07 DIAGNOSIS — E11.65 UNCONTROLLED TYPE 2 DIABETES MELLITUS WITH HYPERGLYCEMIA (HCC): ICD-10-CM

## 2021-04-07 PROBLEM — N18.6 ESRD (END STAGE RENAL DISEASE) (HCC): Status: RESOLVED | Noted: 2020-12-13 | Resolved: 2021-04-07

## 2021-04-07 NOTE — PROGRESS NOTES
Progress Note     Silvia Kelley is doing well he is hoping to go fishing soon has had a Covid vaccine he feels good urinating fine still has peripheral neuropathy sugars are doing fine breathing fine blood pressures been doing good.   [facility]      Social History    Tobacco Use      Smoking status: Former Smoker        Packs/day: 0.00        Quit date: 1989        Years since quittin.4      Smokeless tobacco: Former User      Tobacco comment: quit about 30 years ago.     Alcohol us Cream Apply to chest nightly 60 g 3   • Ketoconazole 2 % External Shampoo Apply to scalp 2 times per week.  120 mL 11   • Pimecrolimus (ELIDEL) 1 % External Cream Apply to affected areas face 1-2x/day 60 g 2   • FREESTYLE LITE TEST In Vitro Strip USE 1 STRI vomiting      ROS:     Constitutional:  Negative for decreased activity, fever, irritability and lethargy  ENMT:  Negative for ear drainage, hearing loss and nasal drainage  Eyes:  Negative for eye discharge and vision loss  Cardiovascular:  Negative for c CKD 4   Creatinine 3.17 bicarb potassium good GFR of 21 phosphorus 3.8    See him back in 3 months will call for labs prior to visit  Orders This Visit:  No orders of the defined types were placed in this encounter.       Meds This Visit:  Requested Gisselle Jeffery

## 2021-04-08 RX ORDER — SYRINGE AND NEEDLE,INSULIN,1ML 31GX15/64"
SYRINGE, EMPTY DISPOSABLE MISCELLANEOUS
Qty: 400 EACH | Refills: 0 | Status: SHIPPED | OUTPATIENT
Start: 2021-04-08 | End: 2021-09-20

## 2021-04-08 RX ORDER — ATORVASTATIN CALCIUM 40 MG/1
TABLET, FILM COATED ORAL
Qty: 90 TABLET | Refills: 0 | Status: SHIPPED | OUTPATIENT
Start: 2021-04-08 | End: 2021-07-08

## 2021-04-09 ENCOUNTER — TELEPHONE (OUTPATIENT)
Dept: ENDOCRINOLOGY CLINIC | Facility: CLINIC | Age: 66
End: 2021-04-09

## 2021-04-09 DIAGNOSIS — E11.65 UNCONTROLLED TYPE 2 DIABETES MELLITUS WITH HYPERGLYCEMIA (HCC): ICD-10-CM

## 2021-04-09 NOTE — TELEPHONE ENCOUNTER
•  insulin aspart 100 UNIT/ML Subcutaneous Solution, INJECT 6 UNITS SUBCUTANEOUSLY WITH BREAKFAST THEN 6 WITH LUNCH AND THEN 8 WITH SUPPER, Disp: 20 mL, Rfl: 1

## 2021-05-04 ENCOUNTER — NURSE ONLY (OUTPATIENT)
Dept: NEPHROLOGY | Facility: CLINIC | Age: 66
End: 2021-05-04
Payer: MEDICARE

## 2021-05-04 ENCOUNTER — TELEPHONE (OUTPATIENT)
Dept: NEPHROLOGY | Facility: CLINIC | Age: 66
End: 2021-05-04

## 2021-05-04 VITALS — SYSTOLIC BLOOD PRESSURE: 137 MMHG | DIASTOLIC BLOOD PRESSURE: 68 MMHG

## 2021-05-04 DIAGNOSIS — N18.32 ANEMIA IN STAGE 3B CHRONIC KIDNEY DISEASE (HCC): Primary | ICD-10-CM

## 2021-05-04 DIAGNOSIS — D63.1 ANEMIA IN STAGE 3B CHRONIC KIDNEY DISEASE (HCC): Primary | ICD-10-CM

## 2021-05-04 PROCEDURE — 96372 THER/PROPH/DIAG INJ SC/IM: CPT | Performed by: INTERNAL MEDICINE

## 2021-05-04 NOTE — H&P
Patient presents to clinic today for Aranesp injection. On 3/4, Hgb was 10.2 and Hct 31.8. BP today is  137/68. Aranesp 150 mcg administered to left upper arm Subcutaneous without complications. Patient tolerated injection well.  Patient will RTC in 4 weeks

## 2021-05-04 NOTE — TELEPHONE ENCOUNTER
Spoke with patient, informed pt standing orders in place to get hgb checked for next aranesp shot. States he will get this done in the next week or two.

## 2021-05-26 DIAGNOSIS — E11.65 UNCONTROLLED TYPE 2 DIABETES MELLITUS WITH HYPERGLYCEMIA (HCC): ICD-10-CM

## 2021-05-26 RX ORDER — BLOOD-GLUCOSE METER
KIT MISCELLANEOUS
Qty: 500 STRIP | Refills: 0 | Status: SHIPPED | OUTPATIENT
Start: 2021-05-26 | End: 2021-08-25

## 2021-05-26 RX ORDER — PANTOPRAZOLE SODIUM 40 MG/1
TABLET, DELAYED RELEASE ORAL
Qty: 90 TABLET | Refills: 1 | Status: SHIPPED | OUTPATIENT
Start: 2021-05-26 | End: 2021-11-23

## 2021-05-26 NOTE — TELEPHONE ENCOUNTER
Wife called in to get refill on medication FREESTYLE LITE TEST In Vitro Strip, pt is running low. Please call wife when the refill has been sent.  Please follow up

## 2021-05-28 ENCOUNTER — TELEPHONE (OUTPATIENT)
Dept: ENDOCRINOLOGY CLINIC | Facility: CLINIC | Age: 66
End: 2021-05-28

## 2021-05-28 ENCOUNTER — LAB ENCOUNTER (OUTPATIENT)
Dept: LAB | Age: 66
End: 2021-05-28
Attending: INTERNAL MEDICINE
Payer: MEDICARE

## 2021-05-28 DIAGNOSIS — N18.30 CHRONIC KIDNEY DISEASE, STAGE III (MODERATE) (HCC): ICD-10-CM

## 2021-05-28 PROCEDURE — 85025 COMPLETE CBC W/AUTO DIFF WBC: CPT

## 2021-05-28 PROCEDURE — 36415 COLL VENOUS BLD VENIPUNCTURE: CPT

## 2021-05-28 PROCEDURE — 80069 RENAL FUNCTION PANEL: CPT

## 2021-05-28 NOTE — TELEPHONE ENCOUNTER
Fax received from [facility] requesting we fax back patients most recent chart note, indicating he has DM. Completed and faxed back.

## 2021-06-01 ENCOUNTER — NURSE ONLY (OUTPATIENT)
Dept: NEPHROLOGY | Facility: CLINIC | Age: 66
End: 2021-06-01
Payer: MEDICARE

## 2021-06-01 VITALS — SYSTOLIC BLOOD PRESSURE: 111 MMHG | HEART RATE: 72 BPM | DIASTOLIC BLOOD PRESSURE: 58 MMHG

## 2021-06-01 DIAGNOSIS — N18.4 ANEMIA OF CHRONIC RENAL FAILURE, STAGE 4 (SEVERE) (HCC): Primary | ICD-10-CM

## 2021-06-01 DIAGNOSIS — D63.1 ANEMIA OF CHRONIC RENAL FAILURE, STAGE 4 (SEVERE) (HCC): Primary | ICD-10-CM

## 2021-06-01 PROCEDURE — 96372 THER/PROPH/DIAG INJ SC/IM: CPT | Performed by: INTERNAL MEDICINE

## 2021-06-01 NOTE — TELEPHONE ENCOUNTER
Dr. Frieda Sicard, pt requesting if additional labs need to be done prior to his upcoming apt June 28. Pt aware you are out of office this week. Pt had CBC and renal panel done 5/28/21. Thank you!

## 2021-06-01 NOTE — PROGRESS NOTES
Pt presents to clinic today for Aranesp injection. On 5/28/21 , Hgb was __10.1__ and hematocrit _31.7___. BP today is _111/58___. Aranesp _150__ mcg administered to _left___ upper arm subcutaneous without complications. Patient tolerated injection well.  Pa

## 2021-06-02 NOTE — TELEPHONE ENCOUNTER
Spoke with pt. Informed pt that he does not need to have any additional lab work done prior to apt per Dr. Niraj Rodriguez. Pt verbalizes understanding. No questions at this time.

## 2021-06-09 ENCOUNTER — TELEPHONE (OUTPATIENT)
Dept: INTERNAL MEDICINE CLINIC | Facility: CLINIC | Age: 66
End: 2021-06-09

## 2021-06-16 RX ORDER — PIMECROLIMUS 10 MG/G
CREAM TOPICAL
Qty: 60 G | Refills: 0 | Status: SHIPPED | OUTPATIENT
Start: 2021-06-16

## 2021-06-22 ENCOUNTER — TELEPHONE (OUTPATIENT)
Dept: DERMATOLOGY CLINIC | Facility: CLINIC | Age: 66
End: 2021-06-22

## 2021-06-22 NOTE — TELEPHONE ENCOUNTER
Fax from [facility]    Please complete PA form for Pimecrolimus 1% cream     Placed fax in pa inbox

## 2021-06-22 NOTE — TELEPHONE ENCOUNTER
[employer] called- [phone] ref [id number]     asking to lower price of Pimecrolimus 1 % External Cream

## 2021-06-28 ENCOUNTER — TELEPHONE (OUTPATIENT)
Dept: NEPHROLOGY | Facility: CLINIC | Age: 66
End: 2021-06-28

## 2021-06-28 ENCOUNTER — OFFICE VISIT (OUTPATIENT)
Dept: NEPHROLOGY | Facility: CLINIC | Age: 66
End: 2021-06-28
Payer: MEDICARE

## 2021-06-28 VITALS
DIASTOLIC BLOOD PRESSURE: 63 MMHG | WEIGHT: 233 LBS | BODY MASS INDEX: 34.51 KG/M2 | HEART RATE: 75 BPM | SYSTOLIC BLOOD PRESSURE: 105 MMHG | HEIGHT: 69 IN

## 2021-06-28 DIAGNOSIS — E10.22 CKD STAGE 4 DUE TO TYPE 1 DIABETES MELLITUS (HCC): ICD-10-CM

## 2021-06-28 DIAGNOSIS — Z79.4 TYPE 2 DIABETES MELLITUS WITH BOTH EYES AFFECTED BY MILD NONPROLIFERATIVE RETINOPATHY WITHOUT MACULAR EDEMA, WITH LONG-TERM CURRENT USE OF INSULIN (HCC): Primary | ICD-10-CM

## 2021-06-28 DIAGNOSIS — R80.9 PROTEINURIA, UNSPECIFIED TYPE: ICD-10-CM

## 2021-06-28 DIAGNOSIS — E10.22 CKD STAGE 4 DUE TO TYPE 1 DIABETES MELLITUS (HCC): Primary | ICD-10-CM

## 2021-06-28 DIAGNOSIS — E11.3293 TYPE 2 DIABETES MELLITUS WITH BOTH EYES AFFECTED BY MILD NONPROLIFERATIVE RETINOPATHY WITHOUT MACULAR EDEMA, WITH LONG-TERM CURRENT USE OF INSULIN (HCC): Primary | ICD-10-CM

## 2021-06-28 DIAGNOSIS — N18.4 CKD STAGE 4 DUE TO TYPE 1 DIABETES MELLITUS (HCC): Primary | ICD-10-CM

## 2021-06-28 DIAGNOSIS — N18.4 CKD STAGE 4 DUE TO TYPE 1 DIABETES MELLITUS (HCC): ICD-10-CM

## 2021-06-28 PROCEDURE — 96372 THER/PROPH/DIAG INJ SC/IM: CPT | Performed by: INTERNAL MEDICINE

## 2021-06-28 PROCEDURE — 99214 OFFICE O/P EST MOD 30 MIN: CPT | Performed by: INTERNAL MEDICINE

## 2021-06-28 RX ORDER — HYDROCODONE BITARTRATE AND ACETAMINOPHEN 10; 325 MG/1; MG/1
1 TABLET ORAL EVERY 6 HOURS PRN
Qty: 90 TABLET | Refills: 0 | Status: SHIPPED | OUTPATIENT
Start: 2021-07-28 | End: 2021-06-28

## 2021-06-28 RX ORDER — SYRINGE AND NEEDLE,INSULIN,1ML 31GX15/64"
SYRINGE, EMPTY DISPOSABLE MISCELLANEOUS
Qty: 400 EACH | Refills: 0 | Status: SHIPPED | OUTPATIENT
Start: 2021-06-28 | End: 2021-09-20

## 2021-06-28 RX ORDER — HYDROCODONE BITARTRATE AND ACETAMINOPHEN 10; 325 MG/1; MG/1
1 TABLET ORAL EVERY 6 HOURS PRN
Qty: 90 TABLET | Refills: 0 | Status: SHIPPED | OUTPATIENT
Start: 2021-06-28 | End: 2021-09-27

## 2021-06-28 RX ORDER — HYDROCODONE BITARTRATE AND ACETAMINOPHEN 10; 325 MG/1; MG/1
1 TABLET ORAL EVERY 6 HOURS PRN
Qty: 90 TABLET | Refills: 0 | Status: SHIPPED | OUTPATIENT
Start: 2021-08-27 | End: 2021-09-27

## 2021-06-28 NOTE — PROGRESS NOTES
Patient presents to clinic today for Aranesp injection. On 5/28/21, Hgb was 10.1 and Hct 31.7. BP today is 105/63. Aranesp 150mcg administered to left upper arm SC without complications. Patient tolerated injection well.  Patient will RTC in 4 weeks for nex

## 2021-06-28 NOTE — PATIENT INSTRUCTIONS
Good job on labs    Please try to keep your sugars under 150 not too many cookies      Please see Keagan Felix from GI for colonoscopy appointment    Please do labs the day you get your neck shot at the very end of July    Please see me the last week of Septem

## 2021-06-29 NOTE — PROGRESS NOTES
Progress Note     Serene Manning    Is doing fine he needs a refill on his Norco I give him 3-month supply 90 pills each does not abuse it he does have chronic neuropathy sugars are doing good between 150 and 200  Blood pressure is good overall he HYDROcodone-acetaminophen  MG Oral Tab Take 1 tablet by mouth every 6 (six) hours as needed for Pain. 90 tablet 0   • [START ON 8/27/2021] HYDROcodone-acetaminophen  MG Oral Tab Take 1 tablet by mouth every 6 (six) hours as needed for Pain.  80 USE 1 STRIP TO CHECK GLUCOSE FOUR TIMES DAILY 500 strip 0   • RELION INSULIN SYRINGE 31G X 15/64\" 0.5 ML Does not apply Misc USE 1 SYRINGE 4 TIMES DAILY 400 each 0   • HYDROcodone-acetaminophen  MG Oral Tab Take 1 tablet by mouth every 8 (eight) ta Negative for cough, dyspnea and wheezing  Gastrointestinal:  Negative for abdominal pain, constipation  Genitourinary:  Negative for dysuria and hematuria  Endocrine:  Negative for abnormal sleep patterns  Hema/Lymph:  Negative for easy bleeding and easy b getting Aranesp every month      Orders This Visit:  Orders Placed This Encounter      CBC With Differential With Platelet      Renal Function Panel      PTH, Intact      Microalb/Creat Ratio, Random Urine      Meds This Visit:  Requested Prescriptions

## 2021-06-29 NOTE — TELEPHONE ENCOUNTER
Reviewed denial fax. The tiering exception PA request has been denied. This cream is covered on his plan but at a higher tier, we were trying to lower the price.    He only qualifies for a tiering exception if there is another drug in the same class montserrat

## 2021-07-01 DIAGNOSIS — E11.65 UNCONTROLLED TYPE 2 DIABETES MELLITUS WITH HYPERGLYCEMIA (HCC): ICD-10-CM

## 2021-07-01 RX ORDER — INSULIN GLARGINE 100 [IU]/ML
INJECTION, SOLUTION SUBCUTANEOUS
Qty: 30 ML | Refills: 0 | Status: SHIPPED | OUTPATIENT
Start: 2021-07-01 | End: 2021-08-04

## 2021-07-01 NOTE — TELEPHONE ENCOUNTER
Let patient know that the only thing similar would be the tacrolimus ointment, which we had initially prescribed but he did not like the greasy nature. I am not sure if this would be any less costly.   If he would like to try it again then may give him 30

## 2021-07-08 DIAGNOSIS — E11.65 UNCONTROLLED TYPE 2 DIABETES MELLITUS WITH HYPERGLYCEMIA (HCC): ICD-10-CM

## 2021-07-08 RX ORDER — ATORVASTATIN CALCIUM 40 MG/1
TABLET, FILM COATED ORAL
Qty: 90 TABLET | Refills: 0 | Status: SHIPPED | OUTPATIENT
Start: 2021-07-08 | End: 2021-10-04

## 2021-07-08 NOTE — TELEPHONE ENCOUNTER
lov 12/10/20  Fu 8/4/21  Wife called screaming that this rx was requested 2 days ago from pharmacy and we have not refilled yet . Needs to  TDOAY. \"per wife\"   Needs to be expedited.

## 2021-07-26 ENCOUNTER — LAB ENCOUNTER (OUTPATIENT)
Dept: LAB | Age: 66
End: 2021-07-26
Attending: INTERNAL MEDICINE
Payer: MEDICARE

## 2021-07-26 DIAGNOSIS — Z79.4 TYPE 2 DIABETES MELLITUS WITH BOTH EYES AFFECTED BY MILD NONPROLIFERATIVE RETINOPATHY WITHOUT MACULAR EDEMA, WITH LONG-TERM CURRENT USE OF INSULIN (HCC): ICD-10-CM

## 2021-07-26 DIAGNOSIS — E11.3293 TYPE 2 DIABETES MELLITUS WITH BOTH EYES AFFECTED BY MILD NONPROLIFERATIVE RETINOPATHY WITHOUT MACULAR EDEMA, WITH LONG-TERM CURRENT USE OF INSULIN (HCC): ICD-10-CM

## 2021-07-26 DIAGNOSIS — E10.22 CKD STAGE 4 DUE TO TYPE 1 DIABETES MELLITUS (HCC): ICD-10-CM

## 2021-07-26 DIAGNOSIS — N18.4 CKD STAGE 4 DUE TO TYPE 1 DIABETES MELLITUS (HCC): ICD-10-CM

## 2021-07-26 LAB
ALBUMIN SERPL-MCNC: 3.6 G/DL (ref 3.4–5)
ANION GAP SERPL CALC-SCNC: 6 MMOL/L (ref 0–18)
BASOPHILS # BLD AUTO: 0.02 X10(3) UL (ref 0–0.2)
BASOPHILS NFR BLD AUTO: 0.6 %
BUN BLD-MCNC: 63 MG/DL (ref 7–18)
BUN/CREAT SERPL: 20.3 (ref 10–20)
CALCIUM BLD-MCNC: 8.5 MG/DL (ref 8.5–10.1)
CHLORIDE SERPL-SCNC: 111 MMOL/L (ref 98–112)
CO2 SERPL-SCNC: 23 MMOL/L (ref 21–32)
CREAT BLD-MCNC: 3.11 MG/DL
DEPRECATED RDW RBC AUTO: 55.2 FL (ref 35.1–46.3)
EOSINOPHIL # BLD AUTO: 0.08 X10(3) UL (ref 0–0.7)
EOSINOPHIL NFR BLD AUTO: 2.2 %
ERYTHROCYTE [DISTWIDTH] IN BLOOD BY AUTOMATED COUNT: 14.2 % (ref 11–15)
EST. AVERAGE GLUCOSE BLD GHB EST-MCNC: 163 MG/DL (ref 68–126)
GLUCOSE BLD-MCNC: 222 MG/DL (ref 70–99)
HBA1C MFR BLD HPLC: 7.3 % (ref ?–5.7)
HCT VFR BLD AUTO: 29.3 %
HGB BLD-MCNC: 9.5 G/DL
IMM GRANULOCYTES # BLD AUTO: 0.02 X10(3) UL (ref 0–1)
IMM GRANULOCYTES NFR BLD: 0.6 %
LYMPHOCYTES # BLD AUTO: 1.17 X10(3) UL (ref 1–4)
LYMPHOCYTES NFR BLD AUTO: 32.4 %
MCH RBC QN AUTO: 34.3 PG (ref 26–34)
MCHC RBC AUTO-ENTMCNC: 32.4 G/DL (ref 31–37)
MCV RBC AUTO: 105.8 FL
MONOCYTES # BLD AUTO: 0.35 X10(3) UL (ref 0.1–1)
MONOCYTES NFR BLD AUTO: 9.7 %
NEUTROPHILS # BLD AUTO: 1.97 X10 (3) UL (ref 1.5–7.7)
NEUTROPHILS # BLD AUTO: 1.97 X10(3) UL (ref 1.5–7.7)
NEUTROPHILS NFR BLD AUTO: 54.5 %
OSMOLALITY SERPL CALC.SUM OF ELEC: 315 MOSM/KG (ref 275–295)
PHOSPHATE SERPL-MCNC: 3 MG/DL (ref 2.5–4.9)
PLATELET # BLD AUTO: 94 10(3)UL (ref 150–450)
POTASSIUM SERPL-SCNC: 5.4 MMOL/L (ref 3.5–5.1)
PTH-INTACT SERPL-MCNC: 90 PG/ML (ref 18.5–88)
RBC # BLD AUTO: 2.77 X10(6)UL
SODIUM SERPL-SCNC: 140 MMOL/L (ref 136–145)
WBC # BLD AUTO: 3.6 X10(3) UL (ref 4–11)

## 2021-07-26 PROCEDURE — 36415 COLL VENOUS BLD VENIPUNCTURE: CPT

## 2021-07-26 PROCEDURE — 83036 HEMOGLOBIN GLYCOSYLATED A1C: CPT

## 2021-07-26 PROCEDURE — 83970 ASSAY OF PARATHORMONE: CPT

## 2021-07-26 PROCEDURE — 85025 COMPLETE CBC W/AUTO DIFF WBC: CPT

## 2021-07-26 PROCEDURE — 80069 RENAL FUNCTION PANEL: CPT

## 2021-07-27 ENCOUNTER — LAB ENCOUNTER (OUTPATIENT)
Dept: LAB | Age: 66
End: 2021-07-27
Attending: INTERNAL MEDICINE
Payer: MEDICARE

## 2021-07-27 LAB
BILIRUB UR QL: NEGATIVE
CLARITY UR: CLEAR
COLOR UR: YELLOW
CREAT UR-SCNC: 62.7 MG/DL
GLUCOSE UR-MCNC: >=500 MG/DL
HGB UR QL STRIP.AUTO: NEGATIVE
KETONES UR-MCNC: NEGATIVE MG/DL
LEUKOCYTE ESTERASE UR QL STRIP.AUTO: NEGATIVE
MICROALBUMIN UR-MCNC: 15.9 MG/DL
MICROALBUMIN/CREAT 24H UR-RTO: 253.6 UG/MG (ref ?–30)
NITRITE UR QL STRIP.AUTO: NEGATIVE
PH UR: 6 [PH] (ref 5–8)
PROT UR-MCNC: 30 MG/DL
SP GR UR STRIP: 1.01 (ref 1–1.03)
UROBILINOGEN UR STRIP-ACNC: <2

## 2021-07-27 PROCEDURE — 81001 URINALYSIS AUTO W/SCOPE: CPT

## 2021-07-27 PROCEDURE — 82043 UR ALBUMIN QUANTITATIVE: CPT

## 2021-07-27 PROCEDURE — 82570 ASSAY OF URINE CREATININE: CPT

## 2021-07-28 ENCOUNTER — NURSE ONLY (OUTPATIENT)
Dept: NEPHROLOGY | Facility: CLINIC | Age: 66
End: 2021-07-28
Payer: MEDICARE

## 2021-07-28 VITALS — DIASTOLIC BLOOD PRESSURE: 66 MMHG | SYSTOLIC BLOOD PRESSURE: 121 MMHG

## 2021-07-28 DIAGNOSIS — N18.4 ANEMIA OF CHRONIC RENAL FAILURE, STAGE 4 (SEVERE) (HCC): Primary | ICD-10-CM

## 2021-07-28 DIAGNOSIS — D63.1 ANEMIA OF CHRONIC RENAL FAILURE, STAGE 4 (SEVERE) (HCC): Primary | ICD-10-CM

## 2021-07-28 PROCEDURE — 96372 THER/PROPH/DIAG INJ SC/IM: CPT | Performed by: INTERNAL MEDICINE

## 2021-07-28 NOTE — TELEPHONE ENCOUNTER
Spoke with patient, discussed below message. Verbalizes understanding. Medication dose changed per MD note below.

## 2021-07-28 NOTE — TELEPHONE ENCOUNTER
Please let him know labs are stable but he’s anemia is a little worse I’d like to go up one notch on his Aranesp please raise him   Two one dose higher every four weeks     From Dr. Daryl Akins

## 2021-08-02 DIAGNOSIS — E11.65 UNCONTROLLED TYPE 2 DIABETES MELLITUS WITH HYPERGLYCEMIA (HCC): ICD-10-CM

## 2021-08-02 RX ORDER — PREGABALIN 300 MG/1
300 CAPSULE ORAL DAILY
Qty: 90 CAPSULE | Refills: 0 | Status: SHIPPED | OUTPATIENT
Start: 2021-08-02 | End: 2021-11-29

## 2021-08-02 RX ORDER — FENOFIBRATE 48 MG/1
TABLET, COATED ORAL
Qty: 90 TABLET | Refills: 0 | Status: SHIPPED | OUTPATIENT
Start: 2021-08-02 | End: 2021-11-16

## 2021-08-04 ENCOUNTER — OFFICE VISIT (OUTPATIENT)
Dept: ENDOCRINOLOGY CLINIC | Facility: CLINIC | Age: 66
End: 2021-08-04
Payer: MEDICARE

## 2021-08-04 VITALS
BODY MASS INDEX: 34 KG/M2 | WEIGHT: 231 LBS | DIASTOLIC BLOOD PRESSURE: 77 MMHG | HEART RATE: 75 BPM | SYSTOLIC BLOOD PRESSURE: 134 MMHG

## 2021-08-04 DIAGNOSIS — E11.65 UNCONTROLLED TYPE 2 DIABETES MELLITUS WITH HYPERGLYCEMIA (HCC): ICD-10-CM

## 2021-08-04 PROCEDURE — 99213 OFFICE O/P EST LOW 20 MIN: CPT | Performed by: INTERNAL MEDICINE

## 2021-08-04 RX ORDER — INSULIN ASPART 100 [IU]/ML
INJECTION, SOLUTION INTRAVENOUS; SUBCUTANEOUS
Qty: 20 ML | Refills: 2 | Status: SHIPPED | OUTPATIENT
Start: 2021-08-04

## 2021-08-04 RX ORDER — INSULIN GLARGINE 100 [IU]/ML
INJECTION, SOLUTION SUBCUTANEOUS
Qty: 30 ML | Refills: 2 | Status: SHIPPED | OUTPATIENT
Start: 2021-08-04 | End: 2021-10-25

## 2021-08-04 NOTE — PROGRESS NOTES
Name: Usha Calzada  Date: 8/4/2021    Referring Physician: No ref. provider found    HISTORY OF PRESENT ILLNESS   Usha Calzada is a 77year old male who presents for diabetes mellitus.       Prior HbA, C or glycohemoglobin were 9.8% 7/2014; 7.7% Disp: 90 tablet, Rfl: 0  •  insulin glargine (LANTUS) 100 UNIT/ML Subcutaneous Solution, INJECT 30 UNITS SUBCUTANEOUSLY NIGHTLY DISCARD  VIAL  28  DAYS  AFTER  OPENING, Disp: 30 mL, Rfl: 0  •  RELION INSULIN SYRINGE 31G X 15/64\" 0.5 ML Does not apply Misc other nostril 2-5 minutes after first dose., Disp: 1 kit, Rfl: 0  •  Insulin Syringe-Needle U-100 (RELION INSULIN SYRINGE) 31G X 15/64\" 0.5 ML Does not apply Misc, 1 Syringe by Does not apply route 4 (four) times daily. , Disp: 400 each, Rfl: 0  •  Blood G Spouse name: Not on file      Number of children: Not on file      Years of education: Not on file      Highest education level: Not on file    Tobacco Use      Smoking status: Former Smoker        Packs/day: 0.00        Quit date: 11/11/1989        Years well developed, in no acute distress  Eyes:  normal conjunctivae, sclera. , normal sclera and normal pupils  Ears/Nose/Mouth/Throat/Neck:  no palpable thyroid nodules or cervical lymphadenopathy  Back: no kyphosis or back tenderness  Musculoskeletal:  morales

## 2021-08-19 ENCOUNTER — TELEPHONE (OUTPATIENT)
Dept: ENDOCRINOLOGY CLINIC | Facility: CLINIC | Age: 66
End: 2021-08-19

## 2021-08-19 DIAGNOSIS — E11.65 UNCONTROLLED TYPE 2 DIABETES MELLITUS WITH HYPERGLYCEMIA (HCC): ICD-10-CM

## 2021-08-19 NOTE — TELEPHONE ENCOUNTER
Pharmacy refill request for - Clarification    Freestyle Lite Test strips  Use 1 strip to check glucose five times daily  QTY: 500    MESSAGE:   Please send new Rx. Need updated written rx per insurance. Also please include diagnosis code.

## 2021-08-23 ENCOUNTER — TELEPHONE (OUTPATIENT)
Dept: GASTROENTEROLOGY | Facility: CLINIC | Age: 66
End: 2021-08-23

## 2021-08-23 NOTE — H&P
[facility], [facility] - Gastroenterology                                                                                                               Reason for consult: crc screening    Requesting physician or provider: Jc Hugo MD    Patient presents w Meibomian gland dysfunction    • Osteoarthritis of spine with radiculopathy, cervical region 2/1/2018   • Pancreatitis 2012   • Primary osteoarthritis of both shoulders 2/1/2018   • Proteinuria    • Type II or unspecified type diabetes mellitus without men once daily 90 tablet 0   • RELION INSULIN SYRINGE 31G X 15/64\" 0.5 ML Does not apply Misc USE 1 SYRINGE 4 TIMES DAILY 400 each 0   • HYDROcodone-acetaminophen  MG Oral Tab Take 1 tablet by mouth every 6 (six) hours as needed for Pain.  90 tablet 0 External Solution Apply to ears daily and scalp daily as needed 120 mL 3   • Fluocinonide 0.05 % External Cream Apply daily as  Needed to ankle 30 g 2   • BD INSULIN SYRINGE U/F 31G X 5/16\" 0.5 ML Does not apply Misc Inject insulin 4 times per day as inst soft, non-tender, non-distended no rebound or guarding, no masses, no hepatomegaly  MSK: No redness, no warmth, no swelling of joints  SKIN: No jaundice, no erythema, no rashes  HEMATOLOGIC: No bleeding, no bruising  NEURO: Alert and interactive, normal ga cln polyps]    2.  bowel prep from pharmacy (single dose trilyte)    3. Contact Dr. Dinh Gimenez for recommendation on insulin prior to procedure  Hold iron 7 days prior to procedure    4. Read all bowel prep instructions carefully    5.  AVOID seeds, nuts,

## 2021-08-25 ENCOUNTER — TELEPHONE (OUTPATIENT)
Dept: NEPHROLOGY | Facility: CLINIC | Age: 66
End: 2021-08-25

## 2021-08-25 ENCOUNTER — OFFICE VISIT (OUTPATIENT)
Dept: GASTROENTEROLOGY | Facility: CLINIC | Age: 66
End: 2021-08-25
Payer: MEDICARE

## 2021-08-25 ENCOUNTER — TELEPHONE (OUTPATIENT)
Dept: GASTROENTEROLOGY | Facility: CLINIC | Age: 66
End: 2021-08-25

## 2021-08-25 ENCOUNTER — NURSE ONLY (OUTPATIENT)
Dept: NEPHROLOGY | Facility: CLINIC | Age: 66
End: 2021-08-25
Payer: MEDICARE

## 2021-08-25 VITALS
SYSTOLIC BLOOD PRESSURE: 111 MMHG | WEIGHT: 234.38 LBS | HEART RATE: 76 BPM | HEIGHT: 69 IN | BODY MASS INDEX: 34.71 KG/M2 | TEMPERATURE: 99 F | DIASTOLIC BLOOD PRESSURE: 73 MMHG

## 2021-08-25 VITALS — DIASTOLIC BLOOD PRESSURE: 68 MMHG | SYSTOLIC BLOOD PRESSURE: 122 MMHG

## 2021-08-25 DIAGNOSIS — E10.22 CKD STAGE 4 DUE TO TYPE 1 DIABETES MELLITUS (HCC): ICD-10-CM

## 2021-08-25 DIAGNOSIS — K21.9 GASTROESOPHAGEAL REFLUX DISEASE, UNSPECIFIED WHETHER ESOPHAGITIS PRESENT: ICD-10-CM

## 2021-08-25 DIAGNOSIS — N18.4 ANEMIA OF CHRONIC RENAL FAILURE, STAGE 4 (SEVERE) (HCC): ICD-10-CM

## 2021-08-25 DIAGNOSIS — Z86.010 PERSONAL HISTORY OF COLONIC POLYPS: ICD-10-CM

## 2021-08-25 DIAGNOSIS — N18.4 CKD STAGE 4 DUE TO TYPE 1 DIABETES MELLITUS (HCC): ICD-10-CM

## 2021-08-25 DIAGNOSIS — Z12.11 COLON CANCER SCREENING: Primary | ICD-10-CM

## 2021-08-25 DIAGNOSIS — K59.00 CONSTIPATION, UNSPECIFIED CONSTIPATION TYPE: ICD-10-CM

## 2021-08-25 DIAGNOSIS — D12.0 ADENOMATOUS POLYP OF CECUM: ICD-10-CM

## 2021-08-25 DIAGNOSIS — N18.4 CHRONIC KIDNEY DISEASE, STAGE IV (SEVERE) (HCC): Primary | ICD-10-CM

## 2021-08-25 DIAGNOSIS — D63.1 ANEMIA OF CHRONIC RENAL FAILURE, STAGE 4 (SEVERE) (HCC): ICD-10-CM

## 2021-08-25 PROCEDURE — 99214 OFFICE O/P EST MOD 30 MIN: CPT | Performed by: NURSE PRACTITIONER

## 2021-08-25 PROCEDURE — 96372 THER/PROPH/DIAG INJ SC/IM: CPT | Performed by: INTERNAL MEDICINE

## 2021-08-25 RX ORDER — BLOOD-GLUCOSE METER
KIT MISCELLANEOUS
Qty: 500 STRIP | Refills: 1 | Status: SHIPPED | OUTPATIENT
Start: 2021-08-25 | End: 2021-09-01

## 2021-08-25 RX ORDER — POLYETHYLENE GLYCOL 3350, SODIUM CHLORIDE, SODIUM BICARBONATE, POTASSIUM CHLORIDE 420; 11.2; 5.72; 1.48 G/4L; G/4L; G/4L; G/4L
POWDER, FOR SOLUTION ORAL
Qty: 4000 ML | Refills: 0 | Status: SHIPPED | OUTPATIENT
Start: 2021-08-25

## 2021-08-25 NOTE — TELEPHONE ENCOUNTER
Patient had Aranesp injection this morning and asking when he needs to do labs? Last labs were done on 7/26/21. Encounter routed to Dr. Jing Leyva.

## 2021-08-25 NOTE — PROGRESS NOTES
See comment. Patient was identified by full name and date of birth. Aranesp 200 mcg subcutaneous given in left upper arm per written order. Tolerated injection and left in good condition.

## 2021-08-25 NOTE — TELEPHONE ENCOUNTER
GI RN     Please contact Dr Betzaida Patiño for Insulin orders procedure is scheduled on 9/30/21 at 7:30am at [facility]    Thank you

## 2021-08-25 NOTE — TELEPHONE ENCOUNTER
Dr. Jeffy Baugh,     Please advise on insulin adjustment orders based on the following diet modifications prior to procedure:    Day before the procedure, patient will be on clear liquid diet only after breakfast.    Scheduled for colonoscopy with Dr. Bobby Retana

## 2021-08-25 NOTE — TELEPHONE ENCOUNTER
Scheduled for:  Colonoscopy [address]  Provider Name:  Dr Noah Ramos  Date:  9/30/21  Location:  [facility]  Sedation:  MAC  Time:  7:30am (pt is aware to arrive at 6:30am)  Prep:  Trilyte  Meds/Allergies Reconciled?:  Bushra/NP reviewed.    Diagnosis with codes:  Colon

## 2021-08-25 NOTE — PATIENT INSTRUCTIONS
-labs first  -pepcid for acid reflux  -reflux diet modification  -fiber supplement  1. Schedule colonoscopy with MAC w/ Dr. Vic Perdue [Diagnosis: crc screening, h/o cln polyps]    2.  bowel prep from pharmacy (single dose trilyte)    3.  Contact Dr. Dejah Osborne on 6/1/2019  © 2709-1466 [employer] [address]. [address] [facility] [place]. All rights reserved. This information is not intended as a substitute for professional medical care. Always follow your healthcare professional's instructions.

## 2021-08-25 NOTE — TELEPHONE ENCOUNTER
Dr. Khari Lozano,  Patient has colonscopy 9/30/21 - Day before the procedure, patient will be on clear liquid diet only after breakfast.    DM regimen per LOV 8/4/21:  Lantus 30 units daily  Humalog 6-6-8    Please advise-thanks

## 2021-08-26 NOTE — TELEPHONE ENCOUNTER
Patient contacted and message from [employer] given. Patient states he will get blood test done tomorrow.

## 2021-08-26 NOTE — TELEPHONE ENCOUNTER
On day of prep decrease Lantus to 18 units subcutaneous daily. Hold Humalog doses. No insulin in the morning prior to procedure. Thanks. Jewell PGY3: Initial ct ordered noncon to optimize stone [place] given history, outpatient urinalysis showing blood. Normal CT (non con), normal Urine (no hematuria), ultrasound obtained and no visualized ovary on right. Patient remains tender on right lower quadrant and right cva and uncomfortable. Discussed case with Radiology team. Will obtain CTA abdomen for embolic pathology given history of atrial fibrillation. CTA unremarkable, patient tolerating PO, will give GI follow for unknown etiology of abdominal pain. -Sarath DO Jewell PGY3: Initial ct ordered noncon to optimize stone hunt given history, outpatient urinalysis showing blood. Normal CT (non con), normal Urine (no hematuria), ultrasound obtained and no visualized ovary on right. Patient remains tender on right lower quadrant and right cva and uncomfortable. Discussed case with Radiology team. Will obtain CTA abdomen for embolic pathology given history of atrial fibrillation and not on AC.

## 2021-08-26 NOTE — TELEPHONE ENCOUNTER
Nursing:  crt 3.11 on last labs (7/2021). Pt instructed to have bmp at tov. If crt above 2 would not recommend colonoscopy until renal function improved. Please let pt know.   Thanks,  [place]

## 2021-08-26 NOTE — TELEPHONE ENCOUNTER
Patient contacted,  verified  and message from Dr. Abdoul Farmer given. Patient states he is scheduled for an iron injection on  with Dr. Riaz Martinez. Patient asking if he needs to reschedule his iron injection.   Colonoscopy scheduled for 2021  Please a

## 2021-08-27 ENCOUNTER — LAB ENCOUNTER (OUTPATIENT)
Dept: LAB | Age: 66
End: 2021-08-27
Attending: NURSE PRACTITIONER
Payer: MEDICARE

## 2021-08-27 DIAGNOSIS — N18.4 CKD STAGE 4 DUE TO TYPE 1 DIABETES MELLITUS (HCC): ICD-10-CM

## 2021-08-27 DIAGNOSIS — E10.22 CKD STAGE 4 DUE TO TYPE 1 DIABETES MELLITUS (HCC): ICD-10-CM

## 2021-08-27 LAB
ANION GAP SERPL CALC-SCNC: 4 MMOL/L (ref 0–18)
BUN BLD-MCNC: 52 MG/DL (ref 7–18)
BUN/CREAT SERPL: 18.8 (ref 10–20)
CALCIUM BLD-MCNC: 8.4 MG/DL (ref 8.5–10.1)
CHLORIDE SERPL-SCNC: 116 MMOL/L (ref 98–112)
CO2 SERPL-SCNC: 24 MMOL/L (ref 21–32)
CREAT BLD-MCNC: 2.77 MG/DL
GLUCOSE BLD-MCNC: 120 MG/DL (ref 70–99)
OSMOLALITY SERPL CALC.SUM OF ELEC: 313 MOSM/KG (ref 275–295)
PATIENT FASTING Y/N/NP: NO
POTASSIUM SERPL-SCNC: 4.9 MMOL/L (ref 3.5–5.1)
SODIUM SERPL-SCNC: 144 MMOL/L (ref 136–145)

## 2021-08-27 PROCEDURE — 36415 COLL VENOUS BLD VENIPUNCTURE: CPT

## 2021-08-27 PROCEDURE — 80048 BASIC METABOLIC PNL TOTAL CA: CPT

## 2021-08-30 ENCOUNTER — TELEPHONE (OUTPATIENT)
Dept: ENDOCRINOLOGY CLINIC | Facility: CLINIC | Age: 66
End: 2021-08-30

## 2021-08-30 NOTE — TELEPHONE ENCOUNTER
Received fax from [address] [place] [address]. They are requesting we fax most recent chart notes as evidence that patient is being see for diabetes. Completed and faxed back.

## 2021-08-31 ENCOUNTER — TELEPHONE (OUTPATIENT)
Dept: GASTROENTEROLOGY | Facility: CLINIC | Age: 66
End: 2021-08-31

## 2021-08-31 DIAGNOSIS — Z12.11 SCREEN FOR COLON CANCER: Primary | ICD-10-CM

## 2021-08-31 DIAGNOSIS — Z86.010 HISTORY OF COLON POLYPS: ICD-10-CM

## 2021-08-31 NOTE — TELEPHONE ENCOUNTER
crt 2.77 and stable from comparison. Recommend he see Dr. Andrew Zimmerman prior to cln to determine safety of prep/procedure    cln 9/30 cancelled and pt and wife aware.

## 2021-09-01 RX ORDER — BLOOD-GLUCOSE METER
KIT MISCELLANEOUS
Qty: 400 STRIP | Refills: 1 | Status: SHIPPED | OUTPATIENT
Start: 2021-09-01 | End: 2021-09-02

## 2021-09-01 NOTE — TELEPHONE ENCOUNTER
Spoke to patient's wife -she states insurance will only cover 400 test strips     RX resent for 400 test strips with DX code

## 2021-09-02 RX ORDER — BLOOD-GLUCOSE METER
KIT MISCELLANEOUS
Qty: 400 STRIP | Refills: 1 | COMMUNITY
Start: 2021-09-02

## 2021-09-02 NOTE — TELEPHONE ENCOUNTER
rn called [facility] pharmacy states they faxed cmn form and need it back with testing frequency.    Form found and [facility] needs documentation  /clinical notes with information and diagnosis , regimen, etch  Last office notes faxed to [phone]  [place]

## 2021-09-08 NOTE — TELEPHONE ENCOUNTER
Per Bushra's note below, colonoscopy cancelled from appt desk. Surgical case cancellation sent to endo.

## 2021-09-16 NOTE — TELEPHONE ENCOUNTER
Received second fax from [facility] [facility] also requesting chart notes from past 6 months. Faxed back requested information.

## 2021-09-17 NOTE — TELEPHONE ENCOUNTER
•  RELION INSULIN SYRINGE 31G X 15/64\" 0.5 ML Does not apply Misc, USE 1 SYRINGE 4 TIMES DAILY, Disp: 400 each, Rfl: 0

## 2021-09-20 RX ORDER — SYRINGE AND NEEDLE,INSULIN,1ML 31GX15/64"
1 SYRINGE, EMPTY DISPOSABLE MISCELLANEOUS 4 TIMES DAILY
Qty: 400 EACH | Refills: 0 | Status: SHIPPED | OUTPATIENT
Start: 2021-09-20

## 2021-09-20 RX ORDER — SYRINGE AND NEEDLE,INSULIN,1ML 31GX15/64"
1 SYRINGE, EMPTY DISPOSABLE MISCELLANEOUS 4 TIMES DAILY
Qty: 400 EACH | Refills: 1 | Status: SHIPPED | OUTPATIENT
Start: 2021-09-20

## 2021-09-24 ENCOUNTER — TELEPHONE (OUTPATIENT)
Dept: NEPHROLOGY | Facility: CLINIC | Age: 66
End: 2021-09-24

## 2021-09-24 DIAGNOSIS — N18.4 CHRONIC KIDNEY DISEASE, STAGE IV (SEVERE) (HCC): Primary | ICD-10-CM

## 2021-09-24 NOTE — TELEPHONE ENCOUNTER
Pt inquiring if labs are needed prior to appt scheduled for Monday and if fasting is needed.  Please call [phone](QA states it is ok to leave a detailed message)

## 2021-09-25 ENCOUNTER — LAB ENCOUNTER (OUTPATIENT)
Dept: LAB | Age: 66
End: 2021-09-25
Attending: INTERNAL MEDICINE
Payer: MEDICARE

## 2021-09-25 DIAGNOSIS — N18.4 CHRONIC KIDNEY DISEASE, STAGE IV (SEVERE) (HCC): ICD-10-CM

## 2021-09-25 LAB
ANION GAP SERPL CALC-SCNC: 4 MMOL/L (ref 0–18)
BASOPHILS # BLD AUTO: 0.01 X10(3) UL (ref 0–0.2)
BASOPHILS NFR BLD AUTO: 0.3 %
BUN BLD-MCNC: 46 MG/DL (ref 7–18)
BUN/CREAT SERPL: 17.1 (ref 10–20)
CALCIUM BLD-MCNC: 8.7 MG/DL (ref 8.5–10.1)
CHLORIDE SERPL-SCNC: 114 MMOL/L (ref 98–112)
CO2 SERPL-SCNC: 24 MMOL/L (ref 21–32)
CREAT BLD-MCNC: 2.69 MG/DL
DEPRECATED RDW RBC AUTO: 55.3 FL (ref 35.1–46.3)
EOSINOPHIL # BLD AUTO: 0.09 X10(3) UL (ref 0–0.7)
EOSINOPHIL NFR BLD AUTO: 2.8 %
ERYTHROCYTE [DISTWIDTH] IN BLOOD BY AUTOMATED COUNT: 14.2 % (ref 11–15)
GLUCOSE BLD-MCNC: 165 MG/DL (ref 70–99)
HCT VFR BLD AUTO: 28.8 %
HGB BLD-MCNC: 9.3 G/DL
IMM GRANULOCYTES # BLD AUTO: 0.01 X10(3) UL (ref 0–1)
IMM GRANULOCYTES NFR BLD: 0.3 %
LYMPHOCYTES # BLD AUTO: 1.01 X10(3) UL (ref 1–4)
LYMPHOCYTES NFR BLD AUTO: 31.1 %
MCH RBC QN AUTO: 34.1 PG (ref 26–34)
MCHC RBC AUTO-ENTMCNC: 32.3 G/DL (ref 31–37)
MCV RBC AUTO: 105.5 FL
MONOCYTES # BLD AUTO: 0.36 X10(3) UL (ref 0.1–1)
MONOCYTES NFR BLD AUTO: 11.1 %
NEUTROPHILS # BLD AUTO: 1.77 X10 (3) UL (ref 1.5–7.7)
NEUTROPHILS # BLD AUTO: 1.77 X10(3) UL (ref 1.5–7.7)
NEUTROPHILS NFR BLD AUTO: 54.4 %
OSMOLALITY SERPL CALC.SUM OF ELEC: 310 MOSM/KG (ref 275–295)
PATIENT FASTING Y/N/NP: NO
PLATELET # BLD AUTO: 103 10(3)UL (ref 150–450)
POTASSIUM SERPL-SCNC: 4.7 MMOL/L (ref 3.5–5.1)
RBC # BLD AUTO: 2.73 X10(6)UL
SODIUM SERPL-SCNC: 142 MMOL/L (ref 136–145)
WBC # BLD AUTO: 3.3 X10(3) UL (ref 4–11)

## 2021-09-25 PROCEDURE — 80048 BASIC METABOLIC PNL TOTAL CA: CPT

## 2021-09-25 PROCEDURE — 36415 COLL VENOUS BLD VENIPUNCTURE: CPT

## 2021-09-25 PROCEDURE — 85025 COMPLETE CBC W/AUTO DIFF WBC: CPT

## 2021-09-27 ENCOUNTER — TELEPHONE (OUTPATIENT)
Dept: GASTROENTEROLOGY | Facility: CLINIC | Age: 66
End: 2021-09-27

## 2021-09-27 ENCOUNTER — OFFICE VISIT (OUTPATIENT)
Dept: NEPHROLOGY | Facility: CLINIC | Age: 66
End: 2021-09-27
Payer: MEDICARE

## 2021-09-27 VITALS
TEMPERATURE: 98 F | BODY MASS INDEX: 34.66 KG/M2 | DIASTOLIC BLOOD PRESSURE: 62 MMHG | HEART RATE: 76 BPM | WEIGHT: 234 LBS | HEIGHT: 69 IN | SYSTOLIC BLOOD PRESSURE: 103 MMHG

## 2021-09-27 DIAGNOSIS — D63.1 ANEMIA IN STAGE 3B CHRONIC KIDNEY DISEASE (HCC): Primary | ICD-10-CM

## 2021-09-27 DIAGNOSIS — N18.32 ANEMIA IN STAGE 3B CHRONIC KIDNEY DISEASE (HCC): Primary | ICD-10-CM

## 2021-09-27 DIAGNOSIS — E08.42 DIABETIC POLYNEUROPATHY ASSOCIATED WITH DIABETES MELLITUS DUE TO UNDERLYING CONDITION (HCC): ICD-10-CM

## 2021-09-27 DIAGNOSIS — E11.3293 TYPE 2 DIABETES MELLITUS WITH BOTH EYES AFFECTED BY MILD NONPROLIFERATIVE RETINOPATHY WITHOUT MACULAR EDEMA, WITH LONG-TERM CURRENT USE OF INSULIN (HCC): ICD-10-CM

## 2021-09-27 DIAGNOSIS — N18.4 CKD STAGE 4 DUE TO TYPE 1 DIABETES MELLITUS (HCC): ICD-10-CM

## 2021-09-27 DIAGNOSIS — Z79.4 TYPE 2 DIABETES MELLITUS WITH BOTH EYES AFFECTED BY MILD NONPROLIFERATIVE RETINOPATHY WITHOUT MACULAR EDEMA, WITH LONG-TERM CURRENT USE OF INSULIN (HCC): ICD-10-CM

## 2021-09-27 DIAGNOSIS — E10.22 CKD STAGE 4 DUE TO TYPE 1 DIABETES MELLITUS (HCC): ICD-10-CM

## 2021-09-27 PROCEDURE — 99214 OFFICE O/P EST MOD 30 MIN: CPT | Performed by: INTERNAL MEDICINE

## 2021-09-27 PROCEDURE — 96372 THER/PROPH/DIAG INJ SC/IM: CPT | Performed by: INTERNAL MEDICINE

## 2021-09-27 RX ORDER — AMITRIPTYLINE HYDROCHLORIDE 25 MG/1
25 TABLET, FILM COATED ORAL NIGHTLY
Qty: 30 TABLET | Refills: 2 | Status: SHIPPED | OUTPATIENT
Start: 2021-09-27

## 2021-09-27 RX ORDER — HYDROCODONE BITARTRATE AND ACETAMINOPHEN 10; 325 MG/1; MG/1
1 TABLET ORAL EVERY 8 HOURS PRN
Qty: 90 TABLET | Refills: 0 | Status: SHIPPED | OUTPATIENT
Start: 2021-10-27 | End: 2021-11-29

## 2021-09-27 RX ORDER — HYDROCODONE BITARTRATE AND ACETAMINOPHEN 10; 325 MG/1; MG/1
1 TABLET ORAL EVERY 8 HOURS PRN
Qty: 90 TABLET | Refills: 0 | Status: SHIPPED | OUTPATIENT
Start: 2021-09-27 | End: 2021-09-27

## 2021-09-27 NOTE — PATIENT INSTRUCTIONS
Please get Covid booster    Do flu shot about 2 weeks later    Shingles vaccine at pharmacy due in November and February      Consider amitriptyline 25 mg at bedtime for sleep and neuropathy  Karan Long will call you with okay for colonoscopy    Please do la

## 2021-09-27 NOTE — PROGRESS NOTES
Progress Note     Tahira Moya    Is here doing okay but his neuropathy is worsening he is already on gabapentin and [place] I refilled his Norco for 2 months I suggested trying amitriptyline 25 mg at night gave him a prescription he is going to th 31.8      Smokeless tobacco: Former User      Tobacco comment: quit about 30 years ago.     Alcohol use: No        Medications (Active prior to today's visit):  Current Outpatient Medications   Medication Sig Dispense Refill   • amitriptyline 25 MG Oral Tab chest nightly 60 g 3   • Ketoconazole 2 % External Shampoo Apply to scalp 2 times per week. 120 mL 11   • Naloxone HCl 4 MG/0.1ML Nasal Liquid 4 mg by Nasal route as needed.  If patient remains unresponsive, repeat dose in other nostril 2-5 minutes after fi 9/27/2021) 90 tablet 0   • HYDROcodone-acetaminophen  MG Oral Tab Take 1 tablet by mouth every 8 (eight) hours as needed for Pain.  (Patient not taking: Reported on 9/27/2021) 90 tablet 0   • tacrolimus (PROTOPIC) 0.1 % External Ointment Apply 1 Appli deformities  Extremities: Decreased sensation in toes no vascular changes  Neurological:  Grossly normal       ASSESSMENT/PLAN:   Assessment   Anemia in stage 3b chronic kidney disease (hcc)  (primary encounter diagnosis)  Ckd stage 4 due to type 1 diabete

## 2021-09-27 NOTE — PROGRESS NOTES
Patient presents to clinic today for Aranesp injection. On 9/25/21, Hgb was 9.3 and Hct 28.8. BP today is 103/62. Aranesp 200 mcg administered to left upper arm Subcutaneous without complications. Patient tolerated injection well.  Patient will RTC in 4 wee

## 2021-09-27 NOTE — TELEPHONE ENCOUNTER
Case discussed with Dr. Alexander Santiago and will plan for cln for crc screening. 1. Schedule colonoscopy with MAC w/ Dr. Eliana Martinez [Diagnosis: crc screening, h/o cln polyps]     2.  bowel prep from pharmacy (single dose trilyte)     3.  Contact Dr. Khari Lozano for re

## 2021-10-04 DIAGNOSIS — E11.65 UNCONTROLLED TYPE 2 DIABETES MELLITUS WITH HYPERGLYCEMIA (HCC): ICD-10-CM

## 2021-10-04 RX ORDER — ATORVASTATIN CALCIUM 40 MG/1
TABLET, FILM COATED ORAL
Qty: 90 TABLET | Refills: 0 | Status: SHIPPED | OUTPATIENT
Start: 2021-10-04 | End: 2021-12-29

## 2021-10-14 NOTE — TELEPHONE ENCOUNTER
Spoke with patient to get colonoscopy rescheduled. He is out of town right now and won't be back until this weekend. He stated he will call our office back when he is available.       Isaak Cortes -    Patient was wondering if he would be able to do Miralax/Gat

## 2021-10-21 ENCOUNTER — TELEPHONE (OUTPATIENT)
Dept: INTERNAL MEDICINE CLINIC | Facility: CLINIC | Age: 66
End: 2021-10-21

## 2021-10-21 NOTE — TELEPHONE ENCOUNTER
Wife states patient woke up with the need to \"go 1 and 2\" this morning with a small amount of loose stool. Wife states patient complaining due to lower abdominal pain with the need to move more of his stools.     Asked if patient was perhaps constipated

## 2021-10-22 ENCOUNTER — OFFICE VISIT (OUTPATIENT)
Dept: INTERNAL MEDICINE CLINIC | Facility: CLINIC | Age: 66
End: 2021-10-22
Payer: MEDICARE

## 2021-10-22 ENCOUNTER — LAB ENCOUNTER (OUTPATIENT)
Dept: LAB | Age: 66
End: 2021-10-22
Attending: INTERNAL MEDICINE
Payer: MEDICARE

## 2021-10-22 VITALS
WEIGHT: 230.19 LBS | DIASTOLIC BLOOD PRESSURE: 72 MMHG | HEIGHT: 69 IN | BODY MASS INDEX: 34.09 KG/M2 | SYSTOLIC BLOOD PRESSURE: 117 MMHG | HEART RATE: 69 BPM | TEMPERATURE: 98 F

## 2021-10-22 DIAGNOSIS — K59.03 DRUG INDUCED CONSTIPATION: Primary | ICD-10-CM

## 2021-10-22 PROCEDURE — 99213 OFFICE O/P EST LOW 20 MIN: CPT | Performed by: INTERNAL MEDICINE

## 2021-10-22 RX ORDER — SENNOSIDES 8.6 MG
1 CAPSULE ORAL
Qty: 90 CAPSULE | Refills: 3 | Status: SHIPPED | OUTPATIENT
Start: 2021-10-22 | End: 2021-10-29

## 2021-10-22 NOTE — PROGRESS NOTES
h  History of Present Illness   Patient ID: Sandrine Cardenas is a 74year old male.   Today's Date: 10/22/21  Chief Complaint: Abdominal Pain    Violet White RN     CD    10/21/21 12:51 PM  Note  Wife states patient woke up with the need to \"go 1 and 2\ reasons: The valid total cholesterol range is 130 to 320 mg/dL    Physical Exam  Vitals and nursing note reviewed. Constitutional:       General: He is not in acute distress. Appearance: Normal appearance. HENT:      Head: Normocephalic.       Ri TABLET BY MOUTH ONCE DAILY IN THE MORNING BEFORE BREAKFAST 90 tablet 1   • Tazarotene 0.1 % External Cream Apply to chest nightly 60 g 3   • Ketoconazole 2 % External Shampoo Apply to scalp 2 times per week.  120 mL 11   • Cyanocobalamin (VITAMIN B-12) 5000 PLUS) In Vitro Strip Use to check blood sugars 3 times a day. 300 strip 1   • Accu-Chek Soft Touch Lancets Does not apply Misc Use to check blood sugars 3 times a day.  300 each 1   • Mometasone Furoate 0.1 % External Solution Apply to ears daily and scalp [facility])       Reviewed Social History:  Social History    Tobacco Use      Smoking status: Former Smoker        Packs/day: 0.00        Quit date: 1989        Years since quittin.9      Smokeless tobacco: Former User      Tobacco comment: quit abou

## 2021-10-23 ENCOUNTER — TELEPHONE (OUTPATIENT)
Dept: INTERNAL MEDICINE CLINIC | Facility: CLINIC | Age: 66
End: 2021-10-23

## 2021-10-23 ENCOUNTER — LAB ENCOUNTER (OUTPATIENT)
Dept: LAB | Age: 66
End: 2021-10-23
Attending: INTERNAL MEDICINE
Payer: MEDICARE

## 2021-10-23 DIAGNOSIS — E10.22 CKD STAGE 4 DUE TO TYPE 1 DIABETES MELLITUS (HCC): ICD-10-CM

## 2021-10-23 DIAGNOSIS — E11.3293 TYPE 2 DIABETES MELLITUS WITH BOTH EYES AFFECTED BY MILD NONPROLIFERATIVE RETINOPATHY WITHOUT MACULAR EDEMA, WITH LONG-TERM CURRENT USE OF INSULIN (HCC): ICD-10-CM

## 2021-10-23 DIAGNOSIS — D63.1 ANEMIA IN STAGE 3B CHRONIC KIDNEY DISEASE (HCC): ICD-10-CM

## 2021-10-23 DIAGNOSIS — N18.32 ANEMIA IN STAGE 3B CHRONIC KIDNEY DISEASE (HCC): ICD-10-CM

## 2021-10-23 DIAGNOSIS — N18.4 CKD STAGE 4 DUE TO TYPE 1 DIABETES MELLITUS (HCC): ICD-10-CM

## 2021-10-23 DIAGNOSIS — Z79.4 TYPE 2 DIABETES MELLITUS WITH BOTH EYES AFFECTED BY MILD NONPROLIFERATIVE RETINOPATHY WITHOUT MACULAR EDEMA, WITH LONG-TERM CURRENT USE OF INSULIN (HCC): ICD-10-CM

## 2021-10-23 DIAGNOSIS — E08.42 DIABETIC POLYNEUROPATHY ASSOCIATED WITH DIABETES MELLITUS DUE TO UNDERLYING CONDITION (HCC): ICD-10-CM

## 2021-10-23 PROCEDURE — 84466 ASSAY OF TRANSFERRIN: CPT

## 2021-10-23 PROCEDURE — 82607 VITAMIN B-12: CPT

## 2021-10-23 PROCEDURE — 85025 COMPLETE CBC W/AUTO DIFF WBC: CPT

## 2021-10-23 PROCEDURE — 36415 COLL VENOUS BLD VENIPUNCTURE: CPT

## 2021-10-23 PROCEDURE — 80069 RENAL FUNCTION PANEL: CPT

## 2021-10-23 PROCEDURE — 83540 ASSAY OF IRON: CPT

## 2021-10-23 NOTE — TELEPHONE ENCOUNTER
Received notice of denial medicare part D prescription drug coverage from [place] CASTILLO,   senna 8.6 mg softgel not covered.      MD is aware, this is OTC med should get OTC

## 2021-10-25 DIAGNOSIS — E11.65 UNCONTROLLED TYPE 2 DIABETES MELLITUS WITH HYPERGLYCEMIA (HCC): ICD-10-CM

## 2021-10-25 RX ORDER — INSULIN GLARGINE 100 [IU]/ML
INJECTION, SOLUTION SUBCUTANEOUS
Qty: 30 ML | Refills: 0 | Status: SHIPPED | OUTPATIENT
Start: 2021-10-25 | End: 2021-12-29

## 2021-10-26 ENCOUNTER — NURSE ONLY (OUTPATIENT)
Dept: NEPHROLOGY | Facility: CLINIC | Age: 66
End: 2021-10-26
Payer: MEDICARE

## 2021-10-26 ENCOUNTER — TELEPHONE (OUTPATIENT)
Dept: NEPHROLOGY | Facility: CLINIC | Age: 66
End: 2021-10-26

## 2021-10-26 VITALS — HEART RATE: 82 BPM | DIASTOLIC BLOOD PRESSURE: 64 MMHG | SYSTOLIC BLOOD PRESSURE: 115 MMHG

## 2021-10-26 DIAGNOSIS — N18.32 ANEMIA IN STAGE 3B CHRONIC KIDNEY DISEASE (HCC): Primary | ICD-10-CM

## 2021-10-26 DIAGNOSIS — D63.1 ANEMIA IN STAGE 3B CHRONIC KIDNEY DISEASE (HCC): Primary | ICD-10-CM

## 2021-10-26 PROCEDURE — 96372 THER/PROPH/DIAG INJ SC/IM: CPT | Performed by: INTERNAL MEDICINE

## 2021-10-26 NOTE — TELEPHONE ENCOUNTER
Please let patient know labs are stable continue Aranesp at same dose       From Dr. Keanu Avila test result message.

## 2021-10-26 NOTE — PROGRESS NOTES
Patient presents to clinic today for Aranesp injection. On 10/23, Hgb was 10.0 and Hct 30.7. BP today is 115/64. Aranesp 200 mcg administered to left upper arm Subcutaneous without complications. Patient tolerated injection well.  Patient will RTC in 4 week

## 2021-11-16 DIAGNOSIS — E11.65 UNCONTROLLED TYPE 2 DIABETES MELLITUS WITH HYPERGLYCEMIA (HCC): ICD-10-CM

## 2021-11-16 RX ORDER — FENOFIBRATE 48 MG/1
48 TABLET, COATED ORAL DAILY
Qty: 90 TABLET | Refills: 0 | Status: SHIPPED | OUTPATIENT
Start: 2021-11-16

## 2021-11-23 RX ORDER — PANTOPRAZOLE SODIUM 40 MG/1
TABLET, DELAYED RELEASE ORAL
Qty: 90 TABLET | Refills: 0 | Status: SHIPPED | OUTPATIENT
Start: 2021-11-23

## 2021-11-29 ENCOUNTER — OFFICE VISIT (OUTPATIENT)
Dept: NEPHROLOGY | Facility: CLINIC | Age: 66
End: 2021-11-29
Payer: MEDICARE

## 2021-11-29 ENCOUNTER — LAB ENCOUNTER (OUTPATIENT)
Dept: LAB | Age: 66
End: 2021-11-29
Attending: INTERNAL MEDICINE
Payer: MEDICARE

## 2021-11-29 ENCOUNTER — TELEPHONE (OUTPATIENT)
Dept: NEPHROLOGY | Facility: CLINIC | Age: 66
End: 2021-11-29

## 2021-11-29 VITALS
BODY MASS INDEX: 35.1 KG/M2 | HEIGHT: 69 IN | HEART RATE: 84 BPM | WEIGHT: 237 LBS | SYSTOLIC BLOOD PRESSURE: 109 MMHG | DIASTOLIC BLOOD PRESSURE: 67 MMHG

## 2021-11-29 DIAGNOSIS — E10.22 CKD STAGE 4 DUE TO TYPE 1 DIABETES MELLITUS (HCC): ICD-10-CM

## 2021-11-29 DIAGNOSIS — N18.4 CKD STAGE 4 DUE TO TYPE 1 DIABETES MELLITUS (HCC): ICD-10-CM

## 2021-11-29 DIAGNOSIS — N18.4 CKD STAGE 4 DUE TO TYPE 1 DIABETES MELLITUS (HCC): Primary | ICD-10-CM

## 2021-11-29 DIAGNOSIS — Z79.4 TYPE 2 DIABETES MELLITUS WITH BOTH EYES AFFECTED BY MILD NONPROLIFERATIVE RETINOPATHY WITHOUT MACULAR EDEMA, WITH LONG-TERM CURRENT USE OF INSULIN (HCC): Primary | ICD-10-CM

## 2021-11-29 DIAGNOSIS — E10.22 CKD STAGE 4 DUE TO TYPE 1 DIABETES MELLITUS (HCC): Primary | ICD-10-CM

## 2021-11-29 DIAGNOSIS — E11.3293 TYPE 2 DIABETES MELLITUS WITH BOTH EYES AFFECTED BY MILD NONPROLIFERATIVE RETINOPATHY WITHOUT MACULAR EDEMA, WITH LONG-TERM CURRENT USE OF INSULIN (HCC): Primary | ICD-10-CM

## 2021-11-29 PROBLEM — E66.01 MORBID (SEVERE) OBESITY DUE TO EXCESS CALORIES (HCC): Status: ACTIVE | Noted: 2021-11-29

## 2021-11-29 PROCEDURE — 36415 COLL VENOUS BLD VENIPUNCTURE: CPT

## 2021-11-29 PROCEDURE — 96372 THER/PROPH/DIAG INJ SC/IM: CPT | Performed by: INTERNAL MEDICINE

## 2021-11-29 PROCEDURE — 80069 RENAL FUNCTION PANEL: CPT

## 2021-11-29 PROCEDURE — 85025 COMPLETE CBC W/AUTO DIFF WBC: CPT

## 2021-11-29 PROCEDURE — 99214 OFFICE O/P EST MOD 30 MIN: CPT | Performed by: INTERNAL MEDICINE

## 2021-11-29 RX ORDER — PREGABALIN 300 MG/1
300 CAPSULE ORAL DAILY
Qty: 90 CAPSULE | Refills: 3 | Status: SHIPPED | OUTPATIENT
Start: 2021-11-29

## 2021-11-29 RX ORDER — HYDROCODONE BITARTRATE AND ACETAMINOPHEN 10; 325 MG/1; MG/1
1 TABLET ORAL EVERY 8 HOURS PRN
Qty: 90 TABLET | Refills: 0 | Status: SHIPPED | OUTPATIENT
Start: 2022-01-10 | End: 2021-11-29

## 2021-11-29 RX ORDER — HYDROCODONE BITARTRATE AND ACETAMINOPHEN 10; 325 MG/1; MG/1
1 TABLET ORAL EVERY 8 HOURS PRN
Qty: 90 TABLET | Refills: 0 | Status: SHIPPED | OUTPATIENT
Start: 2022-02-10

## 2021-11-29 RX ORDER — HYDROCODONE BITARTRATE AND ACETAMINOPHEN 10; 325 MG/1; MG/1
1 TABLET ORAL EVERY 8 HOURS PRN
Qty: 90 TABLET | Refills: 0 | Status: SHIPPED | OUTPATIENT
Start: 2021-12-10 | End: 2021-11-29

## 2021-11-29 NOTE — PATIENT INSTRUCTIONS
I am increasing dose of arenasp     RX    Do labs eight weeks    See me twelve weeks    Watch potassium in diet    Oranges.  Bananas  Avocado    Happy holidays to you both

## 2021-11-29 NOTE — PROGRESS NOTES
Patient presents to clinic today for Aranesp injection. On 11/29, Hgb was 9.8 and Hct 30.7. BP today is 109/67. Aranesp  200 mcg administered to left upper arm Subcutaneous without complications. Patient tolerated injection well.  Patient will RTC in 4 week

## 2021-11-29 NOTE — TELEPHONE ENCOUNTER
Wife requesting orders for labs. Pt has appt scheduled for today at 3pm  for an iron infusion.  Please call [phone]

## 2021-11-30 ENCOUNTER — PATIENT OUTREACH (OUTPATIENT)
Dept: CASE MANAGEMENT | Age: 66
End: 2021-11-30

## 2021-11-30 NOTE — PROGRESS NOTES
Progress Note     Bernie Tamiramor    Is here and doing fine his ABG was just raised up to 200 mcg as his hemoglobin is 9.8.   Pressure is good at 109/67 this his diabetes is running between 110 and 150 he is currently on insulin Lyrica Norco 1 to twi Medications   Medication Sig Dispense Refill   • pregabalin 300 MG Oral Cap Take 1 capsule (300 mg total) by mouth daily.  90 capsule 3   • [START ON 2/10/2022] HYDROcodone-acetaminophen  MG Oral Tab Take 1 tablet by mouth every 8 (eight) hours as nee Use as directed to check blood sugars. 1 kit 0   • Glucose Blood (ACCU-CHEK INÉS PLUS) In Vitro Strip Use to check blood sugars 3 times a day. 300 strip 1   • Accu-Chek Soft Touch Lancets Does not apply Misc Use to check blood sugars 3 times a day.  300 ea irritability and lethargy  ENMT:  Negative for ear drainage, hearing loss and nasal drainage  Eyes:  Negative for eye discharge and vision loss  Cardiovascular:  Negative for chest pain, sob  Respiratory:  Negative for cough, dyspnea and wheezing  Madai Lieu  about watching potassium creatinine 2.69 which is stable  GFR of 20 3 repeat labs in 1 month    Renal function is actually better than 6 months ago    Reviewed all his questions spent 40 minutes with patient and wife  Orders This Visit:  No orders

## 2021-12-03 ENCOUNTER — OFFICE VISIT (OUTPATIENT)
Dept: INTERNAL MEDICINE CLINIC | Facility: CLINIC | Age: 66
End: 2021-12-03
Payer: MEDICARE

## 2021-12-03 VITALS
SYSTOLIC BLOOD PRESSURE: 119 MMHG | DIASTOLIC BLOOD PRESSURE: 69 MMHG | WEIGHT: 235 LBS | BODY MASS INDEX: 34.8 KG/M2 | HEIGHT: 69 IN | HEART RATE: 69 BPM

## 2021-12-03 DIAGNOSIS — E10.22 CKD STAGE 4 DUE TO TYPE 1 DIABETES MELLITUS (HCC): ICD-10-CM

## 2021-12-03 DIAGNOSIS — Z12.5 SCREENING FOR MALIGNANT NEOPLASM OF PROSTATE: ICD-10-CM

## 2021-12-03 DIAGNOSIS — E66.01 MORBID (SEVERE) OBESITY DUE TO EXCESS CALORIES (HCC): ICD-10-CM

## 2021-12-03 DIAGNOSIS — Z23 NEED FOR SHINGLES VACCINE: ICD-10-CM

## 2021-12-03 DIAGNOSIS — E78.5 HYPERLIPIDEMIA, UNSPECIFIED HYPERLIPIDEMIA TYPE: ICD-10-CM

## 2021-12-03 DIAGNOSIS — L40.8 SEBOPSORIASIS: ICD-10-CM

## 2021-12-03 DIAGNOSIS — H43.393 VITREOUS FLOATERS OF BOTH EYES: ICD-10-CM

## 2021-12-03 DIAGNOSIS — L71.9 ROSACEA: ICD-10-CM

## 2021-12-03 DIAGNOSIS — N18.4 CKD STAGE 4 DUE TO TYPE 1 DIABETES MELLITUS (HCC): ICD-10-CM

## 2021-12-03 DIAGNOSIS — L30.9 DERMATITIS: ICD-10-CM

## 2021-12-03 DIAGNOSIS — L57.8 NODULAR ELASTOSIS WITH CYSTS AND COMEDONES OF FAVRE AND RACOUCHOT: ICD-10-CM

## 2021-12-03 DIAGNOSIS — E10.3293 TYPE 1 DIABETES MELLITUS WITH MILD NONPROLIFERATIVE RETINOPATHY OF BOTH EYES WITHOUT MACULAR EDEMA (HCC): ICD-10-CM

## 2021-12-03 DIAGNOSIS — Z12.11 COLON CANCER SCREENING: ICD-10-CM

## 2021-12-03 DIAGNOSIS — Z00.00 ENCOUNTER FOR ANNUAL HEALTH EXAMINATION: Primary | ICD-10-CM

## 2021-12-03 DIAGNOSIS — R80.9 PROTEINURIA, UNSPECIFIED TYPE: ICD-10-CM

## 2021-12-03 DIAGNOSIS — L70.0 NODULAR ELASTOSIS WITH CYSTS AND COMEDONES OF FAVRE AND RACOUCHOT: ICD-10-CM

## 2021-12-03 PROCEDURE — G0439 PPPS, SUBSEQ VISIT: HCPCS | Performed by: INTERNAL MEDICINE

## 2021-12-03 NOTE — PATIENT INSTRUCTIONS
Ketan Burton's SCREENING SCHEDULE   Tests on this list are recommended by your physician but may not be covered, or covered at this frequency, by your insurer. Please check with your insurance carrier before scheduling to verify coverage.    Katerine Tena vaccine ([id number] & [id number]) covered once after 65 Prevnar 13: 11/18/2015    [id number]: 09/26/2018     No recommendations at this time    Hepatitis B One screening covered for patients with certain risk factors   -  No recommendations at this time definitions of the different types of Advance Directives. It also has the State forms available on it's website for anyone to review and print using their home computer and printer. (the forms are also available in [address])  www. [email or web address]. org  This li

## 2021-12-03 NOTE — PROGRESS NOTES
HPI:   Darius Ortiz is a 74year old male who presents for a Medicare Subsequent Annual Wellness visit (Pt already had Initial Annual Wellness).       His last annual assessment has been over 1 year: Annual Physical due on 12/02/2021   Is diabetes luis manuel date: 1989        Years since quittin.0      Smokeless tobacco: Former User      Tobacco comment: quit about 30 years ago. CAGE screening score of 0 on 12/3/2021, showing low risk of alcohol abuse.          Patient Care Team: Patient Care HYDROcodone-acetaminophen  MG Oral Tab, Take 1 tablet by mouth every 8 (eight) hours as needed for Pain.   PANTOPRAZOLE 40 MG Oral Tab EC, TAKE 1 TABLET BY MOUTH ONCE DAILY IN THE MORNING BEFORE BREAKFAST  fenofibrate 48 MG Oral Tab, Take 1 tablet (48 His family history includes Diabetes in his father, maternal grandmother, and another family member. SOCIAL HISTORY:   He  reports that he quit smoking about 32 years ago. He smoked 0.00 packs per day.  He has quit using smokeless tobacco. He reports th normal.   Cardiovascular:      Rate and Rhythm: Normal rate and regular rhythm. Pulses: Normal pulses. Heart sounds: Normal heart sounds. Pulmonary:      Effort: Pulmonary effort is normal. No respiratory distress.       Breath sounds: Normal br LIPID PANEL;  Future  Plan  As above    Screening for malignant neoplasm of prostate  -     PSA SCREEN; Future  Plan  As above    Colon cancer screening  -     GASTRO - INTERNAL  -     OP REFERRAL TO [facility] GI TELEPHONE COLON SCREEN  -     [address] Renetta Burton's SCREENING SCHEDULE   Tests on this list are recommended by your physician but may not be covered, or covered at this frequency, by your insurer. Please check with your insurance carrier before scheduling to verify coverage.    Flaquito Miller vaccine ([id number] & [id number]) covered once after 65 Prevnar 13: 11/18/2015    Sxqyctpsk51: 09/26/2018     No recommendations at this time    Hepatitis B One screening covered for patients with certain risk factors   -  No recommendations at this time self-care/home management

## 2021-12-26 DIAGNOSIS — E11.65 UNCONTROLLED TYPE 2 DIABETES MELLITUS WITH HYPERGLYCEMIA (HCC): ICD-10-CM

## 2021-12-27 ENCOUNTER — LAB ENCOUNTER (OUTPATIENT)
Dept: LAB | Age: 66
End: 2021-12-27
Attending: FAMILY MEDICINE
Payer: MEDICARE

## 2021-12-27 DIAGNOSIS — Z79.4 TYPE 2 DIABETES MELLITUS WITH BOTH EYES AFFECTED BY MILD NONPROLIFERATIVE RETINOPATHY WITHOUT MACULAR EDEMA, WITH LONG-TERM CURRENT USE OF INSULIN (HCC): ICD-10-CM

## 2021-12-27 DIAGNOSIS — Z12.5 SCREENING FOR MALIGNANT NEOPLASM OF PROSTATE: ICD-10-CM

## 2021-12-27 DIAGNOSIS — N18.4 CKD STAGE 4 DUE TO TYPE 1 DIABETES MELLITUS (HCC): ICD-10-CM

## 2021-12-27 DIAGNOSIS — Z00.00 ENCOUNTER FOR ANNUAL HEALTH EXAMINATION: ICD-10-CM

## 2021-12-27 DIAGNOSIS — E10.22 CKD STAGE 4 DUE TO TYPE 1 DIABETES MELLITUS (HCC): ICD-10-CM

## 2021-12-27 DIAGNOSIS — E11.3293 TYPE 2 DIABETES MELLITUS WITH BOTH EYES AFFECTED BY MILD NONPROLIFERATIVE RETINOPATHY WITHOUT MACULAR EDEMA, WITH LONG-TERM CURRENT USE OF INSULIN (HCC): ICD-10-CM

## 2021-12-27 PROCEDURE — 80061 LIPID PANEL: CPT

## 2021-12-27 PROCEDURE — 83036 HEMOGLOBIN GLYCOSYLATED A1C: CPT

## 2021-12-27 PROCEDURE — 80069 RENAL FUNCTION PANEL: CPT

## 2021-12-27 PROCEDURE — 85025 COMPLETE CBC W/AUTO DIFF WBC: CPT

## 2021-12-27 PROCEDURE — 36415 COLL VENOUS BLD VENIPUNCTURE: CPT

## 2021-12-28 ENCOUNTER — NURSE ONLY (OUTPATIENT)
Dept: NEPHROLOGY | Facility: CLINIC | Age: 66
End: 2021-12-28
Payer: MEDICARE

## 2021-12-28 VITALS — SYSTOLIC BLOOD PRESSURE: 130 MMHG | DIASTOLIC BLOOD PRESSURE: 83 MMHG | HEART RATE: 83 BPM

## 2021-12-28 DIAGNOSIS — N18.32 ANEMIA IN STAGE 3B CHRONIC KIDNEY DISEASE (HCC): Primary | ICD-10-CM

## 2021-12-28 DIAGNOSIS — D63.1 ANEMIA IN STAGE 3B CHRONIC KIDNEY DISEASE (HCC): Primary | ICD-10-CM

## 2021-12-28 PROCEDURE — 96372 THER/PROPH/DIAG INJ SC/IM: CPT | Performed by: INTERNAL MEDICINE

## 2021-12-28 NOTE — PROGRESS NOTES
Patient presents to clinic today for Aranesp injection. On 12/27/2021, HGB was 10.2 and hematocrit was 31.4. BP today was 130/83. Aranesp 200 mcg administered to left upper arm. Subcutaneous without complications. Patient tolerated injection well.  Patient

## 2021-12-29 DIAGNOSIS — E11.65 UNCONTROLLED TYPE 2 DIABETES MELLITUS WITH HYPERGLYCEMIA (HCC): ICD-10-CM

## 2021-12-29 RX ORDER — ATORVASTATIN CALCIUM 40 MG/1
40 TABLET, FILM COATED ORAL DAILY
Qty: 90 TABLET | Refills: 0 | Status: SHIPPED | OUTPATIENT
Start: 2021-12-29

## 2021-12-29 RX ORDER — ATORVASTATIN CALCIUM 40 MG/1
TABLET, FILM COATED ORAL
Qty: 90 TABLET | Refills: 0 | OUTPATIENT
Start: 2021-12-29

## 2021-12-29 RX ORDER — INSULIN GLARGINE 100 [IU]/ML
INJECTION, SOLUTION SUBCUTANEOUS
Qty: 30 ML | Refills: 0 | Status: SHIPPED | OUTPATIENT
Start: 2021-12-29

## 2021-12-29 NOTE — TELEPHONE ENCOUNTER
Rx request for Lantus 100 unit/ML and Atorvastatin 40 mg filled per protocol. LOV: 8/4/21  RTC: 6 months f/u 2/21/22    Patient notified. Patient verbalized understanding and had no further questions or concerns at this time.

## 2021-12-29 NOTE — TELEPHONE ENCOUNTER
Wife called in to follow up on the refill request states it is urgent also needs atorvastatin calcium.  Please follow up

## 2021-12-30 ENCOUNTER — TELEPHONE (OUTPATIENT)
Dept: ENDOCRINOLOGY CLINIC | Facility: CLINIC | Age: 66
End: 2021-12-30

## 2021-12-30 NOTE — TELEPHONE ENCOUNTER
Duplicate encounter.   Medication was refilled on 12/29/21    LANTUS 100 UNIT/ML Subcutaneous Solution 30 mL 0 12/29/2021     Sig: INJECT 30 UNITS SUBCUTANEOUSLY NIGHTLY DISCARD  VIAL  28  DAYS  AFTER  OPENING    Sent to pharmacy as: Lantus 100 UNIT/ML Subc

## 2021-12-30 NOTE — TELEPHONE ENCOUNTER
Refill request    Current Outpatient Medications   Medication Sig Dispense Refill   • LANTUS 100 UNIT/ML Subcutaneous Solution INJECT 30 UNITS SUBCUTANEOUSLY NIGHTLY DISCARD  VIAL  28  DAYS  AFTER  OPENING 30 mL 0

## 2022-01-21 ENCOUNTER — LAB ENCOUNTER (OUTPATIENT)
Dept: LAB | Age: 67
End: 2022-01-21
Attending: INTERNAL MEDICINE
Payer: MEDICARE

## 2022-01-21 DIAGNOSIS — Z12.5 SCREENING FOR MALIGNANT NEOPLASM OF PROSTATE: ICD-10-CM

## 2022-01-21 DIAGNOSIS — E10.22 CKD STAGE 4 DUE TO TYPE 1 DIABETES MELLITUS (HCC): ICD-10-CM

## 2022-01-21 DIAGNOSIS — N18.4 CKD STAGE 4 DUE TO TYPE 1 DIABETES MELLITUS (HCC): ICD-10-CM

## 2022-01-21 LAB
ALBUMIN SERPL-MCNC: 3.7 G/DL (ref 3.4–5)
ANION GAP SERPL CALC-SCNC: 3 MMOL/L (ref 0–18)
BASOPHILS # BLD AUTO: 0.01 X10(3) UL (ref 0–0.2)
BASOPHILS NFR BLD AUTO: 0.3 %
BUN BLD-MCNC: 62 MG/DL (ref 7–18)
BUN/CREAT SERPL: 22.4 (ref 10–20)
CALCIUM BLD-MCNC: 8.5 MG/DL (ref 8.5–10.1)
CHLORIDE SERPL-SCNC: 110 MMOL/L (ref 98–112)
CO2 SERPL-SCNC: 28 MMOL/L (ref 21–32)
COMPLEXED PSA SERPL-MCNC: 0.28 NG/ML (ref ?–4)
CREAT BLD-MCNC: 2.77 MG/DL
DEPRECATED RDW RBC AUTO: 56.8 FL (ref 35.1–46.3)
EOSINOPHIL # BLD AUTO: 0.09 X10(3) UL (ref 0–0.7)
EOSINOPHIL NFR BLD AUTO: 2.4 %
ERYTHROCYTE [DISTWIDTH] IN BLOOD BY AUTOMATED COUNT: 14.6 % (ref 11–15)
GLUCOSE BLD-MCNC: 247 MG/DL (ref 70–99)
HCT VFR BLD AUTO: 31.2 %
HGB BLD-MCNC: 10 G/DL
IMM GRANULOCYTES # BLD AUTO: 0.01 X10(3) UL (ref 0–1)
IMM GRANULOCYTES NFR BLD: 0.3 %
LYMPHOCYTES # BLD AUTO: 1.3 X10(3) UL (ref 1–4)
LYMPHOCYTES NFR BLD AUTO: 34.9 %
MCH RBC QN AUTO: 34 PG (ref 26–34)
MCHC RBC AUTO-ENTMCNC: 32.1 G/DL (ref 31–37)
MCV RBC AUTO: 106.1 FL
MONOCYTES # BLD AUTO: 0.37 X10(3) UL (ref 0.1–1)
MONOCYTES NFR BLD AUTO: 9.9 %
NEUTROPHILS # BLD AUTO: 1.95 X10 (3) UL (ref 1.5–7.7)
NEUTROPHILS # BLD AUTO: 1.95 X10(3) UL (ref 1.5–7.7)
NEUTROPHILS NFR BLD AUTO: 52.2 %
OSMOLALITY SERPL CALC.SUM OF ELEC: 318 MOSM/KG (ref 275–295)
PHOSPHATE SERPL-MCNC: 4.1 MG/DL (ref 2.5–4.9)
PLATELET # BLD AUTO: 93 10(3)UL (ref 150–450)
POTASSIUM SERPL-SCNC: 4.8 MMOL/L (ref 3.5–5.1)
RBC # BLD AUTO: 2.94 X10(6)UL
SODIUM SERPL-SCNC: 141 MMOL/L (ref 136–145)
WBC # BLD AUTO: 3.7 X10(3) UL (ref 4–11)

## 2022-01-21 PROCEDURE — 36415 COLL VENOUS BLD VENIPUNCTURE: CPT

## 2022-01-21 PROCEDURE — 85025 COMPLETE CBC W/AUTO DIFF WBC: CPT

## 2022-01-21 PROCEDURE — 80069 RENAL FUNCTION PANEL: CPT

## 2022-01-21 NOTE — TELEPHONE ENCOUNTER
Called patient to help assist with scheduling procedure. Patient is currently in the lab and would like a call back later.

## 2022-01-24 ENCOUNTER — TELEPHONE (OUTPATIENT)
Dept: NEPHROLOGY | Facility: CLINIC | Age: 67
End: 2022-01-24

## 2022-01-25 ENCOUNTER — NURSE ONLY (OUTPATIENT)
Dept: NEPHROLOGY | Facility: CLINIC | Age: 67
End: 2022-01-25
Payer: MEDICARE

## 2022-01-25 VITALS — SYSTOLIC BLOOD PRESSURE: 147 MMHG | DIASTOLIC BLOOD PRESSURE: 79 MMHG

## 2022-01-25 DIAGNOSIS — D63.1 ANEMIA IN STAGE 3B CHRONIC KIDNEY DISEASE (HCC): Primary | ICD-10-CM

## 2022-01-25 DIAGNOSIS — N18.32 ANEMIA IN STAGE 3B CHRONIC KIDNEY DISEASE (HCC): Primary | ICD-10-CM

## 2022-01-25 PROCEDURE — 96372 THER/PROPH/DIAG INJ SC/IM: CPT | Performed by: INTERNAL MEDICINE

## 2022-01-25 NOTE — PROGRESS NOTES
Patient presents to clinic today for Aranesp injection. On 01/21/2022, HGB was 10.0 and hematocrit was 31.2. BP today was 147/79. Aranesp 200mcg administered to left upper arm. Subcutaneous without complications. Patient tolerated injection well.  Patient w

## 2022-01-27 NOTE — TELEPHONE ENCOUNTER
Called patient to help assist with scheduling procedure. Spoke with patients wife in which she states, \"it's OMICRON & we are not going into the hospital unless we need to. \"    Instructed the patient to give GI clinic a call when they are ready to pr

## 2022-02-01 ENCOUNTER — TELEPHONE (OUTPATIENT)
Dept: ENDOCRINOLOGY CLINIC | Facility: CLINIC | Age: 67
End: 2022-02-01

## 2022-02-01 NOTE — TELEPHONE ENCOUNTER
Patients appointment for 2/2/22 was canceled due to Provider unavailable,  Patient would like to reschedule but no available until end of March, Patient needs to come in sooner they need paperwork to be completed  Can he be added?     Please advise

## 2022-02-02 NOTE — TELEPHONE ENCOUNTER
Dr. Kolb Constant to clarify-- we are not sending patient to the ER and he is to see you Monday 2/11/19 at 4:20 pm. Is patient aware? He already has an appointment scheduled on 2/15/19. Should I cancel that one? : Yes

## 2022-02-03 NOTE — TELEPHONE ENCOUNTER
Spoke to patient's wife Camila (DARIA) patient booked appoointment 2 pm 2/9 ADO with Dr. Dell De Oliveira, she had a cancellation.

## 2022-02-09 ENCOUNTER — OFFICE VISIT (OUTPATIENT)
Dept: ENDOCRINOLOGY CLINIC | Facility: CLINIC | Age: 67
End: 2022-02-09
Payer: MEDICARE

## 2022-02-09 VITALS
WEIGHT: 233 LBS | BODY MASS INDEX: 34 KG/M2 | DIASTOLIC BLOOD PRESSURE: 71 MMHG | HEART RATE: 79 BPM | SYSTOLIC BLOOD PRESSURE: 112 MMHG

## 2022-02-09 DIAGNOSIS — E11.65 UNCONTROLLED TYPE 2 DIABETES MELLITUS WITH HYPERGLYCEMIA (HCC): Primary | ICD-10-CM

## 2022-02-09 LAB
CARTRIDGE LOT#: ABNORMAL NUMERIC
GLUCOSE BLOOD: 375
HEMOGLOBIN A1C: 7.3 % (ref 4.3–5.6)
TEST STRIP LOT #: NORMAL NUMERIC

## 2022-02-09 PROCEDURE — 99213 OFFICE O/P EST LOW 20 MIN: CPT | Performed by: INTERNAL MEDICINE

## 2022-02-09 PROCEDURE — 83036 HEMOGLOBIN GLYCOSYLATED A1C: CPT | Performed by: INTERNAL MEDICINE

## 2022-02-09 PROCEDURE — 36416 COLLJ CAPILLARY BLOOD SPEC: CPT | Performed by: INTERNAL MEDICINE

## 2022-02-09 PROCEDURE — 82947 ASSAY GLUCOSE BLOOD QUANT: CPT | Performed by: INTERNAL MEDICINE

## 2022-02-09 RX ORDER — BLOOD-GLUCOSE METER
KIT MISCELLANEOUS
Qty: 400 STRIP | Refills: 1 | Status: SHIPPED | OUTPATIENT
Start: 2022-02-09

## 2022-02-09 RX ORDER — BLOOD-GLUCOSE METER
KIT MISCELLANEOUS
Qty: 400 STRIP | Refills: 1 | Status: SHIPPED | OUTPATIENT
Start: 2022-02-09 | End: 2022-02-09

## 2022-02-09 RX ORDER — ATORVASTATIN CALCIUM 40 MG/1
40 TABLET, FILM COATED ORAL DAILY
Qty: 90 TABLET | Refills: 0 | Status: SHIPPED | OUTPATIENT
Start: 2022-02-09

## 2022-02-09 RX ORDER — INSULIN ASPART 100 [IU]/ML
INJECTION, SOLUTION INTRAVENOUS; SUBCUTANEOUS
Qty: 30 ML | Refills: 2 | Status: SHIPPED | OUTPATIENT
Start: 2022-02-09

## 2022-02-09 RX ORDER — FENOFIBRATE 48 MG/1
48 TABLET, COATED ORAL DAILY
Qty: 90 TABLET | Refills: 0 | Status: SHIPPED | OUTPATIENT
Start: 2022-02-09

## 2022-02-09 NOTE — PATIENT INSTRUCTIONS
Lantus 35 units subcutaneous daily    Humalog   INSULIN SLIDING SCALE  Base Values  Breakfast: 6  Lunch: 8  Dinner: 10  Ranges:  80-99: -2  100-119: 0  120-139: 0  140-159: 1  160-179: 1  180-199: 2  200-219: 2  220-239: 3  240-259: 3  260-279: 4  280-299: 4  300-319: 5  320-339: 5  340-359: 6  360-379: 6  380-399: 7

## 2022-02-11 NOTE — TELEPHONE ENCOUNTER
Patients spouse called    Asking for a refill on Mometasone Furoate 0.1 % External Solution    Asking to have RN call her first so she can shop around for the best price.

## 2022-02-12 RX ORDER — SYRINGE AND NEEDLE,INSULIN,1ML 31GX15/64"
SYRINGE, EMPTY DISPOSABLE MISCELLANEOUS
Qty: 400 EACH | Refills: 0 | Status: SHIPPED | OUTPATIENT
Start: 2022-02-12

## 2022-02-14 NOTE — TELEPHONE ENCOUNTER
LOV 2/2021 - (Last appt was with [facility] due to availability) Ok to refill for pt? Rx pended. Thank you.

## 2022-02-15 RX ORDER — MOMETASONE FUROATE 1 MG/ML
SOLUTION TOPICAL
Qty: 120 ML | Refills: 0 | Status: SHIPPED | OUTPATIENT
Start: 2022-02-15

## 2022-02-15 RX ORDER — PANTOPRAZOLE SODIUM 40 MG/1
TABLET, DELAYED RELEASE ORAL
Qty: 90 TABLET | Refills: 0 | Status: SHIPPED | OUTPATIENT
Start: 2022-02-15

## 2022-02-15 NOTE — TELEPHONE ENCOUNTER
LOV 11/29/21  RTC  12 Weeks  F/U  No schedule    Are you keeping patient he is in list to no longer you see him?

## 2022-02-15 NOTE — TELEPHONE ENCOUNTER
Kaylyn Asif rf and pt is due for f/u appointment soon.   It looks like they have not decided on which pharmacy they would like to use the refill x1 it looks like this is 120 mL per Rx-pended okay to send to their preferred pharmacy

## 2022-02-19 ENCOUNTER — LAB ENCOUNTER (OUTPATIENT)
Dept: LAB | Age: 67
End: 2022-02-19
Attending: INTERNAL MEDICINE
Payer: MEDICARE

## 2022-02-19 DIAGNOSIS — E10.22 CKD STAGE 4 DUE TO TYPE 1 DIABETES MELLITUS (HCC): ICD-10-CM

## 2022-02-19 DIAGNOSIS — N18.4 CKD STAGE 4 DUE TO TYPE 1 DIABETES MELLITUS (HCC): ICD-10-CM

## 2022-02-19 LAB
ALBUMIN SERPL-MCNC: 3.6 G/DL (ref 3.4–5)
ANION GAP SERPL CALC-SCNC: 6 MMOL/L (ref 0–18)
BASOPHILS # BLD AUTO: 0.01 X10(3) UL (ref 0–0.2)
BASOPHILS NFR BLD AUTO: 0.3 %
BUN BLD-MCNC: 52 MG/DL (ref 7–18)
BUN/CREAT SERPL: 19.6 (ref 10–20)
CALCIUM BLD-MCNC: 8.7 MG/DL (ref 8.5–10.1)
CHLORIDE SERPL-SCNC: 111 MMOL/L (ref 98–112)
CO2 SERPL-SCNC: 25 MMOL/L (ref 21–32)
CREAT BLD-MCNC: 2.65 MG/DL
DEPRECATED RDW RBC AUTO: 60.9 FL (ref 35.1–46.3)
EOSINOPHIL # BLD AUTO: 0.08 X10(3) UL (ref 0–0.7)
EOSINOPHIL NFR BLD AUTO: 2.6 %
ERYTHROCYTE [DISTWIDTH] IN BLOOD BY AUTOMATED COUNT: 15.2 % (ref 11–15)
GLUCOSE BLD-MCNC: 279 MG/DL (ref 70–99)
HCT VFR BLD AUTO: 31.4 %
HGB BLD-MCNC: 10.2 G/DL
IMM GRANULOCYTES # BLD AUTO: 0.01 X10(3) UL (ref 0–1)
IMM GRANULOCYTES NFR BLD: 0.3 %
LYMPHOCYTES # BLD AUTO: 0.89 X10(3) UL (ref 1–4)
LYMPHOCYTES NFR BLD AUTO: 29.1 %
MCH RBC QN AUTO: 35.2 PG (ref 26–34)
MCHC RBC AUTO-ENTMCNC: 32.5 G/DL (ref 31–37)
MCV RBC AUTO: 108.3 FL
MONOCYTES # BLD AUTO: 0.25 X10(3) UL (ref 0.1–1)
MONOCYTES NFR BLD AUTO: 8.2 %
NEUTROPHILS # BLD AUTO: 1.82 X10 (3) UL (ref 1.5–7.7)
NEUTROPHILS # BLD AUTO: 1.82 X10(3) UL (ref 1.5–7.7)
NEUTROPHILS NFR BLD AUTO: 59.5 %
OSMOLALITY SERPL CALC.SUM OF ELEC: 318 MOSM/KG (ref 275–295)
PHOSPHATE SERPL-MCNC: 3.4 MG/DL (ref 2.5–4.9)
PLATELET # BLD AUTO: 92 10(3)UL (ref 150–450)
POTASSIUM SERPL-SCNC: 4.9 MMOL/L (ref 3.5–5.1)
RBC # BLD AUTO: 2.9 X10(6)UL
SODIUM SERPL-SCNC: 142 MMOL/L (ref 136–145)
WBC # BLD AUTO: 3.1 X10(3) UL (ref 4–11)

## 2022-02-19 PROCEDURE — 80069 RENAL FUNCTION PANEL: CPT

## 2022-02-19 PROCEDURE — 36415 COLL VENOUS BLD VENIPUNCTURE: CPT

## 2022-02-19 PROCEDURE — 85025 COMPLETE CBC W/AUTO DIFF WBC: CPT

## 2022-02-21 ENCOUNTER — OFFICE VISIT (OUTPATIENT)
Dept: NEPHROLOGY | Facility: CLINIC | Age: 67
End: 2022-02-21
Payer: MEDICARE

## 2022-02-21 VITALS
DIASTOLIC BLOOD PRESSURE: 73 MMHG | BODY MASS INDEX: 36.43 KG/M2 | SYSTOLIC BLOOD PRESSURE: 119 MMHG | HEIGHT: 69 IN | WEIGHT: 246 LBS | HEART RATE: 70 BPM

## 2022-02-21 DIAGNOSIS — N18.4 CKD STAGE 4 DUE TO TYPE 1 DIABETES MELLITUS (HCC): ICD-10-CM

## 2022-02-21 DIAGNOSIS — E10.3293 TYPE 1 DIABETES MELLITUS WITH MILD NONPROLIFERATIVE RETINOPATHY OF BOTH EYES WITHOUT MACULAR EDEMA (HCC): ICD-10-CM

## 2022-02-21 DIAGNOSIS — D63.1 ANEMIA IN STAGE 3B CHRONIC KIDNEY DISEASE (HCC): Primary | ICD-10-CM

## 2022-02-21 DIAGNOSIS — N18.32 ANEMIA IN STAGE 3B CHRONIC KIDNEY DISEASE (HCC): Primary | ICD-10-CM

## 2022-02-21 DIAGNOSIS — E10.22 CKD STAGE 4 DUE TO TYPE 1 DIABETES MELLITUS (HCC): ICD-10-CM

## 2022-02-21 PROCEDURE — 99213 OFFICE O/P EST LOW 20 MIN: CPT | Performed by: INTERNAL MEDICINE

## 2022-02-21 PROCEDURE — 96372 THER/PROPH/DIAG INJ SC/IM: CPT | Performed by: INTERNAL MEDICINE

## 2022-02-21 RX ORDER — HYDROCODONE BITARTRATE AND ACETAMINOPHEN 10; 325 MG/1; MG/1
1 TABLET ORAL EVERY 8 HOURS PRN
Qty: 90 TABLET | Refills: 0 | Status: SHIPPED | OUTPATIENT
Start: 2022-05-10

## 2022-02-21 RX ORDER — HYDROCODONE BITARTRATE AND ACETAMINOPHEN 10; 325 MG/1; MG/1
1 TABLET ORAL EVERY 8 HOURS PRN
Qty: 90 TABLET | Refills: 0 | Status: SHIPPED | OUTPATIENT
Start: 2022-03-10 | End: 2022-02-21

## 2022-02-21 RX ORDER — HYDROCODONE BITARTRATE AND ACETAMINOPHEN 10; 325 MG/1; MG/1
1 TABLET ORAL EVERY 8 HOURS PRN
Qty: 90 TABLET | Refills: 0 | Status: SHIPPED | OUTPATIENT
Start: 2022-04-10 | End: 2022-02-21

## 2022-02-21 NOTE — PATIENT INSTRUCTIONS
Good job chaz Hernandez meds    Labs in mid April again    They are ordered    See me early June    Stay well

## 2022-02-21 NOTE — PROGRESS NOTES
Patient presents to clinic today for Aranesp injection. On 02/19/2022, HGB was 10.2 and hematocrit was 31/4. BP today was 119/73. Aranesp 200mcg administered to left upper arm. Subcutaneous without complications. Patient tolerated injection well. Patient will RTC in 4 weeks for next injection.

## 2022-03-11 ENCOUNTER — OFFICE VISIT (OUTPATIENT)
Dept: DERMATOLOGY CLINIC | Facility: CLINIC | Age: 67
End: 2022-03-11
Payer: MEDICARE

## 2022-03-11 DIAGNOSIS — L71.9 ROSACEA: ICD-10-CM

## 2022-03-11 DIAGNOSIS — L40.8 SEBOPSORIASIS: Primary | ICD-10-CM

## 2022-03-11 PROCEDURE — 99213 OFFICE O/P EST LOW 20 MIN: CPT | Performed by: PHYSICIAN ASSISTANT

## 2022-03-11 RX ORDER — FLUCONAZOLE 100 MG/1
100 TABLET ORAL DAILY
Qty: 10 TABLET | Refills: 0 | Status: SHIPPED | OUTPATIENT
Start: 2022-03-11

## 2022-03-11 RX ORDER — DOXYCYCLINE HYCLATE 100 MG/1
100 CAPSULE ORAL 2 TIMES DAILY
Qty: 60 CAPSULE | Refills: 3 | Status: SHIPPED | OUTPATIENT
Start: 2022-03-11

## 2022-03-22 ENCOUNTER — NURSE ONLY (OUTPATIENT)
Dept: NEPHROLOGY | Facility: CLINIC | Age: 67
End: 2022-03-22
Payer: MEDICARE

## 2022-03-22 DIAGNOSIS — D63.1 ANEMIA OF CHRONIC RENAL FAILURE, STAGE 4 (SEVERE) (HCC): Primary | ICD-10-CM

## 2022-03-22 DIAGNOSIS — N18.4 ANEMIA OF CHRONIC RENAL FAILURE, STAGE 4 (SEVERE) (HCC): Primary | ICD-10-CM

## 2022-03-22 PROCEDURE — 96372 THER/PROPH/DIAG INJ SC/IM: CPT | Performed by: INTERNAL MEDICINE

## 2022-03-22 NOTE — PROGRESS NOTES
Patient presents to clinic today for Aranesp injection. On 2/19/22, Hgb was 10.2 and Hct 31.4. BP today is 132/70. Aranesp 200mcg administered to left upper arm SC without complications. Patient tolerated injection well. Patient will RTC in 4 weeks for next injection.

## 2022-03-30 ENCOUNTER — PATIENT OUTREACH (OUTPATIENT)
Dept: CASE MANAGEMENT | Age: 67
End: 2022-03-30

## 2022-03-30 NOTE — PROGRESS NOTES
Patient has declined CCM program in the past, letter on behalf of provider sent to patient for reconsideration of CCM services.

## 2022-04-08 ENCOUNTER — OFFICE VISIT (OUTPATIENT)
Dept: DERMATOLOGY CLINIC | Facility: CLINIC | Age: 67
End: 2022-04-08
Payer: MEDICARE

## 2022-04-08 DIAGNOSIS — L29.9 PRURITUS: ICD-10-CM

## 2022-04-08 DIAGNOSIS — L71.9 ROSACEA: ICD-10-CM

## 2022-04-08 DIAGNOSIS — L40.8 SEBOPSORIASIS: Primary | ICD-10-CM

## 2022-04-08 PROCEDURE — 99214 OFFICE O/P EST MOD 30 MIN: CPT | Performed by: DERMATOLOGY

## 2022-04-08 RX ORDER — DOXYCYCLINE HYCLATE 100 MG/1
100 CAPSULE ORAL 2 TIMES DAILY
Qty: 60 CAPSULE | Refills: 3 | Status: SHIPPED | OUTPATIENT
Start: 2022-04-08

## 2022-04-16 ENCOUNTER — LAB ENCOUNTER (OUTPATIENT)
Dept: LAB | Age: 67
End: 2022-04-16
Attending: INTERNAL MEDICINE
Payer: MEDICARE

## 2022-04-16 DIAGNOSIS — D63.1 ANEMIA IN STAGE 3B CHRONIC KIDNEY DISEASE (HCC): ICD-10-CM

## 2022-04-16 DIAGNOSIS — E10.3293 TYPE 1 DIABETES MELLITUS WITH MILD NONPROLIFERATIVE RETINOPATHY OF BOTH EYES WITHOUT MACULAR EDEMA (HCC): ICD-10-CM

## 2022-04-16 DIAGNOSIS — N18.32 ANEMIA IN STAGE 3B CHRONIC KIDNEY DISEASE (HCC): ICD-10-CM

## 2022-04-16 DIAGNOSIS — E10.22 CKD STAGE 4 DUE TO TYPE 1 DIABETES MELLITUS (HCC): ICD-10-CM

## 2022-04-16 DIAGNOSIS — N18.4 CKD STAGE 4 DUE TO TYPE 1 DIABETES MELLITUS (HCC): ICD-10-CM

## 2022-04-16 LAB
ALBUMIN SERPL-MCNC: 3.4 G/DL (ref 3.4–5)
ANION GAP SERPL CALC-SCNC: 3 MMOL/L (ref 0–18)
BASOPHILS # BLD AUTO: 0.01 X10(3) UL (ref 0–0.2)
BASOPHILS NFR BLD AUTO: 0.3 %
BUN BLD-MCNC: 53 MG/DL (ref 7–18)
BUN/CREAT SERPL: 20.8 (ref 10–20)
CALCIUM BLD-MCNC: 9.3 MG/DL (ref 8.5–10.1)
CHLORIDE SERPL-SCNC: 114 MMOL/L (ref 98–112)
CO2 SERPL-SCNC: 28 MMOL/L (ref 21–32)
CREAT BLD-MCNC: 2.55 MG/DL
DEPRECATED RDW RBC AUTO: 59.7 FL (ref 35.1–46.3)
EOSINOPHIL # BLD AUTO: 0.1 X10(3) UL (ref 0–0.7)
EOSINOPHIL NFR BLD AUTO: 3.1 %
ERYTHROCYTE [DISTWIDTH] IN BLOOD BY AUTOMATED COUNT: 15.4 % (ref 11–15)
GLUCOSE BLD-MCNC: 152 MG/DL (ref 70–99)
HCT VFR BLD AUTO: 29.7 %
HGB BLD-MCNC: 9.5 G/DL
IMM GRANULOCYTES # BLD AUTO: 0.02 X10(3) UL (ref 0–1)
IMM GRANULOCYTES NFR BLD: 0.6 %
IRON SATN MFR SERPL: 49 %
IRON SERPL-MCNC: 116 UG/DL
LYMPHOCYTES # BLD AUTO: 1.3 X10(3) UL (ref 1–4)
LYMPHOCYTES NFR BLD AUTO: 40.5 %
MCH RBC QN AUTO: 34.2 PG (ref 26–34)
MCHC RBC AUTO-ENTMCNC: 32 G/DL (ref 31–37)
MCV RBC AUTO: 106.8 FL
MONOCYTES # BLD AUTO: 0.31 X10(3) UL (ref 0.1–1)
MONOCYTES NFR BLD AUTO: 9.7 %
NEUTROPHILS # BLD AUTO: 1.47 X10 (3) UL (ref 1.5–7.7)
NEUTROPHILS # BLD AUTO: 1.47 X10(3) UL (ref 1.5–7.7)
NEUTROPHILS NFR BLD AUTO: 45.8 %
OSMOLALITY SERPL CALC.SUM OF ELEC: 317 MOSM/KG (ref 275–295)
PHOSPHATE SERPL-MCNC: 3.2 MG/DL (ref 2.5–4.9)
PLATELET # BLD AUTO: 93 10(3)UL (ref 150–450)
POTASSIUM SERPL-SCNC: 4.6 MMOL/L (ref 3.5–5.1)
PTH-INTACT SERPL-MCNC: 50.6 PG/ML (ref 18.5–88)
RBC # BLD AUTO: 2.78 X10(6)UL
SODIUM SERPL-SCNC: 145 MMOL/L (ref 136–145)
TIBC SERPL-MCNC: 235 UG/DL (ref 240–450)
TRANSFERRIN SERPL-MCNC: 158 MG/DL (ref 200–360)
WBC # BLD AUTO: 3.2 X10(3) UL (ref 4–11)

## 2022-04-16 PROCEDURE — 80069 RENAL FUNCTION PANEL: CPT

## 2022-04-16 PROCEDURE — 83540 ASSAY OF IRON: CPT

## 2022-04-16 PROCEDURE — 36415 COLL VENOUS BLD VENIPUNCTURE: CPT

## 2022-04-16 PROCEDURE — 85025 COMPLETE CBC W/AUTO DIFF WBC: CPT

## 2022-04-16 PROCEDURE — 84466 ASSAY OF TRANSFERRIN: CPT

## 2022-04-16 PROCEDURE — 83970 ASSAY OF PARATHORMONE: CPT

## 2022-04-18 RX ORDER — ATORVASTATIN CALCIUM 40 MG/1
TABLET, FILM COATED ORAL
Qty: 90 TABLET | Refills: 0 | Status: SHIPPED | OUTPATIENT
Start: 2022-04-18

## 2022-04-19 ENCOUNTER — NURSE ONLY (OUTPATIENT)
Dept: NEPHROLOGY | Facility: CLINIC | Age: 67
End: 2022-04-19
Payer: MEDICARE

## 2022-04-19 ENCOUNTER — TELEPHONE (OUTPATIENT)
Dept: NEPHROLOGY | Facility: CLINIC | Age: 67
End: 2022-04-19

## 2022-04-19 PROCEDURE — 96372 THER/PROPH/DIAG INJ SC/IM: CPT | Performed by: INTERNAL MEDICINE

## 2022-04-19 NOTE — TELEPHONE ENCOUNTER
Spoke to patient and relayed Dr. Dipak Ruffin message as shown below. Patient verbalized understanding and had no further questions at this time.     MD Heath Robin, RN  Please let him know labs are good

## 2022-04-19 NOTE — PROGRESS NOTES
Pt presents to clinic today for Aranesp injection. On 4/16/22, Hgb was 9.5 and hematocrit 29.7. BP today is 118/60. Aranesp 200 mcg administered to left upper arm subcutaneous without complications. Patient tolerated injection well. Patient will RTC in 4 weeks for next injection.

## 2022-05-02 RX ORDER — INSULIN GLARGINE 100 [IU]/ML
35 INJECTION, SOLUTION SUBCUTANEOUS NIGHTLY
Qty: 33 ML | Refills: 0 | Status: SHIPPED | OUTPATIENT
Start: 2022-05-02

## 2022-05-02 NOTE — TELEPHONE ENCOUNTER
Pt needs new rx sent for Lantus 34 units to be sent to pharmacy today = pt will be out of rx tomorrow - please call when sent

## 2022-05-04 ENCOUNTER — TELEPHONE (OUTPATIENT)
Dept: INTERNAL MEDICINE CLINIC | Facility: CLINIC | Age: 67
End: 2022-05-04

## 2022-05-04 ENCOUNTER — OFFICE VISIT (OUTPATIENT)
Dept: OPHTHALMOLOGY | Facility: CLINIC | Age: 67
End: 2022-05-04
Payer: MEDICARE

## 2022-05-04 DIAGNOSIS — Z96.1 PSEUDOPHAKIA OF BOTH EYES: Primary | ICD-10-CM

## 2022-05-04 DIAGNOSIS — E11.3293 TYPE 2 DIABETES MELLITUS WITH BOTH EYES AFFECTED BY MILD NONPROLIFERATIVE RETINOPATHY WITHOUT MACULAR EDEMA, WITHOUT LONG-TERM CURRENT USE OF INSULIN (HCC): ICD-10-CM

## 2022-05-04 PROCEDURE — 92014 COMPRE OPH EXAM EST PT 1/>: CPT | Performed by: OPHTHALMOLOGY

## 2022-05-04 NOTE — ASSESSMENT & PLAN NOTE
Diabetes type II: mild background diabetic retinopathy, no signs of neovascularization noted. No treatment necessary at this time. Patient was instructed to monitor vision for sudden changes and to call if visual changes noted. Discussed ocular and systemic benefits of blood sugar control.

## 2022-05-04 NOTE — PATIENT INSTRUCTIONS
Type 2 diabetes mellitus with both eyes affected by mild nonproliferative retinopathy without macular edema, without long-term current use of insulin ([address]  Discussed diagnosis with patient.  No treatment    Pseudophakia of both eyes  No treatment

## 2022-05-04 NOTE — TELEPHONE ENCOUNTER
Documenting results for Diabetic Eye exam into pt's chart.     Type 2 diabetes mellitus with both eyes affected by mild nonproliferative retinopathy without macular edema, without long-term current use of insulin ([facility] [address]

## 2022-05-18 ENCOUNTER — NURSE ONLY (OUTPATIENT)
Dept: NEPHROLOGY | Facility: CLINIC | Age: 67
End: 2022-05-18
Payer: MEDICARE

## 2022-05-18 VITALS — SYSTOLIC BLOOD PRESSURE: 145 MMHG | DIASTOLIC BLOOD PRESSURE: 69 MMHG

## 2022-05-18 DIAGNOSIS — N18.4 ANEMIA OF CHRONIC RENAL FAILURE, STAGE 4 (SEVERE) (HCC): Primary | ICD-10-CM

## 2022-05-18 DIAGNOSIS — D63.1 ANEMIA OF CHRONIC RENAL FAILURE, STAGE 4 (SEVERE) (HCC): Primary | ICD-10-CM

## 2022-05-18 PROCEDURE — 96372 THER/PROPH/DIAG INJ SC/IM: CPT | Performed by: INTERNAL MEDICINE

## 2022-05-18 NOTE — PROGRESS NOTES
Patient presents to clinic today for Aranesp injection. On 4/16/22, Hgb was 9.5 and Hct 29.7. BP today is 145/69. Aranesp 200mcg administered to left upper arm SC without complications. Patient tolerated injection well. Patient will RTC in 4 weeks for next injection.

## 2022-06-06 RX ORDER — PREGABALIN 300 MG/1
CAPSULE ORAL
Qty: 90 CAPSULE | Refills: 0 | Status: SHIPPED | OUTPATIENT
Start: 2022-06-06

## 2022-06-06 RX ORDER — PANTOPRAZOLE SODIUM 40 MG/1
TABLET, DELAYED RELEASE ORAL
Qty: 90 TABLET | Refills: 0 | OUTPATIENT
Start: 2022-06-06

## 2022-06-08 RX ORDER — PANTOPRAZOLE SODIUM 40 MG/1
TABLET, DELAYED RELEASE ORAL
Qty: 90 TABLET | Refills: 1 | OUTPATIENT
Start: 2022-06-08

## 2022-06-13 DIAGNOSIS — E11.65 UNCONTROLLED TYPE 2 DIABETES MELLITUS WITH HYPERGLYCEMIA (HCC): ICD-10-CM

## 2022-06-13 RX ORDER — FENOFIBRATE 48 MG/1
TABLET, COATED ORAL
Qty: 90 TABLET | Refills: 0 | Status: SHIPPED | OUTPATIENT
Start: 2022-06-13

## 2022-06-14 ENCOUNTER — LAB ENCOUNTER (OUTPATIENT)
Dept: LAB | Age: 67
End: 2022-06-14
Attending: INTERNAL MEDICINE
Payer: MEDICARE

## 2022-06-14 DIAGNOSIS — E10.22 CKD STAGE 4 DUE TO TYPE 1 DIABETES MELLITUS (HCC): ICD-10-CM

## 2022-06-14 DIAGNOSIS — N18.4 CKD STAGE 4 DUE TO TYPE 1 DIABETES MELLITUS (HCC): ICD-10-CM

## 2022-06-14 LAB
ALBUMIN SERPL-MCNC: 3.3 G/DL (ref 3.4–5)
ANION GAP SERPL CALC-SCNC: 5 MMOL/L (ref 0–18)
BASOPHILS # BLD AUTO: 0.01 X10(3) UL (ref 0–0.2)
BASOPHILS NFR BLD AUTO: 0.3 %
BUN BLD-MCNC: 42 MG/DL (ref 7–18)
BUN/CREAT SERPL: 16.4 (ref 10–20)
CALCIUM BLD-MCNC: 8.3 MG/DL (ref 8.5–10.1)
CHLORIDE SERPL-SCNC: 113 MMOL/L (ref 98–112)
CO2 SERPL-SCNC: 26 MMOL/L (ref 21–32)
CREAT BLD-MCNC: 2.56 MG/DL
DEPRECATED RDW RBC AUTO: 65.1 FL (ref 35.1–46.3)
EOSINOPHIL # BLD AUTO: 0.1 X10(3) UL (ref 0–0.7)
EOSINOPHIL NFR BLD AUTO: 3.2 %
ERYTHROCYTE [DISTWIDTH] IN BLOOD BY AUTOMATED COUNT: 16.8 % (ref 11–15)
GLUCOSE BLD-MCNC: 109 MG/DL (ref 70–99)
HCT VFR BLD AUTO: 25.7 %
HGB BLD-MCNC: 8.3 G/DL
IMM GRANULOCYTES # BLD AUTO: 0.01 X10(3) UL (ref 0–1)
IMM GRANULOCYTES NFR BLD: 0.3 %
LYMPHOCYTES # BLD AUTO: 1.56 X10(3) UL (ref 1–4)
LYMPHOCYTES NFR BLD AUTO: 49.7 %
MCH RBC QN AUTO: 34.4 PG (ref 26–34)
MCHC RBC AUTO-ENTMCNC: 32.3 G/DL (ref 31–37)
MCV RBC AUTO: 106.6 FL
MONOCYTES # BLD AUTO: 0.3 X10(3) UL (ref 0.1–1)
MONOCYTES NFR BLD AUTO: 9.6 %
NEUTROPHILS # BLD AUTO: 1.16 X10 (3) UL (ref 1.5–7.7)
NEUTROPHILS # BLD AUTO: 1.16 X10(3) UL (ref 1.5–7.7)
NEUTROPHILS NFR BLD AUTO: 36.9 %
OSMOLALITY SERPL CALC.SUM OF ELEC: 309 MOSM/KG (ref 275–295)
PHOSPHATE SERPL-MCNC: 3.7 MG/DL (ref 2.5–4.9)
PLATELET # BLD AUTO: 95 10(3)UL (ref 150–450)
PLATELET MORPHOLOGY: NORMAL
POTASSIUM SERPL-SCNC: 4.2 MMOL/L (ref 3.5–5.1)
RBC # BLD AUTO: 2.41 X10(6)UL
SODIUM SERPL-SCNC: 144 MMOL/L (ref 136–145)
WBC # BLD AUTO: 3.1 X10(3) UL (ref 4–11)

## 2022-06-14 PROCEDURE — 36415 COLL VENOUS BLD VENIPUNCTURE: CPT

## 2022-06-14 PROCEDURE — 85025 COMPLETE CBC W/AUTO DIFF WBC: CPT

## 2022-06-14 PROCEDURE — 80069 RENAL FUNCTION PANEL: CPT

## 2022-06-15 ENCOUNTER — NURSE ONLY (OUTPATIENT)
Dept: NEPHROLOGY | Facility: CLINIC | Age: 67
End: 2022-06-15
Payer: MEDICARE

## 2022-06-15 DIAGNOSIS — N18.4 ANEMIA OF CHRONIC RENAL FAILURE, STAGE 4 (SEVERE) (HCC): Primary | ICD-10-CM

## 2022-06-15 DIAGNOSIS — D63.1 ANEMIA OF CHRONIC RENAL FAILURE, STAGE 4 (SEVERE) (HCC): Primary | ICD-10-CM

## 2022-06-15 PROCEDURE — 96372 THER/PROPH/DIAG INJ SC/IM: CPT | Performed by: INTERNAL MEDICINE

## 2022-06-15 RX ORDER — PANTOPRAZOLE SODIUM 40 MG/1
TABLET, DELAYED RELEASE ORAL
Qty: 90 TABLET | Refills: 0 | Status: SHIPPED | OUTPATIENT
Start: 2022-06-15

## 2022-06-15 NOTE — PROGRESS NOTES
pt presents to clinic today for Aranesp injection. On 6/14/22 ____, Hgb was _8.3___ and hematocrit _25.7___. BP today is __140/72__. Aranesp 200___ mcg administered to right  ____ upper arm subcutaneous without complications. Patient tolerated injection well. Patient will RTC in 4 weeks for next injection.

## 2022-06-16 ENCOUNTER — TELEPHONE (OUTPATIENT)
Dept: ENDOCRINOLOGY CLINIC | Facility: CLINIC | Age: 67
End: 2022-06-16

## 2022-06-16 ENCOUNTER — TELEPHONE (OUTPATIENT)
Dept: NEPHROLOGY | Facility: CLINIC | Age: 67
End: 2022-06-16

## 2022-06-16 DIAGNOSIS — D63.1 ANEMIA OF CHRONIC RENAL FAILURE, STAGE 4 (SEVERE) (HCC): Primary | ICD-10-CM

## 2022-06-16 DIAGNOSIS — N18.4 ANEMIA OF CHRONIC RENAL FAILURE, STAGE 4 (SEVERE) (HCC): Primary | ICD-10-CM

## 2022-06-16 NOTE — TELEPHONE ENCOUNTER
MD Mitra He RN  [facility]   Please speak with me Thursday afternoon we need to increase his ass but thank you I don't want to forget

## 2022-06-16 NOTE — TELEPHONE ENCOUNTER
MD Christy Saavedra RN  [facility]   Please speak with me Thursday afternoon we need to increase his ass but thank you I don't want to forget

## 2022-06-16 NOTE — TELEPHONE ENCOUNTER
Informed pt per Dr Milka Montoya to change Aranesp 200 mcg injection every 3 weeks and do labs on the same day of  the next injection schedule,pt verbalized understanding and scheduled. Rn generated order for CBC.

## 2022-06-16 NOTE — TELEPHONE ENCOUNTER
Received fax from [address] [place] [address] Requesting for additional documentation for Medical Necessity. Printed off [facility] notes from 2/9/2022. Faxed back to [facility] at [phone].

## 2022-06-30 DIAGNOSIS — E11.65 UNCONTROLLED TYPE 2 DIABETES MELLITUS WITH HYPERGLYCEMIA (HCC): ICD-10-CM

## 2022-06-30 RX ORDER — INSULIN ASPART 100 [IU]/ML
INJECTION, SOLUTION INTRAVENOUS; SUBCUTANEOUS
Qty: 20 ML | Refills: 0 | Status: SHIPPED | OUTPATIENT
Start: 2022-06-30

## 2022-07-06 ENCOUNTER — NURSE ONLY (OUTPATIENT)
Dept: NEPHROLOGY | Facility: CLINIC | Age: 67
End: 2022-07-06
Payer: MEDICARE

## 2022-07-06 DIAGNOSIS — D63.1 ANEMIA OF CHRONIC RENAL FAILURE, STAGE 4 (SEVERE) (HCC): Primary | ICD-10-CM

## 2022-07-06 DIAGNOSIS — N18.4 ANEMIA OF CHRONIC RENAL FAILURE, STAGE 4 (SEVERE) (HCC): Primary | ICD-10-CM

## 2022-07-06 PROCEDURE — 96372 THER/PROPH/DIAG INJ SC/IM: CPT | Performed by: INTERNAL MEDICINE

## 2022-07-06 NOTE — PROGRESS NOTES
pt presents to clinic today for Aranesp injection. On 6/14/22____, Hgb was 8.3____ and hematocrit _25.7___. BP today is _124/64___. Aranesp 200___ mcg administered to _Left___ upper arm subcutaneous without complications. Patient tolerated injection well. Patient will RTC in 3 weeks for next injection.

## 2022-07-08 ENCOUNTER — TELEPHONE (OUTPATIENT)
Dept: DERMATOLOGY CLINIC | Facility: CLINIC | Age: 67
End: 2022-07-08

## 2022-07-08 ENCOUNTER — TELEPHONE (OUTPATIENT)
Dept: ENDOCRINOLOGY CLINIC | Facility: CLINIC | Age: 67
End: 2022-07-08

## 2022-07-08 ENCOUNTER — OFFICE VISIT (OUTPATIENT)
Dept: DERMATOLOGY CLINIC | Facility: CLINIC | Age: 67
End: 2022-07-08
Payer: MEDICARE

## 2022-07-08 DIAGNOSIS — L40.8 SEBOPSORIASIS: ICD-10-CM

## 2022-07-08 DIAGNOSIS — L71.9 ROSACEA: Primary | ICD-10-CM

## 2022-07-08 DIAGNOSIS — D23.9 BENIGN NEOPLASM OF SKIN, UNSPECIFIED LOCATION: ICD-10-CM

## 2022-07-08 DIAGNOSIS — L29.9 PRURITUS: ICD-10-CM

## 2022-07-08 DIAGNOSIS — L57.8 NODULAR ELASTOSIS WITH CYSTS AND COMEDONES OF FAVRE AND RACOUCHOT: ICD-10-CM

## 2022-07-08 DIAGNOSIS — L70.0 NODULAR ELASTOSIS WITH CYSTS AND COMEDONES OF FAVRE AND RACOUCHOT: ICD-10-CM

## 2022-07-08 PROCEDURE — 99214 OFFICE O/P EST MOD 30 MIN: CPT | Performed by: DERMATOLOGY

## 2022-07-08 PROCEDURE — 1126F AMNT PAIN NOTED NONE PRSNT: CPT | Performed by: DERMATOLOGY

## 2022-07-08 RX ORDER — DOXYCYCLINE HYCLATE 100 MG/1
100 CAPSULE ORAL DAILY
Qty: 30 CAPSULE | Refills: 1 | Status: SHIPPED | OUTPATIENT
Start: 2022-07-08

## 2022-07-08 RX ORDER — APREMILAST 10-20-30MG
KIT ORAL
Qty: 55 EACH | Refills: 0 | Status: SHIPPED | OUTPATIENT
Start: 2022-07-08

## 2022-07-08 RX ORDER — APREMILAST 30 MG/1
TABLET, FILM COATED ORAL
Qty: 30 TABLET | Refills: 5 | Status: SHIPPED | OUTPATIENT
Start: 2022-07-08

## 2022-07-08 NOTE — TELEPHONE ENCOUNTER
Beronica Reddy form completed by patient. Given to MD for approval and signature. Please call pt with any updates.   Pt did not scan QR code for co-pay/bridge program .

## 2022-07-08 NOTE — TELEPHONE ENCOUNTER
AVS mailed to pt
Please mail AVS for 7/8/22 appointment when available.  ty
Oriented - self; Oriented - place; Oriented - time

## 2022-07-08 NOTE — PATIENT INSTRUCTIONS
Decrease doxycycline to once a day, we may be able to reduce the dose further to a lower dose or every other day, I sent a prescription for the doxycycline    Continue the xyzal, you may try whole tablet at bedtime, rather than 1/2 tablet    Please continue the creams-metronidazole for the redness on the face -forehead, cheeks, nose twice a day    Please continue the pimecrolimus for the scaling and itching for now on eyebrows and eyelids and     Please continue the ketoconazole shampoo, and the mometasone solution to the scalp and ears as needed    It may take a while to see if the Suly Antoine is covered. This is a low dose, so we might need to increase keeping in mind your kidney disease. If the Suly Antoine is covered we will review the instructions for this new pill planned dose is 30mg daily.     I would like to see you back in 2 months

## 2022-07-08 NOTE — TELEPHONE ENCOUNTER
Received fax from [facility] [place]. Attached is a request for a \"Medicare claims submission. \"     Printed off [address] visit notes as of 2/9/2022, current medication list is included in the LOV note, POC bg and A1c results. Faxed back to [address] at [phone].

## 2022-07-08 NOTE — PROGRESS NOTES
Decrease doxycycline to once a day, we may be able to reduce the dose further to a lower dose or every other day, I sent a prescription for the doxycycline    Continue the xyzal, you may try whole tablet at bedtime, rather than 1/2 tablet    Please continue the creams-metronidazole for the redness on the face -forehead, cheeks, nose twice a day    Please continue the pimecrolimus for the scaling and itching for now on eyebrows and eyelids and     Please continue the ketoconazole shampoo, and the mometasone solution to the scalp and ears as needed    It may take a while to see if the Gennaro Deshpande is covered. This is a low dose, so we might need to increase keeping in mind your kidney disease. Dr. Coco Ruiz is aware.

## 2022-07-11 ENCOUNTER — TELEPHONE (OUTPATIENT)
Dept: DERMATOLOGY CLINIC | Facility: CLINIC | Age: 67
End: 2022-07-11

## 2022-07-11 NOTE — TELEPHONE ENCOUNTER
Pt to start [facility]. Enrollment forms completed by pt and signed by Stacy Barclay. Forms faxed to [facility] on 7/11/22 and placed in the [employer] PA binder. Awaiting determination.

## 2022-07-18 DIAGNOSIS — E11.65 UNCONTROLLED TYPE 2 DIABETES MELLITUS WITH HYPERGLYCEMIA (HCC): ICD-10-CM

## 2022-07-18 RX ORDER — SYRINGE AND NEEDLE,INSULIN,1ML 31GX15/64"
SYRINGE, EMPTY DISPOSABLE MISCELLANEOUS
Qty: 400 EACH | Refills: 0 | Status: SHIPPED | OUTPATIENT
Start: 2022-07-18

## 2022-07-18 RX ORDER — INSULIN GLARGINE 100 [IU]/ML
INJECTION, SOLUTION SUBCUTANEOUS
Qty: 32 ML | Refills: 0 | Status: SHIPPED | OUTPATIENT
Start: 2022-07-18

## 2022-07-21 NOTE — TELEPHONE ENCOUNTER
Fax from [address] for [address] 30mg Tablet until 12/31/22    Placed fax in pa inbox

## 2022-07-21 NOTE — TELEPHONE ENCOUNTER
Pt contacted, confirmed name, and . Pt verbalizes understanding, and denies further questions. Will contact pharmacy for dispensing.

## 2022-07-23 ENCOUNTER — LAB ENCOUNTER (OUTPATIENT)
Dept: LAB | Age: 67
End: 2022-07-23
Attending: INTERNAL MEDICINE
Payer: MEDICARE

## 2022-07-23 DIAGNOSIS — E10.22 CKD STAGE 4 DUE TO TYPE 1 DIABETES MELLITUS (HCC): ICD-10-CM

## 2022-07-23 DIAGNOSIS — N18.4 CKD STAGE 4 DUE TO TYPE 1 DIABETES MELLITUS (HCC): ICD-10-CM

## 2022-07-23 LAB
ALBUMIN SERPL-MCNC: 3.4 G/DL (ref 3.4–5)
ANION GAP SERPL CALC-SCNC: 6 MMOL/L (ref 0–18)
BASOPHILS # BLD AUTO: 0.01 X10(3) UL (ref 0–0.2)
BASOPHILS NFR BLD AUTO: 0.4 %
BUN BLD-MCNC: 57 MG/DL (ref 7–18)
BUN/CREAT SERPL: 19.5 (ref 10–20)
CALCIUM BLD-MCNC: 8.8 MG/DL (ref 8.5–10.1)
CHLORIDE SERPL-SCNC: 111 MMOL/L (ref 98–112)
CO2 SERPL-SCNC: 26 MMOL/L (ref 21–32)
CREAT BLD-MCNC: 2.92 MG/DL
DEPRECATED RDW RBC AUTO: 63.3 FL (ref 35.1–46.3)
EOSINOPHIL # BLD AUTO: 0.11 X10(3) UL (ref 0–0.7)
EOSINOPHIL NFR BLD AUTO: 3.9 %
ERYTHROCYTE [DISTWIDTH] IN BLOOD BY AUTOMATED COUNT: 16 % (ref 11–15)
GLUCOSE BLD-MCNC: 139 MG/DL (ref 70–99)
HCT VFR BLD AUTO: 29.8 %
HGB BLD-MCNC: 9.7 G/DL
IMM GRANULOCYTES # BLD AUTO: 0.01 X10(3) UL (ref 0–1)
IMM GRANULOCYTES NFR BLD: 0.4 %
LYMPHOCYTES # BLD AUTO: 1.1 X10(3) UL (ref 1–4)
LYMPHOCYTES NFR BLD AUTO: 39.4 %
MCH RBC QN AUTO: 34.8 PG (ref 26–34)
MCHC RBC AUTO-ENTMCNC: 32.6 G/DL (ref 31–37)
MCV RBC AUTO: 106.8 FL
MONOCYTES # BLD AUTO: 0.3 X10(3) UL (ref 0.1–1)
MONOCYTES NFR BLD AUTO: 10.8 %
NEUTROPHILS # BLD AUTO: 1.26 X10 (3) UL (ref 1.5–7.7)
NEUTROPHILS # BLD AUTO: 1.26 X10(3) UL (ref 1.5–7.7)
NEUTROPHILS NFR BLD AUTO: 45.1 %
OSMOLALITY SERPL CALC.SUM OF ELEC: 314 MOSM/KG (ref 275–295)
PHOSPHATE SERPL-MCNC: 3.4 MG/DL (ref 2.5–4.9)
PLATELET # BLD AUTO: 101 10(3)UL (ref 150–450)
POTASSIUM SERPL-SCNC: 4.7 MMOL/L (ref 3.5–5.1)
RBC # BLD AUTO: 2.79 X10(6)UL
SODIUM SERPL-SCNC: 143 MMOL/L (ref 136–145)
WBC # BLD AUTO: 2.8 X10(3) UL (ref 4–11)

## 2022-07-23 PROCEDURE — 80069 RENAL FUNCTION PANEL: CPT

## 2022-07-23 PROCEDURE — 85025 COMPLETE CBC W/AUTO DIFF WBC: CPT

## 2022-07-23 PROCEDURE — 36415 COLL VENOUS BLD VENIPUNCTURE: CPT

## 2022-07-24 DIAGNOSIS — E11.65 UNCONTROLLED TYPE 2 DIABETES MELLITUS WITH HYPERGLYCEMIA (HCC): ICD-10-CM

## 2022-07-25 ENCOUNTER — OFFICE VISIT (OUTPATIENT)
Dept: NEPHROLOGY | Facility: CLINIC | Age: 67
End: 2022-07-25
Payer: MEDICARE

## 2022-07-25 VITALS
DIASTOLIC BLOOD PRESSURE: 69 MMHG | SYSTOLIC BLOOD PRESSURE: 113 MMHG | HEIGHT: 69 IN | WEIGHT: 237 LBS | BODY MASS INDEX: 35.1 KG/M2 | HEART RATE: 85 BPM

## 2022-07-25 DIAGNOSIS — D63.1 ANEMIA IN STAGE 3B CHRONIC KIDNEY DISEASE (HCC): Primary | ICD-10-CM

## 2022-07-25 DIAGNOSIS — N18.4 CKD STAGE 4 DUE TO TYPE 1 DIABETES MELLITUS (HCC): ICD-10-CM

## 2022-07-25 DIAGNOSIS — E11.3293 TYPE 2 DIABETES MELLITUS WITH BOTH EYES AFFECTED BY MILD NONPROLIFERATIVE RETINOPATHY WITHOUT MACULAR EDEMA, WITHOUT LONG-TERM CURRENT USE OF INSULIN (HCC): ICD-10-CM

## 2022-07-25 DIAGNOSIS — N18.32 ANEMIA IN STAGE 3B CHRONIC KIDNEY DISEASE (HCC): Primary | ICD-10-CM

## 2022-07-25 DIAGNOSIS — E10.22 CKD STAGE 4 DUE TO TYPE 1 DIABETES MELLITUS (HCC): ICD-10-CM

## 2022-07-25 DIAGNOSIS — E78.5 HYPERLIPIDEMIA, UNSPECIFIED HYPERLIPIDEMIA TYPE: ICD-10-CM

## 2022-07-25 RX ORDER — ATORVASTATIN CALCIUM 40 MG/1
TABLET, FILM COATED ORAL
Qty: 90 TABLET | Refills: 0 | Status: SHIPPED | OUTPATIENT
Start: 2022-07-25

## 2022-07-25 RX ORDER — HYDROCODONE BITARTRATE AND ACETAMINOPHEN 10; 325 MG/1; MG/1
1 TABLET ORAL EVERY 8 HOURS PRN
Qty: 90 TABLET | Refills: 0 | Status: SHIPPED | OUTPATIENT
Start: 2022-08-24

## 2022-07-25 RX ORDER — HYDROCODONE BITARTRATE AND ACETAMINOPHEN 10; 325 MG/1; MG/1
1 TABLET ORAL EVERY 8 HOURS PRN
Qty: 90 TABLET | Refills: 0 | Status: SHIPPED | OUTPATIENT
Start: 2022-07-25 | End: 2022-07-25

## 2022-07-25 NOTE — PATIENT INSTRUCTIONS
Take MiraLAX 1 dose every night for your bowels    Take Senokot 1 or 2 pills today as needed    Please do labs in 3 weeks I want you to get your shots every 3 weeks      See me back please in 3 weeks    Have a good summer to both

## 2022-07-25 NOTE — TELEPHONE ENCOUNTER
Pt informed of approval and copay amount. Pt advised that he is being referred out for copay assistance. Pt provided with contact information for Harry to follow up regarding the copay assistance. Pt advised to call the office if he is not able to get the information he needs.

## 2022-07-25 NOTE — PROGRESS NOTES
Pt presents to clinic today for Aranesp injection. On  7/23/22  Hgb was  9.7 _ and hematocrit   29.8 . BP today is  113 69 _. Aranesp  200  mcg administered to Righ_ upper arm subcutaneous without complications. Patient tolerated injection well. Patient will RTC in 4 weeks for next injection.

## 2022-08-13 ENCOUNTER — LAB ENCOUNTER (OUTPATIENT)
Dept: LAB | Age: 67
End: 2022-08-13
Attending: INTERNAL MEDICINE
Payer: MEDICARE

## 2022-08-13 DIAGNOSIS — N18.4 ANEMIA OF CHRONIC RENAL FAILURE, STAGE 4 (SEVERE) (HCC): ICD-10-CM

## 2022-08-13 DIAGNOSIS — D63.1 ANEMIA IN STAGE 3B CHRONIC KIDNEY DISEASE (HCC): ICD-10-CM

## 2022-08-13 DIAGNOSIS — N18.32 ANEMIA IN STAGE 3B CHRONIC KIDNEY DISEASE (HCC): ICD-10-CM

## 2022-08-13 DIAGNOSIS — E11.3293 TYPE 2 DIABETES MELLITUS WITH BOTH EYES AFFECTED BY MILD NONPROLIFERATIVE RETINOPATHY WITHOUT MACULAR EDEMA, WITHOUT LONG-TERM CURRENT USE OF INSULIN (HCC): ICD-10-CM

## 2022-08-13 DIAGNOSIS — E10.22 CKD STAGE 4 DUE TO TYPE 1 DIABETES MELLITUS (HCC): ICD-10-CM

## 2022-08-13 DIAGNOSIS — E78.5 HYPERLIPIDEMIA, UNSPECIFIED HYPERLIPIDEMIA TYPE: ICD-10-CM

## 2022-08-13 DIAGNOSIS — N18.4 CKD STAGE 4 DUE TO TYPE 1 DIABETES MELLITUS (HCC): ICD-10-CM

## 2022-08-13 DIAGNOSIS — D63.1 ANEMIA OF CHRONIC RENAL FAILURE, STAGE 4 (SEVERE) (HCC): ICD-10-CM

## 2022-08-13 LAB
ALBUMIN SERPL-MCNC: 3.4 G/DL (ref 3.4–5)
ANION GAP SERPL CALC-SCNC: 6 MMOL/L (ref 0–18)
BASOPHILS # BLD AUTO: 0.01 X10(3) UL (ref 0–0.2)
BASOPHILS NFR BLD AUTO: 0.3 %
BUN BLD-MCNC: 63 MG/DL (ref 7–18)
BUN/CREAT SERPL: 19.6 (ref 10–20)
CALCIUM BLD-MCNC: 9 MG/DL (ref 8.5–10.1)
CHLORIDE SERPL-SCNC: 112 MMOL/L (ref 98–112)
CHOLEST SERPL-MCNC: 89 MG/DL (ref ?–200)
CO2 SERPL-SCNC: 24 MMOL/L (ref 21–32)
CREAT BLD-MCNC: 3.22 MG/DL
DEPRECATED RDW RBC AUTO: 59.4 FL (ref 35.1–46.3)
EOSINOPHIL # BLD AUTO: 0.04 X10(3) UL (ref 0–0.7)
EOSINOPHIL NFR BLD AUTO: 1.3 %
ERYTHROCYTE [DISTWIDTH] IN BLOOD BY AUTOMATED COUNT: 14.9 % (ref 11–15)
EST. AVERAGE GLUCOSE BLD GHB EST-MCNC: 157 MG/DL (ref 68–126)
FASTING PATIENT LIPID ANSWER: YES
GFR SERPLBLD BASED ON 1.73 SQ M-ARVRAT: 20 ML/MIN/1.73M2 (ref 60–?)
GLUCOSE BLD-MCNC: 227 MG/DL (ref 70–99)
HBA1C MFR BLD: 7.1 % (ref ?–5.7)
HCT VFR BLD AUTO: 30.1 %
HDLC SERPL-MCNC: 34 MG/DL (ref 40–59)
HGB BLD-MCNC: 9.4 G/DL
IMM GRANULOCYTES # BLD AUTO: 0.01 X10(3) UL (ref 0–1)
IMM GRANULOCYTES NFR BLD: 0.3 %
LDLC SERPL CALC-MCNC: 36 MG/DL (ref ?–100)
LYMPHOCYTES # BLD AUTO: 0.86 X10(3) UL (ref 1–4)
LYMPHOCYTES NFR BLD AUTO: 26.9 %
MCH RBC QN AUTO: 33.9 PG (ref 26–34)
MCHC RBC AUTO-ENTMCNC: 31.2 G/DL (ref 31–37)
MCV RBC AUTO: 108.7 FL
MONOCYTES # BLD AUTO: 0.28 X10(3) UL (ref 0.1–1)
MONOCYTES NFR BLD AUTO: 8.8 %
NEUTROPHILS # BLD AUTO: 2 X10 (3) UL (ref 1.5–7.7)
NEUTROPHILS # BLD AUTO: 2 X10(3) UL (ref 1.5–7.7)
NEUTROPHILS NFR BLD AUTO: 62.4 %
NONHDLC SERPL-MCNC: 55 MG/DL (ref ?–130)
OSMOLALITY SERPL CALC.SUM OF ELEC: 319 MOSM/KG (ref 275–295)
PHOSPHATE SERPL-MCNC: 3.8 MG/DL (ref 2.5–4.9)
PLATELET # BLD AUTO: 114 10(3)UL (ref 150–450)
POTASSIUM SERPL-SCNC: 4.8 MMOL/L (ref 3.5–5.1)
RBC # BLD AUTO: 2.77 X10(6)UL
SODIUM SERPL-SCNC: 142 MMOL/L (ref 136–145)
TRIGL SERPL-MCNC: 99 MG/DL (ref 30–149)
VLDLC SERPL CALC-MCNC: 14 MG/DL (ref 0–30)
WBC # BLD AUTO: 3.2 X10(3) UL (ref 4–11)

## 2022-08-13 PROCEDURE — 80069 RENAL FUNCTION PANEL: CPT

## 2022-08-13 PROCEDURE — 36415 COLL VENOUS BLD VENIPUNCTURE: CPT

## 2022-08-13 PROCEDURE — 83036 HEMOGLOBIN GLYCOSYLATED A1C: CPT

## 2022-08-13 PROCEDURE — 85025 COMPLETE CBC W/AUTO DIFF WBC: CPT

## 2022-08-13 PROCEDURE — 80061 LIPID PANEL: CPT

## 2022-08-15 ENCOUNTER — OFFICE VISIT (OUTPATIENT)
Dept: NEPHROLOGY | Facility: CLINIC | Age: 67
End: 2022-08-15
Payer: MEDICARE

## 2022-08-15 ENCOUNTER — TELEPHONE (OUTPATIENT)
Dept: ENDOCRINOLOGY CLINIC | Facility: CLINIC | Age: 67
End: 2022-08-15

## 2022-08-15 VITALS
SYSTOLIC BLOOD PRESSURE: 100 MMHG | HEART RATE: 85 BPM | DIASTOLIC BLOOD PRESSURE: 60 MMHG | BODY MASS INDEX: 35 KG/M2 | WEIGHT: 234 LBS

## 2022-08-15 DIAGNOSIS — N18.4 CKD STAGE 4 DUE TO TYPE 1 DIABETES MELLITUS (HCC): ICD-10-CM

## 2022-08-15 DIAGNOSIS — E11.3293 TYPE 2 DIABETES MELLITUS WITH BOTH EYES AFFECTED BY MILD NONPROLIFERATIVE RETINOPATHY WITHOUT MACULAR EDEMA, WITHOUT LONG-TERM CURRENT USE OF INSULIN (HCC): ICD-10-CM

## 2022-08-15 DIAGNOSIS — D63.1 ANEMIA IN STAGE 3B CHRONIC KIDNEY DISEASE (HCC): Primary | ICD-10-CM

## 2022-08-15 DIAGNOSIS — E10.22 CKD STAGE 4 DUE TO TYPE 1 DIABETES MELLITUS (HCC): ICD-10-CM

## 2022-08-15 DIAGNOSIS — N18.32 ANEMIA IN STAGE 3B CHRONIC KIDNEY DISEASE (HCC): Primary | ICD-10-CM

## 2022-08-15 NOTE — PATIENT INSTRUCTIONS
Please see me September 28      Schedule shot on September 7    Do labs prior to seeing me    Same medications    Good job [place] Abreu

## 2022-08-15 NOTE — PROGRESS NOTES
Pt presents to clinic today for Aranesp injection. On 8/13/22 _, Hgb was  9.4  and hematocrit  30.1_. BP today is 100 60 _. Aranesp  200  mcg administered to   Left _ upper arm subcutaneous without complications. Patient tolerated injection well. Patient will RTC in 4 weeks for next injection.

## 2022-08-15 NOTE — TELEPHONE ENCOUNTER
Received fax from [facility] DR VISHNU HENRIQUEZ. Attached is a request for office visit notes. Printed LOV notes from 2/9/2022, including most recent A1c results as of 8/13/2022. Faxed back to [phone].

## 2022-08-17 ENCOUNTER — OFFICE VISIT (OUTPATIENT)
Dept: ENDOCRINOLOGY CLINIC | Facility: CLINIC | Age: 67
End: 2022-08-17
Payer: MEDICARE

## 2022-08-17 VITALS
WEIGHT: 236 LBS | DIASTOLIC BLOOD PRESSURE: 71 MMHG | HEART RATE: 77 BPM | BODY MASS INDEX: 35 KG/M2 | SYSTOLIC BLOOD PRESSURE: 137 MMHG

## 2022-08-17 DIAGNOSIS — E11.65 UNCONTROLLED TYPE 2 DIABETES MELLITUS WITH HYPERGLYCEMIA (HCC): Primary | ICD-10-CM

## 2022-08-17 LAB
CARTRIDGE LOT#: ABNORMAL NUMERIC
GLUCOSE BLOOD: 285
HEMOGLOBIN A1C: 7.2 % (ref 4.3–5.6)
TEST STRIP LOT #: NORMAL NUMERIC

## 2022-08-17 PROCEDURE — 99214 OFFICE O/P EST MOD 30 MIN: CPT | Performed by: INTERNAL MEDICINE

## 2022-08-17 PROCEDURE — 1126F AMNT PAIN NOTED NONE PRSNT: CPT | Performed by: INTERNAL MEDICINE

## 2022-08-17 PROCEDURE — 83036 HEMOGLOBIN GLYCOSYLATED A1C: CPT | Performed by: INTERNAL MEDICINE

## 2022-08-17 PROCEDURE — 82947 ASSAY GLUCOSE BLOOD QUANT: CPT | Performed by: INTERNAL MEDICINE

## 2022-08-17 RX ORDER — INSULIN GLARGINE 100 [IU]/ML
35 INJECTION, SOLUTION SUBCUTANEOUS NIGHTLY
Qty: 40 ML | Refills: 3 | Status: SHIPPED | OUTPATIENT
Start: 2022-08-17

## 2022-08-17 RX ORDER — INSULIN ASPART 100 [IU]/ML
INJECTION, SOLUTION INTRAVENOUS; SUBCUTANEOUS
Qty: 40 ML | Refills: 2 | Status: SHIPPED | OUTPATIENT
Start: 2022-08-17

## 2022-08-29 RX ORDER — PREGABALIN 300 MG/1
CAPSULE ORAL
Qty: 90 CAPSULE | Refills: 0 | Status: SHIPPED | OUTPATIENT
Start: 2022-08-29

## 2022-09-07 ENCOUNTER — NURSE ONLY (OUTPATIENT)
Dept: NEPHROLOGY | Facility: CLINIC | Age: 67
End: 2022-09-07
Payer: MEDICARE

## 2022-09-07 DIAGNOSIS — D63.1 ANEMIA IN STAGE 3B CHRONIC KIDNEY DISEASE (HCC): Primary | ICD-10-CM

## 2022-09-07 DIAGNOSIS — N18.32 ANEMIA IN STAGE 3B CHRONIC KIDNEY DISEASE (HCC): Primary | ICD-10-CM

## 2022-09-07 NOTE — PROGRESS NOTES
Pt presents to clinic today for Aranesp injection. On  8/13/22 , Hgb was   9.4  and hematocrit  30.1 . BP today is   106/65 _ranesp  200 _ mcg administered to  Right _ upper arm subcutaneous without complications. Patient tolerated injection well. Patient will RTC in 4 weeks for next injection.

## 2022-09-11 DIAGNOSIS — E11.65 UNCONTROLLED TYPE 2 DIABETES MELLITUS WITH HYPERGLYCEMIA (HCC): ICD-10-CM

## 2022-09-12 RX ORDER — FENOFIBRATE 48 MG/1
TABLET, COATED ORAL
Qty: 90 TABLET | Refills: 0 | Status: SHIPPED | OUTPATIENT
Start: 2022-09-12

## 2022-09-12 RX ORDER — PANTOPRAZOLE SODIUM 40 MG/1
TABLET, DELAYED RELEASE ORAL
Qty: 90 TABLET | Refills: 0 | Status: SHIPPED | OUTPATIENT
Start: 2022-09-12

## 2022-09-26 ENCOUNTER — LAB ENCOUNTER (OUTPATIENT)
Dept: LAB | Age: 67
End: 2022-09-26
Attending: INTERNAL MEDICINE

## 2022-09-26 DIAGNOSIS — E78.5 HYPERLIPIDEMIA, UNSPECIFIED HYPERLIPIDEMIA TYPE: ICD-10-CM

## 2022-09-26 DIAGNOSIS — E11.3293 TYPE 2 DIABETES MELLITUS WITH BOTH EYES AFFECTED BY MILD NONPROLIFERATIVE RETINOPATHY WITHOUT MACULAR EDEMA, WITHOUT LONG-TERM CURRENT USE OF INSULIN (HCC): ICD-10-CM

## 2022-09-26 DIAGNOSIS — N18.4 CKD STAGE 4 DUE TO TYPE 1 DIABETES MELLITUS (HCC): ICD-10-CM

## 2022-09-26 DIAGNOSIS — N18.32 ANEMIA IN STAGE 3B CHRONIC KIDNEY DISEASE (HCC): ICD-10-CM

## 2022-09-26 DIAGNOSIS — D63.1 ANEMIA IN STAGE 3B CHRONIC KIDNEY DISEASE (HCC): ICD-10-CM

## 2022-09-26 DIAGNOSIS — E10.22 CKD STAGE 4 DUE TO TYPE 1 DIABETES MELLITUS (HCC): ICD-10-CM

## 2022-09-26 LAB
ALBUMIN SERPL-MCNC: 3.7 G/DL (ref 3.4–5)
ANION GAP SERPL CALC-SCNC: 4 MMOL/L (ref 0–18)
BASOPHILS # BLD AUTO: 0.02 X10(3) UL (ref 0–0.2)
BASOPHILS NFR BLD AUTO: 0.7 %
BILIRUB UR QL: NEGATIVE
BUN BLD-MCNC: 65 MG/DL (ref 7–18)
BUN/CREAT SERPL: 21.7 (ref 10–20)
CALCIUM BLD-MCNC: 9.1 MG/DL (ref 8.5–10.1)
CHLORIDE SERPL-SCNC: 113 MMOL/L (ref 98–112)
CLARITY UR: CLEAR
CO2 SERPL-SCNC: 25 MMOL/L (ref 21–32)
COLOR UR: YELLOW
CREAT BLD-MCNC: 3 MG/DL
CREAT UR-SCNC: 50.6 MG/DL
DEPRECATED RDW RBC AUTO: 57.1 FL (ref 35.1–46.3)
EOSINOPHIL # BLD AUTO: 0.08 X10(3) UL (ref 0–0.7)
EOSINOPHIL NFR BLD AUTO: 2.7 %
ERYTHROCYTE [DISTWIDTH] IN BLOOD BY AUTOMATED COUNT: 14.9 % (ref 11–15)
GFR SERPLBLD BASED ON 1.73 SQ M-ARVRAT: 22 ML/MIN/1.73M2 (ref 60–?)
GLUCOSE BLD-MCNC: 117 MG/DL (ref 70–99)
GLUCOSE UR-MCNC: 100 MG/DL
HCT VFR BLD AUTO: 30.2 %
HGB BLD-MCNC: 9.9 G/DL
HGB UR QL STRIP.AUTO: NEGATIVE
IMM GRANULOCYTES # BLD AUTO: 0.01 X10(3) UL (ref 0–1)
IMM GRANULOCYTES NFR BLD: 0.3 %
KETONES UR-MCNC: NEGATIVE MG/DL
LEUKOCYTE ESTERASE UR QL STRIP.AUTO: NEGATIVE
LYMPHOCYTES # BLD AUTO: 1.17 X10(3) UL (ref 1–4)
LYMPHOCYTES NFR BLD AUTO: 39 %
MCH RBC QN AUTO: 34.5 PG (ref 26–34)
MCHC RBC AUTO-ENTMCNC: 32.8 G/DL (ref 31–37)
MCV RBC AUTO: 105.2 FL
MICROALBUMIN UR-MCNC: 25.4 MG/DL
MICROALBUMIN/CREAT 24H UR-RTO: 502 UG/MG (ref ?–30)
MONOCYTES # BLD AUTO: 0.31 X10(3) UL (ref 0.1–1)
MONOCYTES NFR BLD AUTO: 10.3 %
NEUTROPHILS # BLD AUTO: 1.41 X10 (3) UL (ref 1.5–7.7)
NEUTROPHILS # BLD AUTO: 1.41 X10(3) UL (ref 1.5–7.7)
NEUTROPHILS NFR BLD AUTO: 47 %
NITRITE UR QL STRIP.AUTO: NEGATIVE
OSMOLALITY SERPL CALC.SUM OF ELEC: 314 MOSM/KG (ref 275–295)
PH UR: 5.5 [PH] (ref 5–8)
PHOSPHATE SERPL-MCNC: 3.9 MG/DL (ref 2.5–4.9)
PLATELET # BLD AUTO: 100 10(3)UL (ref 150–450)
POTASSIUM SERPL-SCNC: 5.5 MMOL/L (ref 3.5–5.1)
PTH-INTACT SERPL-MCNC: 59.6 PG/ML (ref 18.5–88)
RBC # BLD AUTO: 2.87 X10(6)UL
SODIUM SERPL-SCNC: 142 MMOL/L (ref 136–145)
SP GR UR STRIP: 1.01 (ref 1–1.03)
UROBILINOGEN UR STRIP-ACNC: 0.2
WBC # BLD AUTO: 3 X10(3) UL (ref 4–11)

## 2022-09-26 PROCEDURE — 81015 MICROSCOPIC EXAM OF URINE: CPT

## 2022-09-26 PROCEDURE — 83970 ASSAY OF PARATHORMONE: CPT

## 2022-09-26 PROCEDURE — 82570 ASSAY OF URINE CREATININE: CPT

## 2022-09-26 PROCEDURE — 36415 COLL VENOUS BLD VENIPUNCTURE: CPT

## 2022-09-26 PROCEDURE — 80069 RENAL FUNCTION PANEL: CPT

## 2022-09-26 PROCEDURE — 81001 URINALYSIS AUTO W/SCOPE: CPT

## 2022-09-26 PROCEDURE — 85025 COMPLETE CBC W/AUTO DIFF WBC: CPT

## 2022-09-26 PROCEDURE — 82043 UR ALBUMIN QUANTITATIVE: CPT

## 2022-09-27 RX ORDER — DOXYCYCLINE HYCLATE 100 MG/1
100 CAPSULE ORAL DAILY
Qty: 30 CAPSULE | Refills: 3 | Status: SHIPPED | OUTPATIENT
Start: 2022-09-27

## 2022-09-28 ENCOUNTER — OFFICE VISIT (OUTPATIENT)
Dept: NEPHROLOGY | Facility: CLINIC | Age: 67
End: 2022-09-28

## 2022-09-28 VITALS
SYSTOLIC BLOOD PRESSURE: 92 MMHG | WEIGHT: 233.25 LBS | HEIGHT: 69 IN | BODY MASS INDEX: 34.55 KG/M2 | HEART RATE: 76 BPM | DIASTOLIC BLOOD PRESSURE: 56 MMHG

## 2022-09-28 DIAGNOSIS — N18.32 ANEMIA IN STAGE 3B CHRONIC KIDNEY DISEASE (HCC): Primary | ICD-10-CM

## 2022-09-28 DIAGNOSIS — E10.22 CKD STAGE 4 DUE TO TYPE 1 DIABETES MELLITUS (HCC): ICD-10-CM

## 2022-09-28 DIAGNOSIS — E10.3293 TYPE 1 DIABETES MELLITUS WITH MILD NONPROLIFERATIVE RETINOPATHY OF BOTH EYES WITHOUT MACULAR EDEMA (HCC): ICD-10-CM

## 2022-09-28 DIAGNOSIS — D63.1 ANEMIA IN STAGE 3B CHRONIC KIDNEY DISEASE (HCC): Primary | ICD-10-CM

## 2022-09-28 DIAGNOSIS — N18.4 CKD STAGE 4 DUE TO TYPE 1 DIABETES MELLITUS (HCC): ICD-10-CM

## 2022-09-28 PROCEDURE — 99213 OFFICE O/P EST LOW 20 MIN: CPT | Performed by: INTERNAL MEDICINE

## 2022-09-28 PROCEDURE — 96372 THER/PROPH/DIAG INJ SC/IM: CPT | Performed by: INTERNAL MEDICINE

## 2022-09-28 PROCEDURE — 1126F AMNT PAIN NOTED NONE PRSNT: CPT | Performed by: INTERNAL MEDICINE

## 2022-09-28 RX ORDER — HYDROCODONE BITARTRATE AND ACETAMINOPHEN 10; 325 MG/1; MG/1
1 TABLET ORAL EVERY 8 HOURS PRN
Qty: 90 TABLET | Refills: 0 | Status: SHIPPED | OUTPATIENT
Start: 2022-11-28

## 2022-09-28 RX ORDER — HYDROCODONE BITARTRATE AND ACETAMINOPHEN 10; 325 MG/1; MG/1
1 TABLET ORAL EVERY 8 HOURS PRN
Qty: 90 TABLET | Refills: 0 | Status: SHIPPED | OUTPATIENT
Start: 2022-10-28 | End: 2022-09-28

## 2022-09-28 RX ORDER — HYDROCODONE BITARTRATE AND ACETAMINOPHEN 10; 325 MG/1; MG/1
1 TABLET ORAL EVERY 8 HOURS PRN
Qty: 90 TABLET | Refills: 0 | Status: SHIPPED | OUTPATIENT
Start: 2022-09-28 | End: 2022-09-28

## 2022-09-28 NOTE — PROGRESS NOTES
pt presents to clinic today for Aranesp injection. On _9/26/22___, Hgb was _9.9___ and hematocrit _30.2___. BP today is _92/56_76__. Aranesp 200___ mcg administered to Left____ upper arm subcutaneous without complications. Patient tolerated injection well. Patient will RTC in 3 weeks for next injection.

## 2022-09-28 NOTE — PATIENT INSTRUCTIONS
Keep up shot monthly    See me before francy    Do labs  Before November shot    Good to see you both!

## 2022-09-28 NOTE — PROGRESS NOTES
Sondra Desai is here with his wife Kiesha Tabor he is doing fine pressures 92/56 sugars are doing good he is watching his diet I refilled his Norco he takes 1 a day gave him 90 pills with 2 refills he does not abuse otherwise feeling good not on anything for blood pressure as his blood pressure runs low. No other complaints stays active feels well does have chronic neuropathy in his feet.   No chest pain or shortness of breath    Vitals are stable pressure is 101/56 pulse 76 weight 233 HEENT pupils equal reactive mouth clear and dry    Neck supple no adenopathy  Lungs are clear abdomen soft nontender good bowel sounds    Extremities that edema trace pedal pulses neurologically intact      Impression #1 anemia hemoglobin 9.9 continue Aranesp 200 mcg every 3 weeks  #2 CKD 4  Creatinine level 3 GFR of 22    #3 potassium 5.5 not on ACE or ARB watch K and a diet reviewed diet with wife    #4 positive microalbumin cannot be on ACE or ARB    See me back in 12 weeks do labs again in 9 weeks

## 2022-10-03 RX ORDER — SYRINGE AND NEEDLE,INSULIN,1ML 31GX15/64"
SYRINGE, EMPTY DISPOSABLE MISCELLANEOUS
Qty: 400 EACH | Refills: 0 | Status: SHIPPED | OUTPATIENT
Start: 2022-10-03

## 2022-10-17 ENCOUNTER — TELEPHONE (OUTPATIENT)
Dept: NEPHROLOGY | Facility: CLINIC | Age: 67
End: 2022-10-17

## 2022-10-18 ENCOUNTER — NURSE ONLY (OUTPATIENT)
Dept: NEPHROLOGY | Facility: CLINIC | Age: 67
End: 2022-10-18
Payer: MEDICARE

## 2022-10-18 DIAGNOSIS — N18.32 ANEMIA IN STAGE 3B CHRONIC KIDNEY DISEASE (HCC): Primary | ICD-10-CM

## 2022-10-18 DIAGNOSIS — D63.1 ANEMIA IN STAGE 3B CHRONIC KIDNEY DISEASE (HCC): Primary | ICD-10-CM

## 2022-10-18 PROCEDURE — 96372 THER/PROPH/DIAG INJ SC/IM: CPT | Performed by: INTERNAL MEDICINE

## 2022-10-18 NOTE — PROGRESS NOTES
Pt presents to clinic today for Aranesp injection. On 9/26/22_, Hgb was   9.9  and hematocrit   30.2 . BP today is   130 59 . Aranesp  200 _ mcg administered to  Right __ upper arm subcutaneous without complications. Patient tolerated injection well. Patient will RTC in 4 weeks for next injection.

## 2022-10-30 DIAGNOSIS — E11.65 UNCONTROLLED TYPE 2 DIABETES MELLITUS WITH HYPERGLYCEMIA (HCC): ICD-10-CM

## 2022-11-01 DIAGNOSIS — E11.65 UNCONTROLLED TYPE 2 DIABETES MELLITUS WITH HYPERGLYCEMIA (HCC): ICD-10-CM

## 2022-11-01 RX ORDER — INSULIN GLARGINE 100 [IU]/ML
INJECTION, SOLUTION SUBCUTANEOUS
Qty: 30 ML | Refills: 0 | Status: SHIPPED | OUTPATIENT
Start: 2022-11-01

## 2022-11-01 RX ORDER — ATORVASTATIN CALCIUM 40 MG/1
TABLET, FILM COATED ORAL
Qty: 90 TABLET | Refills: 0 | Status: SHIPPED | OUTPATIENT
Start: 2022-11-01

## 2022-11-02 RX ORDER — BLOOD-GLUCOSE METER
KIT MISCELLANEOUS
Qty: 400 STRIP | Refills: 1 | Status: SHIPPED | OUTPATIENT
Start: 2022-11-02

## 2022-11-07 ENCOUNTER — LAB ENCOUNTER (OUTPATIENT)
Dept: LAB | Age: 67
End: 2022-11-07
Attending: INTERNAL MEDICINE
Payer: MEDICARE

## 2022-11-07 DIAGNOSIS — E10.22 CKD STAGE 4 DUE TO TYPE 1 DIABETES MELLITUS (HCC): ICD-10-CM

## 2022-11-07 DIAGNOSIS — N18.4 CKD STAGE 4 DUE TO TYPE 1 DIABETES MELLITUS (HCC): ICD-10-CM

## 2022-11-07 DIAGNOSIS — D63.1 ANEMIA IN STAGE 3B CHRONIC KIDNEY DISEASE (HCC): ICD-10-CM

## 2022-11-07 DIAGNOSIS — N18.32 ANEMIA IN STAGE 3B CHRONIC KIDNEY DISEASE (HCC): ICD-10-CM

## 2022-11-07 DIAGNOSIS — E10.3293 TYPE 1 DIABETES MELLITUS WITH MILD NONPROLIFERATIVE RETINOPATHY OF BOTH EYES WITHOUT MACULAR EDEMA (HCC): ICD-10-CM

## 2022-11-07 LAB
ALBUMIN SERPL-MCNC: 3.4 G/DL (ref 3.4–5)
ANION GAP SERPL CALC-SCNC: 6 MMOL/L (ref 0–18)
BASOPHILS # BLD AUTO: 0.01 X10(3) UL (ref 0–0.2)
BASOPHILS NFR BLD AUTO: 0.3 %
BUN BLD-MCNC: 52 MG/DL (ref 7–18)
BUN/CREAT SERPL: 17.9 (ref 10–20)
CALCIUM BLD-MCNC: 8.5 MG/DL (ref 8.5–10.1)
CHLORIDE SERPL-SCNC: 113 MMOL/L (ref 98–112)
CO2 SERPL-SCNC: 26 MMOL/L (ref 21–32)
CREAT BLD-MCNC: 2.9 MG/DL
DEPRECATED RDW RBC AUTO: 59.7 FL (ref 35.1–46.3)
EOSINOPHIL # BLD AUTO: 0.1 X10(3) UL (ref 0–0.7)
EOSINOPHIL NFR BLD AUTO: 3.5 %
ERYTHROCYTE [DISTWIDTH] IN BLOOD BY AUTOMATED COUNT: 15.1 % (ref 11–15)
GFR SERPLBLD BASED ON 1.73 SQ M-ARVRAT: 23 ML/MIN/1.73M2 (ref 60–?)
GLUCOSE BLD-MCNC: 192 MG/DL (ref 70–99)
HCT VFR BLD AUTO: 30.1 %
HGB BLD-MCNC: 9.7 G/DL
IMM GRANULOCYTES # BLD AUTO: 0.01 X10(3) UL (ref 0–1)
IMM GRANULOCYTES NFR BLD: 0.3 %
LYMPHOCYTES # BLD AUTO: 1.01 X10(3) UL (ref 1–4)
LYMPHOCYTES NFR BLD AUTO: 34.9 %
MCH RBC QN AUTO: 34.4 PG (ref 26–34)
MCHC RBC AUTO-ENTMCNC: 32.2 G/DL (ref 31–37)
MCV RBC AUTO: 106.7 FL
MONOCYTES # BLD AUTO: 0.31 X10(3) UL (ref 0.1–1)
MONOCYTES NFR BLD AUTO: 10.7 %
NEUTROPHILS # BLD AUTO: 1.45 X10 (3) UL (ref 1.5–7.7)
NEUTROPHILS # BLD AUTO: 1.45 X10(3) UL (ref 1.5–7.7)
NEUTROPHILS NFR BLD AUTO: 50.3 %
OSMOLALITY SERPL CALC.SUM OF ELEC: 319 MOSM/KG (ref 275–295)
PHOSPHATE SERPL-MCNC: 3 MG/DL (ref 2.5–4.9)
PLATELET # BLD AUTO: 101 10(3)UL (ref 150–450)
POTASSIUM SERPL-SCNC: 5.1 MMOL/L (ref 3.5–5.1)
RBC # BLD AUTO: 2.82 X10(6)UL
SODIUM SERPL-SCNC: 145 MMOL/L (ref 136–145)
WBC # BLD AUTO: 2.9 X10(3) UL (ref 4–11)

## 2022-11-07 PROCEDURE — 85025 COMPLETE CBC W/AUTO DIFF WBC: CPT

## 2022-11-07 PROCEDURE — 36415 COLL VENOUS BLD VENIPUNCTURE: CPT

## 2022-11-07 PROCEDURE — 80069 RENAL FUNCTION PANEL: CPT

## 2022-11-09 ENCOUNTER — NURSE ONLY (OUTPATIENT)
Dept: NEPHROLOGY | Facility: CLINIC | Age: 67
End: 2022-11-09
Payer: MEDICARE

## 2022-11-09 DIAGNOSIS — D63.1 ANEMIA IN STAGE 3B CHRONIC KIDNEY DISEASE (HCC): Primary | ICD-10-CM

## 2022-11-09 DIAGNOSIS — N18.32 ANEMIA IN STAGE 3B CHRONIC KIDNEY DISEASE (HCC): Primary | ICD-10-CM

## 2022-11-09 PROCEDURE — 96372 THER/PROPH/DIAG INJ SC/IM: CPT | Performed by: INTERNAL MEDICINE

## 2022-11-09 NOTE — PROGRESS NOTES
pt presents to clinic today for Aranesp injection. On _11/7/22___, Hgb was 9.7____ and hematocrit 30.1____. BP today is ____. Aranesp 200___ mcg administered to _left___ upper arm subcutaneous without complications. Patient tolerated injection well. Patient will RTC in 3 weeks for next injection.

## 2022-11-11 ENCOUNTER — TELEPHONE (OUTPATIENT)
Dept: ENDOCRINOLOGY CLINIC | Facility: CLINIC | Age: 67
End: 2022-11-11

## 2022-11-11 NOTE — TELEPHONE ENCOUNTER
Received fax from [facility]. Attached is medical necessity form. Completed, placed in Dr [employer] folder for signature.

## 2022-11-15 ENCOUNTER — TELEPHONE (OUTPATIENT)
Dept: NEPHROLOGY | Facility: CLINIC | Age: 67
End: 2022-11-15

## 2022-11-15 DIAGNOSIS — N18.32 ANEMIA IN STAGE 3B CHRONIC KIDNEY DISEASE (HCC): Primary | ICD-10-CM

## 2022-11-15 DIAGNOSIS — D63.1 ANEMIA IN STAGE 3B CHRONIC KIDNEY DISEASE (HCC): Primary | ICD-10-CM

## 2022-11-15 NOTE — TELEPHONE ENCOUNTER
Per Dr Montoya Pass pt labs is ok but Hgb still low pt need to do Aranesp 200 mcg  injection every 2 weeks . Patient informed, verbalized understanding and scheduled.

## 2022-11-18 ENCOUNTER — TELEPHONE (OUTPATIENT)
Dept: ENDOCRINOLOGY CLINIC | Facility: CLINIC | Age: 67
End: 2022-11-18

## 2022-11-18 DIAGNOSIS — E11.65 UNCONTROLLED TYPE 2 DIABETES MELLITUS WITH HYPERGLYCEMIA (HCC): ICD-10-CM

## 2022-11-18 RX ORDER — BLOOD-GLUCOSE METER
KIT MISCELLANEOUS
Qty: 400 STRIP | Refills: 1 | Status: SHIPPED | OUTPATIENT
Start: 2022-11-18 | End: 2022-11-18

## 2022-11-18 RX ORDER — BLOOD-GLUCOSE METER
KIT MISCELLANEOUS
Qty: 400 STRIP | Refills: 1 | Status: SHIPPED | OUTPATIENT
Start: 2022-11-18

## 2022-11-18 NOTE — TELEPHONE ENCOUNTER
Patient is requesting call back from nurse regarding medication FREESTYLE LITE TEST In Vitro Strip. Patient states she was advised by Medicare to have medication transferred to a different pharmacy, [address] [place] [address] in [place], due to [facility] getting audited.

## 2022-11-18 NOTE — TELEPHONE ENCOUNTER
Called patient's wife and discussed below. RN sent script to [facility] as requested with dx and insulin status.

## 2022-11-23 ENCOUNTER — NURSE ONLY (OUTPATIENT)
Dept: NEPHROLOGY | Facility: CLINIC | Age: 67
End: 2022-11-23
Payer: MEDICARE

## 2022-11-23 DIAGNOSIS — D63.1 ANEMIA IN STAGE 3B CHRONIC KIDNEY DISEASE (HCC): Primary | ICD-10-CM

## 2022-11-23 DIAGNOSIS — N18.32 ANEMIA IN STAGE 3B CHRONIC KIDNEY DISEASE (HCC): Primary | ICD-10-CM

## 2022-11-23 PROCEDURE — 96372 THER/PROPH/DIAG INJ SC/IM: CPT | Performed by: INTERNAL MEDICINE

## 2022-11-23 NOTE — PROGRESS NOTES
pt presents to clinic today for Aranesp injection. On 11/7/22____, Hgb was _9.7___ and hematocrit _30.1___. BP today is _117/63 75___. Aranesp 200___ mcg administered to _right___ upper arm subcutaneous without complications. Patient tolerated injection well. Patient will RTC in 2 weeks for next injection.

## 2022-11-25 ENCOUNTER — OFFICE VISIT (OUTPATIENT)
Dept: DERMATOLOGY CLINIC | Facility: CLINIC | Age: 67
End: 2022-11-25
Payer: MEDICARE

## 2022-11-25 DIAGNOSIS — L71.9 ROSACEA: ICD-10-CM

## 2022-11-25 DIAGNOSIS — L40.8 SEBOPSORIASIS: Primary | ICD-10-CM

## 2022-11-25 DIAGNOSIS — Z51.81 MEDICATION MONITORING ENCOUNTER: ICD-10-CM

## 2022-11-25 PROCEDURE — 1126F AMNT PAIN NOTED NONE PRSNT: CPT | Performed by: DERMATOLOGY

## 2022-11-25 PROCEDURE — 99213 OFFICE O/P EST LOW 20 MIN: CPT | Performed by: DERMATOLOGY

## 2022-11-25 RX ORDER — BETAMETHASONE DIPROPIONATE 0.5 MG/G
1 CREAM TOPICAL 2 TIMES DAILY
Qty: 50 G | Refills: 0 | Status: SHIPPED | OUTPATIENT
Start: 2022-11-25 | End: 2022-11-25

## 2022-11-25 RX ORDER — BETAMETHASONE DIPROPIONATE 0.5 MG/G
1 CREAM TOPICAL 2 TIMES DAILY
Qty: 50 G | Refills: 0 | Status: SHIPPED | OUTPATIENT
Start: 2022-11-25

## 2022-11-28 RX ORDER — PREGABALIN 300 MG/1
CAPSULE ORAL
Qty: 90 CAPSULE | Refills: 0 | Status: SHIPPED | OUTPATIENT
Start: 2022-11-28

## 2022-12-04 DIAGNOSIS — E11.65 UNCONTROLLED TYPE 2 DIABETES MELLITUS WITH HYPERGLYCEMIA (HCC): ICD-10-CM

## 2022-12-05 ENCOUNTER — OFFICE VISIT (OUTPATIENT)
Dept: INTERNAL MEDICINE CLINIC | Facility: CLINIC | Age: 67
End: 2022-12-05
Payer: MEDICARE

## 2022-12-05 ENCOUNTER — TELEPHONE (OUTPATIENT)
Dept: NEPHROLOGY | Facility: CLINIC | Age: 67
End: 2022-12-05

## 2022-12-05 VITALS
SYSTOLIC BLOOD PRESSURE: 100 MMHG | BODY MASS INDEX: 34.36 KG/M2 | HEIGHT: 69 IN | WEIGHT: 232 LBS | HEART RATE: 70 BPM | DIASTOLIC BLOOD PRESSURE: 63 MMHG

## 2022-12-05 DIAGNOSIS — E55.9 VITAMIN D DEFICIENCY: ICD-10-CM

## 2022-12-05 DIAGNOSIS — Z00.00 ENCOUNTER FOR MEDICARE ANNUAL WELLNESS EXAM: Primary | ICD-10-CM

## 2022-12-05 DIAGNOSIS — Z23 NEED FOR SHINGLES VACCINE: ICD-10-CM

## 2022-12-05 DIAGNOSIS — K21.00 GASTROESOPHAGEAL REFLUX DISEASE WITH ESOPHAGITIS, UNSPECIFIED WHETHER HEMORRHAGE: ICD-10-CM

## 2022-12-05 DIAGNOSIS — I12.9 TYPE 2 DM WITH CKD STAGE 4 AND HYPERTENSION (HCC): ICD-10-CM

## 2022-12-05 DIAGNOSIS — L71.9 ROSACEA: ICD-10-CM

## 2022-12-05 DIAGNOSIS — E11.22 TYPE 2 DM WITH CKD STAGE 4 AND HYPERTENSION (HCC): ICD-10-CM

## 2022-12-05 DIAGNOSIS — N18.4 TYPE 2 DM WITH CKD STAGE 4 AND HYPERTENSION (HCC): ICD-10-CM

## 2022-12-05 DIAGNOSIS — E11.69 HYPERLIPIDEMIA ASSOCIATED WITH TYPE 2 DIABETES MELLITUS (HCC): ICD-10-CM

## 2022-12-05 DIAGNOSIS — Z12.5 ENCOUNTER FOR SCREENING FOR MALIGNANT NEOPLASM OF PROSTATE: ICD-10-CM

## 2022-12-05 DIAGNOSIS — E11.59 HYPERTENSION ASSOCIATED WITH TYPE 2 DIABETES MELLITUS (HCC): ICD-10-CM

## 2022-12-05 DIAGNOSIS — E11.3293 TYPE 2 DIABETES MELLITUS WITH BOTH EYES AFFECTED BY MILD NONPROLIFERATIVE RETINOPATHY WITHOUT MACULAR EDEMA, WITHOUT LONG-TERM CURRENT USE OF INSULIN (HCC): ICD-10-CM

## 2022-12-05 DIAGNOSIS — E66.09 CLASS 1 OBESITY DUE TO EXCESS CALORIES WITH SERIOUS COMORBIDITY AND BODY MASS INDEX (BMI) OF 34.0 TO 34.9 IN ADULT: ICD-10-CM

## 2022-12-05 DIAGNOSIS — H43.393 VITREOUS FLOATERS OF BOTH EYES: ICD-10-CM

## 2022-12-05 DIAGNOSIS — E78.5 HYPERLIPIDEMIA ASSOCIATED WITH TYPE 2 DIABETES MELLITUS (HCC): ICD-10-CM

## 2022-12-05 DIAGNOSIS — Z96.1 PSEUDOPHAKIA OF BOTH EYES: ICD-10-CM

## 2022-12-05 DIAGNOSIS — I15.2 HYPERTENSION ASSOCIATED WITH TYPE 2 DIABETES MELLITUS (HCC): ICD-10-CM

## 2022-12-05 DIAGNOSIS — L57.8 NODULAR ELASTOSIS WITH CYSTS AND COMEDONES OF FAVRE AND RACOUCHOT: ICD-10-CM

## 2022-12-05 DIAGNOSIS — G62.9 NEUROPATHY: ICD-10-CM

## 2022-12-05 DIAGNOSIS — Z12.11 COLON CANCER SCREENING: ICD-10-CM

## 2022-12-05 DIAGNOSIS — L70.0 NODULAR ELASTOSIS WITH CYSTS AND COMEDONES OF FAVRE AND RACOUCHOT: ICD-10-CM

## 2022-12-05 DIAGNOSIS — L40.8 SEBOPSORIASIS: ICD-10-CM

## 2022-12-05 PROBLEM — E10.22 CKD STAGE 4 DUE TO TYPE 1 DIABETES MELLITUS (HCC): Status: RESOLVED | Noted: 2021-06-28 | Resolved: 2022-12-05

## 2022-12-05 PROBLEM — E66.811 CLASS 1 OBESITY DUE TO EXCESS CALORIES WITH SERIOUS COMORBIDITY IN ADULT: Status: ACTIVE | Noted: 2021-11-29

## 2022-12-05 RX ORDER — PREGABALIN 300 MG/1
300 CAPSULE ORAL DAILY
Qty: 90 CAPSULE | Refills: 3 | Status: SHIPPED | OUTPATIENT
Start: 2022-12-05

## 2022-12-05 RX ORDER — PANTOPRAZOLE SODIUM 40 MG/1
40 TABLET, DELAYED RELEASE ORAL
Qty: 90 TABLET | Refills: 9 | Status: SHIPPED | OUTPATIENT
Start: 2022-12-05

## 2022-12-05 RX ORDER — ATORVASTATIN CALCIUM 40 MG/1
40 TABLET, FILM COATED ORAL DAILY
Qty: 90 TABLET | Refills: 9 | Status: SHIPPED | OUTPATIENT
Start: 2022-12-05

## 2022-12-05 RX ORDER — FENOFIBRATE 48 MG/1
TABLET, COATED ORAL
Qty: 90 TABLET | Refills: 0 | Status: SHIPPED | OUTPATIENT
Start: 2022-12-05 | End: 2022-12-05

## 2022-12-05 RX ORDER — FENOFIBRATE 48 MG/1
48 TABLET, COATED ORAL DAILY
Qty: 90 TABLET | Refills: 9 | Status: SHIPPED | OUTPATIENT
Start: 2022-12-05

## 2022-12-05 NOTE — TELEPHONE ENCOUNTER
[facility] calling to ask if our office sends e-scripts for rx [place]. Please call at [phone],[employer].

## 2022-12-05 NOTE — PATIENT INSTRUCTIONS
When you get your bloodwork done for Dr Octavio Edgar, please also have them draw my labs, you do not need to fast, some are duplicated which are fine, we see each other labs. Jose Luis Burton's SCREENING SCHEDULE   Tests on this list are recommended by your physician but may not be covered, or covered at this frequency, by your insurer. Please check with your insurance carrier before scheduling to verify coverage.    PREVENTATIVE SERVICES FREQUENCY &  COVERAGE DETAILS LAST COMPLETION DATE   Diabetes Screening    Fasting Blood Sugar / Glucose    One screening every 12 months if never tested or if previously tested but not diagnosed with pre-diabetes   One screening every 6 months if diagnosed with pre-diabetes Lab Results   Component Value Date     (H) 11/07/2022        Cardiovascular Disease Screening    Lipid Panel  Cholesterol  Lipoprotein (HDL)  Triglycerides Covered every 5 years for all Medicare beneficiaries without apparent signs or symptoms of cardiovascular disease Lab Results   Component Value Date    CHOLEST 89 08/13/2022    HDL 34 (L) 08/13/2022    LDL 36 08/13/2022    TRIG 99 08/13/2022         Electrocardiogram (EKG)   Covered if needed at Welcome to Medicare, and non-screening if indicated for medical reasons 09/03/2018      Ultrasound Screening for Abdominal Aortic Aneurysm (AAA) Covered once in a lifetime for one of the following risk factors    Men who are 73-68 years old and have ever smoked    Anyone with a family history -     Colorectal Cancer Screening  Covered for ages 52-80; only need ONE of the following:    Colonoscopy   Covered every 10 years    Covered every 2 years if patient is at high risk or previous colonoscopy was abnormal -    Colorectal Cancer Screening due on 04/20/2013    Flexible Sigmoidoscopy   Covered every 4 years -    Fecal Occult Blood Test Covered annually -   Prostate Cancer Screening    Prostate-Specific Antigen (PSA) Annually Lab Results   Component Value Date    PSA 0.32 01/02/2020     PSA due on 01/21/2024   Immunizations    Influenza Covered once per flu season  Please get every year -  No recommendations at this time    Pneumococcal Each vaccine ([id number] & Rjkcltnmi49) covered once after 65 Prevnar 13: 11/18/2015    Feewovasd37: 09/26/2018     No recommendations at this time    Hepatitis B One screening covered for patients with certain risk factors   -  No recommendations at this time    Tetanus Toxoid Not covered by Medicare Part B unless medically necessary (cut with metal); may be covered with your pharmacy prescription benefits -    Tetanus, Diptheria and Pertusis TD and TDaP Not covered by Medicare Part B -  No recommendations at this time    Zoster Not covered by Medicare Part B; may be covered with your pharmacy  prescription benefits -  Zoster Vaccines(1 of 2) Never done       Diabetes      Hemoglobin A1C Annually; if last result is elevated, may be asked to retest more frequently.     Medicare covers every 3 months Lab Results   Component Value Date     (H) 08/13/2022    A1C 7.2 (A) 08/17/2022       No recommendations at this time    Creat/alb ratio Annually Lab Results   Component Value Date    MICROALBCREA 502.0 (H) 09/26/2022       LDL Annually Lab Results   Component Value Date    LDL 36 08/13/2022       Dilated Eye Exam Annually 05/04/2022       Annual Monitoring of Persistent Medications (ACE/ARB, digoxin diuretics, anticonvulsants)    Potassium Annually Lab Results   Component Value Date    K 5.1 11/07/2022         Creatinine   Annually Lab Results   Component Value Date    CREATSERUM 2.90 (H) 11/07/2022         BUN Annually Lab Results   Component Value Date    BUN 52 (H) 11/07/2022       Drug Serum Conc Annually No results found for: DIGOXIN, DIG, VALP

## 2022-12-06 ENCOUNTER — TELEPHONE (OUTPATIENT)
Dept: NEPHROLOGY | Facility: CLINIC | Age: 67
End: 2022-12-06

## 2022-12-06 RX ORDER — HYDROCODONE BITARTRATE AND ACETAMINOPHEN 5; 325 MG/1; MG/1
1 TABLET ORAL EVERY 6 HOURS PRN
Refills: 0 | Status: CANCELLED | OUTPATIENT
Start: 2022-12-06

## 2022-12-06 RX ORDER — HYDROCODONE BITARTRATE AND ACETAMINOPHEN 5; 325 MG/1; MG/1
2 TABLET ORAL EVERY 8 HOURS PRN
Qty: 180 TABLET | Refills: 0 | Status: SHIPPED | OUTPATIENT
Start: 2022-12-06

## 2022-12-06 NOTE — TELEPHONE ENCOUNTER
Dr Lennie Clancy  is in  back order,only 5mg-325  Is available,please clarify and sign order if appropriate

## 2022-12-07 ENCOUNTER — NURSE ONLY (OUTPATIENT)
Dept: NEPHROLOGY | Facility: CLINIC | Age: 67
End: 2022-12-07
Payer: MEDICARE

## 2022-12-07 PROCEDURE — 96372 THER/PROPH/DIAG INJ SC/IM: CPT | Performed by: INTERNAL MEDICINE

## 2022-12-07 NOTE — PROGRESS NOTES
Pt presents to clinic today for Aranesp injection. On _11/7/22 , Hgb was  9.7_ and hematocrit  30.1 . BP today is  121 64_. Aranesp  200 _ mcg administered to  Left _ upper arm subcutaneous without complications. Patient tolerated injection well. Patient will RTC in 4 weeks for next injection.

## 2022-12-07 NOTE — TELEPHONE ENCOUNTER
[address] calling back to notify that they cannot accepted faxed narcotic orders.     Narcotic orders must be sent via electronically

## 2022-12-19 ENCOUNTER — LAB ENCOUNTER (OUTPATIENT)
Dept: LAB | Age: 67
End: 2022-12-19
Attending: INTERNAL MEDICINE
Payer: MEDICARE

## 2022-12-19 DIAGNOSIS — E11.3293 TYPE 2 DIABETES MELLITUS WITH BOTH EYES AFFECTED BY MILD NONPROLIFERATIVE RETINOPATHY WITHOUT MACULAR EDEMA, WITHOUT LONG-TERM CURRENT USE OF INSULIN (HCC): ICD-10-CM

## 2022-12-19 DIAGNOSIS — N18.32 ANEMIA IN STAGE 3B CHRONIC KIDNEY DISEASE (HCC): ICD-10-CM

## 2022-12-19 DIAGNOSIS — D63.1 ANEMIA IN STAGE 3B CHRONIC KIDNEY DISEASE (HCC): ICD-10-CM

## 2022-12-19 LAB
ALBUMIN SERPL-MCNC: 3.4 G/DL (ref 3.4–5)
ALBUMIN/GLOB SERPL: 1.2 {RATIO} (ref 1–2)
ALP LIVER SERPL-CCNC: 62 U/L
ALT SERPL-CCNC: 28 U/L
ANION GAP SERPL CALC-SCNC: 3 MMOL/L (ref 0–18)
AST SERPL-CCNC: 18 U/L (ref 15–37)
BASOPHILS # BLD AUTO: 0.01 X10(3) UL (ref 0–0.2)
BASOPHILS NFR BLD AUTO: 0.3 %
BILIRUB SERPL-MCNC: 0.7 MG/DL (ref 0.1–2)
BUN BLD-MCNC: 56 MG/DL (ref 7–18)
BUN/CREAT SERPL: 18.9 (ref 10–20)
CALCIUM BLD-MCNC: 9 MG/DL (ref 8.5–10.1)
CHLORIDE SERPL-SCNC: 112 MMOL/L (ref 98–112)
CHOLEST SERPL-MCNC: 90 MG/DL (ref ?–200)
CO2 SERPL-SCNC: 27 MMOL/L (ref 21–32)
COMPLEXED PSA SERPL-MCNC: 0.23 NG/ML (ref ?–4)
CREAT BLD-MCNC: 2.97 MG/DL
DEPRECATED RDW RBC AUTO: 63.2 FL (ref 35.1–46.3)
EOSINOPHIL # BLD AUTO: 0.08 X10(3) UL (ref 0–0.7)
EOSINOPHIL NFR BLD AUTO: 2.8 %
ERYTHROCYTE [DISTWIDTH] IN BLOOD BY AUTOMATED COUNT: 16 % (ref 11–15)
EST. AVERAGE GLUCOSE BLD GHB EST-MCNC: 154 MG/DL (ref 68–126)
FASTING PATIENT LIPID ANSWER: NO
FASTING STATUS PATIENT QL REPORTED: NO
GFR SERPLBLD BASED ON 1.73 SQ M-ARVRAT: 22 ML/MIN/1.73M2 (ref 60–?)
GLOBULIN PLAS-MCNC: 2.9 G/DL (ref 2.8–4.4)
GLUCOSE BLD-MCNC: 195 MG/DL (ref 70–99)
HBA1C MFR BLD: 7 % (ref ?–5.7)
HCT VFR BLD AUTO: 31.4 %
HDLC SERPL-MCNC: 38 MG/DL (ref 40–59)
HGB BLD-MCNC: 9.9 G/DL
IMM GRANULOCYTES # BLD AUTO: 0 X10(3) UL (ref 0–1)
IMM GRANULOCYTES NFR BLD: 0 %
LDLC SERPL CALC-MCNC: 33 MG/DL (ref ?–100)
LYMPHOCYTES # BLD AUTO: 0.91 X10(3) UL (ref 1–4)
LYMPHOCYTES NFR BLD AUTO: 31.8 %
MCH RBC QN AUTO: 34.5 PG (ref 26–34)
MCHC RBC AUTO-ENTMCNC: 31.5 G/DL (ref 31–37)
MCV RBC AUTO: 109.4 FL
MONOCYTES # BLD AUTO: 0.32 X10(3) UL (ref 0.1–1)
MONOCYTES NFR BLD AUTO: 11.2 %
NEUTROPHILS # BLD AUTO: 1.54 X10 (3) UL (ref 1.5–7.7)
NEUTROPHILS # BLD AUTO: 1.54 X10(3) UL (ref 1.5–7.7)
NEUTROPHILS NFR BLD AUTO: 53.9 %
NONHDLC SERPL-MCNC: 52 MG/DL (ref ?–130)
OSMOLALITY SERPL CALC.SUM OF ELEC: 315 MOSM/KG (ref 275–295)
PLATELET # BLD AUTO: 96 10(3)UL (ref 150–450)
POTASSIUM SERPL-SCNC: 5 MMOL/L (ref 3.5–5.1)
PROT SERPL-MCNC: 6.3 G/DL (ref 6.4–8.2)
RBC # BLD AUTO: 2.87 X10(6)UL
SODIUM SERPL-SCNC: 142 MMOL/L (ref 136–145)
TRIGL SERPL-MCNC: 99 MG/DL (ref 30–149)
TSI SER-ACNC: 1.82 MIU/ML (ref 0.36–3.74)
VIT D+METAB SERPL-MCNC: 60.3 NG/ML (ref 30–100)
VLDLC SERPL CALC-MCNC: 13 MG/DL (ref 0–30)
WBC # BLD AUTO: 2.9 X10(3) UL (ref 4–11)

## 2022-12-19 PROCEDURE — 85025 COMPLETE CBC W/AUTO DIFF WBC: CPT

## 2022-12-19 PROCEDURE — 82306 VITAMIN D 25 HYDROXY: CPT | Performed by: INTERNAL MEDICINE

## 2022-12-19 PROCEDURE — 84443 ASSAY THYROID STIM HORMONE: CPT | Performed by: INTERNAL MEDICINE

## 2022-12-19 PROCEDURE — 36415 COLL VENOUS BLD VENIPUNCTURE: CPT

## 2022-12-19 PROCEDURE — 83036 HEMOGLOBIN GLYCOSYLATED A1C: CPT | Performed by: INTERNAL MEDICINE

## 2022-12-19 PROCEDURE — 80053 COMPREHEN METABOLIC PANEL: CPT | Performed by: INTERNAL MEDICINE

## 2022-12-19 PROCEDURE — 80061 LIPID PANEL: CPT | Performed by: INTERNAL MEDICINE

## 2022-12-21 ENCOUNTER — OFFICE VISIT (OUTPATIENT)
Dept: NEPHROLOGY | Facility: CLINIC | Age: 67
End: 2022-12-21
Payer: MEDICARE

## 2022-12-21 VITALS
WEIGHT: 238 LBS | HEART RATE: 65 BPM | SYSTOLIC BLOOD PRESSURE: 113 MMHG | BODY MASS INDEX: 35 KG/M2 | DIASTOLIC BLOOD PRESSURE: 63 MMHG

## 2022-12-21 DIAGNOSIS — E11.59 HYPERTENSION ASSOCIATED WITH TYPE 2 DIABETES MELLITUS (HCC): Primary | ICD-10-CM

## 2022-12-21 DIAGNOSIS — I15.2 HYPERTENSION ASSOCIATED WITH TYPE 2 DIABETES MELLITUS (HCC): Primary | ICD-10-CM

## 2022-12-21 DIAGNOSIS — D63.1 ANEMIA IN STAGE 3B CHRONIC KIDNEY DISEASE (HCC): ICD-10-CM

## 2022-12-21 DIAGNOSIS — D50.8 OTHER IRON DEFICIENCY ANEMIA: ICD-10-CM

## 2022-12-21 DIAGNOSIS — G62.9 NEUROPATHY: ICD-10-CM

## 2022-12-21 DIAGNOSIS — E11.3293 TYPE 2 DIABETES MELLITUS WITH BOTH EYES AFFECTED BY MILD NONPROLIFERATIVE RETINOPATHY WITHOUT MACULAR EDEMA, WITHOUT LONG-TERM CURRENT USE OF INSULIN (HCC): ICD-10-CM

## 2022-12-21 DIAGNOSIS — N18.4 TYPE 2 DM WITH CKD STAGE 4 AND HYPERTENSION (HCC): ICD-10-CM

## 2022-12-21 DIAGNOSIS — I12.9 TYPE 2 DM WITH CKD STAGE 4 AND HYPERTENSION (HCC): ICD-10-CM

## 2022-12-21 DIAGNOSIS — E11.22 TYPE 2 DM WITH CKD STAGE 4 AND HYPERTENSION (HCC): ICD-10-CM

## 2022-12-21 DIAGNOSIS — N18.32 ANEMIA IN STAGE 3B CHRONIC KIDNEY DISEASE (HCC): ICD-10-CM

## 2022-12-21 PROBLEM — E66.01 MORBID (SEVERE) OBESITY DUE TO EXCESS CALORIES (HCC): Status: ACTIVE | Noted: 2022-12-21

## 2022-12-21 PROCEDURE — 99214 OFFICE O/P EST MOD 30 MIN: CPT | Performed by: INTERNAL MEDICINE

## 2022-12-21 PROCEDURE — 96372 THER/PROPH/DIAG INJ SC/IM: CPT | Performed by: INTERNAL MEDICINE

## 2022-12-21 RX ORDER — HYDROCODONE BITARTRATE AND ACETAMINOPHEN 10; 325 MG/1; MG/1
1 TABLET ORAL EVERY 8 HOURS PRN
Qty: 90 TABLET | Refills: 0 | Status: SHIPPED | OUTPATIENT
Start: 2023-02-04

## 2022-12-21 RX ORDER — HYDROCODONE BITARTRATE AND ACETAMINOPHEN 5; 325 MG/1; MG/1
2 TABLET ORAL EVERY 8 HOURS PRN
Qty: 180 TABLET | Refills: 0 | Status: SHIPPED | OUTPATIENT
Start: 2023-01-05 | End: 2022-12-21

## 2022-12-21 RX ORDER — PREGABALIN 300 MG/1
300 CAPSULE ORAL DAILY
Qty: 90 CAPSULE | Refills: 3 | Status: SHIPPED | OUTPATIENT
Start: 2022-12-21

## 2022-12-21 RX ORDER — HYDROCODONE BITARTRATE AND ACETAMINOPHEN 5; 325 MG/1; MG/1
2 TABLET ORAL EVERY 8 HOURS PRN
Qty: 180 TABLET | Refills: 0 | Status: SHIPPED | OUTPATIENT
Start: 2023-02-04

## 2022-12-21 RX ORDER — HYDROCODONE BITARTRATE AND ACETAMINOPHEN 10; 325 MG/1; MG/1
1 TABLET ORAL EVERY 8 HOURS PRN
Qty: 90 TABLET | Refills: 0 | Status: SHIPPED | OUTPATIENT
Start: 2023-01-05 | End: 2022-12-21

## 2022-12-21 NOTE — PROGRESS NOTES
Patient presents to clinic today for Aranesp injection. On 12/19/22, Hgb was 9.9 and Hct 31.4. BP today is 113/63. Aranesp 200 mcg administered to left upper arm SC without complications. Patient tolerated injection well.

## 2023-01-03 ENCOUNTER — TELEPHONE (OUTPATIENT)
Dept: DERMATOLOGY CLINIC | Facility: CLINIC | Age: 68
End: 2023-01-03

## 2023-01-05 NOTE — TELEPHONE ENCOUNTER
Noted, thank you. Previously he was not interested in biologics due to cost, side effects either. Not sure that other meds like methotrexate would be an option.     Please let pharmacy know that pt is not interested in [place]

## 2023-01-11 ENCOUNTER — NURSE ONLY (OUTPATIENT)
Dept: NEPHROLOGY | Facility: CLINIC | Age: 68
End: 2023-01-11

## 2023-01-11 DIAGNOSIS — N18.32 ANEMIA IN STAGE 3B CHRONIC KIDNEY DISEASE (HCC): Primary | ICD-10-CM

## 2023-01-11 DIAGNOSIS — D63.1 ANEMIA IN STAGE 3B CHRONIC KIDNEY DISEASE (HCC): Primary | ICD-10-CM

## 2023-01-11 PROCEDURE — 96372 THER/PROPH/DIAG INJ SC/IM: CPT | Performed by: INTERNAL MEDICINE

## 2023-01-11 NOTE — PROGRESS NOTES
pt presents to clinic today for Aranesp injection. On 12/19/22____, Hgb was _9.9___ and hematocrit __31.4__. BP today is 136/74 75____. Aranesp 200___ mcg administered to __right__ upper arm subcutaneous without complications. Patient tolerated injection well. Patient will RTC in 2 weeks for next injection.

## 2023-01-12 ENCOUNTER — TELEPHONE (OUTPATIENT)
Dept: NEPHROLOGY | Facility: CLINIC | Age: 68
End: 2023-01-12

## 2023-01-12 DIAGNOSIS — N18.32 ANEMIA IN STAGE 3B CHRONIC KIDNEY DISEASE (HCC): Primary | ICD-10-CM

## 2023-01-12 DIAGNOSIS — D63.1 ANEMIA IN STAGE 3B CHRONIC KIDNEY DISEASE (HCC): Primary | ICD-10-CM

## 2023-01-12 NOTE — TELEPHONE ENCOUNTER
Rn verified with Dr Shanique Swartz per last visit note-on 12/21/23-Shot 4 weeks. Rn generated order. noted pt appt on 2/1/23.

## 2023-01-23 DIAGNOSIS — E11.65 UNCONTROLLED TYPE 2 DIABETES MELLITUS WITH HYPERGLYCEMIA (HCC): ICD-10-CM

## 2023-01-23 RX ORDER — INSULIN GLARGINE 100 [IU]/ML
INJECTION, SOLUTION SUBCUTANEOUS
Qty: 30 ML | Refills: 0 | Status: SHIPPED | OUTPATIENT
Start: 2023-01-23

## 2023-01-27 ENCOUNTER — LAB ENCOUNTER (OUTPATIENT)
Dept: LAB | Age: 68
End: 2023-01-27
Attending: INTERNAL MEDICINE
Payer: MEDICARE

## 2023-01-27 DIAGNOSIS — G62.9 NEUROPATHY: ICD-10-CM

## 2023-01-27 DIAGNOSIS — N18.4 TYPE 2 DM WITH CKD STAGE 4 AND HYPERTENSION (HCC): ICD-10-CM

## 2023-01-27 DIAGNOSIS — E11.3293 TYPE 2 DIABETES MELLITUS WITH BOTH EYES AFFECTED BY MILD NONPROLIFERATIVE RETINOPATHY WITHOUT MACULAR EDEMA, WITHOUT LONG-TERM CURRENT USE OF INSULIN (HCC): ICD-10-CM

## 2023-01-27 DIAGNOSIS — I12.9 TYPE 2 DM WITH CKD STAGE 4 AND HYPERTENSION (HCC): ICD-10-CM

## 2023-01-27 DIAGNOSIS — D63.1 ANEMIA IN STAGE 3B CHRONIC KIDNEY DISEASE (HCC): ICD-10-CM

## 2023-01-27 DIAGNOSIS — N18.32 ANEMIA IN STAGE 3B CHRONIC KIDNEY DISEASE (HCC): ICD-10-CM

## 2023-01-27 DIAGNOSIS — D50.8 OTHER IRON DEFICIENCY ANEMIA: ICD-10-CM

## 2023-01-27 DIAGNOSIS — E11.59 HYPERTENSION ASSOCIATED WITH TYPE 2 DIABETES MELLITUS (HCC): ICD-10-CM

## 2023-01-27 DIAGNOSIS — I15.2 HYPERTENSION ASSOCIATED WITH TYPE 2 DIABETES MELLITUS (HCC): ICD-10-CM

## 2023-01-27 DIAGNOSIS — E11.22 TYPE 2 DM WITH CKD STAGE 4 AND HYPERTENSION (HCC): ICD-10-CM

## 2023-01-27 LAB
ALBUMIN SERPL-MCNC: 3.4 G/DL (ref 3.4–5)
ANION GAP SERPL CALC-SCNC: 8 MMOL/L (ref 0–18)
BUN BLD-MCNC: 74 MG/DL (ref 7–18)
BUN/CREAT SERPL: 21 (ref 10–20)
CALCIUM BLD-MCNC: 8.8 MG/DL (ref 8.5–10.1)
CHLORIDE SERPL-SCNC: 111 MMOL/L (ref 98–112)
CO2 SERPL-SCNC: 25 MMOL/L (ref 21–32)
CREAT BLD-MCNC: 3.53 MG/DL
GFR SERPLBLD BASED ON 1.73 SQ M-ARVRAT: 18 ML/MIN/1.73M2 (ref 60–?)
GLUCOSE BLD-MCNC: 195 MG/DL (ref 70–99)
IRON SATN MFR SERPL: 59 %
IRON SERPL-MCNC: 162 UG/DL
OSMOLALITY SERPL CALC.SUM OF ELEC: 325 MOSM/KG (ref 275–295)
PHOSPHATE SERPL-MCNC: 3.4 MG/DL (ref 2.5–4.9)
POTASSIUM SERPL-SCNC: 5.3 MMOL/L (ref 3.5–5.1)
PTH-INTACT SERPL-MCNC: 55.9 PG/ML (ref 18.5–88)
SODIUM SERPL-SCNC: 144 MMOL/L (ref 136–145)
TIBC SERPL-MCNC: 276 UG/DL (ref 240–450)
TRANSFERRIN SERPL-MCNC: 185 MG/DL (ref 200–360)

## 2023-01-27 PROCEDURE — 36415 COLL VENOUS BLD VENIPUNCTURE: CPT

## 2023-01-27 PROCEDURE — 85060 BLOOD SMEAR INTERPRETATION: CPT

## 2023-01-27 PROCEDURE — 85025 COMPLETE CBC W/AUTO DIFF WBC: CPT

## 2023-01-27 PROCEDURE — 80069 RENAL FUNCTION PANEL: CPT

## 2023-01-27 PROCEDURE — 83540 ASSAY OF IRON: CPT

## 2023-01-27 PROCEDURE — 84466 ASSAY OF TRANSFERRIN: CPT

## 2023-01-27 PROCEDURE — 83970 ASSAY OF PARATHORMONE: CPT

## 2023-01-28 LAB
BASOPHILS # BLD AUTO: 0.01 X10(3) UL (ref 0–0.2)
BASOPHILS NFR BLD AUTO: 0.4 %
DEPRECATED RDW RBC AUTO: 59.7 FL (ref 35.1–46.3)
EOSINOPHIL # BLD AUTO: 0.05 X10(3) UL (ref 0–0.7)
EOSINOPHIL NFR BLD AUTO: 2.1 %
ERYTHROCYTE [DISTWIDTH] IN BLOOD BY AUTOMATED COUNT: 15.2 % (ref 11–15)
HCT VFR BLD AUTO: 29.6 %
HGB BLD-MCNC: 9.4 G/DL
IMM GRANULOCYTES # BLD AUTO: 0 X10(3) UL (ref 0–1)
IMM GRANULOCYTES NFR BLD: 0 %
LYMPHOCYTES # BLD AUTO: 0.81 X10(3) UL (ref 1–4)
LYMPHOCYTES NFR BLD AUTO: 33.6 %
MCH RBC QN AUTO: 34.2 PG (ref 26–34)
MCHC RBC AUTO-ENTMCNC: 31.8 G/DL (ref 31–37)
MCV RBC AUTO: 107.6 FL
MONOCYTES # BLD AUTO: 0.19 X10(3) UL (ref 0.1–1)
MONOCYTES NFR BLD AUTO: 7.9 %
NEUTROPHILS # BLD AUTO: 1.35 X10 (3) UL (ref 1.5–7.7)
NEUTROPHILS # BLD AUTO: 1.35 X10(3) UL (ref 1.5–7.7)
NEUTROPHILS NFR BLD AUTO: 56 %
PLATELET # BLD AUTO: 89 10(3)UL (ref 150–450)
RBC # BLD AUTO: 2.75 X10(6)UL
WBC # BLD AUTO: 2.4 X10(3) UL (ref 4–11)

## 2023-02-01 ENCOUNTER — TELEPHONE (OUTPATIENT)
Dept: NEPHROLOGY | Facility: CLINIC | Age: 68
End: 2023-02-01

## 2023-02-01 ENCOUNTER — NURSE ONLY (OUTPATIENT)
Dept: NEPHROLOGY | Facility: CLINIC | Age: 68
End: 2023-02-01

## 2023-02-01 DIAGNOSIS — N18.32 ANEMIA IN STAGE 3B CHRONIC KIDNEY DISEASE (HCC): Primary | ICD-10-CM

## 2023-02-01 DIAGNOSIS — D63.1 ANEMIA IN STAGE 3B CHRONIC KIDNEY DISEASE (HCC): Primary | ICD-10-CM

## 2023-02-01 PROCEDURE — 96372 THER/PROPH/DIAG INJ SC/IM: CPT | Performed by: INTERNAL MEDICINE

## 2023-02-01 RX ORDER — SYRINGE AND NEEDLE,INSULIN,1ML 31GX15/64"
SYRINGE, EMPTY DISPOSABLE MISCELLANEOUS
Qty: 200 EACH | Refills: 0 | Status: SHIPPED | OUTPATIENT
Start: 2023-02-01

## 2023-02-01 NOTE — PROGRESS NOTES
pt presents to clinic today for Aranesp injection. On 1/27/23____, Hgb was _9.4___ and hematocrit __29.6__. BP today is _110/64___. Aranesp 200___ mcg administered to _left___ upper arm subcutaneous without complications. Patient tolerated injection well. Patient will RTC in 4 weeks for next injection.

## 2023-02-01 NOTE — TELEPHONE ENCOUNTER
LOV: 8/17/2022    RTC: 6 months     F/U: 2/15/2023    Dr Robert Brown-- orders pending, approve if appropriate

## 2023-02-01 NOTE — TELEPHONE ENCOUNTER
Dr Uzair Carrera patient came to the office for injection .hgb 9.4 dropped (was 9.9) ,just changed to every 4 weeks injection,please see lab results in epic and need new order for labs CBC  piror to next appt,thanks.

## 2023-02-09 ENCOUNTER — TELEPHONE (OUTPATIENT)
Dept: NEPHROLOGY | Facility: CLINIC | Age: 68
End: 2023-02-09

## 2023-02-09 DIAGNOSIS — D63.1 ANEMIA IN STAGE 3B CHRONIC KIDNEY DISEASE (HCC): Primary | ICD-10-CM

## 2023-02-09 DIAGNOSIS — N18.32 ANEMIA IN STAGE 3B CHRONIC KIDNEY DISEASE (HCC): Primary | ICD-10-CM

## 2023-02-10 ENCOUNTER — OFFICE VISIT (OUTPATIENT)
Dept: HEMATOLOGY/ONCOLOGY | Facility: HOSPITAL | Age: 68
End: 2023-02-10
Attending: INTERNAL MEDICINE
Payer: MEDICARE

## 2023-02-10 VITALS
BODY MASS INDEX: 34.51 KG/M2 | HEART RATE: 88 BPM | HEIGHT: 69 IN | SYSTOLIC BLOOD PRESSURE: 101 MMHG | DIASTOLIC BLOOD PRESSURE: 55 MMHG | RESPIRATION RATE: 18 BRPM | TEMPERATURE: 98 F | OXYGEN SATURATION: 99 % | WEIGHT: 233 LBS

## 2023-02-10 DIAGNOSIS — D61.818 PANCYTOPENIA (HCC): Primary | ICD-10-CM

## 2023-02-10 DIAGNOSIS — N18.9 CHRONIC KIDNEY DISEASE, UNSPECIFIED CKD STAGE: ICD-10-CM

## 2023-02-10 PROCEDURE — 99204 OFFICE O/P NEW MOD 45 MIN: CPT | Performed by: INTERNAL MEDICINE

## 2023-02-11 NOTE — CONSULTS
[facility]    PATIENT'S NAME: Kaleb Olivas   CONSULTING PHYSICIAN: Devin Mathis MD   PATIENT ACCOUNT #: [de-identified] LOCATION: [address] [place] [address] RECORD #: [id number] YOB: 1955   CONSULTATION DATE: 02/10/2023       [facility] NEW PATIENT CONSULT    REQUESTING PHYSICIAN:  Dr. Bang Parsons:  Pancytopenia. HISTORY OF PRESENT ILLNESS:  The patient is a very pleasant 68-year-old male with history of chronic kidney disease, being evaluated by Hematology for pancytopenia. He had CBCs through his nephrologist 12/19/2022, where his white blood cell count was 2800, hemoglobin 9.9, platelets 65,821. MCV was 109. Neutrophil count was 1500, lymphocyte count 900. The patient has been on Aranesp for anemia related to his chronic kidney disease. Further labs as of 01/27/2023 showed his iron saturation to be 59%. Creatinine was 3.5. On interview, the patient states he feels well overall. He denies any fevers, chills, night sweats, unintentional weight loss. He denies any bruising or bleeding. He is active, independent activities of daily living. He is planning a fishing trip in the next several weeks. He is otherwise without complaints. PAST MEDICAL HISTORY:  Chronic kidney disease, hyperlipidemia, gastroesophageal reflux disease, diabetes mellitus, obesity. ALLERGIES:  Piperacillin/tazobactam, cephalosporins. SOCIAL HISTORY:  Denies tobacco use. Denies significant alcohol use. Denies illicit drug use. FAMILY MEDICAL HISTORY:  No history of any hematologic disorders in the family, specifically mother, father. REVIEW OF SYSTEMS:  All other systems reviewed and negative x12. PHYSICAL EXAMINATION:    GENERAL:  No acute distress. Alert and oriented. VITAL SIGNS:  ECOG performance status is 0. Weight is 105 kg. Blood pressure is 101/55, pulse 88, respiratory rate 18, pulse ox 99% on room air. Temperature is 36.7.   HEENT: Moist mucous membranes. Oropharynx clear. NECK:  Neck is supple. LUNGS:  Symmetric expansion. HEART:  Good distal pulses. ABDOMEN:  Soft. EXTREMITIES:  No edema. SKIN:  No visible lesions. LYMPHATICS:  No visible adenopathy. NEUROLOGIC:  Moving all extremities. Cranial nerves intact. PSYCHIATRIC:  Appropriate mood, appropriate affect. MUSCULOSKELETAL:  No deformity. LABORATORY DATA:  Reviewed as per HPI. Please see above for details. IMPRESSION AND PLAN:  The patient is a very pleasant 56-year-old male with a history of chronic kidney disease, being evaluated by Hematology for pancytopenia. The patient has macrocytic anemia along with leukopenia, thrombocytopenia. He has been on Aranesp for anemia related to his chronic kidney disease. We discussed that given multiple cell lines are decreased, there may be a primary clonal hematologic process. Most likely we would need to proceed with a bone marrow biopsy to diagnose further. We will send further laboratory evaluation for now including monoclonal studies. Also rule out hemolysis, nutritional deficiencies. We will see him back with lab work and if this is otherwise unrevealing, we will likely recommend proceeding with a bone marrow biopsy. Thank you very much for this consultation request and allowing us to participate in the care of this delightful patient. Dictated By Ashley Helm MD  d: 02/10/2023 16:43:21  t: 02/10/2023 18:03:36  Job [id number]  [facility]/    cc: Ericka Brasher MD

## 2023-02-13 ENCOUNTER — TELEPHONE (OUTPATIENT)
Dept: ENDOCRINOLOGY CLINIC | Facility: CLINIC | Age: 68
End: 2023-02-13

## 2023-02-13 DIAGNOSIS — E11.65 UNCONTROLLED TYPE 2 DIABETES MELLITUS WITH HYPERGLYCEMIA (HCC): Primary | ICD-10-CM

## 2023-02-13 RX ORDER — SYRINGE AND NEEDLE,INSULIN,1ML 31GX15/64"
1 SYRINGE, EMPTY DISPOSABLE MISCELLANEOUS 4 TIMES DAILY
Qty: 400 EACH | Refills: 0 | Status: SHIPPED | OUTPATIENT
Start: 2023-02-13

## 2023-02-13 NOTE — TELEPHONE ENCOUNTER
RELION INSULIN SYRINGE 31G X 15/64\" 0.5 ML Does not apply Misc, USE 1 SYRINGE SUBCUTANEOUSLY 4 TIMES DAILY, Disp: 200 each, Rfl: 0    PATIENT IS REQUESTING A 90 DAY SUPPLY

## 2023-02-15 ENCOUNTER — OFFICE VISIT (OUTPATIENT)
Dept: ENDOCRINOLOGY CLINIC | Facility: CLINIC | Age: 68
End: 2023-02-15

## 2023-02-15 VITALS
HEART RATE: 80 BPM | SYSTOLIC BLOOD PRESSURE: 156 MMHG | BODY MASS INDEX: 34 KG/M2 | WEIGHT: 233 LBS | DIASTOLIC BLOOD PRESSURE: 84 MMHG

## 2023-02-15 DIAGNOSIS — E11.65 UNCONTROLLED TYPE 2 DIABETES MELLITUS WITH HYPERGLYCEMIA (HCC): Primary | ICD-10-CM

## 2023-02-15 LAB
CARTRIDGE LOT#: ABNORMAL NUMERIC
GLUCOSE BLOOD: 198
HEMOGLOBIN A1C: 7 % (ref 4.3–5.6)
TEST STRIP LOT #: NORMAL NUMERIC

## 2023-02-15 PROCEDURE — 83036 HEMOGLOBIN GLYCOSYLATED A1C: CPT | Performed by: INTERNAL MEDICINE

## 2023-02-15 PROCEDURE — 99213 OFFICE O/P EST LOW 20 MIN: CPT | Performed by: INTERNAL MEDICINE

## 2023-02-15 PROCEDURE — 1126F AMNT PAIN NOTED NONE PRSNT: CPT | Performed by: INTERNAL MEDICINE

## 2023-02-15 PROCEDURE — 82947 ASSAY GLUCOSE BLOOD QUANT: CPT | Performed by: INTERNAL MEDICINE

## 2023-02-15 RX ORDER — SYRINGE AND NEEDLE,INSULIN,1ML 31GX15/64"
1 SYRINGE, EMPTY DISPOSABLE MISCELLANEOUS 4 TIMES DAILY
Qty: 400 EACH | Refills: 2 | Status: SHIPPED | OUTPATIENT
Start: 2023-02-15

## 2023-02-15 RX ORDER — INSULIN ASPART 100 [IU]/ML
INJECTION, SOLUTION INTRAVENOUS; SUBCUTANEOUS
Qty: 40 ML | Refills: 2 | Status: SHIPPED | OUTPATIENT
Start: 2023-02-15

## 2023-02-15 RX ORDER — INSULIN GLARGINE 100 [IU]/ML
35 INJECTION, SOLUTION SUBCUTANEOUS NIGHTLY
Qty: 30 ML | Refills: 2 | Status: SHIPPED | OUTPATIENT
Start: 2023-02-15

## 2023-02-23 ENCOUNTER — OFFICE VISIT (OUTPATIENT)
Dept: NEPHROLOGY | Facility: CLINIC | Age: 68
End: 2023-02-23

## 2023-02-23 VITALS
DIASTOLIC BLOOD PRESSURE: 69 MMHG | HEART RATE: 82 BPM | WEIGHT: 236 LBS | BODY MASS INDEX: 34.96 KG/M2 | HEIGHT: 69 IN | SYSTOLIC BLOOD PRESSURE: 129 MMHG

## 2023-02-23 DIAGNOSIS — E11.59 HYPERTENSION ASSOCIATED WITH TYPE 2 DIABETES MELLITUS (HCC): ICD-10-CM

## 2023-02-23 DIAGNOSIS — E78.5 HYPERLIPIDEMIA ASSOCIATED WITH TYPE 2 DIABETES MELLITUS (HCC): ICD-10-CM

## 2023-02-23 DIAGNOSIS — I12.9 TYPE 2 DM WITH CKD STAGE 4 AND HYPERTENSION (HCC): ICD-10-CM

## 2023-02-23 DIAGNOSIS — N18.4 TYPE 2 DM WITH CKD STAGE 4 AND HYPERTENSION (HCC): ICD-10-CM

## 2023-02-23 DIAGNOSIS — E10.22 CKD STAGE 4 DUE TO TYPE 1 DIABETES MELLITUS (HCC): Primary | ICD-10-CM

## 2023-02-23 DIAGNOSIS — I15.2 HYPERTENSION ASSOCIATED WITH TYPE 2 DIABETES MELLITUS (HCC): ICD-10-CM

## 2023-02-23 DIAGNOSIS — E11.22 TYPE 2 DM WITH CKD STAGE 4 AND HYPERTENSION (HCC): ICD-10-CM

## 2023-02-23 DIAGNOSIS — N18.4 CKD STAGE 4 DUE TO TYPE 1 DIABETES MELLITUS (HCC): Primary | ICD-10-CM

## 2023-02-23 DIAGNOSIS — E11.69 HYPERLIPIDEMIA ASSOCIATED WITH TYPE 2 DIABETES MELLITUS (HCC): ICD-10-CM

## 2023-02-23 PROCEDURE — 99214 OFFICE O/P EST MOD 30 MIN: CPT | Performed by: INTERNAL MEDICINE

## 2023-02-23 RX ORDER — HYDROCODONE BITARTRATE AND ACETAMINOPHEN 10; 325 MG/1; MG/1
1 TABLET ORAL EVERY 8 HOURS PRN
Qty: 90 TABLET | Refills: 0 | Status: SHIPPED | OUTPATIENT
Start: 2023-03-04 | End: 2023-02-23

## 2023-02-23 RX ORDER — HYDROCODONE BITARTRATE AND ACETAMINOPHEN 10; 325 MG/1; MG/1
1 TABLET ORAL EVERY 8 HOURS PRN
Qty: 90 TABLET | Refills: 0 | Status: SHIPPED | OUTPATIENT
Start: 2023-04-04

## 2023-02-24 NOTE — PATIENT INSTRUCTIONS
Please do labs for me and the hematologist on March 3 they are in the computer    You have 2 prescriptions for Norco March 4 and April 4    Please get Aranesp on March 3 call the nurse for an appointment    See me back in 4 weeks please after March 3 so first week of April    Sorry to get you concerned Baron Morataya

## 2023-03-03 ENCOUNTER — LAB ENCOUNTER (OUTPATIENT)
Dept: LAB | Facility: HOSPITAL | Age: 68
End: 2023-03-03
Attending: INTERNAL MEDICINE
Payer: MEDICARE

## 2023-03-03 ENCOUNTER — NURSE ONLY (OUTPATIENT)
Dept: NEPHROLOGY | Facility: CLINIC | Age: 68
End: 2023-03-03

## 2023-03-03 DIAGNOSIS — E10.22 CKD STAGE 4 DUE TO TYPE 1 DIABETES MELLITUS (HCC): Primary | ICD-10-CM

## 2023-03-03 DIAGNOSIS — D61.818 PANCYTOPENIA (HCC): ICD-10-CM

## 2023-03-03 DIAGNOSIS — E10.22 CKD STAGE 4 DUE TO TYPE 1 DIABETES MELLITUS (HCC): ICD-10-CM

## 2023-03-03 DIAGNOSIS — N18.4 CKD STAGE 4 DUE TO TYPE 1 DIABETES MELLITUS (HCC): Primary | ICD-10-CM

## 2023-03-03 DIAGNOSIS — N18.4 CKD STAGE 4 DUE TO TYPE 1 DIABETES MELLITUS (HCC): ICD-10-CM

## 2023-03-03 LAB
ALBUMIN SERPL-MCNC: 3.3 G/DL (ref 3.4–5)
ANION GAP SERPL CALC-SCNC: 3 MMOL/L (ref 0–18)
BASOPHILS # BLD AUTO: 0 X10(3) UL (ref 0–0.2)
BASOPHILS NFR BLD AUTO: 0 %
BUN BLD-MCNC: 79 MG/DL (ref 7–18)
BUN/CREAT SERPL: 23.9 (ref 10–20)
CALCIUM BLD-MCNC: 9.1 MG/DL (ref 8.5–10.1)
CHLORIDE SERPL-SCNC: 116 MMOL/L (ref 98–112)
CO2 SERPL-SCNC: 25 MMOL/L (ref 21–32)
CREAT BLD-MCNC: 3.3 MG/DL
DEPRECATED RDW RBC AUTO: 59.9 FL (ref 35.1–46.3)
EOSINOPHIL # BLD AUTO: 0.07 X10(3) UL (ref 0–0.7)
EOSINOPHIL NFR BLD AUTO: 3 %
ERYTHROCYTE [DISTWIDTH] IN BLOOD BY AUTOMATED COUNT: 15.3 % (ref 11–15)
FOLATE SERPL-MCNC: >20 NG/ML (ref 8.7–?)
GFR SERPLBLD BASED ON 1.73 SQ M-ARVRAT: 19 ML/MIN/1.73M2 (ref 60–?)
GLUCOSE BLD-MCNC: 140 MG/DL (ref 70–99)
HAPTOGLOB SERPL-MCNC: 56.1 MG/DL (ref 30–200)
HCT VFR BLD AUTO: 26 %
HGB BLD-MCNC: 8.4 G/DL
IMM GRANULOCYTES # BLD AUTO: 0 X10(3) UL (ref 0–1)
IMM GRANULOCYTES NFR BLD: 0 %
LDH SERPL L TO P-CCNC: 235 U/L
LYMPHOCYTES # BLD AUTO: 0.75 X10(3) UL (ref 1–4)
LYMPHOCYTES NFR BLD AUTO: 32.6 %
MCH RBC QN AUTO: 34.4 PG (ref 26–34)
MCHC RBC AUTO-ENTMCNC: 32.3 G/DL (ref 31–37)
MCV RBC AUTO: 106.6 FL
MONOCYTES # BLD AUTO: 0.26 X10(3) UL (ref 0.1–1)
MONOCYTES NFR BLD AUTO: 11.3 %
NEUTROPHILS # BLD AUTO: 1.22 X10 (3) UL (ref 1.5–7.7)
NEUTROPHILS # BLD AUTO: 1.22 X10(3) UL (ref 1.5–7.7)
NEUTROPHILS NFR BLD AUTO: 53.1 %
OSMOLALITY SERPL CALC.SUM OF ELEC: 324 MOSM/KG (ref 275–295)
PHOSPHATE SERPL-MCNC: 3.4 MG/DL (ref 2.5–4.9)
PLATELET # BLD AUTO: 93 10(3)UL (ref 150–450)
POTASSIUM SERPL-SCNC: 5.3 MMOL/L (ref 3.5–5.1)
RBC # BLD AUTO: 2.44 X10(6)UL
SODIUM SERPL-SCNC: 144 MMOL/L (ref 136–145)
VIT B12 SERPL-MCNC: >2000 PG/ML (ref 193–986)
WBC # BLD AUTO: 2.3 X10(3) UL (ref 4–11)

## 2023-03-03 PROCEDURE — 82746 ASSAY OF FOLIC ACID SERUM: CPT

## 2023-03-03 PROCEDURE — 83615 LACTATE (LD) (LDH) ENZYME: CPT

## 2023-03-03 PROCEDURE — 85025 COMPLETE CBC W/AUTO DIFF WBC: CPT

## 2023-03-03 PROCEDURE — 83010 ASSAY OF HAPTOGLOBIN QUANT: CPT

## 2023-03-03 PROCEDURE — 96372 THER/PROPH/DIAG INJ SC/IM: CPT | Performed by: INTERNAL MEDICINE

## 2023-03-03 PROCEDURE — 80069 RENAL FUNCTION PANEL: CPT

## 2023-03-03 PROCEDURE — 86334 IMMUNOFIX E-PHORESIS SERUM: CPT

## 2023-03-03 PROCEDURE — 82607 VITAMIN B-12: CPT

## 2023-03-03 PROCEDURE — 83521 IG LIGHT CHAINS FREE EACH: CPT

## 2023-03-03 PROCEDURE — 85060 BLOOD SMEAR INTERPRETATION: CPT

## 2023-03-03 PROCEDURE — 36415 COLL VENOUS BLD VENIPUNCTURE: CPT

## 2023-03-03 PROCEDURE — 84165 PROTEIN E-PHORESIS SERUM: CPT

## 2023-03-03 NOTE — PROGRESS NOTES
pt presents to clinic today for Aranesp injection. On 2/15/23____, Hgb was _7.0___ and hematocrit __29.6__. BP today is _150/70___. Aranesp 200___ mcg administered to _right__ upper arm subcutaneous without complications. Patient tolerated injection well. Patient will RTC in 3 weeks for next injection.

## 2023-03-07 LAB
ALBUMIN SERPL ELPH-MCNC: 3.95 G/DL (ref 3.75–5.21)
ALBUMIN/GLOB SERPL: 1.68 {RATIO} (ref 1–2)
ALPHA1 GLOB SERPL ELPH-MCNC: 0.24 G/DL (ref 0.19–0.46)
ALPHA2 GLOB SERPL ELPH-MCNC: 0.45 G/DL (ref 0.48–1.05)
B-GLOBULIN SERPL ELPH-MCNC: 0.59 G/DL (ref 0.68–1.23)
GAMMA GLOB SERPL ELPH-MCNC: 1.07 G/DL (ref 0.62–1.7)
KAPPA LC FREE SER-MCNC: 6.63 MG/DL (ref 0.33–1.94)
KAPPA LC FREE/LAMBDA FREE SER NEPH: 1.38 {RATIO} (ref 0.26–1.65)
LAMBDA LC FREE SERPL-MCNC: 4.82 MG/DL (ref 0.57–2.63)
PROT SERPL-MCNC: 6.3 G/DL (ref 6.4–8.2)

## 2023-03-13 ENCOUNTER — OFFICE VISIT (OUTPATIENT)
Dept: HEMATOLOGY/ONCOLOGY | Facility: HOSPITAL | Age: 68
End: 2023-03-13
Attending: INTERNAL MEDICINE
Payer: MEDICARE

## 2023-03-13 VITALS
RESPIRATION RATE: 18 BRPM | SYSTOLIC BLOOD PRESSURE: 126 MMHG | BODY MASS INDEX: 34.51 KG/M2 | TEMPERATURE: 98 F | DIASTOLIC BLOOD PRESSURE: 61 MMHG | WEIGHT: 233 LBS | OXYGEN SATURATION: 98 % | HEIGHT: 69 IN | HEART RATE: 70 BPM

## 2023-03-13 DIAGNOSIS — D61.818 PANCYTOPENIA (HCC): Primary | ICD-10-CM

## 2023-03-13 DIAGNOSIS — N18.9 CHRONIC KIDNEY DISEASE, UNSPECIFIED CKD STAGE: ICD-10-CM

## 2023-03-13 PROCEDURE — 99211 OFF/OP EST MAY X REQ PHY/QHP: CPT

## 2023-03-16 ENCOUNTER — TELEPHONE (OUTPATIENT)
Dept: HEMATOLOGY/ONCOLOGY | Facility: HOSPITAL | Age: 68
End: 2023-03-16

## 2023-03-16 ENCOUNTER — TELEPHONE (OUTPATIENT)
Dept: ENDOCRINOLOGY CLINIC | Facility: CLINIC | Age: 68
End: 2023-03-16

## 2023-03-16 NOTE — TELEPHONE ENCOUNTER
Received fax from [address] [place] [address]. They are requesting we fax back recent chart notes so medicare may cover DM testing supplies. Faxed back note 02/15/23. Waiting for confirmation.

## 2023-03-16 NOTE — TELEPHONE ENCOUNTER
----- Message from Gabby Salmon RN sent at 3/16/2023 11:52 AM CDT -----  Regarding: Schedule follow up post biopsy  Please schedule a follow with Dr Howard Rubin on March 29th for 15 min visit to review bone marrow biopsy results. Call spouse if you do not reach the patient per patient request.     Thanks    Let me know when you have it done. Thank you!   Jermaine Dunne

## 2023-03-22 ENCOUNTER — HOSPITAL ENCOUNTER (OUTPATIENT)
Dept: CT IMAGING | Facility: HOSPITAL | Age: 68
Discharge: HOME | End: 2023-03-22
Attending: INTERNAL MEDICINE
Payer: MEDICARE

## 2023-03-22 VITALS
DIASTOLIC BLOOD PRESSURE: 66 MMHG | SYSTOLIC BLOOD PRESSURE: 123 MMHG | RESPIRATION RATE: 17 BRPM | OXYGEN SATURATION: 98 % | HEART RATE: 69 BPM

## 2023-03-22 DIAGNOSIS — D61.818 PANCYTOPENIA (HCC): ICD-10-CM

## 2023-03-22 PROCEDURE — 36415 COLL VENOUS BLD VENIPUNCTURE: CPT | Performed by: RADIOLOGY

## 2023-03-22 PROCEDURE — 88305 TISSUE EXAM BY PATHOLOGIST: CPT | Performed by: INTERNAL MEDICINE

## 2023-03-22 PROCEDURE — 38222 DX BONE MARROW BX & ASPIR: CPT | Performed by: INTERNAL MEDICINE

## 2023-03-22 PROCEDURE — 88291 CYTO/MOLECULAR REPORT: CPT | Performed by: RADIOLOGY

## 2023-03-22 PROCEDURE — 88313 SPECIAL STAINS GROUP 2: CPT | Performed by: INTERNAL MEDICINE

## 2023-03-22 PROCEDURE — 85097 BONE MARROW INTERPRETATION: CPT | Performed by: INTERNAL MEDICINE

## 2023-03-22 PROCEDURE — 88237 TISSUE CULTURE BONE MARROW: CPT | Performed by: RADIOLOGY

## 2023-03-22 PROCEDURE — 88189 FLOWCYTOMETRY/READ 16 & >: CPT | Performed by: RADIOLOGY

## 2023-03-22 PROCEDURE — 77012 CT SCAN FOR NEEDLE BIOPSY: CPT | Performed by: INTERNAL MEDICINE

## 2023-03-22 PROCEDURE — 81455 SO/HL 51/>GSAP DNA/DNA&RNA: CPT | Performed by: RADIOLOGY

## 2023-03-22 PROCEDURE — 88185 FLOWCYTOMETRY/TC ADD-ON: CPT | Performed by: RADIOLOGY

## 2023-03-22 PROCEDURE — 88184 FLOWCYTOMETRY/ TC 1 MARKER: CPT | Performed by: RADIOLOGY

## 2023-03-22 PROCEDURE — 88311 DECALCIFY TISSUE: CPT | Performed by: INTERNAL MEDICINE

## 2023-03-22 PROCEDURE — 88275 CYTOGENETICS 100-300: CPT | Performed by: RADIOLOGY

## 2023-03-22 PROCEDURE — 88271 CYTOGENETICS DNA PROBE: CPT | Performed by: RADIOLOGY

## 2023-03-22 PROCEDURE — 88264 CHROMOSOME ANALYSIS 20-25: CPT | Performed by: RADIOLOGY

## 2023-03-22 RX ORDER — FLUMAZENIL 0.1 MG/ML
0.2 INJECTION INTRAVENOUS AS NEEDED
Status: DISCONTINUED | OUTPATIENT
Start: 2023-03-22 | End: 2023-03-24

## 2023-03-22 RX ORDER — MIDAZOLAM HYDROCHLORIDE 1 MG/ML
INJECTION INTRAMUSCULAR; INTRAVENOUS
Status: COMPLETED
Start: 2023-03-22 | End: 2023-03-22

## 2023-03-22 RX ORDER — MIDAZOLAM HYDROCHLORIDE 1 MG/ML
2 INJECTION INTRAMUSCULAR; INTRAVENOUS EVERY 5 MIN PRN
Status: DISCONTINUED | OUTPATIENT
Start: 2023-03-22 | End: 2023-03-24

## 2023-03-22 RX ORDER — NALOXONE HYDROCHLORIDE 0.4 MG/ML
80 INJECTION, SOLUTION INTRAMUSCULAR; INTRAVENOUS; SUBCUTANEOUS AS NEEDED
Status: DISCONTINUED | OUTPATIENT
Start: 2023-03-22 | End: 2023-03-24

## 2023-03-22 RX ORDER — MIDAZOLAM HYDROCHLORIDE 1 MG/ML
1 INJECTION INTRAMUSCULAR; INTRAVENOUS EVERY 5 MIN PRN
Status: DISCONTINUED | OUTPATIENT
Start: 2023-03-22 | End: 2023-03-24

## 2023-03-22 RX ORDER — SODIUM CHLORIDE 9 MG/ML
INJECTION, SOLUTION INTRAVENOUS CONTINUOUS
Status: DISCONTINUED | OUTPATIENT
Start: 2023-03-22 | End: 2023-03-24

## 2023-03-22 RX ORDER — HEPARIN SODIUM 1000 [USP'U]/ML
INJECTION, SOLUTION INTRAVENOUS; SUBCUTANEOUS
Status: DISPENSED
Start: 2023-03-22 | End: 2023-03-22

## 2023-03-22 RX ADMIN — MIDAZOLAM HYDROCHLORIDE 2 MG: 1 INJECTION INTRAMUSCULAR; INTRAVENOUS at 10:22:00

## 2023-03-22 NOTE — DISCHARGE INSTRUCTIONS
Procedure performed by Dr. Korin Hills RN, [facility]    Biopsy/Aspiration of BONE MARROW    DISCHARGE INSTRUCTIONS                               DO NOT TAKE aspirin-containing products, Ibuprofen, Vitamin E, or                              blood thinning products for three (3) days after the procedure. You may                             take Tylenol (1 or 2 tablets every 4-6 hours) for mild discomfort at the                             biopsy site. Rest quietly for 24 hours, no physical activity. Avoid                              straining, heavy lifting, or strenuous physical activity for approximately                             2 days. Report any bleeding at aspiration/biopsy site, redness,                             swelling, odor, discharge, pain or fever that does not lessen after one                              day, to your physician. Resume a regular diet. Call your physician with                             questions or test results. Also you may contact the Radiology Nurse                             at [phone] with any additional questions or concerns. BRING THIS SHEET WITH YOU SHOULD YOU HAVE TO VISIT AN EMERGENCY ROOM OR SEE YOUR DOCTOR IN THE NEXT 24 HOURS. THANK YOU FOR CHOOSING [facility] FOR YOUR CARE,  WE WISH YOU WELL.  :)

## 2023-03-23 LAB
CD10 CELLS NFR SPEC: <1 %
CD10/CD19: <1 %
CD19 CELLS NFR SPEC: 4 %
CD19+/CD200+: <1 %
CD2 CELLS NFR SPEC: 92 %
CD20 CELLS NFR SPEC: 4 %
CD200 CELLS: 1 %
CD3 CELLS NFR SPEC: 86 %
CD3+/TCRGD+: 4 %
CD3+CD4+ CELLS NFR SPEC: 42 %
CD3+CD4+ CELLS/CD3+CD8+ CLL SPEC: 1.1
CD3+CD8+ CELLS NFR SPEC: 39 %
CD3-/CD56+: 6 %
CD34 CELLS NFR SPEC: <1 %
CD38 CELLS NFR SPEC: 1 %
CD38+/CD19+: <1 %
CD45 CELLS NFR SPEC: 100 %
CD5 CELLS NFR SPEC: 82 %
CD5/CD19 CELLS: <1 %
CD7 CELLS NFR SPEC: 81 %
CELL SURF KAPPA/LAMBDA RATIO: 1
CELL SURF LAMBDA LIGHT CHAIN: 2 %
CELL SURFACE KAPPA LIGHT CHAIN: 2 %
TCR G-D CELLS NFR SPEC: 5 %

## 2023-03-24 ENCOUNTER — NURSE ONLY (OUTPATIENT)
Dept: NEPHROLOGY | Facility: CLINIC | Age: 68
End: 2023-03-24

## 2023-03-24 DIAGNOSIS — D63.1 ANEMIA IN STAGE 3B CHRONIC KIDNEY DISEASE (HCC): Primary | ICD-10-CM

## 2023-03-24 DIAGNOSIS — N18.32 ANEMIA IN STAGE 3B CHRONIC KIDNEY DISEASE (HCC): Primary | ICD-10-CM

## 2023-03-24 PROCEDURE — 96372 THER/PROPH/DIAG INJ SC/IM: CPT | Performed by: INTERNAL MEDICINE

## 2023-03-24 NOTE — PROGRESS NOTES
pt presents to clinic today for Aranesp injection. On 3/3/2023___, Hgb was _8.4___ and hematocrit __29.0__. BP today is _137/68  78___. Aranesp 200___ mcg administered to _left_ upper arm subcutaneous without complications. Patient tolerated injection well. Patient will RTC in 4 weeks for next injection.

## 2023-03-29 ENCOUNTER — OFFICE VISIT (OUTPATIENT)
Dept: HEMATOLOGY/ONCOLOGY | Facility: HOSPITAL | Age: 68
End: 2023-03-29
Attending: INTERNAL MEDICINE
Payer: MEDICARE

## 2023-03-29 VITALS
SYSTOLIC BLOOD PRESSURE: 145 MMHG | RESPIRATION RATE: 18 BRPM | DIASTOLIC BLOOD PRESSURE: 62 MMHG | HEIGHT: 69 IN | TEMPERATURE: 98 F | HEART RATE: 75 BPM | WEIGHT: 237 LBS | OXYGEN SATURATION: 100 % | BODY MASS INDEX: 35.1 KG/M2

## 2023-03-29 DIAGNOSIS — D46.9 MDS (MYELODYSPLASTIC SYNDROME) (HCC): Primary | ICD-10-CM

## 2023-03-29 DIAGNOSIS — Z51.11 CHEMOTHERAPY MANAGEMENT, ENCOUNTER FOR: ICD-10-CM

## 2023-03-29 DIAGNOSIS — D61.818 PANCYTOPENIA (HCC): ICD-10-CM

## 2023-03-29 PROCEDURE — 99215 OFFICE O/P EST HI 40 MIN: CPT | Performed by: INTERNAL MEDICINE

## 2023-03-29 RX ORDER — PROCHLORPERAZINE MALEATE 10 MG
10 TABLET ORAL ONCE
OUTPATIENT
Start: 2023-04-17

## 2023-03-29 RX ORDER — PROCHLORPERAZINE MALEATE 10 MG
10 TABLET ORAL ONCE
OUTPATIENT
Start: 2023-04-11

## 2023-03-29 RX ORDER — PROCHLORPERAZINE MALEATE 10 MG
10 TABLET ORAL ONCE
OUTPATIENT
Start: 2023-04-13

## 2023-03-29 RX ORDER — PROCHLORPERAZINE MALEATE 10 MG
10 TABLET ORAL ONCE
OUTPATIENT
Start: 2023-04-12

## 2023-03-29 RX ORDER — PROCHLORPERAZINE MALEATE 10 MG
10 TABLET ORAL ONCE
OUTPATIENT
Start: 2023-04-18

## 2023-03-29 RX ORDER — PROCHLORPERAZINE MALEATE 10 MG
10 TABLET ORAL ONCE
OUTPATIENT
Start: 2023-04-14

## 2023-03-29 RX ORDER — PROCHLORPERAZINE MALEATE 10 MG
10 TABLET ORAL ONCE
Status: CANCELLED | OUTPATIENT
Start: 2023-04-10

## 2023-03-29 NOTE — PROGRESS NOTES
Met with patient and spouse, Johnna Browne after Dr Deny Nguyen appointment. Information from [facility] on MDS provided to patient and spouse. Information on azacatadine from [facility] in hand out form also provided. Questions related to plan answered including changing to higher dose of aranesp which we will take over from nephrology. Patient became tearful when he shares that his son  at age 15 from leukemia. Emotional support provided. Anxious about starting treatment but coping effectively and open to starting. Will have  coordinate start of aranesp.

## 2023-04-01 ENCOUNTER — LAB ENCOUNTER (OUTPATIENT)
Dept: LAB | Age: 68
End: 2023-04-01
Attending: INTERNAL MEDICINE
Payer: MEDICARE

## 2023-04-01 DIAGNOSIS — D46.9 MDS (MYELODYSPLASTIC SYNDROME) (HCC): ICD-10-CM

## 2023-04-01 LAB
ALBUMIN SERPL-MCNC: 3.3 G/DL (ref 3.4–5)
ALBUMIN/GLOB SERPL: 1 {RATIO} (ref 1–2)
ALP LIVER SERPL-CCNC: 59 U/L
ALT SERPL-CCNC: 28 U/L
ANION GAP SERPL CALC-SCNC: 8 MMOL/L (ref 0–18)
AST SERPL-CCNC: 20 U/L (ref 15–37)
BASOPHILS # BLD AUTO: 0.01 X10(3) UL (ref 0–0.2)
BASOPHILS NFR BLD AUTO: 0.4 %
BILIRUB SERPL-MCNC: 0.5 MG/DL (ref 0.1–2)
BUN BLD-MCNC: 65 MG/DL (ref 7–18)
BUN/CREAT SERPL: 19.1 (ref 10–20)
CALCIUM BLD-MCNC: 8.7 MG/DL (ref 8.5–10.1)
CHLORIDE SERPL-SCNC: 111 MMOL/L (ref 98–112)
CO2 SERPL-SCNC: 25 MMOL/L (ref 21–32)
CREAT BLD-MCNC: 3.41 MG/DL
DEPRECATED RDW RBC AUTO: 61.5 FL (ref 35.1–46.3)
EOSINOPHIL # BLD AUTO: 0.05 X10(3) UL (ref 0–0.7)
EOSINOPHIL NFR BLD AUTO: 2 %
ERYTHROCYTE [DISTWIDTH] IN BLOOD BY AUTOMATED COUNT: 16 % (ref 11–15)
FASTING STATUS PATIENT QL REPORTED: NO
GFR SERPLBLD BASED ON 1.73 SQ M-ARVRAT: 19 ML/MIN/1.73M2 (ref 60–?)
GLOBULIN PLAS-MCNC: 3.3 G/DL (ref 2.8–4.4)
GLUCOSE BLD-MCNC: 213 MG/DL (ref 70–99)
HCT VFR BLD AUTO: 27.7 %
HGB BLD-MCNC: 8.9 G/DL
IMM GRANULOCYTES # BLD AUTO: 0.01 X10(3) UL (ref 0–1)
IMM GRANULOCYTES NFR BLD: 0.4 %
LYMPHOCYTES # BLD AUTO: 1.09 X10(3) UL (ref 1–4)
LYMPHOCYTES NFR BLD AUTO: 43.6 %
MCH RBC QN AUTO: 34.5 PG (ref 26–34)
MCHC RBC AUTO-ENTMCNC: 32.1 G/DL (ref 31–37)
MCV RBC AUTO: 107.4 FL
MONOCYTES # BLD AUTO: 0.23 X10(3) UL (ref 0.1–1)
MONOCYTES NFR BLD AUTO: 9.2 %
NEUTROPHILS # BLD AUTO: 1.11 X10 (3) UL (ref 1.5–7.7)
NEUTROPHILS # BLD AUTO: 1.11 X10(3) UL (ref 1.5–7.7)
NEUTROPHILS NFR BLD AUTO: 44.4 %
OSMOLALITY SERPL CALC.SUM OF ELEC: 323 MOSM/KG (ref 275–295)
PLATELET # BLD AUTO: 101 10(3)UL (ref 150–450)
POTASSIUM SERPL-SCNC: 4.8 MMOL/L (ref 3.5–5.1)
PROT SERPL-MCNC: 6.6 G/DL (ref 6.4–8.2)
RBC # BLD AUTO: 2.58 X10(6)UL
SODIUM SERPL-SCNC: 144 MMOL/L (ref 136–145)
WBC # BLD AUTO: 2.5 X10(3) UL (ref 4–11)

## 2023-04-01 PROCEDURE — 36415 COLL VENOUS BLD VENIPUNCTURE: CPT

## 2023-04-01 PROCEDURE — 80053 COMPREHEN METABOLIC PANEL: CPT

## 2023-04-01 PROCEDURE — 85025 COMPLETE CBC W/AUTO DIFF WBC: CPT

## 2023-04-04 ENCOUNTER — APPOINTMENT (OUTPATIENT)
Dept: HEMATOLOGY/ONCOLOGY | Facility: HOSPITAL | Age: 68
End: 2023-04-04
Payer: MEDICARE

## 2023-04-06 ENCOUNTER — NURSE ONLY (OUTPATIENT)
Dept: HEMATOLOGY/ONCOLOGY | Facility: HOSPITAL | Age: 68
End: 2023-04-06
Attending: INTERNAL MEDICINE
Payer: MEDICARE

## 2023-04-06 DIAGNOSIS — D61.818 PANCYTOPENIA (HCC): ICD-10-CM

## 2023-04-06 DIAGNOSIS — D46.9 MDS (MYELODYSPLASTIC SYNDROME) (HCC): Primary | ICD-10-CM

## 2023-04-06 PROCEDURE — 96372 THER/PROPH/DIAG INJ SC/IM: CPT

## 2023-04-07 RX ORDER — HEPARIN SODIUM (PORCINE) LOCK FLUSH IV SOLN 100 UNIT/ML 100 UNIT/ML
SOLUTION INTRAVENOUS
Status: DISPENSED
Start: 2023-04-07 | End: 2023-04-07

## 2023-04-15 ENCOUNTER — LAB ENCOUNTER (OUTPATIENT)
Dept: LAB | Age: 68
End: 2023-04-15
Attending: INTERNAL MEDICINE
Payer: MEDICARE

## 2023-04-15 DIAGNOSIS — D46.9 MDS (MYELODYSPLASTIC SYNDROME) (HCC): ICD-10-CM

## 2023-04-15 LAB
HCT VFR BLD AUTO: 28.5 %
HGB BLD-MCNC: 8.8 G/DL

## 2023-04-15 PROCEDURE — 36415 COLL VENOUS BLD VENIPUNCTURE: CPT

## 2023-04-15 PROCEDURE — 85014 HEMATOCRIT: CPT

## 2023-04-15 PROCEDURE — 85018 HEMOGLOBIN: CPT

## 2023-04-18 ENCOUNTER — NURSE ONLY (OUTPATIENT)
Dept: HEMATOLOGY/ONCOLOGY | Facility: HOSPITAL | Age: 68
End: 2023-04-18
Attending: INTERNAL MEDICINE
Payer: MEDICARE

## 2023-04-18 VITALS
HEART RATE: 75 BPM | OXYGEN SATURATION: 100 % | RESPIRATION RATE: 16 BRPM | SYSTOLIC BLOOD PRESSURE: 114 MMHG | DIASTOLIC BLOOD PRESSURE: 53 MMHG

## 2023-04-18 DIAGNOSIS — D46.9 MDS (MYELODYSPLASTIC SYNDROME) (HCC): Primary | ICD-10-CM

## 2023-04-18 DIAGNOSIS — D61.818 PANCYTOPENIA (HCC): ICD-10-CM

## 2023-04-18 PROCEDURE — 96372 THER/PROPH/DIAG INJ SC/IM: CPT

## 2023-04-18 NOTE — PROGRESS NOTES
Pt here for Q2 week aranesp injection - gets labs outpt at [facility]  Hgl 8.8 on 4/15  Reports feeling well overall, denies new complaints/ concerns    Aranesp 500mcg given left upper arm subcutaneous, tolerated well  Parameters are to hold if hgb is 11 or greater - pt is aware    Discharged stable to home with future appts, given and reviewed AVS.  Returns 5/5 to also see Dr Rafael Caballero.   Pt states he will get labs drawn outpt again  Gait steady, indep

## 2023-04-22 DIAGNOSIS — E11.65 UNCONTROLLED TYPE 2 DIABETES MELLITUS WITH HYPERGLYCEMIA (HCC): ICD-10-CM

## 2023-04-23 NOTE — TELEPHONE ENCOUNTER
LOV: 2/15/2023  RTC: 6M, upcoming 8/16/2023  Last refill: 2/15/2023    Dr. Ralph Polanco review and sign pended prescription if agreeable.

## 2023-04-24 RX ORDER — INSULIN GLARGINE 100 [IU]/ML
INJECTION, SOLUTION SUBCUTANEOUS
Qty: 30 ML | Refills: 0 | Status: SHIPPED | OUTPATIENT
Start: 2023-04-24

## 2023-05-02 ENCOUNTER — APPOINTMENT (OUTPATIENT)
Dept: HEMATOLOGY/ONCOLOGY | Facility: HOSPITAL | Age: 68
End: 2023-05-02
Attending: INTERNAL MEDICINE
Payer: MEDICARE

## 2023-05-04 ENCOUNTER — LAB ENCOUNTER (OUTPATIENT)
Dept: LAB | Age: 68
End: 2023-05-04
Attending: INTERNAL MEDICINE
Payer: MEDICARE

## 2023-05-04 DIAGNOSIS — D46.9 MDS (MYELODYSPLASTIC SYNDROME) (HCC): ICD-10-CM

## 2023-05-04 DIAGNOSIS — D46.9 MDS (MYELODYSPLASTIC SYNDROME) (HCC): Primary | ICD-10-CM

## 2023-05-04 LAB
ALBUMIN SERPL-MCNC: 3.4 G/DL (ref 3.4–5)
ALBUMIN/GLOB SERPL: 1.1 {RATIO} (ref 1–2)
ALP LIVER SERPL-CCNC: 55 U/L
ALT SERPL-CCNC: 28 U/L
ANION GAP SERPL CALC-SCNC: 8 MMOL/L (ref 0–18)
AST SERPL-CCNC: 21 U/L (ref 15–37)
BASOPHILS # BLD AUTO: 0 X10(3) UL (ref 0–0.2)
BASOPHILS NFR BLD AUTO: 0 %
BILIRUB SERPL-MCNC: 0.7 MG/DL (ref 0.1–2)
BUN BLD-MCNC: 66 MG/DL (ref 7–18)
BUN/CREAT SERPL: 18 (ref 10–20)
CALCIUM BLD-MCNC: 8.4 MG/DL (ref 8.5–10.1)
CHLORIDE SERPL-SCNC: 113 MMOL/L (ref 98–112)
CO2 SERPL-SCNC: 23 MMOL/L (ref 21–32)
CREAT BLD-MCNC: 3.66 MG/DL
DEPRECATED RDW RBC AUTO: 67.7 FL (ref 35.1–46.3)
EOSINOPHIL # BLD AUTO: 0.05 X10(3) UL (ref 0–0.7)
EOSINOPHIL NFR BLD AUTO: 1.6 %
ERYTHROCYTE [DISTWIDTH] IN BLOOD BY AUTOMATED COUNT: 16.9 % (ref 11–15)
FASTING STATUS PATIENT QL REPORTED: NO
GFR SERPLBLD BASED ON 1.73 SQ M-ARVRAT: 17 ML/MIN/1.73M2 (ref 60–?)
GLOBULIN PLAS-MCNC: 3.2 G/DL (ref 2.8–4.4)
GLUCOSE BLD-MCNC: 224 MG/DL (ref 70–99)
HCT VFR BLD AUTO: 31.2 %
HCT VFR BLD AUTO: 31.2 %
HGB BLD-MCNC: 9.9 G/DL
HGB BLD-MCNC: 9.9 G/DL
IMM GRANULOCYTES # BLD AUTO: 0.01 X10(3) UL (ref 0–1)
IMM GRANULOCYTES NFR BLD: 0.3 %
LYMPHOCYTES # BLD AUTO: 0.94 X10(3) UL (ref 1–4)
LYMPHOCYTES NFR BLD AUTO: 30.5 %
MCH RBC QN AUTO: 34.7 PG (ref 26–34)
MCHC RBC AUTO-ENTMCNC: 31.7 G/DL (ref 31–37)
MCV RBC AUTO: 109.5 FL
MONOCYTES # BLD AUTO: 0.35 X10(3) UL (ref 0.1–1)
MONOCYTES NFR BLD AUTO: 11.4 %
NEUTROPHILS # BLD AUTO: 1.73 X10 (3) UL (ref 1.5–7.7)
NEUTROPHILS # BLD AUTO: 1.73 X10(3) UL (ref 1.5–7.7)
NEUTROPHILS NFR BLD AUTO: 56.2 %
OSMOLALITY SERPL CALC.SUM OF ELEC: 324 MOSM/KG (ref 275–295)
PLATELET # BLD AUTO: 93 10(3)UL (ref 150–450)
PLATELET MORPHOLOGY: NORMAL
POTASSIUM SERPL-SCNC: 5.6 MMOL/L (ref 3.5–5.1)
PROT SERPL-MCNC: 6.6 G/DL (ref 6.4–8.2)
RBC # BLD AUTO: 2.85 X10(6)UL
SODIUM SERPL-SCNC: 144 MMOL/L (ref 136–145)
WBC # BLD AUTO: 3.1 X10(3) UL (ref 4–11)

## 2023-05-04 PROCEDURE — 85025 COMPLETE CBC W/AUTO DIFF WBC: CPT

## 2023-05-04 PROCEDURE — 80053 COMPREHEN METABOLIC PANEL: CPT

## 2023-05-04 PROCEDURE — 85014 HEMATOCRIT: CPT

## 2023-05-04 PROCEDURE — 85018 HEMOGLOBIN: CPT

## 2023-05-04 PROCEDURE — 36415 COLL VENOUS BLD VENIPUNCTURE: CPT

## 2023-05-05 ENCOUNTER — NURSE ONLY (OUTPATIENT)
Dept: HEMATOLOGY/ONCOLOGY | Facility: HOSPITAL | Age: 68
End: 2023-05-05
Attending: INTERNAL MEDICINE
Payer: MEDICARE

## 2023-05-05 ENCOUNTER — OFFICE VISIT (OUTPATIENT)
Dept: NEPHROLOGY | Facility: CLINIC | Age: 68
End: 2023-05-05

## 2023-05-05 VITALS
HEIGHT: 69 IN | WEIGHT: 237 LBS | HEART RATE: 67 BPM | DIASTOLIC BLOOD PRESSURE: 64 MMHG | SYSTOLIC BLOOD PRESSURE: 99 MMHG | BODY MASS INDEX: 35.1 KG/M2

## 2023-05-05 VITALS
WEIGHT: 237 LBS | OXYGEN SATURATION: 98 % | HEART RATE: 73 BPM | RESPIRATION RATE: 18 BRPM | BODY MASS INDEX: 35.1 KG/M2 | SYSTOLIC BLOOD PRESSURE: 117 MMHG | DIASTOLIC BLOOD PRESSURE: 54 MMHG | TEMPERATURE: 98 F | HEIGHT: 69 IN

## 2023-05-05 DIAGNOSIS — D46.9 MDS (MYELODYSPLASTIC SYNDROME) (HCC): Primary | ICD-10-CM

## 2023-05-05 DIAGNOSIS — E11.3293 TYPE 2 DIABETES MELLITUS WITH BOTH EYES AFFECTED BY MILD NONPROLIFERATIVE RETINOPATHY WITHOUT MACULAR EDEMA, WITHOUT LONG-TERM CURRENT USE OF INSULIN (HCC): ICD-10-CM

## 2023-05-05 DIAGNOSIS — E10.22 CKD STAGE 4 DUE TO TYPE 1 DIABETES MELLITUS (HCC): Primary | ICD-10-CM

## 2023-05-05 DIAGNOSIS — D61.818 PANCYTOPENIA (HCC): ICD-10-CM

## 2023-05-05 DIAGNOSIS — E11.22 TYPE 2 DM WITH CKD STAGE 4 AND HYPERTENSION (HCC): ICD-10-CM

## 2023-05-05 DIAGNOSIS — D46.9 MDS (MYELODYSPLASTIC SYNDROME) (HCC): ICD-10-CM

## 2023-05-05 DIAGNOSIS — N18.4 CKD STAGE 4 DUE TO TYPE 1 DIABETES MELLITUS (HCC): Primary | ICD-10-CM

## 2023-05-05 DIAGNOSIS — N18.4 TYPE 2 DM WITH CKD STAGE 4 AND HYPERTENSION (HCC): ICD-10-CM

## 2023-05-05 DIAGNOSIS — Z51.81 MEDICATION MONITORING ENCOUNTER: ICD-10-CM

## 2023-05-05 DIAGNOSIS — I12.9 TYPE 2 DM WITH CKD STAGE 4 AND HYPERTENSION (HCC): ICD-10-CM

## 2023-05-05 PROCEDURE — 99214 OFFICE O/P EST MOD 30 MIN: CPT | Performed by: INTERNAL MEDICINE

## 2023-05-05 PROCEDURE — 1126F AMNT PAIN NOTED NONE PRSNT: CPT | Performed by: INTERNAL MEDICINE

## 2023-05-05 PROCEDURE — 96372 THER/PROPH/DIAG INJ SC/IM: CPT

## 2023-05-05 RX ORDER — HYDROCODONE BITARTRATE AND ACETAMINOPHEN 10; 325 MG/1; MG/1
1 TABLET ORAL EVERY 8 HOURS PRN
Qty: 90 TABLET | Refills: 0 | Status: SHIPPED | OUTPATIENT
Start: 2023-06-05 | End: 2023-05-05

## 2023-05-05 RX ORDER — HYDROCODONE BITARTRATE AND ACETAMINOPHEN 10; 325 MG/1; MG/1
1 TABLET ORAL EVERY 8 HOURS PRN
Qty: 90 TABLET | Refills: 0 | Status: SHIPPED | OUTPATIENT
Start: 2023-05-05 | End: 2023-05-05

## 2023-05-05 RX ORDER — HYDROCODONE BITARTRATE AND ACETAMINOPHEN 10; 325 MG/1; MG/1
1 TABLET ORAL EVERY 8 HOURS PRN
Qty: 90 TABLET | Refills: 0 | Status: SHIPPED | OUTPATIENT
Start: 2023-07-05

## 2023-05-05 NOTE — PATIENT INSTRUCTIONS
You have 3 months of prescriptions    Please see me in late July    I put an order in for a renal panel please do it in early June    When you do labs for Dr. Umana Common will call you with the results on that    The kidneys get a little worse please make sure you are hydrated Jerardo Chirinos to see you both please see me in late July

## 2023-05-05 NOTE — PROGRESS NOTES
Pt here for Q2 week aranesp injection - gets labs outpt at [facility]  Hgl 9.9 on 5/4  Reports feeling well overall, denies new complaints/ concerns    Aranesp 500mcg given left upper arm subcutaneous, tolerated well  Parameters are to hold if hgb is 11 or greater - pt is aware    Discharged stable to lobby with future appts. Sees Dr Jadon Chamberlain today.   Gait steady, indep

## 2023-05-17 ENCOUNTER — LAB ENCOUNTER (OUTPATIENT)
Dept: LAB | Age: 68
End: 2023-05-17
Attending: INTERNAL MEDICINE
Payer: MEDICARE

## 2023-05-17 DIAGNOSIS — D46.9 MDS (MYELODYSPLASTIC SYNDROME) (HCC): ICD-10-CM

## 2023-05-17 LAB
HCT VFR BLD AUTO: 31.3 %
HGB BLD-MCNC: 9.9 G/DL

## 2023-05-17 PROCEDURE — 36415 COLL VENOUS BLD VENIPUNCTURE: CPT

## 2023-05-17 PROCEDURE — 85014 HEMATOCRIT: CPT

## 2023-05-17 PROCEDURE — 85018 HEMOGLOBIN: CPT

## 2023-05-19 ENCOUNTER — NURSE ONLY (OUTPATIENT)
Dept: HEMATOLOGY/ONCOLOGY | Facility: HOSPITAL | Age: 68
End: 2023-05-19
Attending: INTERNAL MEDICINE
Payer: MEDICARE

## 2023-05-19 VITALS
OXYGEN SATURATION: 100 % | SYSTOLIC BLOOD PRESSURE: 133 MMHG | RESPIRATION RATE: 18 BRPM | DIASTOLIC BLOOD PRESSURE: 58 MMHG | HEART RATE: 65 BPM

## 2023-05-19 DIAGNOSIS — D46.9 MDS (MYELODYSPLASTIC SYNDROME) (HCC): Primary | ICD-10-CM

## 2023-05-19 DIAGNOSIS — D61.818 PANCYTOPENIA (HCC): ICD-10-CM

## 2023-05-19 PROCEDURE — 96372 THER/PROPH/DIAG INJ SC/IM: CPT

## 2023-05-19 NOTE — PROGRESS NOTES
Pt here for Q2 week aranesp injection - gets labs outpt at [facility]  Hgl 9.9 on 5/17  Reports feeling well overall, denies new complaints/ concerns  Reviewed s/s anemia - he has very mild fatigue and again, feels well otherwise  Will go fishing next week    Aranesp 500mcg given left upper arm subcutaneous, tolerated well - much less painful if given slow, warmed   Must give super slow, warm capped needle/ syringe in hand, do not apply bandage d/t hairy arms per pt. Parameters are to hold if hgb is 11 or greater - pt is aware    Discharged stable to lobby with future appts.   Sees Dr Sanderson Points in July  Gait steady, indep

## 2023-05-31 ENCOUNTER — TELEPHONE (OUTPATIENT)
Dept: ENDOCRINOLOGY CLINIC | Facility: CLINIC | Age: 68
End: 2023-05-31

## 2023-05-31 DIAGNOSIS — E11.65 UNCONTROLLED TYPE 2 DIABETES MELLITUS WITH HYPERGLYCEMIA (HCC): ICD-10-CM

## 2023-05-31 RX ORDER — BLOOD-GLUCOSE METER
KIT MISCELLANEOUS
Qty: 400 STRIP | Refills: 1 | Status: SHIPPED | OUTPATIENT
Start: 2023-05-31

## 2023-06-01 ENCOUNTER — LAB ENCOUNTER (OUTPATIENT)
Dept: LAB | Age: 68
End: 2023-06-01
Attending: INTERNAL MEDICINE
Payer: MEDICARE

## 2023-06-01 DIAGNOSIS — D46.9 MDS (MYELODYSPLASTIC SYNDROME) (HCC): ICD-10-CM

## 2023-06-01 LAB
HCT VFR BLD AUTO: 33.6 %
HGB BLD-MCNC: 10.7 G/DL

## 2023-06-01 PROCEDURE — 36415 COLL VENOUS BLD VENIPUNCTURE: CPT

## 2023-06-01 PROCEDURE — 85018 HEMOGLOBIN: CPT

## 2023-06-01 PROCEDURE — 85014 HEMATOCRIT: CPT

## 2023-06-02 ENCOUNTER — NURSE ONLY (OUTPATIENT)
Dept: HEMATOLOGY/ONCOLOGY | Facility: HOSPITAL | Age: 68
End: 2023-06-02
Attending: INTERNAL MEDICINE
Payer: MEDICARE

## 2023-06-02 DIAGNOSIS — D61.818 PANCYTOPENIA (HCC): ICD-10-CM

## 2023-06-02 DIAGNOSIS — D46.9 MDS (MYELODYSPLASTIC SYNDROME) (HCC): Primary | ICD-10-CM

## 2023-06-02 PROCEDURE — 96372 THER/PROPH/DIAG INJ SC/IM: CPT

## 2023-06-02 NOTE — PROGRESS NOTES
Patient to infusion for Aranesp injection. 10.7 which was drawn yesterday outpatient. Patient states he is feeling well today. Patient tolerated injection well, administered per parameters. Patient verbalizes understanding of Aranesp injection and HH monitoring prior to each dose. Discharged stable, with next appointment. Care Plan  Problem:  Heme Imbalance: identified, monitoring    Related to:  Disease process    Interventions:  HH monitoring as ordered  Aranesp injections as ordered    Evaluation:  Will continue to monitor for S/S of anemia with each visit. Will continue therapy per parameters.

## 2023-06-15 ENCOUNTER — LAB ENCOUNTER (OUTPATIENT)
Dept: LAB | Age: 68
End: 2023-06-15
Attending: INTERNAL MEDICINE
Payer: MEDICARE

## 2023-06-15 DIAGNOSIS — D46.9 MDS (MYELODYSPLASTIC SYNDROME) (HCC): ICD-10-CM

## 2023-06-15 DIAGNOSIS — N18.4 TYPE 2 DM WITH CKD STAGE 4 AND HYPERTENSION (HCC): ICD-10-CM

## 2023-06-15 DIAGNOSIS — I12.9 TYPE 2 DM WITH CKD STAGE 4 AND HYPERTENSION (HCC): ICD-10-CM

## 2023-06-15 DIAGNOSIS — N18.4 CKD STAGE 4 DUE TO TYPE 1 DIABETES MELLITUS (HCC): ICD-10-CM

## 2023-06-15 DIAGNOSIS — E11.22 TYPE 2 DM WITH CKD STAGE 4 AND HYPERTENSION (HCC): ICD-10-CM

## 2023-06-15 DIAGNOSIS — E10.22 CKD STAGE 4 DUE TO TYPE 1 DIABETES MELLITUS (HCC): ICD-10-CM

## 2023-06-15 LAB
ALBUMIN SERPL-MCNC: 3.6 G/DL (ref 3.4–5)
ANION GAP SERPL CALC-SCNC: 6 MMOL/L (ref 0–18)
BUN BLD-MCNC: 84 MG/DL (ref 7–18)
CALCIUM BLD-MCNC: 9 MG/DL (ref 8.5–10.1)
CHLORIDE SERPL-SCNC: 113 MMOL/L (ref 98–112)
CO2 SERPL-SCNC: 23 MMOL/L (ref 21–32)
CREAT BLD-MCNC: 3.23 MG/DL
GFR SERPLBLD BASED ON 1.73 SQ M-ARVRAT: 20 ML/MIN/1.73M2 (ref 60–?)
GLUCOSE BLD-MCNC: 264 MG/DL (ref 70–99)
HCT VFR BLD AUTO: 35.5 %
HGB BLD-MCNC: 11.2 G/DL
OSMOLALITY SERPL CALC.SUM OF ELEC: 329 MOSM/KG (ref 275–295)
PHOSPHATE SERPL-MCNC: 5 MG/DL (ref 2.5–4.9)
POTASSIUM SERPL-SCNC: 4.9 MMOL/L (ref 3.5–5.1)
PTH-INTACT SERPL-MCNC: 112.3 PG/ML (ref 18.5–88)
SODIUM SERPL-SCNC: 142 MMOL/L (ref 136–145)

## 2023-06-15 PROCEDURE — 85018 HEMOGLOBIN: CPT

## 2023-06-15 PROCEDURE — 80069 RENAL FUNCTION PANEL: CPT

## 2023-06-15 PROCEDURE — 83970 ASSAY OF PARATHORMONE: CPT

## 2023-06-15 PROCEDURE — 85014 HEMATOCRIT: CPT

## 2023-06-15 PROCEDURE — 36415 COLL VENOUS BLD VENIPUNCTURE: CPT

## 2023-06-16 ENCOUNTER — APPOINTMENT (OUTPATIENT)
Dept: HEMATOLOGY/ONCOLOGY | Facility: HOSPITAL | Age: 68
End: 2023-06-16
Attending: INTERNAL MEDICINE
Payer: MEDICARE

## 2023-06-28 ENCOUNTER — LAB ENCOUNTER (OUTPATIENT)
Dept: LAB | Age: 68
End: 2023-06-28
Attending: INTERNAL MEDICINE
Payer: MEDICARE

## 2023-06-28 DIAGNOSIS — D46.9 MDS (MYELODYSPLASTIC SYNDROME) (HCC): ICD-10-CM

## 2023-06-28 LAB
HCT VFR BLD AUTO: 31.1 %
HGB BLD-MCNC: 9.7 G/DL

## 2023-06-28 PROCEDURE — 85018 HEMOGLOBIN: CPT

## 2023-06-28 PROCEDURE — 85014 HEMATOCRIT: CPT

## 2023-06-28 PROCEDURE — 36415 COLL VENOUS BLD VENIPUNCTURE: CPT

## 2023-06-30 ENCOUNTER — NURSE ONLY (OUTPATIENT)
Dept: HEMATOLOGY/ONCOLOGY | Facility: HOSPITAL | Age: 68
End: 2023-06-30
Attending: INTERNAL MEDICINE
Payer: MEDICARE

## 2023-06-30 VITALS
SYSTOLIC BLOOD PRESSURE: 130 MMHG | DIASTOLIC BLOOD PRESSURE: 62 MMHG | RESPIRATION RATE: 18 BRPM | HEART RATE: 70 BPM | OXYGEN SATURATION: 99 %

## 2023-06-30 DIAGNOSIS — D61.818 PANCYTOPENIA (HCC): ICD-10-CM

## 2023-06-30 DIAGNOSIS — D46.9 MDS (MYELODYSPLASTIC SYNDROME) (HCC): Primary | ICD-10-CM

## 2023-06-30 PROCEDURE — 96372 THER/PROPH/DIAG INJ SC/IM: CPT

## 2023-07-11 ENCOUNTER — LAB ENCOUNTER (OUTPATIENT)
Dept: LAB | Age: 68
End: 2023-07-11
Attending: INTERNAL MEDICINE
Payer: MEDICARE

## 2023-07-11 DIAGNOSIS — D46.9 MDS (MYELODYSPLASTIC SYNDROME) (HCC): ICD-10-CM

## 2023-07-11 LAB
HCT VFR BLD AUTO: 31.1 %
HGB BLD-MCNC: 9.7 G/DL

## 2023-07-11 PROCEDURE — 85018 HEMOGLOBIN: CPT

## 2023-07-11 PROCEDURE — 85014 HEMATOCRIT: CPT

## 2023-07-11 PROCEDURE — 36415 COLL VENOUS BLD VENIPUNCTURE: CPT

## 2023-07-12 ENCOUNTER — NURSE ONLY (OUTPATIENT)
Dept: HEMATOLOGY/ONCOLOGY | Facility: HOSPITAL | Age: 68
End: 2023-07-12
Attending: INTERNAL MEDICINE
Payer: MEDICARE

## 2023-07-12 VITALS
RESPIRATION RATE: 18 BRPM | TEMPERATURE: 98 F | HEIGHT: 69 IN | DIASTOLIC BLOOD PRESSURE: 68 MMHG | HEART RATE: 68 BPM | BODY MASS INDEX: 34.96 KG/M2 | SYSTOLIC BLOOD PRESSURE: 128 MMHG | OXYGEN SATURATION: 98 % | WEIGHT: 236 LBS

## 2023-07-12 DIAGNOSIS — D61.818 PANCYTOPENIA (HCC): ICD-10-CM

## 2023-07-12 DIAGNOSIS — D46.9 MDS (MYELODYSPLASTIC SYNDROME) (HCC): Primary | ICD-10-CM

## 2023-07-12 DIAGNOSIS — Z51.81 MEDICATION MONITORING ENCOUNTER: ICD-10-CM

## 2023-07-12 PROCEDURE — 99214 OFFICE O/P EST MOD 30 MIN: CPT | Performed by: INTERNAL MEDICINE

## 2023-07-12 PROCEDURE — 96372 THER/PROPH/DIAG INJ SC/IM: CPT

## 2023-07-14 ENCOUNTER — APPOINTMENT (OUTPATIENT)
Dept: HEMATOLOGY/ONCOLOGY | Facility: HOSPITAL | Age: 68
End: 2023-07-14
Attending: INTERNAL MEDICINE
Payer: MEDICARE

## 2023-07-20 DIAGNOSIS — E11.65 UNCONTROLLED TYPE 2 DIABETES MELLITUS WITH HYPERGLYCEMIA (HCC): ICD-10-CM

## 2023-07-20 RX ORDER — INSULIN GLARGINE 100 [IU]/ML
35 INJECTION, SOLUTION SUBCUTANEOUS NIGHTLY
Qty: 30 ML | Refills: 1 | Status: SHIPPED | OUTPATIENT
Start: 2023-07-20

## 2023-07-20 RX ORDER — INSULIN ASPART 100 [IU]/ML
INJECTION, SOLUTION INTRAVENOUS; SUBCUTANEOUS
Qty: 40 ML | Refills: 0 | Status: SHIPPED | OUTPATIENT
Start: 2023-07-20

## 2023-07-20 NOTE — TELEPHONE ENCOUNTER
Refill Request for medication(s): doxycycline 100 MG Oral Cap     Last Office Visit: 11/2022    Last Refill: 4/2022    Pharmacy, Dosage verified:     Condition Update (if applicable):     Rx pended and sent to provider for approval, please advise. Thank You!

## 2023-07-21 RX ORDER — DOXYCYCLINE HYCLATE 100 MG/1
100 CAPSULE ORAL DAILY
Qty: 90 CAPSULE | Refills: 0 | Status: SHIPPED | OUTPATIENT
Start: 2023-07-21

## 2023-07-26 ENCOUNTER — LAB ENCOUNTER (OUTPATIENT)
Dept: LAB | Age: 68
End: 2023-07-26
Attending: INTERNAL MEDICINE
Payer: MEDICARE

## 2023-07-26 DIAGNOSIS — E10.22 CKD STAGE 4 DUE TO TYPE 1 DIABETES MELLITUS (HCC): ICD-10-CM

## 2023-07-26 DIAGNOSIS — N18.4 CKD STAGE 4 DUE TO TYPE 1 DIABETES MELLITUS (HCC): ICD-10-CM

## 2023-07-26 DIAGNOSIS — D61.818 PANCYTOPENIA (HCC): ICD-10-CM

## 2023-07-26 DIAGNOSIS — N18.4 TYPE 2 DM WITH CKD STAGE 4 AND HYPERTENSION (HCC): ICD-10-CM

## 2023-07-26 DIAGNOSIS — E11.22 TYPE 2 DM WITH CKD STAGE 4 AND HYPERTENSION (HCC): ICD-10-CM

## 2023-07-26 DIAGNOSIS — I12.9 TYPE 2 DM WITH CKD STAGE 4 AND HYPERTENSION (HCC): ICD-10-CM

## 2023-07-26 DIAGNOSIS — D46.9 MDS (MYELODYSPLASTIC SYNDROME) (HCC): ICD-10-CM

## 2023-07-26 LAB
BASOPHILS # BLD AUTO: 0.01 X10(3) UL (ref 0–0.2)
BASOPHILS NFR BLD AUTO: 0.3 %
BILIRUB UR QL STRIP.AUTO: NEGATIVE
CLARITY UR REFRACT.AUTO: CLEAR
COLOR UR AUTO: YELLOW
CREAT UR-SCNC: 56.9 MG/DL
EOSINOPHIL # BLD AUTO: 0.07 X10(3) UL (ref 0–0.7)
EOSINOPHIL NFR BLD AUTO: 1.9 %
ERYTHROCYTE [DISTWIDTH] IN BLOOD BY AUTOMATED COUNT: 16.5 %
GLUCOSE UR STRIP.AUTO-MCNC: 150 MG/DL
HCT VFR BLD AUTO: 34 %
HGB BLD-MCNC: 10.8 G/DL
IMM GRANULOCYTES # BLD AUTO: 0.01 X10(3) UL (ref 0–1)
IMM GRANULOCYTES NFR BLD: 0.3 %
KETONES UR STRIP.AUTO-MCNC: NEGATIVE MG/DL
LEUKOCYTE ESTERASE UR QL STRIP.AUTO: NEGATIVE
LYMPHOCYTES # BLD AUTO: 1.04 X10(3) UL (ref 1–4)
LYMPHOCYTES NFR BLD AUTO: 28.3 %
MCH RBC QN AUTO: 34.2 PG (ref 26–34)
MCHC RBC AUTO-ENTMCNC: 31.8 G/DL (ref 31–37)
MCV RBC AUTO: 107.6 FL
MICROALBUMIN UR-MCNC: 58.4 MG/DL
MICROALBUMIN/CREAT 24H UR-RTO: 1026.4 UG/MG (ref ?–30)
MONOCYTES # BLD AUTO: 0.34 X10(3) UL (ref 0.1–1)
MONOCYTES NFR BLD AUTO: 9.3 %
NEUTROPHILS # BLD AUTO: 2.2 X10 (3) UL (ref 1.5–7.7)
NEUTROPHILS # BLD AUTO: 2.2 X10(3) UL (ref 1.5–7.7)
NEUTROPHILS NFR BLD AUTO: 59.9 %
NITRITE UR QL STRIP.AUTO: NEGATIVE
PH UR STRIP.AUTO: 5 [PH] (ref 5–8)
PLATELET # BLD AUTO: 91 10(3)UL (ref 150–450)
PROT UR STRIP.AUTO-MCNC: 100 MG/DL
RBC # BLD AUTO: 3.16 X10(6)UL
RBC UR QL AUTO: NEGATIVE
SP GR UR STRIP.AUTO: 1.01 (ref 1–1.03)
UROBILINOGEN UR STRIP.AUTO-MCNC: <2 MG/DL
WBC # BLD AUTO: 3.7 X10(3) UL (ref 4–11)

## 2023-07-26 PROCEDURE — 85025 COMPLETE CBC W/AUTO DIFF WBC: CPT

## 2023-07-26 PROCEDURE — 81001 URINALYSIS AUTO W/SCOPE: CPT

## 2023-07-26 PROCEDURE — 36415 COLL VENOUS BLD VENIPUNCTURE: CPT

## 2023-07-26 PROCEDURE — 82570 ASSAY OF URINE CREATININE: CPT

## 2023-07-26 PROCEDURE — 82043 UR ALBUMIN QUANTITATIVE: CPT

## 2023-07-28 ENCOUNTER — TELEPHONE (OUTPATIENT)
Dept: NEPHROLOGY | Facility: CLINIC | Age: 68
End: 2023-07-28

## 2023-07-28 ENCOUNTER — NURSE ONLY (OUTPATIENT)
Dept: HEMATOLOGY/ONCOLOGY | Facility: HOSPITAL | Age: 68
End: 2023-07-28
Attending: INTERNAL MEDICINE
Payer: MEDICARE

## 2023-07-28 VITALS
RESPIRATION RATE: 18 BRPM | HEART RATE: 70 BPM | OXYGEN SATURATION: 99 % | SYSTOLIC BLOOD PRESSURE: 149 MMHG | DIASTOLIC BLOOD PRESSURE: 66 MMHG

## 2023-07-28 DIAGNOSIS — D61.818 PANCYTOPENIA (HCC): ICD-10-CM

## 2023-07-28 DIAGNOSIS — D46.9 MDS (MYELODYSPLASTIC SYNDROME) (HCC): Primary | ICD-10-CM

## 2023-07-28 PROCEDURE — 96372 THER/PROPH/DIAG INJ SC/IM: CPT

## 2023-07-28 NOTE — TELEPHONE ENCOUNTER
Patients wife Shonda West cancelled this evenings appointment at 5:00 PM, informed no openings until November. Please call patients wife at [phone],ABDULLAHI.

## 2023-07-28 NOTE — PROGRESS NOTES
Pt here for Q2 week aranesp injection - gets labs outpt at [facility]  Hgl 10.8 on 7/26  Denies any s/s bleeding or other issues. Reports feeling very good, denies new complaints/ concerns    Aranesp 500mcg given left upper arm subcutaneous, tolerated well - much less painful if given slow, warmed   Must give super slow, warm capped needle/ syringe in hand, do not apply bandage d/t hairy arms per pt. Parameters are to hold if hgb is 11 or greater - pt is aware    Discharged stable to home with future appts.  Given AVS  Sees Dr Lilly Hopkins in Sept  Gait steady, indep

## 2023-07-28 NOTE — TELEPHONE ENCOUNTER
Dr Chelo Ryan made aware of note below and can see patient on 8/3/23 at 340pm,pt wife informed and agreed.

## 2023-08-03 ENCOUNTER — OFFICE VISIT (OUTPATIENT)
Dept: NEPHROLOGY | Facility: CLINIC | Age: 68
End: 2023-08-03

## 2023-08-03 VITALS
SYSTOLIC BLOOD PRESSURE: 120 MMHG | BODY MASS INDEX: 35.1 KG/M2 | HEART RATE: 66 BPM | HEIGHT: 69 IN | WEIGHT: 237 LBS | DIASTOLIC BLOOD PRESSURE: 65 MMHG

## 2023-08-03 DIAGNOSIS — N18.4 TYPE 2 DM WITH CKD STAGE 4 AND HYPERTENSION (HCC): ICD-10-CM

## 2023-08-03 DIAGNOSIS — I12.9 TYPE 2 DM WITH CKD STAGE 4 AND HYPERTENSION (HCC): ICD-10-CM

## 2023-08-03 DIAGNOSIS — E10.22 CKD STAGE 4 DUE TO TYPE 1 DIABETES MELLITUS (HCC): Primary | ICD-10-CM

## 2023-08-03 DIAGNOSIS — E11.22 TYPE 2 DM WITH CKD STAGE 4 AND HYPERTENSION (HCC): ICD-10-CM

## 2023-08-03 DIAGNOSIS — N18.4 CKD STAGE 4 DUE TO TYPE 1 DIABETES MELLITUS (HCC): Primary | ICD-10-CM

## 2023-08-03 DIAGNOSIS — D46.9 MDS (MYELODYSPLASTIC SYNDROME) (HCC): ICD-10-CM

## 2023-08-03 PROCEDURE — 99214 OFFICE O/P EST MOD 30 MIN: CPT | Performed by: INTERNAL MEDICINE

## 2023-08-03 RX ORDER — HYDROCODONE BITARTRATE AND ACETAMINOPHEN 10; 325 MG/1; MG/1
1 TABLET ORAL EVERY 8 HOURS PRN
Qty: 90 TABLET | Refills: 0 | Status: SHIPPED | OUTPATIENT
Start: 2023-08-03 | End: 2023-08-03

## 2023-08-03 RX ORDER — HYDROCODONE BITARTRATE AND ACETAMINOPHEN 10; 325 MG/1; MG/1
1 TABLET ORAL EVERY 8 HOURS PRN
Qty: 90 TABLET | Refills: 0 | Status: SHIPPED | OUTPATIENT
Start: 2023-09-04 | End: 2023-08-03

## 2023-08-03 RX ORDER — HYDROCODONE BITARTRATE AND ACETAMINOPHEN 10; 325 MG/1; MG/1
1 TABLET ORAL EVERY 8 HOURS PRN
Qty: 90 TABLET | Refills: 0 | Status: SHIPPED | OUTPATIENT
Start: 2023-10-03

## 2023-08-03 NOTE — PATIENT INSTRUCTIONS
Please do blood test nonfasting next time you are getting your shot    Continue getting the shots twice a month    You have 3 months of prescriptions    I will call you with lab results    See me in 3 months    Have a great summer to you both

## 2023-08-04 ENCOUNTER — TELEPHONE (OUTPATIENT)
Dept: NEPHROLOGY | Facility: CLINIC | Age: 68
End: 2023-08-04

## 2023-08-05 NOTE — PROGRESS NOTES
Progress Note     Khushbu Galindo    Is here with wife Princess Barber currently getting Aranesp from Dr. Narda Hernández twice a month for myelodysplasia history of CKD 4 chronic neuropathy is on Norco 3 times a day does not abuse says his sugars are doing good feeling okay breathing well takes Lipitor no complaints. Except for chronic neuropathy      HISTORY:  Past Medical History:   Diagnosis Date    C2-3 mild central, C3-4 mild-mod diffuse, C4-5 mild diffuse, C5-6 mod diffuse bulging discs 3/15/2018    C5-6 mod central & bilateral mild foraminal, C4-5 left mild foraminal stenosis 2018    C6-7 mild-mod central herniated disc 3/15/2018    Cataract     2010    Chronic kidney disease (CKD)     stage 3    Diabetes mellitus ([facility] [address]     Diabetes type 2, uncontrolled     Diabetic retinopathy ([facility] [address]     referred to [facility] associates for eval    Hyperlipidemia     Meibomian gland dysfunction     Osteoarthritis of spine with radiculopathy, cervical region 2018    Pancreatitis     Primary osteoarthritis of both shoulders 2018    Proteinuria     Type II or unspecified type diabetes mellitus without mention of complication, not stated as uncontrolled     Pills & Insulin    Vitreous floaters       Past Surgical History:   Procedure Laterality Date    APPENDECTOMY      CATARACT EXTRACTION W/  INTRAOCULAR LENS IMPLANT Right 2018    Dr. Severo Wakefield Left 2018    Dr. Swetha Hodges      ELECTROCARDIOGRAM, COMPLETE  2012    scanned to media tab    HERNIA SURGERY        Social History    Tobacco Use      Smoking status: Former        Packs/day: 0.00        Types: Cigarettes        Quit date: 1989        Years since quittin.7      Smokeless tobacco: Former      Tobacco comments: quit about 30 years ago.     Alcohol use: No        Medications (Active prior to today's visit):  Current Outpatient Medications   Medication Sig Dispense Refill [START ON 10/3/2023] HYDROcodone-acetaminophen  MG Oral Tab Take 1 tablet by mouth every 8 (eight) hours as needed for Pain. 90 tablet 0    doxycycline 100 MG Oral Cap Take 1 capsule (100 mg total) by mouth daily. 90 capsule 0    LANTUS 100 UNIT/ML Subcutaneous Solution Inject 35 Units into the skin nightly. 30 mL 1    NOVOLOG 100 UNIT/ML Injection Solution INJECT 50 UNITS SUBCUTANEOUSLY ONCE DAILY VIA  CORRECTION  FACTOR 40 mL 0    RELION INSULIN SYRINGE 31G X 15/64\" 0.5 ML Does not apply Misc Inject 1 Syringe into the skin 4 (four) times daily. 400 each 0    RELION INSULIN SYRINGE 31G X 15/64\" 0.5 ML Does not apply Misc USE 1 SYRINGE SUBCUTANEOUSLY 4 TIMES DAILY 200 each 0    pregabalin 300 MG Oral Cap Take 1 capsule (300 mg total) by mouth daily. FOR NERVE PAIN. 90 capsule 3    atorvastatin 40 MG Oral Tab Take 1 tablet (40 mg total) by mouth daily. FOR CHOLESTEROL. 90 tablet 9    fenofibrate 48 MG Oral Tab Take 1 tablet (48 mg total) by mouth daily. FOR TRIGLYCERIDES. 90 tablet 9    pantoprazole 40 MG Oral Tab EC Take 1 tablet (40 mg total) by mouth before breakfast. FOR ACID REFLUX. 90 tablet 9    Betamethasone Dipropionate Aug 0.05 % External Cream Apply 1 Application topically 2 (two) times daily. Apply to affected areas bid 50 g 0    metRONIDAZOLE 0.75 % External Cream Apply 1 Application topically daily. For breakout on face 45 g 0    Pimecrolimus 1 % External Cream APPLY   TOPICALLY AFFECTED AREA ON FACE ONCE DAILY TO TWICE DAILY 60 g 0    Tazarotene 0.1 % External Cream Apply to chest nightly (Patient taking differently: Apply to chest nightly PRN) 60 g 3    Blood Glucose Monitoring Suppl (ACCU-CHEK INÉS PLUS) w/Device Does not apply Kit Use as directed to check blood sugars. 1 kit 0    BD INSULIN SYRINGE U/F 31G X 5/16\" 0.5 ML Does not apply Misc Inject insulin 4 times per day as instructed. 400 each 0    Ferrous Gluconate 225 (27 Fe) MG Oral Tab Take 27 mg by mouth daily.       Cyanocobalamin (VITAMIN B-12) 5000 MCG Oral Lozenge Take 5,000 mcg by mouth daily. 30 lozenge 0    Omega-3 Fatty Acids (FISH OIL) 600 MG Oral Cap Take 1 capsule by mouth nightly. Multiple Vitamins-Minerals (CENTRUM SILVER) Oral Tab Take 1 tablet by mouth daily. FREESTYLE LITE TEST In Vitro Strip USE 1 STRIP TO CHECK BLOOD GLUCOSE 4 TIMES DAILY. DX: E11.65, INSULIN DEPENDENT 400 strip 1    RELION INSULIN SYRINGE 31G X 15/64\" 0.5 ML Does not apply Misc 1 Syringe 4 (four) times daily. 400 each 2    Insulin Syringe-Needle U-100 (RELION INSULIN SYRINGE) 31G X 15/64\" 0.5 ML Does not apply Misc 1 Syringe by Does not apply route 4 (four) times daily. 400 each 0    Naloxone HCl 4 MG/0.1ML Nasal Liquid 4 mg by Nasal route as needed. If patient remains unresponsive, repeat dose in other nostril 2-5 minutes after first dose. 1 kit 0    Insulin Syringe-Needle U-100 (RELION INSULIN SYRINGE) 31G X 15/64\" 0.5 ML Does not apply Misc 1 Syringe by Does not apply route 4 (four) times daily. 400 each 0    Glucose Blood (ACCU-CHEK INÉS PLUS) In Vitro Strip Use to check blood sugars 3 times a day.  300 strip 1       Allergies:    Cefdinir                DIARRHEA  Zosyn [Piperacillin*    OTHER (SEE COMMENTS)    Comment:Heightened sense of smell; dry heaves, vomiting      ROS:     Constitutional:  Negative for decreased activity, fever, irritability and lethargy  ENMT:  Negative for ear drainage, hearing loss and nasal drainage  Eyes:  Negative for eye discharge and vision loss  Cardiovascular:  Negative for chest pain, sob  Respiratory:  Negative for cough, dyspnea and wheezing  Gastrointestinal:  Negative for abdominal pain, constipation  Genitourinary:  Negative for dysuria and hematuria  Endocrine:  Negative for abnormal sleep patterns  Hema/Lymph:  Negative for easy bleeding and easy bruising  Integumentary:  Negative for pruritus and rash  Musculoskeletal:  Negative for bone/joint symptoms  Neurological: Positive for chronic neuropathy in legs and feet  Psychiatric:  Negative for inappropriate interaction and psychiatric symptoms       08/03/23  1649   BP: 120/65   Pulse: 66       PHYSICAL EXAM:   Constitutional: appears well hydrated alert and responsive   Head/Face: normocephalic  Eyes/Vision: normal extraocular motion is intact  Nose/Mouth/Throat:mucous membranes are moist   Neck/Thyroid: neck is supple without adenopathy  Lymphatic: no abnormal cervical, supraclavicular adenopathy is noted  Respiratory:  lungs are clear to auscultation bilaterally  Cardiovascular: regular rate and rhythm   Abdomen: soft, non-tender, non-distended, BS normal  Skin/Hair: no unusual rashes present, no abnormal bruising noted  Back/Spine: no abnormalities noted  Musculoskeletal: no deformities  Extremities: no edema  Neurological:  Grossly normal       ASSESSMENT/PLAN:   Assessment   Ckd stage 4 due to type 1 diabetes mellitus ([facility])  (primary encounter diagnosis)  Type 2 dm with ckd stage 4 and hypertension ([facility])  Mds (myelodysplastic syndrome) ([facility])    #1 CKD 4  Creatinine 3.2 GFR of 20 to watch labs phosphorus was 5 we will keep an eye on that may need to start phosphorus binder  2 secondary hyperparathyroid  goes over 120 start Calcitrol  #3 diabetes   Check A1c  #4 chronic anemia myelodysplasia per Dr. Shawn Lepe hemoglobin better  10.8    Do labs in 3 to 4 weeks see me back in 2 months  Orders This Visit:  Orders Placed This Encounter      Renal Function Panel      Meds This Visit:  Requested Prescriptions     Signed Prescriptions Disp Refills    HYDROcodone-acetaminophen  MG Oral Tab 90 tablet 0     Sig: Take 1 tablet by mouth every 8 (eight) hours as needed for Pain. Imaging & Referrals:  None     8/4/2023  Jaun Mclain MD

## 2023-08-09 ENCOUNTER — LAB ENCOUNTER (OUTPATIENT)
Dept: LAB | Age: 68
End: 2023-08-09
Attending: INTERNAL MEDICINE
Payer: MEDICARE

## 2023-08-09 DIAGNOSIS — E11.22 TYPE 2 DM WITH CKD STAGE 4 AND HYPERTENSION (HCC): ICD-10-CM

## 2023-08-09 DIAGNOSIS — D46.9 MDS (MYELODYSPLASTIC SYNDROME) (HCC): ICD-10-CM

## 2023-08-09 DIAGNOSIS — E10.22 CKD STAGE 4 DUE TO TYPE 1 DIABETES MELLITUS (HCC): ICD-10-CM

## 2023-08-09 DIAGNOSIS — N18.4 CKD STAGE 4 DUE TO TYPE 1 DIABETES MELLITUS (HCC): ICD-10-CM

## 2023-08-09 DIAGNOSIS — I12.9 TYPE 2 DM WITH CKD STAGE 4 AND HYPERTENSION (HCC): ICD-10-CM

## 2023-08-09 DIAGNOSIS — N18.4 TYPE 2 DM WITH CKD STAGE 4 AND HYPERTENSION (HCC): ICD-10-CM

## 2023-08-09 DIAGNOSIS — D61.818 PANCYTOPENIA (HCC): ICD-10-CM

## 2023-08-09 LAB
ALBUMIN SERPL-MCNC: 3.5 G/DL (ref 3.4–5)
ANION GAP SERPL CALC-SCNC: 4 MMOL/L (ref 0–18)
BASOPHILS # BLD AUTO: 0.01 X10(3) UL (ref 0–0.2)
BASOPHILS NFR BLD AUTO: 0.4 %
BUN BLD-MCNC: 66 MG/DL (ref 7–18)
CALCIUM BLD-MCNC: 8.8 MG/DL (ref 8.5–10.1)
CHLORIDE SERPL-SCNC: 113 MMOL/L (ref 98–112)
CO2 SERPL-SCNC: 24 MMOL/L (ref 21–32)
CREAT BLD-MCNC: 3.37 MG/DL
EGFRCR SERPLBLD CKD-EPI 2021: 19 ML/MIN/1.73M2 (ref 60–?)
EOSINOPHIL # BLD AUTO: 0.04 X10(3) UL (ref 0–0.7)
EOSINOPHIL NFR BLD AUTO: 1.4 %
ERYTHROCYTE [DISTWIDTH] IN BLOOD BY AUTOMATED COUNT: 16.5 %
GLUCOSE BLD-MCNC: 190 MG/DL (ref 70–99)
HCT VFR BLD AUTO: 34.7 %
HGB BLD-MCNC: 10.9 G/DL
IMM GRANULOCYTES # BLD AUTO: 0.02 X10(3) UL (ref 0–1)
IMM GRANULOCYTES NFR BLD: 0.7 %
LYMPHOCYTES # BLD AUTO: 1.04 X10(3) UL (ref 1–4)
LYMPHOCYTES NFR BLD AUTO: 37.4 %
MCH RBC QN AUTO: 34.6 PG (ref 26–34)
MCHC RBC AUTO-ENTMCNC: 31.4 G/DL (ref 31–37)
MCV RBC AUTO: 110.2 FL
MONOCYTES # BLD AUTO: 0.19 X10(3) UL (ref 0.1–1)
MONOCYTES NFR BLD AUTO: 6.8 %
NEUTROPHILS # BLD AUTO: 1.48 X10 (3) UL (ref 1.5–7.7)
NEUTROPHILS # BLD AUTO: 1.48 X10(3) UL (ref 1.5–7.7)
NEUTROPHILS NFR BLD AUTO: 53.3 %
OSMOLALITY SERPL CALC.SUM OF ELEC: 316 MOSM/KG (ref 275–295)
PHOSPHATE SERPL-MCNC: 3.8 MG/DL (ref 2.5–4.9)
PLATELET # BLD AUTO: 88 10(3)UL (ref 150–450)
POTASSIUM SERPL-SCNC: 4.9 MMOL/L (ref 3.5–5.1)
RBC # BLD AUTO: 3.15 X10(6)UL
SODIUM SERPL-SCNC: 141 MMOL/L (ref 136–145)
WBC # BLD AUTO: 2.8 X10(3) UL (ref 4–11)

## 2023-08-09 PROCEDURE — 80069 RENAL FUNCTION PANEL: CPT

## 2023-08-09 PROCEDURE — 83036 HEMOGLOBIN GLYCOSYLATED A1C: CPT

## 2023-08-09 PROCEDURE — 85025 COMPLETE CBC W/AUTO DIFF WBC: CPT

## 2023-08-09 PROCEDURE — 36415 COLL VENOUS BLD VENIPUNCTURE: CPT

## 2023-08-10 LAB
EST. AVERAGE GLUCOSE BLD GHB EST-MCNC: 174 MG/DL (ref 68–126)
HBA1C MFR BLD: 7.7 % (ref ?–5.7)

## 2023-08-11 ENCOUNTER — NURSE ONLY (OUTPATIENT)
Dept: HEMATOLOGY/ONCOLOGY | Facility: HOSPITAL | Age: 68
End: 2023-08-11
Attending: INTERNAL MEDICINE
Payer: MEDICARE

## 2023-08-11 VITALS
RESPIRATION RATE: 16 BRPM | HEART RATE: 74 BPM | DIASTOLIC BLOOD PRESSURE: 73 MMHG | OXYGEN SATURATION: 98 % | SYSTOLIC BLOOD PRESSURE: 124 MMHG

## 2023-08-11 DIAGNOSIS — D61.818 PANCYTOPENIA (HCC): ICD-10-CM

## 2023-08-11 DIAGNOSIS — D46.9 MDS (MYELODYSPLASTIC SYNDROME) (HCC): Primary | ICD-10-CM

## 2023-08-11 PROCEDURE — 96372 THER/PROPH/DIAG INJ SC/IM: CPT

## 2023-08-16 ENCOUNTER — OFFICE VISIT (OUTPATIENT)
Dept: ENDOCRINOLOGY CLINIC | Facility: CLINIC | Age: 68
End: 2023-08-16

## 2023-08-16 VITALS
WEIGHT: 234 LBS | BODY MASS INDEX: 35 KG/M2 | HEART RATE: 71 BPM | DIASTOLIC BLOOD PRESSURE: 75 MMHG | SYSTOLIC BLOOD PRESSURE: 125 MMHG

## 2023-08-16 DIAGNOSIS — E11.65 UNCONTROLLED TYPE 2 DIABETES MELLITUS WITH HYPERGLYCEMIA (HCC): Primary | ICD-10-CM

## 2023-08-16 LAB
CARTRIDGE LOT#: ABNORMAL NUMERIC
GLUCOSE BLOOD: 232
HEMOGLOBIN A1C: 6.4 % (ref 4.3–5.6)
TEST STRIP LOT #: NORMAL NUMERIC

## 2023-08-16 PROCEDURE — 99214 OFFICE O/P EST MOD 30 MIN: CPT | Performed by: INTERNAL MEDICINE

## 2023-08-16 PROCEDURE — 83036 HEMOGLOBIN GLYCOSYLATED A1C: CPT | Performed by: INTERNAL MEDICINE

## 2023-08-16 PROCEDURE — 1126F AMNT PAIN NOTED NONE PRSNT: CPT | Performed by: INTERNAL MEDICINE

## 2023-08-16 PROCEDURE — 82947 ASSAY GLUCOSE BLOOD QUANT: CPT | Performed by: INTERNAL MEDICINE

## 2023-08-16 RX ORDER — AMITRIPTYLINE HYDROCHLORIDE 25 MG/1
25 TABLET, FILM COATED ORAL NIGHTLY
Qty: 90 TABLET | Refills: 1 | Status: SHIPPED | OUTPATIENT
Start: 2023-08-16

## 2023-08-16 RX ORDER — INSULIN GLARGINE 100 [IU]/ML
35 INJECTION, SOLUTION SUBCUTANEOUS NIGHTLY
Qty: 30 ML | Refills: 1 | Status: SHIPPED | OUTPATIENT
Start: 2023-08-16

## 2023-08-24 ENCOUNTER — LAB ENCOUNTER (OUTPATIENT)
Dept: LAB | Age: 68
End: 2023-08-24
Attending: INTERNAL MEDICINE
Payer: MEDICARE

## 2023-08-24 DIAGNOSIS — D46.9 MDS (MYELODYSPLASTIC SYNDROME) (HCC): ICD-10-CM

## 2023-08-24 DIAGNOSIS — D61.818 PANCYTOPENIA (HCC): ICD-10-CM

## 2023-08-24 LAB
BASOPHILS # BLD AUTO: 0.01 X10(3) UL (ref 0–0.2)
BASOPHILS NFR BLD AUTO: 0.4 %
EOSINOPHIL # BLD AUTO: 0.09 X10(3) UL (ref 0–0.7)
EOSINOPHIL NFR BLD AUTO: 3.3 %
ERYTHROCYTE [DISTWIDTH] IN BLOOD BY AUTOMATED COUNT: 16.4 %
HCT VFR BLD AUTO: 33.3 %
HGB BLD-MCNC: 10.8 G/DL
IMM GRANULOCYTES # BLD AUTO: 0.01 X10(3) UL (ref 0–1)
IMM GRANULOCYTES NFR BLD: 0.4 %
LYMPHOCYTES # BLD AUTO: 1.21 X10(3) UL (ref 1–4)
LYMPHOCYTES NFR BLD AUTO: 44.3 %
MCH RBC QN AUTO: 34.7 PG (ref 26–34)
MCHC RBC AUTO-ENTMCNC: 32.4 G/DL (ref 31–37)
MCV RBC AUTO: 107.1 FL
MONOCYTES # BLD AUTO: 0.28 X10(3) UL (ref 0.1–1)
MONOCYTES NFR BLD AUTO: 10.3 %
NEUTROPHILS # BLD AUTO: 1.13 X10 (3) UL (ref 1.5–7.7)
NEUTROPHILS # BLD AUTO: 1.13 X10(3) UL (ref 1.5–7.7)
NEUTROPHILS NFR BLD AUTO: 41.3 %
PLATELET # BLD AUTO: 85 10(3)UL (ref 150–450)
PLATELET MORPHOLOGY: NORMAL
RBC # BLD AUTO: 3.11 X10(6)UL
WBC # BLD AUTO: 2.7 X10(3) UL (ref 4–11)

## 2023-08-24 PROCEDURE — 36415 COLL VENOUS BLD VENIPUNCTURE: CPT

## 2023-08-24 PROCEDURE — 85025 COMPLETE CBC W/AUTO DIFF WBC: CPT

## 2023-08-25 ENCOUNTER — NURSE ONLY (OUTPATIENT)
Dept: HEMATOLOGY/ONCOLOGY | Facility: HOSPITAL | Age: 68
End: 2023-08-25
Attending: INTERNAL MEDICINE
Payer: MEDICARE

## 2023-08-25 DIAGNOSIS — D61.818 PANCYTOPENIA (HCC): ICD-10-CM

## 2023-08-25 DIAGNOSIS — D46.9 MDS (MYELODYSPLASTIC SYNDROME) (HCC): Primary | ICD-10-CM

## 2023-08-25 PROCEDURE — 96372 THER/PROPH/DIAG INJ SC/IM: CPT

## 2023-08-25 NOTE — PROGRESS NOTES
Hgb 10.8- aranesp given per order. Patient tolerated well. Discharged with AVS ambulating independently.

## 2023-08-28 DIAGNOSIS — G62.9 NEUROPATHY: ICD-10-CM

## 2023-08-28 DIAGNOSIS — E11.3293 TYPE 2 DIABETES MELLITUS WITH BOTH EYES AFFECTED BY MILD NONPROLIFERATIVE RETINOPATHY WITHOUT MACULAR EDEMA, WITHOUT LONG-TERM CURRENT USE OF INSULIN (HCC): ICD-10-CM

## 2023-08-28 RX ORDER — PREGABALIN 300 MG/1
300 CAPSULE ORAL DAILY
Qty: 90 CAPSULE | Refills: 0 | Status: SHIPPED | OUTPATIENT
Start: 2023-08-28

## 2023-08-31 ENCOUNTER — TELEPHONE (OUTPATIENT)
Dept: NEPHROLOGY | Facility: CLINIC | Age: 68
End: 2023-08-31

## 2023-09-06 ENCOUNTER — LAB ENCOUNTER (OUTPATIENT)
Dept: LAB | Age: 68
End: 2023-09-06
Attending: INTERNAL MEDICINE
Payer: MEDICARE

## 2023-09-06 DIAGNOSIS — D61.818 PANCYTOPENIA (HCC): ICD-10-CM

## 2023-09-06 DIAGNOSIS — D46.9 MDS (MYELODYSPLASTIC SYNDROME) (HCC): ICD-10-CM

## 2023-09-06 LAB
BASOPHILS # BLD AUTO: 0 X10(3) UL (ref 0–0.2)
BASOPHILS NFR BLD AUTO: 0 %
EOSINOPHIL # BLD AUTO: 0.06 X10(3) UL (ref 0–0.7)
EOSINOPHIL NFR BLD AUTO: 2.5 %
ERYTHROCYTE [DISTWIDTH] IN BLOOD BY AUTOMATED COUNT: 16.3 %
HCT VFR BLD AUTO: 35.8 %
HGB BLD-MCNC: 11.4 G/DL
IMM GRANULOCYTES # BLD AUTO: 0.01 X10(3) UL (ref 0–1)
IMM GRANULOCYTES NFR BLD: 0.4 %
LYMPHOCYTES # BLD AUTO: 0.91 X10(3) UL (ref 1–4)
LYMPHOCYTES NFR BLD AUTO: 38.6 %
MCH RBC QN AUTO: 34.7 PG (ref 26–34)
MCHC RBC AUTO-ENTMCNC: 31.8 G/DL (ref 31–37)
MCV RBC AUTO: 108.8 FL
MONOCYTES # BLD AUTO: 0.22 X10(3) UL (ref 0.1–1)
MONOCYTES NFR BLD AUTO: 9.3 %
NEUTROPHILS # BLD AUTO: 1.16 X10 (3) UL (ref 1.5–7.7)
NEUTROPHILS # BLD AUTO: 1.16 X10(3) UL (ref 1.5–7.7)
NEUTROPHILS NFR BLD AUTO: 49.2 %
PLATELET # BLD AUTO: 93 10(3)UL (ref 150–450)
RBC # BLD AUTO: 3.29 X10(6)UL
WBC # BLD AUTO: 2.4 X10(3) UL (ref 4–11)

## 2023-09-06 PROCEDURE — 85025 COMPLETE CBC W/AUTO DIFF WBC: CPT

## 2023-09-06 PROCEDURE — 36415 COLL VENOUS BLD VENIPUNCTURE: CPT

## 2023-09-07 ENCOUNTER — APPOINTMENT (OUTPATIENT)
Dept: CT IMAGING | Facility: HOSPITAL | Age: 68
End: 2023-09-07
Attending: EMERGENCY MEDICINE
Payer: MEDICARE

## 2023-09-07 ENCOUNTER — HOSPITAL ENCOUNTER (EMERGENCY)
Facility: HOSPITAL | Age: 68
Discharge: HOME | End: 2023-09-07
Attending: EMERGENCY MEDICINE
Payer: MEDICARE

## 2023-09-07 VITALS
SYSTOLIC BLOOD PRESSURE: 161 MMHG | DIASTOLIC BLOOD PRESSURE: 82 MMHG | RESPIRATION RATE: 18 BRPM | TEMPERATURE: 98 F | HEART RATE: 70 BPM | OXYGEN SATURATION: 100 %

## 2023-09-07 DIAGNOSIS — E87.5 HYPERKALEMIA: ICD-10-CM

## 2023-09-07 DIAGNOSIS — R11.2 NAUSEA AND VOMITING IN ADULT: Primary | ICD-10-CM

## 2023-09-07 DIAGNOSIS — N18.9 CHRONIC KIDNEY DISEASE, UNSPECIFIED CKD STAGE: ICD-10-CM

## 2023-09-07 DIAGNOSIS — E86.0 DEHYDRATION: ICD-10-CM

## 2023-09-07 DIAGNOSIS — D61.818 PANCYTOPENIA (HCC): ICD-10-CM

## 2023-09-07 DIAGNOSIS — D46.9 MDS (MYELODYSPLASTIC SYNDROME) (HCC): ICD-10-CM

## 2023-09-07 LAB
ALBUMIN SERPL-MCNC: 3.6 G/DL (ref 3.4–5)
ALBUMIN/GLOB SERPL: 1.1 {RATIO} (ref 1–2)
ALP LIVER SERPL-CCNC: 69 U/L
ALT SERPL-CCNC: 34 U/L
ANION GAP SERPL CALC-SCNC: 2 MMOL/L (ref 0–18)
AST SERPL-CCNC: 25 U/L (ref 15–37)
BASOPHILS # BLD AUTO: 0 X10(3) UL (ref 0–0.2)
BASOPHILS NFR BLD AUTO: 0 %
BILIRUB SERPL-MCNC: 1 MG/DL (ref 0.1–2)
BUN BLD-MCNC: 46 MG/DL (ref 7–18)
BUN/CREAT SERPL: 14.5 (ref 10–20)
CALCIUM BLD-MCNC: 8.9 MG/DL (ref 8.5–10.1)
CHLORIDE SERPL-SCNC: 113 MMOL/L (ref 98–112)
CO2 SERPL-SCNC: 26 MMOL/L (ref 21–32)
CREAT BLD-MCNC: 3.18 MG/DL
DEPRECATED RDW RBC AUTO: 63.1 FL (ref 35.1–46.3)
EGFRCR SERPLBLD CKD-EPI 2021: 20 ML/MIN/1.73M2 (ref 60–?)
EOSINOPHIL # BLD AUTO: 0.05 X10(3) UL (ref 0–0.7)
EOSINOPHIL NFR BLD AUTO: 2.2 %
ERYTHROCYTE [DISTWIDTH] IN BLOOD BY AUTOMATED COUNT: 16.1 % (ref 11–15)
GLOBULIN PLAS-MCNC: 3.3 G/DL (ref 2.8–4.4)
GLUCOSE BLD-MCNC: 227 MG/DL (ref 70–99)
GLUCOSE BLDC GLUCOMTR-MCNC: 216 MG/DL (ref 70–99)
HCT VFR BLD AUTO: 35.1 %
HGB BLD-MCNC: 11.4 G/DL
IMM GRANULOCYTES # BLD AUTO: 0.01 X10(3) UL (ref 0–1)
IMM GRANULOCYTES NFR BLD: 0.4 %
LIPASE SERPL-CCNC: 30 U/L (ref 13–75)
LYMPHOCYTES # BLD AUTO: 0.79 X10(3) UL (ref 1–4)
LYMPHOCYTES NFR BLD AUTO: 34.3 %
MCH RBC QN AUTO: 35.1 PG (ref 26–34)
MCHC RBC AUTO-ENTMCNC: 32.5 G/DL (ref 31–37)
MCV RBC AUTO: 108 FL
MONOCYTES # BLD AUTO: 0.24 X10(3) UL (ref 0.1–1)
MONOCYTES NFR BLD AUTO: 10.4 %
NEUTROPHILS # BLD AUTO: 1.21 X10 (3) UL (ref 1.5–7.7)
NEUTROPHILS # BLD AUTO: 1.21 X10(3) UL (ref 1.5–7.7)
NEUTROPHILS NFR BLD AUTO: 52.7 %
OSMOLALITY SERPL CALC.SUM OF ELEC: 311 MOSM/KG (ref 275–295)
PLATELET # BLD AUTO: 93 10(3)UL (ref 150–450)
POTASSIUM SERPL-SCNC: 5.9 MMOL/L (ref 3.5–5.1)
PROT SERPL-MCNC: 6.9 G/DL (ref 6.4–8.2)
RBC # BLD AUTO: 3.25 X10(6)UL
SODIUM SERPL-SCNC: 141 MMOL/L (ref 136–145)
WBC # BLD AUTO: 2.3 X10(3) UL (ref 4–11)

## 2023-09-07 PROCEDURE — 80053 COMPREHEN METABOLIC PANEL: CPT | Performed by: EMERGENCY MEDICINE

## 2023-09-07 PROCEDURE — 82962 GLUCOSE BLOOD TEST: CPT

## 2023-09-07 PROCEDURE — 85025 COMPLETE CBC W/AUTO DIFF WBC: CPT

## 2023-09-07 PROCEDURE — 83690 ASSAY OF LIPASE: CPT | Performed by: EMERGENCY MEDICINE

## 2023-09-07 PROCEDURE — 74176 CT ABD & PELVIS W/O CONTRAST: CPT | Performed by: EMERGENCY MEDICINE

## 2023-09-07 PROCEDURE — 99284 EMERGENCY DEPT VISIT MOD MDM: CPT

## 2023-09-07 PROCEDURE — 85025 COMPLETE CBC W/AUTO DIFF WBC: CPT | Performed by: EMERGENCY MEDICINE

## 2023-09-07 PROCEDURE — 96361 HYDRATE IV INFUSION ADD-ON: CPT

## 2023-09-07 PROCEDURE — 99285 EMERGENCY DEPT VISIT HI MDM: CPT

## 2023-09-07 PROCEDURE — 80053 COMPREHEN METABOLIC PANEL: CPT

## 2023-09-07 PROCEDURE — 96360 HYDRATION IV INFUSION INIT: CPT

## 2023-09-07 RX ORDER — ONDANSETRON 4 MG/1
4 TABLET, ORALLY DISINTEGRATING ORAL EVERY 8 HOURS PRN
Qty: 6 TABLET | Refills: 0 | Status: SHIPPED | OUTPATIENT
Start: 2023-09-07

## 2023-09-08 ENCOUNTER — APPOINTMENT (OUTPATIENT)
Dept: HEMATOLOGY/ONCOLOGY | Facility: HOSPITAL | Age: 68
End: 2023-09-08
Attending: INTERNAL MEDICINE
Payer: MEDICARE

## 2023-09-08 ENCOUNTER — TELEPHONE (OUTPATIENT)
Dept: NEPHROLOGY | Facility: CLINIC | Age: 68
End: 2023-09-08

## 2023-09-08 VITALS
RESPIRATION RATE: 18 BRPM | BODY MASS INDEX: 34.51 KG/M2 | HEART RATE: 92 BPM | WEIGHT: 233 LBS | HEIGHT: 69 IN | OXYGEN SATURATION: 97 % | SYSTOLIC BLOOD PRESSURE: 135 MMHG | TEMPERATURE: 98 F | DIASTOLIC BLOOD PRESSURE: 75 MMHG

## 2023-09-08 DIAGNOSIS — E10.22 CKD STAGE 4 DUE TO TYPE 1 DIABETES MELLITUS (HCC): Primary | ICD-10-CM

## 2023-09-08 DIAGNOSIS — D46.9 MDS (MYELODYSPLASTIC SYNDROME) (HCC): Primary | ICD-10-CM

## 2023-09-08 DIAGNOSIS — D61.818 PANCYTOPENIA (HCC): ICD-10-CM

## 2023-09-08 DIAGNOSIS — Z51.81 MEDICATION MONITORING ENCOUNTER: ICD-10-CM

## 2023-09-08 DIAGNOSIS — N18.4 CKD STAGE 4 DUE TO TYPE 1 DIABETES MELLITUS (HCC): Primary | ICD-10-CM

## 2023-09-08 PROCEDURE — 99214 OFFICE O/P EST MOD 30 MIN: CPT | Performed by: INTERNAL MEDICINE

## 2023-09-08 NOTE — TELEPHONE ENCOUNTER
Rn generated order for lab BMP on Monday or Tuesday next week ordered by Dr Ty Garcia. pt wife Vanesa(DARIA veirified)informed .

## 2023-09-12 ENCOUNTER — LAB ENCOUNTER (OUTPATIENT)
Dept: LAB | Age: 68
End: 2023-09-12
Attending: INTERNAL MEDICINE
Payer: MEDICARE

## 2023-09-12 ENCOUNTER — PATIENT OUTREACH (OUTPATIENT)
Dept: CASE MANAGEMENT | Age: 68
End: 2023-09-12

## 2023-09-12 DIAGNOSIS — E10.22 CKD STAGE 4 DUE TO TYPE 1 DIABETES MELLITUS (HCC): ICD-10-CM

## 2023-09-12 DIAGNOSIS — N18.4 CKD STAGE 4 DUE TO TYPE 1 DIABETES MELLITUS (HCC): ICD-10-CM

## 2023-09-12 LAB
ANION GAP SERPL CALC-SCNC: 4 MMOL/L (ref 0–18)
BUN BLD-MCNC: 59 MG/DL (ref 7–18)
CALCIUM BLD-MCNC: 8.4 MG/DL (ref 8.5–10.1)
CHLORIDE SERPL-SCNC: 113 MMOL/L (ref 98–112)
CO2 SERPL-SCNC: 25 MMOL/L (ref 21–32)
CREAT BLD-MCNC: 3.29 MG/DL
EGFRCR SERPLBLD CKD-EPI 2021: 19 ML/MIN/1.73M2 (ref 60–?)
FASTING STATUS PATIENT QL REPORTED: NO
GLUCOSE BLD-MCNC: 132 MG/DL (ref 70–99)
OSMOLALITY SERPL CALC.SUM OF ELEC: 312 MOSM/KG (ref 275–295)
POTASSIUM SERPL-SCNC: 4.8 MMOL/L (ref 3.5–5.1)
SODIUM SERPL-SCNC: 142 MMOL/L (ref 136–145)

## 2023-09-12 PROCEDURE — 36415 COLL VENOUS BLD VENIPUNCTURE: CPT

## 2023-09-12 PROCEDURE — 80048 BASIC METABOLIC PNL TOTAL CA: CPT

## 2023-09-13 ENCOUNTER — TELEPHONE (OUTPATIENT)
Dept: NEPHROLOGY | Facility: CLINIC | Age: 68
End: 2023-09-13

## 2023-09-20 ENCOUNTER — LAB ENCOUNTER (OUTPATIENT)
Dept: LAB | Age: 68
End: 2023-09-20
Attending: INTERNAL MEDICINE
Payer: MEDICARE

## 2023-09-20 DIAGNOSIS — D46.9 MDS (MYELODYSPLASTIC SYNDROME) (HCC): ICD-10-CM

## 2023-09-20 DIAGNOSIS — D61.818 PANCYTOPENIA (HCC): ICD-10-CM

## 2023-09-20 LAB
BASOPHILS # BLD AUTO: 0.01 X10(3) UL (ref 0–0.2)
BASOPHILS NFR BLD AUTO: 0.4 %
EOSINOPHIL # BLD AUTO: 0.06 X10(3) UL (ref 0–0.7)
EOSINOPHIL NFR BLD AUTO: 2.1 %
ERYTHROCYTE [DISTWIDTH] IN BLOOD BY AUTOMATED COUNT: 14.8 %
HCT VFR BLD AUTO: 30.4 %
HGB BLD-MCNC: 10 G/DL
IMM GRANULOCYTES # BLD AUTO: 0.01 X10(3) UL (ref 0–1)
IMM GRANULOCYTES NFR BLD: 0.4 %
LYMPHOCYTES # BLD AUTO: 1.12 X10(3) UL (ref 1–4)
LYMPHOCYTES NFR BLD AUTO: 39.3 %
MCH RBC QN AUTO: 34.4 PG (ref 26–34)
MCHC RBC AUTO-ENTMCNC: 32.9 G/DL (ref 31–37)
MCV RBC AUTO: 104.5 FL
MONOCYTES # BLD AUTO: 0.27 X10(3) UL (ref 0.1–1)
MONOCYTES NFR BLD AUTO: 9.5 %
NEUTROPHILS # BLD AUTO: 1.38 X10 (3) UL (ref 1.5–7.7)
NEUTROPHILS # BLD AUTO: 1.38 X10(3) UL (ref 1.5–7.7)
NEUTROPHILS NFR BLD AUTO: 48.3 %
PLATELET # BLD AUTO: 80 10(3)UL (ref 150–450)
RBC # BLD AUTO: 2.91 X10(6)UL
WBC # BLD AUTO: 2.9 X10(3) UL (ref 4–11)

## 2023-09-20 PROCEDURE — 36415 COLL VENOUS BLD VENIPUNCTURE: CPT

## 2023-09-20 PROCEDURE — 85025 COMPLETE CBC W/AUTO DIFF WBC: CPT

## 2023-09-22 ENCOUNTER — NURSE ONLY (OUTPATIENT)
Dept: HEMATOLOGY/ONCOLOGY | Facility: HOSPITAL | Age: 68
End: 2023-09-22
Attending: INTERNAL MEDICINE
Payer: MEDICARE

## 2023-09-22 VITALS
RESPIRATION RATE: 18 BRPM | OXYGEN SATURATION: 98 % | HEART RATE: 86 BPM | SYSTOLIC BLOOD PRESSURE: 112 MMHG | DIASTOLIC BLOOD PRESSURE: 56 MMHG

## 2023-09-22 DIAGNOSIS — D61.818 PANCYTOPENIA (HCC): ICD-10-CM

## 2023-09-22 DIAGNOSIS — D46.9 MDS (MYELODYSPLASTIC SYNDROME) (HCC): Primary | ICD-10-CM

## 2023-09-22 PROCEDURE — 96372 THER/PROPH/DIAG INJ SC/IM: CPT

## 2023-09-22 NOTE — PROGRESS NOTES
Pt here for Q2 week aranesp injection - gets labs outpt at [facility]  Hgl 10.0 on 9/20  Denies any s/s bleeding or other issues. Reports feeling very good, denies new complaints/ concerns  Goes fishing unless raining outside    Aranesp 500mcg given left upper arm subcutaneous, tolerated well - much less painful if given slow, warmed   Must give super slow, warm capped needle/ syringe in hand, do not apply bandage d/t hairy arms per pt. Parameters are to hold if hgb is 11 or greater - pt is aware    Discharged stable to home with future appts.  Given AVS  Sees Dr Stephany Allen in Dec  Gait steady, indep

## 2023-09-26 ENCOUNTER — TELEPHONE (OUTPATIENT)
Dept: HEMATOLOGY/ONCOLOGY | Facility: HOSPITAL | Age: 68
End: 2023-09-26

## 2023-09-26 NOTE — TELEPHONE ENCOUNTER
Soni Aguilar would like to know if [facility] could have a Covid booster shot, not sure if this is ok to go along with the other medication he is taking every two weeks.  Thanks [employer]

## 2023-09-27 NOTE — TELEPHONE ENCOUNTER
OK to get vaccines. Discussed rationale for increased dose of darbo now has MDS and this is dose for that condition.

## 2023-10-05 ENCOUNTER — LAB ENCOUNTER (OUTPATIENT)
Dept: LAB | Age: 68
End: 2023-10-05
Attending: INTERNAL MEDICINE
Payer: MEDICARE

## 2023-10-05 DIAGNOSIS — D61.818 PANCYTOPENIA (HCC): ICD-10-CM

## 2023-10-05 DIAGNOSIS — D46.9 MDS (MYELODYSPLASTIC SYNDROME) (HCC): ICD-10-CM

## 2023-10-05 LAB
BASOPHILS # BLD AUTO: 0 X10(3) UL (ref 0–0.2)
BASOPHILS NFR BLD AUTO: 0 %
EOSINOPHIL # BLD AUTO: 0.08 X10(3) UL (ref 0–0.7)
EOSINOPHIL NFR BLD AUTO: 3.1 %
ERYTHROCYTE [DISTWIDTH] IN BLOOD BY AUTOMATED COUNT: 15.9 %
HCT VFR BLD AUTO: 31.7 %
HGB BLD-MCNC: 10.1 G/DL
IMM GRANULOCYTES # BLD AUTO: 0.02 X10(3) UL (ref 0–1)
IMM GRANULOCYTES NFR BLD: 0.8 %
LYMPHOCYTES # BLD AUTO: 1.02 X10(3) UL (ref 1–4)
LYMPHOCYTES NFR BLD AUTO: 39.5 %
MCH RBC QN AUTO: 34.9 PG (ref 26–34)
MCHC RBC AUTO-ENTMCNC: 31.9 G/DL (ref 31–37)
MCV RBC AUTO: 109.7 FL
MONOCYTES # BLD AUTO: 0.24 X10(3) UL (ref 0.1–1)
MONOCYTES NFR BLD AUTO: 9.3 %
NEUTROPHILS # BLD AUTO: 1.22 X10 (3) UL (ref 1.5–7.7)
NEUTROPHILS # BLD AUTO: 1.22 X10(3) UL (ref 1.5–7.7)
NEUTROPHILS NFR BLD AUTO: 47.3 %
PLATELET # BLD AUTO: 89 10(3)UL (ref 150–450)
RBC # BLD AUTO: 2.89 X10(6)UL
WBC # BLD AUTO: 2.6 X10(3) UL (ref 4–11)

## 2023-10-05 PROCEDURE — 85025 COMPLETE CBC W/AUTO DIFF WBC: CPT

## 2023-10-05 PROCEDURE — 36415 COLL VENOUS BLD VENIPUNCTURE: CPT

## 2023-10-06 ENCOUNTER — NURSE ONLY (OUTPATIENT)
Dept: HEMATOLOGY/ONCOLOGY | Facility: HOSPITAL | Age: 68
End: 2023-10-06
Attending: INTERNAL MEDICINE
Payer: MEDICARE

## 2023-10-06 VITALS
OXYGEN SATURATION: 99 % | DIASTOLIC BLOOD PRESSURE: 71 MMHG | RESPIRATION RATE: 16 BRPM | HEART RATE: 76 BPM | SYSTOLIC BLOOD PRESSURE: 143 MMHG

## 2023-10-06 DIAGNOSIS — D46.9 MDS (MYELODYSPLASTIC SYNDROME) (HCC): Primary | ICD-10-CM

## 2023-10-06 DIAGNOSIS — D61.818 PANCYTOPENIA (HCC): ICD-10-CM

## 2023-10-06 PROCEDURE — 36415 COLL VENOUS BLD VENIPUNCTURE: CPT

## 2023-10-06 PROCEDURE — 96372 THER/PROPH/DIAG INJ SC/IM: CPT

## 2023-10-06 NOTE — PROGRESS NOTES
Pt here for Q2 week aranesp injection - gets labs outpt at [facility]  Hgl 10.1 on 10/5  Reports feeling good, denies new complaints/ concerns  Continues to go fishing unless raining outside    Aranesp 500mcg given left upper arm subcutaneous, tolerated well - much less painful if given slow, warmed   Must give super slow, warm capped needle/ syringe in hand, do not apply bandage d/t hairy arms per pt. Parameters are to hold if hgb is 11 or greater - pt is aware    Discharged stable to home with future appts.  Given AVS  Sees Dr Sugar Anthony in Dec  Gait steady, indep

## 2023-10-19 ENCOUNTER — LAB ENCOUNTER (OUTPATIENT)
Dept: LAB | Age: 68
End: 2023-10-19
Attending: INTERNAL MEDICINE
Payer: MEDICARE

## 2023-10-19 DIAGNOSIS — D46.9 MDS (MYELODYSPLASTIC SYNDROME) (HCC): ICD-10-CM

## 2023-10-19 DIAGNOSIS — D61.818 PANCYTOPENIA (HCC): ICD-10-CM

## 2023-10-19 PROCEDURE — 36415 COLL VENOUS BLD VENIPUNCTURE: CPT

## 2023-10-19 PROCEDURE — 85060 BLOOD SMEAR INTERPRETATION: CPT

## 2023-10-19 PROCEDURE — 85025 COMPLETE CBC W/AUTO DIFF WBC: CPT

## 2023-10-20 ENCOUNTER — NURSE ONLY (OUTPATIENT)
Dept: HEMATOLOGY/ONCOLOGY | Facility: HOSPITAL | Age: 68
End: 2023-10-20
Attending: INTERNAL MEDICINE
Payer: MEDICARE

## 2023-10-20 DIAGNOSIS — D61.818 PANCYTOPENIA (HCC): ICD-10-CM

## 2023-10-20 DIAGNOSIS — D46.9 MDS (MYELODYSPLASTIC SYNDROME) (HCC): Primary | ICD-10-CM

## 2023-10-20 LAB
BASOPHILS # BLD AUTO: 0.01 X10(3) UL (ref 0–0.2)
BASOPHILS NFR BLD AUTO: 0.4 %
DEPRECATED RDW RBC AUTO: 68 FL (ref 35.1–46.3)
EOSINOPHIL # BLD AUTO: 0.06 X10(3) UL (ref 0–0.7)
EOSINOPHIL NFR BLD AUTO: 2.3 %
ERYTHROCYTE [DISTWIDTH] IN BLOOD BY AUTOMATED COUNT: 16.9 % (ref 11–15)
HCT VFR BLD AUTO: 31.8 %
HGB BLD-MCNC: 10.1 G/DL
IMM GRANULOCYTES # BLD AUTO: 0.01 X10(3) UL (ref 0–1)
IMM GRANULOCYTES NFR BLD: 0.4 %
LYMPHOCYTES # BLD AUTO: 1.18 X10(3) UL (ref 1–4)
LYMPHOCYTES NFR BLD AUTO: 45 %
MCH RBC QN AUTO: 34.9 PG (ref 26–34)
MCHC RBC AUTO-ENTMCNC: 31.8 G/DL (ref 31–37)
MCV RBC AUTO: 110 FL
MONOCYTES # BLD AUTO: 0.26 X10(3) UL (ref 0.1–1)
MONOCYTES NFR BLD AUTO: 9.9 %
NEUTROPHILS # BLD AUTO: 1.1 X10 (3) UL (ref 1.5–7.7)
NEUTROPHILS # BLD AUTO: 1.1 X10(3) UL (ref 1.5–7.7)
NEUTROPHILS NFR BLD AUTO: 42 %
PLATELET # BLD AUTO: 90 10(3)UL (ref 150–450)
PLATELET MORPHOLOGY: NORMAL
RBC # BLD AUTO: 2.89 X10(6)UL
WBC # BLD AUTO: 2.6 X10(3) UL (ref 4–11)

## 2023-10-20 PROCEDURE — 96372 THER/PROPH/DIAG INJ SC/IM: CPT

## 2023-10-20 NOTE — PROGRESS NOTES
Hgb 10. 1. Yuki Zeng labs drawn on 10/19. .    Aranesp given in left upper arm. Tolerated injection well. Patient with no complaints today    AVS printed with future appointments made.

## 2023-11-02 ENCOUNTER — LAB ENCOUNTER (OUTPATIENT)
Dept: LAB | Age: 68
End: 2023-11-02
Attending: INTERNAL MEDICINE
Payer: MEDICARE

## 2023-11-02 DIAGNOSIS — D61.818 PANCYTOPENIA (HCC): ICD-10-CM

## 2023-11-02 DIAGNOSIS — D46.9 MDS (MYELODYSPLASTIC SYNDROME) (HCC): ICD-10-CM

## 2023-11-02 LAB
BASOPHILS # BLD AUTO: 0 X10(3) UL (ref 0–0.2)
BASOPHILS NFR BLD AUTO: 0 %
DEPRECATED RDW RBC AUTO: 69.4 FL (ref 35.1–46.3)
EOSINOPHIL # BLD AUTO: 0.04 X10(3) UL (ref 0–0.7)
EOSINOPHIL NFR BLD AUTO: 1.6 %
ERYTHROCYTE [DISTWIDTH] IN BLOOD BY AUTOMATED COUNT: 17.3 % (ref 11–15)
HCT VFR BLD AUTO: 32.4 %
HGB BLD-MCNC: 10.3 G/DL
IMM GRANULOCYTES # BLD AUTO: 0.01 X10(3) UL (ref 0–1)
IMM GRANULOCYTES NFR BLD: 0.4 %
LYMPHOCYTES # BLD AUTO: 0.96 X10(3) UL (ref 1–4)
LYMPHOCYTES NFR BLD AUTO: 37.4 %
MCH RBC QN AUTO: 35 PG (ref 26–34)
MCHC RBC AUTO-ENTMCNC: 31.8 G/DL (ref 31–37)
MCV RBC AUTO: 110.2 FL
MONOCYTES # BLD AUTO: 0.24 X10(3) UL (ref 0.1–1)
MONOCYTES NFR BLD AUTO: 9.3 %
NEUTROPHILS # BLD AUTO: 1.32 X10 (3) UL (ref 1.5–7.7)
NEUTROPHILS # BLD AUTO: 1.32 X10(3) UL (ref 1.5–7.7)
NEUTROPHILS NFR BLD AUTO: 51.3 %
PLATELET # BLD AUTO: 92 10(3)UL (ref 150–450)
PLATELET MORPHOLOGY: NORMAL
RBC # BLD AUTO: 2.94 X10(6)UL
WBC # BLD AUTO: 2.6 X10(3) UL (ref 4–11)

## 2023-11-02 PROCEDURE — 36415 COLL VENOUS BLD VENIPUNCTURE: CPT

## 2023-11-02 PROCEDURE — 85025 COMPLETE CBC W/AUTO DIFF WBC: CPT

## 2023-11-03 ENCOUNTER — NURSE ONLY (OUTPATIENT)
Dept: HEMATOLOGY/ONCOLOGY | Facility: HOSPITAL | Age: 68
End: 2023-11-03
Attending: INTERNAL MEDICINE
Payer: MEDICARE

## 2023-11-03 DIAGNOSIS — D61.818 PANCYTOPENIA (HCC): ICD-10-CM

## 2023-11-03 DIAGNOSIS — D46.9 MDS (MYELODYSPLASTIC SYNDROME) (HCC): Primary | ICD-10-CM

## 2023-11-03 PROCEDURE — 96372 THER/PROPH/DIAG INJ SC/IM: CPT

## 2023-11-03 NOTE — PROGRESS NOTES
Hgb 10.3, had labs drawn on 11/2. Aranesp given today per parameters. Tolerated injection well. Patient discharged ambulatory. AVS printed with future appointments made.

## 2023-11-10 ENCOUNTER — OFFICE VISIT (OUTPATIENT)
Dept: NEPHROLOGY | Facility: CLINIC | Age: 68
End: 2023-11-10

## 2023-11-10 VITALS
HEART RATE: 84 BPM | SYSTOLIC BLOOD PRESSURE: 139 MMHG | DIASTOLIC BLOOD PRESSURE: 72 MMHG | BODY MASS INDEX: 35.25 KG/M2 | WEIGHT: 238 LBS | HEIGHT: 69 IN

## 2023-11-10 DIAGNOSIS — D63.1 ANEMIA OF CHRONIC RENAL FAILURE, STAGE 4 (SEVERE): ICD-10-CM

## 2023-11-10 DIAGNOSIS — D46.9 MDS (MYELODYSPLASTIC SYNDROME) (HCC): ICD-10-CM

## 2023-11-10 DIAGNOSIS — E10.22 CKD STAGE 4 DUE TO TYPE 1 DIABETES MELLITUS (HCC): ICD-10-CM

## 2023-11-10 DIAGNOSIS — N18.4 CKD STAGE 4 DUE TO TYPE 1 DIABETES MELLITUS (HCC): ICD-10-CM

## 2023-11-10 DIAGNOSIS — E11.3293 TYPE 2 DIABETES MELLITUS WITH BOTH EYES AFFECTED BY MILD NONPROLIFERATIVE RETINOPATHY WITHOUT MACULAR EDEMA, WITHOUT LONG-TERM CURRENT USE OF INSULIN (HCC): Primary | ICD-10-CM

## 2023-11-10 DIAGNOSIS — I15.2 HYPERTENSION ASSOCIATED WITH TYPE 2 DIABETES MELLITUS: ICD-10-CM

## 2023-11-10 DIAGNOSIS — N18.4 ANEMIA OF CHRONIC RENAL FAILURE, STAGE 4 (SEVERE): ICD-10-CM

## 2023-11-10 DIAGNOSIS — E11.59 HYPERTENSION ASSOCIATED WITH TYPE 2 DIABETES MELLITUS: ICD-10-CM

## 2023-11-10 PROCEDURE — 99214 OFFICE O/P EST MOD 30 MIN: CPT | Performed by: INTERNAL MEDICINE

## 2023-11-10 RX ORDER — HYDROCODONE BITARTRATE AND ACETAMINOPHEN 10; 325 MG/1; MG/1
1 TABLET ORAL EVERY 8 HOURS PRN
Qty: 90 TABLET | Refills: 0 | Status: SHIPPED | OUTPATIENT
Start: 2023-12-13 | End: 2023-11-10

## 2023-11-10 RX ORDER — HYDROCODONE BITARTRATE AND ACETAMINOPHEN 10; 325 MG/1; MG/1
1 TABLET ORAL EVERY 8 HOURS PRN
Qty: 90 TABLET | Refills: 0 | Status: SHIPPED | OUTPATIENT
Start: 2024-01-12

## 2023-11-10 RX ORDER — HYDROCODONE BITARTRATE AND ACETAMINOPHEN 10; 325 MG/1; MG/1
1 TABLET ORAL EVERY 8 HOURS PRN
Qty: 90 TABLET | Refills: 0 | Status: SHIPPED | OUTPATIENT
Start: 2023-11-13 | End: 2023-11-10

## 2023-11-10 NOTE — PATIENT INSTRUCTIONS
Do labs in the next few weeks    Get COVID-vaccine RSV is up to you    You have 3 months of prescriptions see me back in early February    I hope Aliyah Ruelas feels better    Happy holidays to you both

## 2023-11-11 NOTE — PROGRESS NOTES
Progress Note     Jaquelin Holbrook    Is here his wife Joey Madrigal sick stable CKD 4 hypertension type 2 diabetes says his sugars are doing okay refilled his pain meds he takes Norco up to 3 daily.   Does not abuse it gave him 3 months of prescriptions otherwise feels okay urinating fine just went fishing recently no other complaints not on blood pressure medicine as his pressure tends to run low does have proteinuria  Last creatinine up to 3.29 does have myelodysplastic syndrome sees Dr. Yeny Petersen    HISTORY:  Past Medical History:   Diagnosis Date    C2-3 mild central, C3-4 mild-mod diffuse, C4-5 mild diffuse, C5-6 mod diffuse bulging discs 3/15/2018    C5-6 mod central & bilateral mild foraminal, C4-5 left mild foraminal stenosis 2018    C6-7 mild-mod central herniated disc 3/15/2018    Cataract         Chronic kidney disease (CKD)     stage 3    Diabetes mellitus ([facility] [address]     Diabetes type 2, uncontrolled     Diabetic retinopathy ([facility] [address]     referred to [facility] associates for eval    Hyperlipidemia     Meibomian gland dysfunction     Osteoarthritis of spine with radiculopathy, cervical region 2018    Pancreatitis     Primary osteoarthritis of both shoulders 2018    Proteinuria     Type II or unspecified type diabetes mellitus without mention of complication, not stated as uncontrolled     Pills & Insulin    Vitreous floaters       Past Surgical History:   Procedure Laterality Date    APPENDECTOMY      CATARACT EXTRACTION W/  INTRAOCULAR LENS IMPLANT Right 2018    Dr. Diego Vasquez Left 2018    Dr. Evelio Hernandez      ELECTROCARDIOGRAM, COMPLETE  2012    scanned to media tab    HERNIA SURGERY        Social History     Tobacco Use    Smoking status: Former     Packs/day: 0     Types: Cigarettes     Quit date: 1989     Years since quittin.0    Smokeless tobacco: Former    Tobacco comments:     quit about 30 years ago. Substance Use Topics    Alcohol use: No         Medications (Active prior to today's visit):  Current Outpatient Medications   Medication Sig Dispense Refill    [START ON 1/12/2024] HYDROcodone-acetaminophen  MG Oral Tab Take 1 tablet by mouth every 8 (eight) hours as needed for Pain. 90 tablet 0    ondansetron 4 MG Oral Tablet Dispersible Take 1 tablet (4 mg total) by mouth every 8 (eight) hours as needed for Nausea. 6 tablet 0    pregabalin 300 MG Oral Cap Take 1 capsule (300 mg total) by mouth daily. 90 capsule 0    LANTUS 100 UNIT/ML Subcutaneous Solution Inject 35 Units into the skin nightly. 30 mL 1    amitriptyline 25 MG Oral Tab Take 1 tablet (25 mg total) by mouth nightly. 90 tablet 1    NOVOLOG 100 UNIT/ML Injection Solution INJECT 50 UNITS SUBCUTANEOUSLY ONCE DAILY VIA  CORRECTION  FACTOR 40 mL 0    FREESTYLE LITE TEST In Vitro Strip USE 1 STRIP TO CHECK BLOOD GLUCOSE 4 TIMES DAILY. DX: E11.65, INSULIN DEPENDENT 400 strip 1    RELION INSULIN SYRINGE 31G X 15/64\" 0.5 ML Does not apply Misc 1 Syringe 4 (four) times daily. 400 each 2    RELION INSULIN SYRINGE 31G X 15/64\" 0.5 ML Does not apply Misc Inject 1 Syringe into the skin 4 (four) times daily. 400 each 0    RELION INSULIN SYRINGE 31G X 15/64\" 0.5 ML Does not apply Misc USE 1 SYRINGE SUBCUTANEOUSLY 4 TIMES DAILY 200 each 0    atorvastatin 40 MG Oral Tab Take 1 tablet (40 mg total) by mouth daily. FOR CHOLESTEROL. 90 tablet 9    fenofibrate 48 MG Oral Tab Take 1 tablet (48 mg total) by mouth daily. FOR TRIGLYCERIDES. 90 tablet 9    pantoprazole 40 MG Oral Tab EC Take 1 tablet (40 mg total) by mouth before breakfast. FOR ACID REFLUX. 90 tablet 9    Betamethasone Dipropionate Aug 0.05 % External Cream Apply 1 Application topically 2 (two) times daily. Apply to affected areas bid 50 g 0    metRONIDAZOLE 0.75 % External Cream Apply 1 Application topically daily.  For breakout on face 45 g 0    Insulin Syringe-Needle U-100 (RELION INSULIN SYRINGE) 31G X 15/64\" 0.5 ML Does not apply Misc 1 Syringe by Does not apply route 4 (four) times daily. 400 each 0    Pimecrolimus 1 % External Cream APPLY   TOPICALLY AFFECTED AREA ON FACE ONCE DAILY TO TWICE DAILY 60 g 0    Tazarotene 0.1 % External Cream Apply to chest nightly (Patient taking differently: Apply to chest nightly PRN) 60 g 3    Naloxone HCl 4 MG/0.1ML Nasal Liquid 4 mg by Nasal route as needed. If patient remains unresponsive, repeat dose in other nostril 2-5 minutes after first dose. 1 kit 0    Insulin Syringe-Needle U-100 (RELION INSULIN SYRINGE) 31G X 15/64\" 0.5 ML Does not apply Misc 1 Syringe by Does not apply route 4 (four) times daily. 400 each 0    Blood Glucose Monitoring Suppl (ACCU-CHEK INÉS PLUS) w/Device Does not apply Kit Use as directed to check blood sugars. 1 kit 0    Glucose Blood (ACCU-CHEK INÉS PLUS) In Vitro Strip Use to check blood sugars 3 times a day. 300 strip 1    BD INSULIN SYRINGE U/F 31G X 5/16\" 0.5 ML Does not apply Misc Inject insulin 4 times per day as instructed. 400 each 0    Ferrous Gluconate 225 (27 Fe) MG Oral Tab Take 27 mg by mouth daily. Cyanocobalamin (VITAMIN B-12) 5000 MCG Oral Lozenge Take 5,000 mcg by mouth daily. 30 lozenge 0    Omega-3 Fatty Acids (FISH OIL) 600 MG Oral Cap Take 1 capsule by mouth nightly. Multiple Vitamins-Minerals (CENTRUM SILVER) Oral Tab Take 1 tablet by mouth daily. Allergies:   Allergies   Allergen Reactions    Cefdinir DIARRHEA    Zosyn [Piperacillin Sod-Tazobactam So] OTHER (SEE COMMENTS)     Heightened sense of smell; dry heaves, vomiting         ROS:     Constitutional:  Negative for decreased activity, fever, irritability and lethargy  ENMT:  Negative for ear drainage, hearing loss and nasal drainage  Eyes:  Negative for eye discharge and vision loss  Cardiovascular:  Negative for chest pain, sob  Respiratory:  Negative for cough, dyspnea and wheezing  Gastrointestinal:  Negative for abdominal pain, constipation  Genitourinary:  Negative for dysuria and hematuria  Endocrine:  Negative for abnormal sleep patterns  Hema/Lymph:  Negative for easy bleeding and easy bruising  Integumentary:  Negative for pruritus and rash  Musculoskeletal: Chronic pain throughout his body  Neurological:  Negative for gait disturbance  Psychiatric:  Negative for inappropriate interaction and psychiatric symptoms      Vitals:    11/10/23 1640   BP: 139/72   Pulse: 84       PHYSICAL EXAM:   Constitutional: appears well hydrated alert and responsive   Head/Face: normocephalic  Eyes/Vision: normal extraocular motion is intact  Nose/Mouth/Throat:mucous membranes are moist   Neck/Thyroid: neck is supple without adenopathy  Lymphatic: no abnormal cervical, supraclavicular adenopathy is noted  Respiratory:  lungs are clear to auscultation bilaterally  Cardiovascular: regular rate and rhythm   Abdomen: soft, non-tender, non-distended, BS normal  Skin/Hair: no unusual rashes present, no abnormal bruising noted  Back/Spine: no abnormalities noted  Musculoskeletal: no deformities  Extremities: no edema  Neurological:  Grossly normal       ASSESSMENT/PLAN:   Assessment   Encounter Diagnoses   Name Primary?     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 Melia Kunz last set of labs were good  Hemoglobin 10 hematocrit 32 MCV of 110 white count 2.6  Platelets are low at 92,000    Continue Aranesp as an outpatient see me back in 3 months refilled his meds do labs in the next few weeks     Orders This Visit:  Orders Placed This Encounter   Procedures    CBC With Differential With Platelet    Comp Metabolic Panel (14)    PTH, Intact    Hemoglobin A1C       Meds This Visit:  Requested Prescriptions     Signed Prescriptions Disp Refills    HYDROcodone-acetaminophen  MG Oral Tab 90 tablet 0     Sig: Take 1 tablet by mouth every 8 (eight) hours as needed for Pain. Imaging & Referrals:  None     11/10/2023  Jaun Ramsay MD

## 2023-11-16 ENCOUNTER — LAB ENCOUNTER (OUTPATIENT)
Dept: LAB | Age: 68
End: 2023-11-16
Attending: INTERNAL MEDICINE
Payer: MEDICARE

## 2023-11-16 DIAGNOSIS — N18.4 CKD STAGE 4 DUE TO TYPE 1 DIABETES MELLITUS (HCC): ICD-10-CM

## 2023-11-16 DIAGNOSIS — D61.818 PANCYTOPENIA (HCC): ICD-10-CM

## 2023-11-16 DIAGNOSIS — N18.4 ANEMIA OF CHRONIC RENAL FAILURE, STAGE 4 (SEVERE): ICD-10-CM

## 2023-11-16 DIAGNOSIS — D46.9 MDS (MYELODYSPLASTIC SYNDROME) (HCC): ICD-10-CM

## 2023-11-16 DIAGNOSIS — E10.22 CKD STAGE 4 DUE TO TYPE 1 DIABETES MELLITUS (HCC): ICD-10-CM

## 2023-11-16 DIAGNOSIS — E11.3293 TYPE 2 DIABETES MELLITUS WITH BOTH EYES AFFECTED BY MILD NONPROLIFERATIVE RETINOPATHY WITHOUT MACULAR EDEMA, WITHOUT LONG-TERM CURRENT USE OF INSULIN (HCC): ICD-10-CM

## 2023-11-16 DIAGNOSIS — D63.1 ANEMIA OF CHRONIC RENAL FAILURE, STAGE 4 (SEVERE): ICD-10-CM

## 2023-11-16 LAB
ALBUMIN SERPL-MCNC: 3.9 G/DL (ref 3.2–4.8)
ALBUMIN/GLOB SERPL: 1.5 {RATIO} (ref 1–2)
ALP LIVER SERPL-CCNC: 67 U/L
ALT SERPL-CCNC: 28 U/L
ANION GAP SERPL CALC-SCNC: 10 MMOL/L (ref 0–18)
AST SERPL-CCNC: 40 U/L (ref ?–34)
BASOPHILS # BLD AUTO: 0.01 X10(3) UL (ref 0–0.2)
BASOPHILS NFR BLD AUTO: 0.3 %
BILIRUB SERPL-MCNC: 0.9 MG/DL (ref 0.2–1.1)
BUN BLD-MCNC: 70 MG/DL (ref 9–23)
BUN/CREAT SERPL: 16.7 (ref 10–20)
CALCIUM BLD-MCNC: 9.2 MG/DL (ref 8.7–10.4)
CHLORIDE SERPL-SCNC: 109 MMOL/L (ref 98–112)
CO2 SERPL-SCNC: 24 MMOL/L (ref 21–32)
CREAT BLD-MCNC: 4.2 MG/DL
DEPRECATED RDW RBC AUTO: 67.7 FL (ref 35.1–46.3)
EGFRCR SERPLBLD CKD-EPI 2021: 14 ML/MIN/1.73M2 (ref 60–?)
EOSINOPHIL # BLD AUTO: 0.06 X10(3) UL (ref 0–0.7)
EOSINOPHIL NFR BLD AUTO: 2 %
ERYTHROCYTE [DISTWIDTH] IN BLOOD BY AUTOMATED COUNT: 17 % (ref 11–15)
EST. AVERAGE GLUCOSE BLD GHB EST-MCNC: 154 MG/DL (ref 68–126)
FASTING STATUS PATIENT QL REPORTED: NO
GLOBULIN PLAS-MCNC: 2.6 G/DL (ref 2.8–4.4)
GLUCOSE BLD-MCNC: 193 MG/DL (ref 70–99)
HBA1C MFR BLD: 7 % (ref ?–5.7)
HCT VFR BLD AUTO: 33.1 %
HGB BLD-MCNC: 10.4 G/DL
IMM GRANULOCYTES # BLD AUTO: 0.01 X10(3) UL (ref 0–1)
IMM GRANULOCYTES NFR BLD: 0.3 %
LYMPHOCYTES # BLD AUTO: 0.97 X10(3) UL (ref 1–4)
LYMPHOCYTES NFR BLD AUTO: 33.1 %
MCH RBC QN AUTO: 34.2 PG (ref 26–34)
MCHC RBC AUTO-ENTMCNC: 31.4 G/DL (ref 31–37)
MCV RBC AUTO: 108.9 FL
MONOCYTES # BLD AUTO: 0.31 X10(3) UL (ref 0.1–1)
MONOCYTES NFR BLD AUTO: 10.6 %
NEUTROPHILS # BLD AUTO: 1.57 X10 (3) UL (ref 1.5–7.7)
NEUTROPHILS # BLD AUTO: 1.57 X10(3) UL (ref 1.5–7.7)
NEUTROPHILS NFR BLD AUTO: 53.7 %
OSMOLALITY SERPL CALC.SUM OF ELEC: 322 MOSM/KG (ref 275–295)
PLATELET # BLD AUTO: 83 10(3)UL (ref 150–450)
PLATELET MORPHOLOGY: NORMAL
POTASSIUM SERPL-SCNC: 5.3 MMOL/L (ref 3.5–5.1)
PROT SERPL-MCNC: 6.5 G/DL (ref 5.7–8.2)
PTH-INTACT SERPL-MCNC: 77 PG/ML (ref 18.5–88)
RBC # BLD AUTO: 3.04 X10(6)UL
SODIUM SERPL-SCNC: 143 MMOL/L (ref 136–145)
WBC # BLD AUTO: 2.9 X10(3) UL (ref 4–11)

## 2023-11-16 PROCEDURE — 36415 COLL VENOUS BLD VENIPUNCTURE: CPT

## 2023-11-16 PROCEDURE — 85025 COMPLETE CBC W/AUTO DIFF WBC: CPT

## 2023-11-16 PROCEDURE — 83036 HEMOGLOBIN GLYCOSYLATED A1C: CPT

## 2023-11-16 PROCEDURE — 83970 ASSAY OF PARATHORMONE: CPT

## 2023-11-16 PROCEDURE — 80053 COMPREHEN METABOLIC PANEL: CPT

## 2023-11-17 ENCOUNTER — NURSE ONLY (OUTPATIENT)
Dept: HEMATOLOGY/ONCOLOGY | Facility: HOSPITAL | Age: 68
End: 2023-11-17
Attending: INTERNAL MEDICINE
Payer: MEDICARE

## 2023-11-17 DIAGNOSIS — D61.818 PANCYTOPENIA (HCC): ICD-10-CM

## 2023-11-17 DIAGNOSIS — D46.9 MDS (MYELODYSPLASTIC SYNDROME) (HCC): Primary | ICD-10-CM

## 2023-11-17 PROCEDURE — 96372 THER/PROPH/DIAG INJ SC/IM: CPT

## 2023-11-17 NOTE — PROGRESS NOTES
Pt to lab for aranesp injection. Yesterday's HGB 10.4. No complaints today. Aranesp given left upper arm. Discharged home with future appointments.

## 2023-11-20 ENCOUNTER — APPOINTMENT (OUTPATIENT)
Dept: GENERAL RADIOLOGY | Age: 68
End: 2023-11-20
Attending: NURSE PRACTITIONER
Payer: MEDICARE

## 2023-11-20 ENCOUNTER — NURSE TRIAGE (OUTPATIENT)
Dept: INTERNAL MEDICINE CLINIC | Facility: CLINIC | Age: 68
End: 2023-11-20

## 2023-11-20 ENCOUNTER — HOSPITAL ENCOUNTER (OUTPATIENT)
Age: 68
Discharge: HOME | End: 2023-11-20
Payer: MEDICARE

## 2023-11-20 VITALS
HEART RATE: 96 BPM | RESPIRATION RATE: 18 BRPM | DIASTOLIC BLOOD PRESSURE: 71 MMHG | TEMPERATURE: 98 F | OXYGEN SATURATION: 98 % | SYSTOLIC BLOOD PRESSURE: 145 MMHG
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
## 2023-11-20 DIAGNOSIS — M25.511 ACUTE PAIN OF BOTH SHOULDERS: Primary | ICD-10-CM

## 2023-11-20 DIAGNOSIS — S60.222A CONTUSION OF LEFT HAND, INITIAL ENCOUNTER: ICD-10-CM

## 2023-11-20 DIAGNOSIS — M25.512 ACUTE PAIN OF BOTH SHOULDERS: Primary | ICD-10-CM

## 2023-11-20 PROCEDURE — 99214 OFFICE O/P EST MOD 30 MIN: CPT

## 2023-11-20 PROCEDURE — 73130 X-RAY EXAM OF HAND: CPT | Performed by: NURSE PRACTITIONER

## 2023-11-20 PROCEDURE — 73030 X-RAY EXAM OF SHOULDER: CPT | Performed by: NURSE PRACTITIONER

## 2023-11-20 PROCEDURE — 29130 APPL FINGER SPLINT STATIC: CPT

## 2023-11-20 NOTE — ED INITIAL ASSESSMENT (HPI)
Patient arrived ambulatory to room c/o a fall that occurred 2 days ago. Patient states he was carrying heavy groceries up steps, tripped and fell down approximately 4 steps. Patient c/o pain to the left shoulder and left hand.

## 2023-11-20 NOTE — DISCHARGE INSTRUCTIONS
Please ice your left hand every 2-3 hours 20 minutes at a time. Follow-up with primary care provider as needed.

## 2023-11-20 NOTE — TELEPHONE ENCOUNTER
Action Requested: Summary for Provider     []  Critical Lab, Recommendations Needed  [] Need Additional Advice  [x]   FYI    []   Need Orders  [] Need Medications Sent to Pharmacy  []  Other     SUMMARY: Patient's spouse called with patient next to her. Patient fell down 3 stairs on Saturday,hit his head and fell on his left side. Spouse states patient does not have a bump on his head. Patient having pain on \"his whole left side, can't make a fist\"    Spouse advised to take patient to ER. Spouse states it is too expensive, they are on a fixed income. Spouse agrees she will take him to immediate care. Reason for call:  Fall  Onset: 2 days ago    Reason for Disposition   Sounds like a serious injury to the triager    Protocols used: Hand and Wrist Injury-A-OH

## 2023-11-24 ENCOUNTER — TELEPHONE (OUTPATIENT)
Dept: NEPHROLOGY | Facility: CLINIC | Age: 68
End: 2023-11-24

## 2023-11-24 DIAGNOSIS — N18.4 CKD STAGE 4 DUE TO TYPE 1 DIABETES MELLITUS (HCC): Primary | ICD-10-CM

## 2023-11-24 DIAGNOSIS — E10.22 CKD STAGE 4 DUE TO TYPE 1 DIABETES MELLITUS (HCC): Primary | ICD-10-CM

## 2023-11-24 NOTE — TELEPHONE ENCOUNTER
MD Verónica Swanson, RN  Labs good except kidney numbers. Not sure why. Repeat bmp second week of December please please notify pt.  Thanks

## 2023-11-26 DIAGNOSIS — E11.65 UNCONTROLLED TYPE 2 DIABETES MELLITUS WITH HYPERGLYCEMIA (HCC): ICD-10-CM

## 2023-11-27 RX ORDER — BLOOD-GLUCOSE METER
KIT MISCELLANEOUS
Qty: 400 STRIP | Refills: 0 | Status: SHIPPED | OUTPATIENT
Start: 2023-11-27

## 2023-11-27 RX ORDER — BLOOD-GLUCOSE METER
KIT MISCELLANEOUS
Qty: 400 STRIP | Refills: 0 | Status: SHIPPED | OUTPATIENT
Start: 2023-11-27 | End: 2023-11-27

## 2023-11-27 NOTE — TELEPHONE ENCOUNTER
[facility] is following up on refill states they need diagnosis code and insulin statement is not on the prescription please follow up

## 2023-11-30 ENCOUNTER — LAB ENCOUNTER (OUTPATIENT)
Dept: LAB | Age: 68
End: 2023-11-30
Attending: INTERNAL MEDICINE
Payer: MEDICARE

## 2023-11-30 DIAGNOSIS — D61.818 PANCYTOPENIA (HCC): ICD-10-CM

## 2023-11-30 DIAGNOSIS — D46.9 MDS (MYELODYSPLASTIC SYNDROME) (HCC): ICD-10-CM

## 2023-11-30 LAB
BASOPHILS # BLD AUTO: 0.01 X10(3) UL (ref 0–0.2)
BASOPHILS NFR BLD AUTO: 0.4 %
DEPRECATED RDW RBC AUTO: 63.5 FL (ref 35.1–46.3)
EOSINOPHIL # BLD AUTO: 0.07 X10(3) UL (ref 0–0.7)
EOSINOPHIL NFR BLD AUTO: 2.8 %
ERYTHROCYTE [DISTWIDTH] IN BLOOD BY AUTOMATED COUNT: 16.1 % (ref 11–15)
HCT VFR BLD AUTO: 32.2 %
HGB BLD-MCNC: 10.5 G/DL
IMM GRANULOCYTES # BLD AUTO: 0.01 X10(3) UL (ref 0–1)
IMM GRANULOCYTES NFR BLD: 0.4 %
LYMPHOCYTES # BLD AUTO: 0.89 X10(3) UL (ref 1–4)
LYMPHOCYTES NFR BLD AUTO: 35.5 %
MCH RBC QN AUTO: 35.4 PG (ref 26–34)
MCHC RBC AUTO-ENTMCNC: 32.6 G/DL (ref 31–37)
MCV RBC AUTO: 108.4 FL
MONOCYTES # BLD AUTO: 0.25 X10(3) UL (ref 0.1–1)
MONOCYTES NFR BLD AUTO: 10 %
NEUTROPHILS # BLD AUTO: 1.28 X10 (3) UL (ref 1.5–7.7)
NEUTROPHILS # BLD AUTO: 1.28 X10(3) UL (ref 1.5–7.7)
NEUTROPHILS NFR BLD AUTO: 50.9 %
PLATELET # BLD AUTO: 86 10(3)UL (ref 150–450)
RBC # BLD AUTO: 2.97 X10(6)UL
WBC # BLD AUTO: 2.5 X10(3) UL (ref 4–11)

## 2023-11-30 PROCEDURE — 36415 COLL VENOUS BLD VENIPUNCTURE: CPT

## 2023-11-30 PROCEDURE — 85025 COMPLETE CBC W/AUTO DIFF WBC: CPT

## 2023-12-01 ENCOUNTER — NURSE ONLY (OUTPATIENT)
Dept: HEMATOLOGY/ONCOLOGY | Facility: HOSPITAL | Age: 68
End: 2023-12-01
Attending: INTERNAL MEDICINE
Payer: MEDICARE

## 2023-12-01 DIAGNOSIS — D61.818 PANCYTOPENIA (HCC): ICD-10-CM

## 2023-12-01 DIAGNOSIS — D46.9 MDS (MYELODYSPLASTIC SYNDROME) (HCC): Primary | ICD-10-CM

## 2023-12-01 PROCEDURE — 96372 THER/PROPH/DIAG INJ SC/IM: CPT

## 2023-12-05 RX ORDER — BETAMETHASONE DIPROPIONATE 0.5 MG/G
1 CREAM TOPICAL 2 TIMES DAILY
Qty: 50 G | Refills: 1 | Status: SHIPPED | OUTPATIENT
Start: 2023-12-05

## 2023-12-05 NOTE — TELEPHONE ENCOUNTER
Refill Request for medication(s):   Betamethasone    Last Office Visit: 11/25/22    Last Refill: 11/25/22    Pharmacy, Dosage verified: yes    Condition Update (if applicable):     Rx pended and sent to provider for approval, please advise. Thank You!

## 2023-12-07 ENCOUNTER — TELEPHONE (OUTPATIENT)
Dept: INTERNAL MEDICINE CLINIC | Facility: CLINIC | Age: 68
End: 2023-12-07

## 2023-12-07 ENCOUNTER — OFFICE VISIT (OUTPATIENT)
Dept: INTERNAL MEDICINE CLINIC | Facility: CLINIC | Age: 68
End: 2023-12-07

## 2023-12-07 VITALS
WEIGHT: 234 LBS | SYSTOLIC BLOOD PRESSURE: 133 MMHG | BODY MASS INDEX: 34.66 KG/M2 | HEIGHT: 69 IN | HEART RATE: 81 BPM | DIASTOLIC BLOOD PRESSURE: 69 MMHG

## 2023-12-07 DIAGNOSIS — L70.0 NODULAR ELASTOSIS WITH CYSTS AND COMEDONES OF FAVRE AND RACOUCHOT: ICD-10-CM

## 2023-12-07 DIAGNOSIS — E53.8 VITAMIN B12 DEFICIENCY: ICD-10-CM

## 2023-12-07 DIAGNOSIS — E11.3293 TYPE 2 DIABETES MELLITUS WITH BOTH EYES AFFECTED BY MILD NONPROLIFERATIVE RETINOPATHY WITHOUT MACULAR EDEMA, WITHOUT LONG-TERM CURRENT USE OF INSULIN (HCC): ICD-10-CM

## 2023-12-07 DIAGNOSIS — H91.93 BILATERAL HEARING LOSS, UNSPECIFIED HEARING LOSS TYPE: ICD-10-CM

## 2023-12-07 DIAGNOSIS — L57.8 NODULAR ELASTOSIS WITH CYSTS AND COMEDONES OF FAVRE AND RACOUCHOT: ICD-10-CM

## 2023-12-07 DIAGNOSIS — E78.5 HYPERLIPIDEMIA ASSOCIATED WITH TYPE 2 DIABETES MELLITUS: ICD-10-CM

## 2023-12-07 DIAGNOSIS — N18.5 CHRONIC RENAL DISEASE, STAGE V (HCC): Primary | ICD-10-CM

## 2023-12-07 DIAGNOSIS — L40.8 SEBOPSORIASIS: ICD-10-CM

## 2023-12-07 DIAGNOSIS — L71.9 ROSACEA: ICD-10-CM

## 2023-12-07 DIAGNOSIS — H43.393 VITREOUS FLOATERS OF BOTH EYES: ICD-10-CM

## 2023-12-07 DIAGNOSIS — E66.01 MORBID (SEVERE) OBESITY DUE TO EXCESS CALORIES (HCC): ICD-10-CM

## 2023-12-07 DIAGNOSIS — G62.9 NEUROPATHY: ICD-10-CM

## 2023-12-07 DIAGNOSIS — Z96.1 PSEUDOPHAKIA OF BOTH EYES: ICD-10-CM

## 2023-12-07 DIAGNOSIS — E55.9 VITAMIN D DEFICIENCY: ICD-10-CM

## 2023-12-07 DIAGNOSIS — H61.23 BILATERAL IMPACTED CERUMEN: ICD-10-CM

## 2023-12-07 DIAGNOSIS — D46.9 MDS (MYELODYSPLASTIC SYNDROME) (HCC): ICD-10-CM

## 2023-12-07 DIAGNOSIS — E11.69 HYPERLIPIDEMIA ASSOCIATED WITH TYPE 2 DIABETES MELLITUS: ICD-10-CM

## 2023-12-07 DIAGNOSIS — D61.818 PANCYTOPENIA (HCC): ICD-10-CM

## 2023-12-07 DIAGNOSIS — K21.00 GASTROESOPHAGEAL REFLUX DISEASE WITH ESOPHAGITIS, UNSPECIFIED WHETHER HEMORRHAGE: ICD-10-CM

## 2023-12-07 DIAGNOSIS — Z91.81 HISTORY OF RECENT FALL: ICD-10-CM

## 2023-12-07 DIAGNOSIS — Z00.00 ANNUAL PHYSICAL EXAM: ICD-10-CM

## 2023-12-07 RX ORDER — PANTOPRAZOLE SODIUM 40 MG/1
40 TABLET, DELAYED RELEASE ORAL
Qty: 90 TABLET | Refills: 9 | Status: SHIPPED | OUTPATIENT
Start: 2023-12-07

## 2023-12-07 RX ORDER — FENOFIBRATE 48 MG/1
48 TABLET, COATED ORAL DAILY
Qty: 90 TABLET | Refills: 9 | Status: SHIPPED | OUTPATIENT
Start: 2023-12-07

## 2023-12-07 RX ORDER — PEN NEEDLE, DIABETIC 29 G X1/2"
NEEDLE, DISPOSABLE MISCELLANEOUS
Qty: 400 EACH | Refills: 0 | Status: SHIPPED | OUTPATIENT
Start: 2023-12-07

## 2023-12-07 RX ORDER — MELATONIN
1000 DAILY
Qty: 90 TABLET | Refills: 4 | Status: SHIPPED | OUTPATIENT
Start: 2023-12-07 | End: 2025-03-01

## 2023-12-07 RX ORDER — ATORVASTATIN CALCIUM 40 MG/1
40 TABLET, FILM COATED ORAL DAILY
Qty: 90 TABLET | Refills: 9 | Status: SHIPPED | OUTPATIENT
Start: 2023-12-07

## 2023-12-07 NOTE — TELEPHONE ENCOUNTER
Patient seen today in office. Would like to change pcp to Dr. Ashleigh Lopez. Advised patient to call insurance.   Pcp removal order placed

## 2023-12-08 ENCOUNTER — PATIENT OUTREACH (OUTPATIENT)
Dept: CASE MANAGEMENT | Age: 68
End: 2023-12-08

## 2023-12-08 NOTE — PROCEDURES
Received order requesting to update PCP to Dr. Payton Ely is Approved and finalized on December 8, 2023.     Thanks,  [facility]

## 2023-12-11 ENCOUNTER — OFFICE VISIT (OUTPATIENT)
Dept: OTOLARYNGOLOGY | Facility: CLINIC | Age: 68
End: 2023-12-11

## 2023-12-11 ENCOUNTER — OFFICE VISIT (OUTPATIENT)
Dept: AUDIOLOGY | Facility: CLINIC | Age: 68
End: 2023-12-11

## 2023-12-11 DIAGNOSIS — G62.9 NEUROPATHY: ICD-10-CM

## 2023-12-11 DIAGNOSIS — H90.3 SENSORINEURAL HEARING LOSS, BILATERAL: Primary | ICD-10-CM

## 2023-12-11 DIAGNOSIS — E11.3293 TYPE 2 DIABETES MELLITUS WITH BOTH EYES AFFECTED BY MILD NONPROLIFERATIVE RETINOPATHY WITHOUT MACULAR EDEMA, WITHOUT LONG-TERM CURRENT USE OF INSULIN (HCC): ICD-10-CM

## 2023-12-11 DIAGNOSIS — H90.3 SENSORINEURAL HEARING LOSS (SNHL), BILATERAL: Primary | ICD-10-CM

## 2023-12-11 PROCEDURE — 92504 EAR MICROSCOPY EXAMINATION: CPT | Performed by: OTOLARYNGOLOGY

## 2023-12-11 PROCEDURE — 92567 TYMPANOMETRY: CPT | Performed by: AUDIOLOGIST

## 2023-12-11 PROCEDURE — 92557 COMPREHENSIVE HEARING TEST: CPT | Performed by: AUDIOLOGIST

## 2023-12-11 PROCEDURE — 99204 OFFICE O/P NEW MOD 45 MIN: CPT | Performed by: OTOLARYNGOLOGY

## 2023-12-11 RX ORDER — ASPIRIN 81 MG/1
1 TABLET ORAL DAILY
COMMUNITY

## 2023-12-11 RX ORDER — SIMVASTATIN 20 MG
TABLET ORAL
COMMUNITY

## 2023-12-11 RX ORDER — METOPROLOL TARTRATE 50 MG/1
TABLET, FILM COATED ORAL
COMMUNITY

## 2023-12-11 NOTE — PROGRESS NOTES
NEW PATIENT PROGRESS NOTE  OTOLOGY/OTOLARYNGOLOGY    REF MD:  Nivia Kaminski, [address]  [address]  [place] [address]     PCP: Patricia Randhawa MD    CHIEF COMPLAINT:    Chief Complaint   Patient presents with    Hearing Loss     Hearing loss x 1 year      HISTORY OF PRESENT ILLNESS: Pro Wills is a 77year old male who presents for evaluation of hearing loss and a clogged sensation in ear per wife. The patient endorses use of hearing aids from [employer] for over a year, although they are uncomfortable and he is very unhappy with them. He has revisited [facility] for adjustments but was referred to an ENT for cerumen cleaning and audiogram. Mr. Kacey Wallace endorses difficulty with hearing clarity, he describes it as though he is \"in a bottle. \" Denies family history of hearing loss.        PAST MEDICAL HISTORY:    Past Medical History:   Diagnosis Date    C2-3 mild central, C3-4 mild-mod diffuse, C4-5 mild diffuse, C5-6 mod diffuse bulging discs 3/15/2018    C5-6 mod central & bilateral mild foraminal, C4-5 left mild foraminal stenosis 2/1/2018    C6-7 mild-mod central herniated disc 3/15/2018    Cataract     2010    Chronic kidney disease (CKD)     stage 3    Diabetes mellitus ([address]     Diabetes type 2, uncontrolled     Diabetic retinopathy ([address]     [facility] to [facility] for eval    Hyperlipidemia     Meibomian gland dysfunction     Osteoarthritis of spine with radiculopathy, cervical region 2/1/2018    Pancreatitis 2012    Primary osteoarthritis of both shoulders 2/1/2018    Proteinuria     Type II or unspecified type diabetes mellitus without mention of complication, not stated as uncontrolled     Pills & Insulin    Vitreous floaters        PAST SURGICAL HISTORY:    Past Surgical History:   Procedure Laterality Date    APPENDECTOMY      CATARACT EXTRACTION W/  INTRAOCULAR LENS IMPLANT Right 06/11/2018    Dr. King Cortes Left 07/12/2018     Jessica    CHOLECYSTECTOMY  2012    ELECTROCARDIOGRAM, COMPLETE  4/23/2012    scanned to media tab    HERNIA SURGERY         Current Outpatient Medications on File Prior to Visit   Medication Sig Dispense Refill    aspirin 81 MG Oral Tab EC Take 1 tablet (81 mg total) by mouth daily. metoprolol tartrate 50 MG Oral Tab 1 tablet Orally Twice a day for 30 day(s)      simvastatin 20 MG Oral Tab 1 tablet in the evening Orally Once a day for 30 day(s)      BD INSULIN SYRINGE U/F 31G X 5/16\" 0.5 ML Does not apply Misc Inject insulin 4 times per day as instructed. 400 each 0    atorvastatin 40 MG Oral Tab Take 1 tablet (40 mg total) by mouth daily. FOR CHOLESTEROL. 90 tablet 9    fenofibrate 48 MG Oral Tab Take 1 tablet (48 mg total) by mouth daily. FOR TRIGLYCERIDES. 90 tablet 9    pantoprazole 40 MG Oral Tab EC Take 1 tablet (40 mg total) by mouth before breakfast. FOR ACID REFLUX. 90 tablet 9    cyanocobalamin 1000 MCG Oral Tab Take 1 tablet (1,000 mcg total) by mouth daily. 90 tablet 4    Betamethasone Dipropionate Aug 0.05 % External Cream Apply 1 Application topically 2 (two) times daily. APPLY TO AFFECTED AREA 50 g 1    FREESTYLE LITE TEST In Vitro Strip USE 1 STRIP TO CHECK BLOOD GLUCOSE 4 TIMES DAILY. 400 strip 0    [START ON 1/12/2024] HYDROcodone-acetaminophen  MG Oral Tab Take 1 tablet by mouth every 8 (eight) hours as needed for Pain. 90 tablet 0    pregabalin 300 MG Oral Cap Take 1 capsule (300 mg total) by mouth daily. 90 capsule 0    LANTUS 100 UNIT/ML Subcutaneous Solution Inject 35 Units into the skin nightly. 30 mL 1    amitriptyline 25 MG Oral Tab Take 1 tablet (25 mg total) by mouth nightly. 90 tablet 1    NOVOLOG 100 UNIT/ML Injection Solution INJECT 50 UNITS SUBCUTANEOUSLY ONCE DAILY VIA  CORRECTION  FACTOR 40 mL 0    RELION INSULIN SYRINGE 31G X 15/64\" 0.5 ML Does not apply Misc 1 Syringe 4 (four) times daily.  400 each 2    RELION INSULIN SYRINGE 31G X 15/64\" 0.5 ML Does not apply Misc Inject 1 Syringe into the skin 4 (four) times daily. 400 each 0    RELION INSULIN SYRINGE 31G X 15/64\" 0.5 ML Does not apply Misc USE 1 SYRINGE SUBCUTANEOUSLY 4 TIMES DAILY 200 each 0    metRONIDAZOLE 0.75 % External Cream Apply 1 Application topically daily. For breakout on face 45 g 0    Insulin Syringe-Needle U-100 (RELION INSULIN SYRINGE) 31G X 15/64\" 0.5 ML Does not apply Misc 1 Syringe by Does not apply route 4 (four) times daily. 400 each 0    Pimecrolimus 1 % External Cream APPLY   TOPICALLY AFFECTED AREA ON FACE ONCE DAILY TO TWICE DAILY 60 g 0    Tazarotene 0.1 % External Cream Apply to chest nightly (Patient taking differently: Apply to chest nightly PRN) 60 g 3    Insulin Syringe-Needle U-100 (RELION INSULIN SYRINGE) 31G X 15/64\" 0.5 ML Does not apply Misc 1 Syringe by Does not apply route 4 (four) times daily. 400 each 0    Blood Glucose Monitoring Suppl (ACCU-CHEK INÉS PLUS) w/Device Does not apply Kit Use as directed to check blood sugars. 1 kit 0    Glucose Blood (ACCU-CHEK INÉS PLUS) In Vitro Strip Use to check blood sugars 3 times a day. 300 strip 1    Ferrous Gluconate 225 (27 Fe) MG Oral Tab Take 27 mg by mouth daily. Omega-3 Fatty Acids (FISH OIL) 600 MG Oral Cap Take 1 capsule by mouth nightly. Multiple Vitamins-Minerals (CENTRUM SILVER) Oral Tab Take 1 tablet by mouth daily. Current Facility-Administered Medications on File Prior to Visit   Medication Dose Route Frequency Provider Last Rate Last Admin    darbepoetin grey (Aranesp) 200 MCG/0.4ML injection 200 mcg  200 mcg Subcutaneous Q21 Days Marisol Anne MD   200 mcg at 03/24/23 1028       Allergies:    Allergies   Allergen Reactions    Cefdinir DIARRHEA    Zosyn [Piperacillin Sod-Tazobactam So] OTHER (SEE COMMENTS)     Heightened sense of smell; dry heaves, vomiting       SOCIAL HISTORY:    Social History     Tobacco Use    Smoking status: Former     Packs/day: 0     Types: Cigarettes     Quit date: 11/11/1989 Years since quittin.1    Smokeless tobacco: Former    Tobacco comments:     quit about 30 years ago. Substance Use Topics    Alcohol use: No       FAMILY HISTORY: Denies known family history of hearing loss, tinnitus, vertigo, or migraine. Denies known family history of head and neck cancer, thyroid cancer, bleeding disorders. REVIEW OF SYSTEMS:   Positives are in bold  Neuro: Headache, facial weakness, facial numbness, neck pain, vertigo  ENT: Hearing change, tinnitus, otorrhea, otalgia, aural fullness, ear pressure, vertigo, imbalance  Sinus pressure, rhinorrhea, congestion, facial pain, jaw pain, dysphagia, odynophagia, sore throat, voice changes, shortness of breath    EXAMINATION:  I washed my hands with an alcohol-based hand gel prior to examination  Constitutional:   --Vitals: There were no vitals taken for this visit. --General: no apparent distress, well-developed, conversant  Psych: affect pleasant and appropriate for age, alert and oriented  Neuro: Facial movement normal bilateral  Respiratory: No stridor, stertor or increased work of breathing  ENT:  --Ear: The bilateral ears were examined under binocular microscopy  Right ear microscopic exam:  Pinna: Normal, no lesions or masses. Mastoid: Nontender on palpation. External auditory canal: Clear, no masses or lesions. Tympanic membrane: Intact, no lesions, normal landmarks. Middle ear: Aerated. Left ear microscopic exam:  Pinna: Normal, no lesions or masses. Mastoid: Nontender on palpation. External auditory canal: Clear, no masses or lesions. Tympanic membrane: Intact, no lesions, normal landmarks. Middle ear: Aerated.       Latest Audiogram Result (Hz) Exam performed: 2023 10:33 AM Last edited by Kourtney Hopkins on 2023 10:45 AM        125 250  1500 2000 3000 4000 6000 8000    Right air:  50 45  45  50 60 65  75    Left air:  50 50  45  60 65 65  65    Right mastoid bone:       45        Left mastoid bone:     40  45  65      Right mastoid bone (masked):   40            Left mastoid bone (masked):   50 45   50           Reliability:  Fair    Transducer: Inserts    Technique:  Conventional Audiometry    Comments:            Latest Speech Audiometry  Last edited by Kourtney Palafox on 12/11/2023 10:45 AM       Ear Method PTA SAT SRT [facility] Test/list Score (%) Intensity Mask/noise Notes    right recorded   50   Half-List 60 80  masked    left recorded   55   Half-List 72 85  masked                  Latest Tympanogram Result       Probe Tone (Hz): 226 Exam performed: 12/11/2023 10:34 AM Last edited by Kourtney Palafox on 12/11/2023 10:45 AM      Tympanograms  These were drawn by a user, not generated from device data      Right Ear Left Ear                     Right Ear Left Ear    Tympanogram type: Type A Type A    Canal volume (mL): 2.1 1.6    Peak pressure (daPa): 0 -10    Peak amplitude (mL): 0.63 0.4    Tympanogram width (daPa): Comments:                         ASSESSMENT/PLAN:  Bonnie Harvey is a 77year old male with     ICD-10-CM   1. Sensorineural hearing loss (SNHL), bilateral  H90.3        IMPRESSION:  Bilateral SNHL  Patient is a     PLAN:  -Audiogram reviewed with patient, given a copy of results  -Patient is unhappy with current hearing aids from [facility] recommend he be evaluated at the [facility] for new hearing aids, he is medically cleared for hearing aids   -Patient may return to us for hearing aids if other options are not working for him  -Follow up for serial audiograms as needed    Situation reviewed with the patient in detail. Attention: This note has been scribed by Karyn Mercer under the supervision of William Louie MD.    William Louie MD  Otology/Otolaryngology  [facility]   [address]  [address] [facility]  Phone [phone]  Fax [phone]      I have personally performed the services described in this documentation. All medical record entries made by the scribe were at my direction and in my presence. I have reviewed the chart and agree that the medical record reflects my personal performance and is accurate and complete.

## 2023-12-13 ENCOUNTER — LAB ENCOUNTER (OUTPATIENT)
Dept: LAB | Age: 68
End: 2023-12-13
Attending: STUDENT IN AN ORGANIZED HEALTH CARE EDUCATION/TRAINING PROGRAM
Payer: MEDICARE

## 2023-12-13 DIAGNOSIS — N18.4 CKD STAGE 4 DUE TO TYPE 1 DIABETES MELLITUS (HCC): ICD-10-CM

## 2023-12-13 DIAGNOSIS — E53.8 VITAMIN B12 DEFICIENCY: ICD-10-CM

## 2023-12-13 DIAGNOSIS — D61.818 PANCYTOPENIA (HCC): ICD-10-CM

## 2023-12-13 DIAGNOSIS — E55.9 VITAMIN D DEFICIENCY: ICD-10-CM

## 2023-12-13 DIAGNOSIS — D46.9 MDS (MYELODYSPLASTIC SYNDROME) (HCC): ICD-10-CM

## 2023-12-13 DIAGNOSIS — E10.22 CKD STAGE 4 DUE TO TYPE 1 DIABETES MELLITUS (HCC): ICD-10-CM

## 2023-12-13 DIAGNOSIS — Z00.00 ANNUAL PHYSICAL EXAM: ICD-10-CM

## 2023-12-13 LAB
ANION GAP SERPL CALC-SCNC: 6 MMOL/L (ref 0–18)
BASOPHILS # BLD AUTO: 0.02 X10(3) UL (ref 0–0.2)
BASOPHILS NFR BLD AUTO: 0.7 %
BUN BLD-MCNC: 56 MG/DL (ref 9–23)
BUN/CREAT SERPL: 16.4 (ref 10–20)
CALCIUM BLD-MCNC: 9.6 MG/DL (ref 8.7–10.4)
CHLORIDE SERPL-SCNC: 109 MMOL/L (ref 98–112)
CHOLEST SERPL-MCNC: 113 MG/DL (ref ?–200)
CO2 SERPL-SCNC: 24 MMOL/L (ref 21–32)
CREAT BLD-MCNC: 3.42 MG/DL
DEPRECATED RDW RBC AUTO: 62.8 FL (ref 35.1–46.3)
EGFRCR SERPLBLD CKD-EPI 2021: 19 ML/MIN/1.73M2 (ref 60–?)
EOSINOPHIL # BLD AUTO: 0.06 X10(3) UL (ref 0–0.7)
EOSINOPHIL NFR BLD AUTO: 2.1 %
ERYTHROCYTE [DISTWIDTH] IN BLOOD BY AUTOMATED COUNT: 15.9 % (ref 11–15)
FASTING PATIENT LIPID ANSWER: YES
FASTING STATUS PATIENT QL REPORTED: YES
GLUCOSE BLD-MCNC: 162 MG/DL (ref 70–99)
HCT VFR BLD AUTO: 34.9 %
HDLC SERPL-MCNC: 32 MG/DL (ref 40–59)
HGB BLD-MCNC: 11 G/DL
IMM GRANULOCYTES # BLD AUTO: 0 X10(3) UL (ref 0–1)
IMM GRANULOCYTES NFR BLD: 0 %
LDLC SERPL CALC-MCNC: 57 MG/DL (ref ?–100)
LYMPHOCYTES # BLD AUTO: 1.07 X10(3) UL (ref 1–4)
LYMPHOCYTES NFR BLD AUTO: 38.1 %
MCH RBC QN AUTO: 34 PG (ref 26–34)
MCHC RBC AUTO-ENTMCNC: 31.5 G/DL (ref 31–37)
MCV RBC AUTO: 107.7 FL
MONOCYTES # BLD AUTO: 0.3 X10(3) UL (ref 0.1–1)
MONOCYTES NFR BLD AUTO: 10.7 %
NEUTROPHILS # BLD AUTO: 1.36 X10 (3) UL (ref 1.5–7.7)
NEUTROPHILS # BLD AUTO: 1.36 X10(3) UL (ref 1.5–7.7)
NEUTROPHILS NFR BLD AUTO: 48.4 %
NONHDLC SERPL-MCNC: 81 MG/DL (ref ?–130)
OSMOLALITY SERPL CALC.SUM OF ELEC: 307 MOSM/KG (ref 275–295)
PLATELET # BLD AUTO: 101 10(3)UL (ref 150–450)
POTASSIUM SERPL-SCNC: 5.7 MMOL/L (ref 3.5–5.1)
PSA SERPL-MCNC: 0.21 NG/ML (ref ?–4)
RBC # BLD AUTO: 3.24 X10(6)UL
SODIUM SERPL-SCNC: 139 MMOL/L (ref 136–145)
TRIGL SERPL-MCNC: 136 MG/DL (ref 30–149)
TSI SER-ACNC: 2.07 MIU/ML (ref 0.55–4.78)
VIT B12 SERPL-MCNC: >2000 PG/ML (ref 211–911)
VIT D+METAB SERPL-MCNC: 58.9 NG/ML (ref 30–100)
VLDLC SERPL CALC-MCNC: 20 MG/DL (ref 0–30)
WBC # BLD AUTO: 2.8 X10(3) UL (ref 4–11)

## 2023-12-13 PROCEDURE — 82607 VITAMIN B-12: CPT

## 2023-12-13 PROCEDURE — 80048 BASIC METABOLIC PNL TOTAL CA: CPT

## 2023-12-13 PROCEDURE — 85025 COMPLETE CBC W/AUTO DIFF WBC: CPT

## 2023-12-13 PROCEDURE — 84153 ASSAY OF PSA TOTAL: CPT

## 2023-12-13 PROCEDURE — 80061 LIPID PANEL: CPT

## 2023-12-13 PROCEDURE — 82306 VITAMIN D 25 HYDROXY: CPT

## 2023-12-13 PROCEDURE — 84443 ASSAY THYROID STIM HORMONE: CPT

## 2023-12-13 PROCEDURE — 36415 COLL VENOUS BLD VENIPUNCTURE: CPT

## 2023-12-13 RX ORDER — PREGABALIN 300 MG/1
300 CAPSULE ORAL DAILY
Qty: 90 CAPSULE | Refills: 0 | Status: SHIPPED | OUTPATIENT
Start: 2023-12-13

## 2023-12-15 ENCOUNTER — OFFICE VISIT (OUTPATIENT)
Dept: HEMATOLOGY/ONCOLOGY | Facility: HOSPITAL | Age: 68
End: 2023-12-15
Attending: INTERNAL MEDICINE
Payer: MEDICARE

## 2023-12-15 ENCOUNTER — TELEPHONE (OUTPATIENT)
Dept: INTERNAL MEDICINE CLINIC | Facility: CLINIC | Age: 68
End: 2023-12-15

## 2023-12-15 VITALS
DIASTOLIC BLOOD PRESSURE: 61 MMHG | RESPIRATION RATE: 18 BRPM | SYSTOLIC BLOOD PRESSURE: 134 MMHG | TEMPERATURE: 98 F | BODY MASS INDEX: 35.1 KG/M2 | HEIGHT: 69 IN | OXYGEN SATURATION: 98 % | WEIGHT: 237 LBS | HEART RATE: 87 BPM

## 2023-12-15 DIAGNOSIS — D61.818 PANCYTOPENIA (HCC): ICD-10-CM

## 2023-12-15 DIAGNOSIS — Z51.81 MEDICATION MONITORING ENCOUNTER: ICD-10-CM

## 2023-12-15 DIAGNOSIS — J21.9 ACUTE BRONCHIOLITIS DUE TO UNSPECIFIED ORGANISM: ICD-10-CM

## 2023-12-15 DIAGNOSIS — R05.1 ACUTE COUGH: Primary | ICD-10-CM

## 2023-12-15 DIAGNOSIS — D46.9 MDS (MYELODYSPLASTIC SYNDROME) (HCC): Primary | ICD-10-CM

## 2023-12-15 PROCEDURE — 99214 OFFICE O/P EST MOD 30 MIN: CPT | Performed by: INTERNAL MEDICINE

## 2023-12-15 RX ORDER — AZITHROMYCIN 250 MG/1
TABLET, FILM COATED ORAL
Qty: 6 TABLET | Refills: 0 | Status: SHIPPED | OUTPATIENT
Start: 2023-12-15 | End: 2023-12-19

## 2023-12-15 RX ORDER — BENZONATATE 100 MG/1
100 CAPSULE ORAL 3 TIMES DAILY PRN
Qty: 90 CAPSULE | Refills: 0 | Status: SHIPPED | OUTPATIENT
Start: 2023-12-15 | End: 2024-01-14

## 2023-12-15 NOTE — PROGRESS NOTES
Please relay the following to the patient:   Ronaldo Burton,   I reviewed your recent blood work  Hello, after review of your labs here are your recommendations:    # Lipids/cholesterol: Your total cholesterol numbers are well controlled on the atorvastatin 40mg daily, please continue with your cholesterol medication    # CMP: Your Basic metabolic panel shows  ongoing Chronic Kidney disease and hyperkalemia, please make sure you follow up with your nephrologist Dr. Charmaine Reaves for further evaluation     # CBC: Your complete blood count shows overall low white and red blood cells, please follow up with your Hematologist. Dr. Nancy Watson    # TSH: Your thyroid (TSH) function is normal.     # Vitamins: Your vitamin D level is within normal range. Please continue with your current dose of Vitamin D, supplementation    # Diabetes/A1C: Your A1C 1 month ago was 7.0 , will need to repeat in 2 months (typically recheck A1C every 3 months)    # Prostate:  Your prostate level, ie PSA, is normal.     If you have any questions or concerns in regards to these labs please let me know. --Dr. Akhtar Livers

## 2023-12-15 NOTE — TELEPHONE ENCOUNTER
Spoke with pt's wife per DARIA, TARIK verified. She was informed of pt lab result & MD recommendation. She stated  the last visit with Dr Reza Gonzales on 23, pt mentioned to MD about his cough, she wondering if MD can do or rx something for pt? Pt still has cough probably reaction from his covid shot he rec'd on 23. pls advise, thanks in advance.      See telephone encounter 12-15-23

## 2023-12-15 NOTE — TELEPHONE ENCOUNTER
Spoke with pt's wife per DARIA,  verified  She was informed of MD recommendation, she stated understanding. She will call back for pt f/u appt.        Future Appointments   Date Time Provider Tomas Ibrahim   2023 10:30 AM EM CC LAB2 [facility] CHEMO EMO   2024 10:00 AM EM CC LAB1 [facility] CHEMO EMO   2024 10:00 AM EM CC LAB1 [facility] CHEMO EMO   2024  1:20 PM Isai Diop MD [id number]   2024 10:45 AM EM CC LAB1 [facility] CHEMO EMO   2024 11:00 AM Benjamin Blair MD [facility] EC ADO   2024 10:00 AM EM CC LAB2 [facility] CHEMO EMO   3/15/2024 10:30 AM Benjamin Helm  [facility] HEM ONC EMO   2024 10:45 AM Radha Cerda MD [address] [facility]

## 2023-12-15 NOTE — TELEPHONE ENCOUNTER
Noted, just sent tessalon 1 pill po TID and azithromycin for 5 days for now. But would ideally like to reassess him in office to see if he needs anything else. Unfortunately wont be back till Monday to make a follow up. If he's getting worse over the weekend might need to go to Urgent care or ER.

## 2023-12-21 ENCOUNTER — TELEPHONE (OUTPATIENT)
Dept: NEPHROLOGY | Facility: CLINIC | Age: 68
End: 2023-12-21

## 2023-12-21 NOTE — TELEPHONE ENCOUNTER
Pt wife calling back. She is requesting a call back ASAP. She states it is URGENT but did not give details.   Please call

## 2023-12-21 NOTE — TELEPHONE ENCOUNTER
Spoke to pt's wife (nito/ confirmed). States tries to follow low potassium diet, but most foods pt likes is high in potassium. Advised just needs to eat in moderation. Mailed low potassium diet info sheet to address on file. Verbalized understanding.

## 2023-12-21 NOTE — TELEPHONE ENCOUNTER
Rn spoke to pt wife reported [address] did not receive the Rx,pt will be out of town ,Rn called pharmacy in [place] and called Rx to [facility] wife informed.

## 2023-12-27 ENCOUNTER — LAB ENCOUNTER (OUTPATIENT)
Dept: LAB | Age: 68
End: 2023-12-27
Attending: INTERNAL MEDICINE
Payer: MEDICARE

## 2023-12-27 DIAGNOSIS — D46.9 MDS (MYELODYSPLASTIC SYNDROME) (HCC): ICD-10-CM

## 2023-12-27 DIAGNOSIS — D61.818 PANCYTOPENIA (HCC): ICD-10-CM

## 2023-12-27 DIAGNOSIS — Z51.81 MEDICATION MONITORING ENCOUNTER: ICD-10-CM

## 2023-12-27 LAB
BASOPHILS # BLD AUTO: 0.01 X10(3) UL (ref 0–0.2)
BASOPHILS NFR BLD AUTO: 0.3 %
DEPRECATED RDW RBC AUTO: 58.1 FL (ref 35.1–46.3)
EOSINOPHIL # BLD AUTO: 0.07 X10(3) UL (ref 0–0.7)
EOSINOPHIL NFR BLD AUTO: 2.4 %
ERYTHROCYTE [DISTWIDTH] IN BLOOD BY AUTOMATED COUNT: 14.9 % (ref 11–15)
HCT VFR BLD AUTO: 29.5 %
HGB BLD-MCNC: 9.5 G/DL
IMM GRANULOCYTES # BLD AUTO: 0.01 X10(3) UL (ref 0–1)
IMM GRANULOCYTES NFR BLD: 0.3 %
LYMPHOCYTES # BLD AUTO: 1.11 X10(3) UL (ref 1–4)
LYMPHOCYTES NFR BLD AUTO: 38.1 %
MCH RBC QN AUTO: 34.3 PG (ref 26–34)
MCHC RBC AUTO-ENTMCNC: 32.2 G/DL (ref 31–37)
MCV RBC AUTO: 106.5 FL
MONOCYTES # BLD AUTO: 0.29 X10(3) UL (ref 0.1–1)
MONOCYTES NFR BLD AUTO: 10 %
NEUTROPHILS # BLD AUTO: 1.42 X10 (3) UL (ref 1.5–7.7)
NEUTROPHILS # BLD AUTO: 1.42 X10(3) UL (ref 1.5–7.7)
NEUTROPHILS NFR BLD AUTO: 48.9 %
PLATELET # BLD AUTO: 97 10(3)UL (ref 150–450)
RBC # BLD AUTO: 2.77 X10(6)UL
WBC # BLD AUTO: 2.9 X10(3) UL (ref 4–11)

## 2023-12-27 PROCEDURE — 85025 COMPLETE CBC W/AUTO DIFF WBC: CPT

## 2023-12-27 PROCEDURE — 36415 COLL VENOUS BLD VENIPUNCTURE: CPT

## 2023-12-29 ENCOUNTER — NURSE ONLY (OUTPATIENT)
Dept: HEMATOLOGY/ONCOLOGY | Facility: HOSPITAL | Age: 68
End: 2023-12-29
Attending: INTERNAL MEDICINE
Payer: MEDICARE

## 2023-12-29 DIAGNOSIS — D46.9 MDS (MYELODYSPLASTIC SYNDROME) (HCC): Primary | ICD-10-CM

## 2023-12-29 DIAGNOSIS — D61.818 PANCYTOPENIA (HCC): ICD-10-CM

## 2023-12-29 PROCEDURE — 96372 THER/PROPH/DIAG INJ SC/IM: CPT

## 2023-12-29 NOTE — PROGRESS NOTES
Patient had Cbc drawn on 12/27. Hgb 9.5. Patient with no complaints today. Araesnp given per parameters. AVS printed with future appointments already made. Discharged ambulatory.

## 2024-01-02 ENCOUNTER — NURSE TRIAGE (OUTPATIENT)
Dept: INTERNAL MEDICINE CLINIC | Facility: CLINIC | Age: 69
End: 2024-01-02

## 2024-01-02 PROCEDURE — 99283 EMERGENCY DEPT VISIT LOW MDM: CPT

## 2024-01-02 PROCEDURE — 99284 EMERGENCY DEPT VISIT MOD MDM: CPT

## 2024-01-02 NOTE — TELEPHONE ENCOUNTER
Action Requested: Summary for Provider     []  Critical Lab, Recommendations Needed  [] Need Additional Advice  []   FYI    []   Need Orders  [] Need Medications Sent to Pharmacy  []  Other     SUMMARY: Pt's wife  (coughing )stated pt have a deep congested cough, productive (white to yellow), stated Pt wants to go to ER which is not that serious   Pt coughing deeply, sounds congested ,h/a , decrease appetite ,appt made for tomorrow for eval       Reason for call: Cough  Onset:                    Reason for Disposition   Continuous (nonstop) coughing interferes with work or school and no improvement using cough treatment per Care Advice    Protocols used: Cough-A-[place]

## 2024-01-03 ENCOUNTER — APPOINTMENT (OUTPATIENT)
Dept: GENERAL RADIOLOGY | Facility: HOSPITAL | Age: 69
End: 2024-01-03
Attending: EMERGENCY MEDICINE
Payer: MEDICARE

## 2024-01-03 ENCOUNTER — TELEPHONE (OUTPATIENT)
Dept: INTERNAL MEDICINE CLINIC | Facility: CLINIC | Age: 69
End: 2024-01-03

## 2024-01-03 ENCOUNTER — HOSPITAL ENCOUNTER (EMERGENCY)
Facility: HOSPITAL | Age: 69
Discharge: HOME | End: 2024-01-03
Attending: EMERGENCY MEDICINE
Payer: MEDICARE

## 2024-01-03 VITALS
OXYGEN SATURATION: 99 % | TEMPERATURE: 98 F | SYSTOLIC BLOOD PRESSURE: 144 MMHG | HEART RATE: 99 BPM | RESPIRATION RATE: 20 BRPM | DIASTOLIC BLOOD PRESSURE: 73 MMHG

## 2024-01-03 DIAGNOSIS — J18.1 CONSOLIDATION LUNG (HCC): ICD-10-CM

## 2024-01-03 DIAGNOSIS — B33.8 RESPIRATORY SYNCYTIAL VIRUS (RSV): Primary | ICD-10-CM

## 2024-01-03 LAB
FLUAV + FLUBV RNA SPEC NAA+PROBE: NEGATIVE
FLUAV + FLUBV RNA SPEC NAA+PROBE: NEGATIVE
RSV RNA SPEC NAA+PROBE: POSITIVE
SARS-COV-2 RNA RESP QL NAA+PROBE: NOT DETECTED

## 2024-01-03 PROCEDURE — 94640 AIRWAY INHALATION TREATMENT: CPT

## 2024-01-03 PROCEDURE — 71045 X-RAY EXAM CHEST 1 VIEW: CPT | Performed by: EMERGENCY MEDICINE

## 2024-01-03 PROCEDURE — 0241U SARS-COV-2/FLU A AND B/RSV BY PCR (GENEXPERT): CPT | Performed by: EMERGENCY MEDICINE

## 2024-01-03 PROCEDURE — 0241U SARS-COV-2/FLU A AND B/RSV BY PCR (GENEXPERT): CPT

## 2024-01-03 RX ORDER — ALBUTEROL SULFATE 2.5 MG/3ML
2.5 SOLUTION RESPIRATORY (INHALATION) EVERY 4 HOURS PRN
Qty: 30 EACH | Refills: 0 | Status: SHIPPED | OUTPATIENT
Start: 2024-01-03 | End: 2024-02-02

## 2024-01-03 RX ORDER — IPRATROPIUM BROMIDE AND ALBUTEROL SULFATE 2.5; .5 MG/3ML; MG/3ML
3 SOLUTION RESPIRATORY (INHALATION) ONCE
Status: COMPLETED | OUTPATIENT
Start: 2024-01-03 | End: 2024-01-03

## 2024-01-03 RX ORDER — BENZONATATE 200 MG/1
200 CAPSULE ORAL 3 TIMES DAILY PRN
Qty: 21 CAPSULE | Refills: 0 | Status: SHIPPED | OUTPATIENT
Start: 2024-01-03 | End: 2024-01-10

## 2024-01-03 RX ORDER — DOXYCYCLINE HYCLATE 100 MG/1
100 CAPSULE ORAL 2 TIMES DAILY
Qty: 10 CAPSULE | Refills: 0 | Status: SHIPPED | OUTPATIENT
Start: 2024-01-03 | End: 2024-01-08

## 2024-01-03 NOTE — TELEPHONE ENCOUNTER
Called pt no answer left message.    Patient was discharged this morning from ED, wondering if he will be making it in to see Dr Aguilar today at his 12pm appointment or can reschedule for tomorrow? If patient calls back please ask and reschedule if needed.

## 2024-01-03 NOTE — ED PROVIDER NOTES
Patient Seen in: [facility] Emergency Department      History     Chief Complaint   Patient presents with    Cough/URI     Stated Complaint: Cough    Subjective:   HPI    68 year old male with multiple ongoing medical issues including chronic kidney disease, diabetes, hyperlipidemia, arthritis who presents with cough which his wife thinks has been going on for the past 2 and half weeks but they also both got a worsened cough last week.  The wife was diagnosed with RSV at the time that they both got a worsened cough, patient suspects he has the same.  He has not been able to sleep for the past 2 nights because of the cough.     Objective:   Past Medical History:   Diagnosis Date    C2-3 mild central, C3-4 mild-mod diffuse, C4-5 mild diffuse, C5-6 mod diffuse bulging discs 3/15/2018    C5-6 mod central & bilateral mild foraminal, C4-5 left mild foraminal stenosis 2/1/2018    C6-7 mild-mod central herniated disc 3/15/2018    Cataract     2010    Chronic kidney disease (CKD)     stage 3    Diabetes mellitus (HCC)     Diabetes type 2, uncontrolled     Diabetic retinopathy (HCC)     [facility] for eval    Hyperlipidemia     Meibomian gland dysfunction     Osteoarthritis of spine with radiculopathy, cervical region 2/1/2018    Pancreatitis 2012    Primary osteoarthritis of both shoulders 2/1/2018    Proteinuria     Type II or unspecified type diabetes mellitus without mention of complication, not stated as uncontrolled     Pills & Insulin    Vitreous floaters               Past Surgical History:   Procedure Laterality Date    APPENDECTOMY      CATARACT EXTRACTION W/  INTRAOCULAR LENS IMPLANT Right 06/11/2018    Dr. Lopez    CATARACT EXTRACTION W/  INTRAOCULAR LENS IMPLANT Left 07/12/2018    Dr. Lopez    CHOLECYSTECTOMY  2012    ELECTROCARDIOGRAM, COMPLETE  4/23/2012    scanned to media tab    HERNIA SURGERY                  Social History     Socioeconomic History    Marital status:     Tobacco Use    Smoking status: Former     Packs/day: 0     Types: Cigarettes     Quit date: 1989     Years since quittin.1    Smokeless tobacco: Former    Tobacco comments:     quit about 30 years ago.   Vaping Use    Vaping Use: Never used   Substance and Sexual Activity    Alcohol use: No    Drug use: No   Other Topics Concern    Caffeine Concern Yes     Comment: 1 cup coffee per day    Exercise No    Reaction to local anesthetic No              Review of Systems    Positive for stated complaint: Cough  Other systems are as noted in HPI.  Constitutional and vital signs reviewed.      All other systems reviewed and negative except as noted above.    Physical Exam     ED Triage Vitals [24 0013]   BP (!) 166/80   Pulse 97   Resp 18   Temp 98.4 °F (36.9 °C)   Temp src Oral   SpO2 96 %   O2 Device None (Room air)       Current:/73   Pulse 99   Temp 98.4 °F (36.9 °C) (Oral)   Resp 20   SpO2 99%         Physical Exam    ***      ED Course     Labs Reviewed   SARS-COV-2/FLU A AND B/RSV BY PCR (GENEXPERT) - Abnormal; Notable for the following components:       Result Value    RSV by PCR Positive (*)     All other components within normal limits    Narrative:     This test is intended for the qualitative detection and differentiation of SARS-CoV-2, influenza A, influenza B, and respiratory syncytial virus (RSV) viral RNA in nasopharyngeal or nares swabs from individuals suspected of respiratory viral infection consistent with COVID-19 by their healthcare provider. Signs and symptoms of respiratory viral infection due to SARS-CoV-2, influenza, and RSV can be similar.    Test performed using the Xpert Xpress SARS-CoV-2/FLU/RSV (real time RT-PCR)  assay on the [employer] instrument, [employer], [place] [address].   This test is being used under the Food and Drug Administration's Emergency Use Authorization.    The authorized Fact Sheet for Healthcare Providers for this assay is available upon request  from the laboratory.                    [facility]      Pulse Ox: 99%, {Pulse ox analysis:30187}, ***    Cardiac Monitor:   Pulse Readings from Last 1 Encounters:   01/03/24 99   , {EKG RHYTHM:48054345}, ***     Prior radiology reviewed: {YES/NO/NA:6565}    My independent radiology interpretation: *** , defer to radiology read for final report.    Radiology findings:       AP VIEW OF THE CHEST    IMPRESSION    COMPARISONS: 5/7/17    Mild bibasilar opacities probably atelectasis, assuming no clinical pneumonia. Cardiomediastinal silhouette is unremarkable.  No pneumothorax or pleural effusion.  No acute bony abnormality.      Chronic Medical Conditions Potentially Contributing: ***    Critical Care: I spent a total of *** minutes of critical care time in obtaining history, performing a physical exam, bedside monitoring of interventions, collecting and interpreting tests and discussion with consultants but not including time spent performing procedures.    Medications   ipratropium-albuterol (Duoneb) 0.5-2.5 (3) MG/3ML inhalation solution 3 mL (3 mL Nebulization Given 1/3/24 0896)       I discussed the use of over-the-counter meds and proper use in this patient when appropriate.                                        Medical Decision Making      Disposition and Plan     Clinical Impression:  No diagnosis found.     Disposition:  There is no disposition on file for this visit.  There is no disposition time on file for this visit.    Follow-up:  No follow-up provider specified.  We recommend that you schedule follow up care with a primary care provider within the next three months to obtain basic health screening including reassessment of your blood pressure.      Medications Prescribed:  Current Discharge Medication List                                          RA    Cardiac Monitor:   Pulse Readings from Last 1 Encounters:   01/03/24 99   , sinus, normal for rate and rhythm         Radiology findings:       AP VIEW OF THE CHEST    IMPRESSION    COMPARISONS: 5/7/17    Mild bibasilar opacities probably atelectasis, assuming no clinical pneumonia. Cardiomediastinal silhouette is unremarkable.  No pneumothorax or pleural effusion.  No acute bony abnormality.    .    Medications   ipratropium-albuterol (Duoneb) 0.5-2.5 (3) MG/3ML inhalation solution 3 mL (3 mL Nebulization Given 1/3/24 0506)         Patient with cough, wife has the same.  He is very impatient and would like to go.  The patient did feel somewhat better with the DuoNeb, continues to cough but otherwise in no respiratory distress.  Patient was given a nebulizer machine for home and will give albuterol treatments for home to use as needed.  The patient had a chest x-ray that showed questionable bibasilar opacities, given his other medical issues will also cover with antibiotics in case of developing pneumonia.  Patient advised of reasons to return, follow-up, supportive care.    Disposition and Plan     Clinical Impression:  1. Respiratory syncytial virus (RSV)    2. Consolidation lung (HCC)         Disposition:  Discharge  1/3/2024  5:59 am    Follow-up:  Paul Aguilar MD  [address] [place] [address]  [phone]    Schedule an appointment as soon as possible for a visit  Call for next available appointment    We recommend that you schedule follow up care with a primary care provider within the next three months to obtain basic health screening including reassessment of your blood pressure.      Medications Prescribed:  Discharge Medication List as of 1/3/2024  5:59 AM        START taking these medications    Details   albuterol (2.5 MG/3ML) 0.083% Inhalation Nebu Soln Take 3 mL (2.5 mg total) by nebulization every 4 (four) hours as needed for Wheezing or Shortness of Breath., Normal, Disp-30  each, R-0      doxycycline 100 MG Oral Cap Take 1 capsule (100 mg total) by mouth 2 (two) times daily for 5 days., Normal, Disp-10 capsule, R-0

## 2024-01-03 NOTE — ED INITIAL ASSESSMENT (HPI)
Pt to ED for cough x2 weeks with chills and body aches. Pt's wife was diagnosed with RSV last week.

## 2024-01-10 ENCOUNTER — OFFICE VISIT (OUTPATIENT)
Dept: INTERNAL MEDICINE CLINIC | Facility: CLINIC | Age: 69
End: 2024-01-10

## 2024-01-10 ENCOUNTER — LAB ENCOUNTER (OUTPATIENT)
Dept: LAB | Age: 69
End: 2024-01-10
Attending: INTERNAL MEDICINE
Payer: MEDICARE

## 2024-01-10 VITALS
WEIGHT: 228 LBS | HEART RATE: 75 BPM | DIASTOLIC BLOOD PRESSURE: 72 MMHG | TEMPERATURE: 98 F | SYSTOLIC BLOOD PRESSURE: 150 MMHG | BODY MASS INDEX: 34 KG/M2

## 2024-01-10 DIAGNOSIS — D61.818 PANCYTOPENIA (HCC): ICD-10-CM

## 2024-01-10 DIAGNOSIS — J01.90 ACUTE BACTERIAL SINUSITIS: ICD-10-CM

## 2024-01-10 DIAGNOSIS — J32.9 SINUSITIS, UNSPECIFIED CHRONICITY, UNSPECIFIED LOCATION: Primary | ICD-10-CM

## 2024-01-10 DIAGNOSIS — G62.9 NEUROPATHY: Primary | ICD-10-CM

## 2024-01-10 DIAGNOSIS — B96.89 ACUTE BACTERIAL SINUSITIS: ICD-10-CM

## 2024-01-10 DIAGNOSIS — D46.9 MDS (MYELODYSPLASTIC SYNDROME) (HCC): ICD-10-CM

## 2024-01-10 DIAGNOSIS — J45.991 COUGH VARIANT ASTHMA: ICD-10-CM

## 2024-01-10 LAB
BASOPHILS # BLD AUTO: 0.01 X10(3) UL (ref 0–0.2)
BASOPHILS NFR BLD AUTO: 0.3 %
DEPRECATED RDW RBC AUTO: 59.4 FL (ref 35.1–46.3)
EOSINOPHIL # BLD AUTO: 0.05 X10(3) UL (ref 0–0.7)
EOSINOPHIL NFR BLD AUTO: 1.6 %
ERYTHROCYTE [DISTWIDTH] IN BLOOD BY AUTOMATED COUNT: 15.5 % (ref 11–15)
HCT VFR BLD AUTO: 29.5 %
HGB BLD-MCNC: 9.2 G/DL
IMM GRANULOCYTES # BLD AUTO: 0.01 X10(3) UL (ref 0–1)
IMM GRANULOCYTES NFR BLD: 0.3 %
LYMPHOCYTES # BLD AUTO: 1.26 X10(3) UL (ref 1–4)
LYMPHOCYTES NFR BLD AUTO: 39.3 %
MCH RBC QN AUTO: 33.2 PG (ref 26–34)
MCHC RBC AUTO-ENTMCNC: 31.2 G/DL (ref 31–37)
MCV RBC AUTO: 106.5 FL
MONOCYTES # BLD AUTO: 0.38 X10(3) UL (ref 0.1–1)
MONOCYTES NFR BLD AUTO: 11.8 %
NEUTROPHILS # BLD AUTO: 1.5 X10 (3) UL (ref 1.5–7.7)
NEUTROPHILS # BLD AUTO: 1.5 X10(3) UL (ref 1.5–7.7)
NEUTROPHILS NFR BLD AUTO: 46.7 %
PLATELET # BLD AUTO: 127 10(3)UL (ref 150–450)
RBC # BLD AUTO: 2.77 X10(6)UL
WBC # BLD AUTO: 3.2 X10(3) UL (ref 4–11)

## 2024-01-10 PROCEDURE — 1126F AMNT PAIN NOTED NONE PRSNT: CPT | Performed by: STUDENT IN AN ORGANIZED HEALTH CARE EDUCATION/TRAINING PROGRAM

## 2024-01-10 PROCEDURE — 85025 COMPLETE CBC W/AUTO DIFF WBC: CPT

## 2024-01-10 PROCEDURE — 36415 COLL VENOUS BLD VENIPUNCTURE: CPT

## 2024-01-10 PROCEDURE — 99214 OFFICE O/P EST MOD 30 MIN: CPT | Performed by: STUDENT IN AN ORGANIZED HEALTH CARE EDUCATION/TRAINING PROGRAM

## 2024-01-10 RX ORDER — FLUTICASONE PROPIONATE AND SALMETEROL 100; 50 UG/1; UG/1
1 POWDER RESPIRATORY (INHALATION) 2 TIMES DAILY
Qty: 1 EACH | Refills: 3 | Status: SHIPPED | OUTPATIENT
Start: 2024-01-10

## 2024-01-10 RX ORDER — FLUTICASONE PROPIONATE 50 MCG
2 SPRAY, SUSPENSION (ML) NASAL DAILY
Qty: 3 EACH | Refills: 3 | Status: SHIPPED | OUTPATIENT
Start: 2024-01-10

## 2024-01-10 RX ORDER — AMOXICILLIN AND CLAVULANATE POTASSIUM 875; 125 MG/1; MG/1
1 TABLET, FILM COATED ORAL 2 TIMES DAILY
Qty: 20 TABLET | Refills: 0 | Status: SHIPPED | OUTPATIENT
Start: 2024-01-10 | End: 2024-01-20

## 2024-01-10 RX ORDER — METHYLPREDNISOLONE 4 MG/1
TABLET ORAL
Qty: 1 EACH | Refills: 0 | Status: SHIPPED | OUTPATIENT
Start: 2024-01-10

## 2024-01-10 RX ORDER — DOXYCYCLINE HYCLATE 100 MG/1
100 CAPSULE ORAL 2 TIMES DAILY WITH MEALS
Qty: 20 CAPSULE | Refills: 0 | Status: SHIPPED | OUTPATIENT
Start: 2024-01-10 | End: 2024-01-20

## 2024-01-10 RX ORDER — BENZONATATE 200 MG/1
200 CAPSULE ORAL 3 TIMES DAILY PRN
Qty: 90 CAPSULE | Refills: 0 | Status: SHIPPED | OUTPATIENT
Start: 2024-01-10 | End: 2024-02-09

## 2024-01-10 RX ORDER — AZELASTINE HYDROCHLORIDE 137 UG/1
1-2 SPRAY, METERED NASAL 2 TIMES DAILY
Qty: 30 ML | Refills: 3 | Status: SHIPPED | OUTPATIENT
Start: 2024-01-10

## 2024-01-10 RX ORDER — GUAIFENESIN AND DEXTROMETHORPHAN HYDROBROMIDE 100; 10 MG/5ML; MG/5ML
5 SOLUTION ORAL EVERY 12 HOURS
Qty: 150 ML | Refills: 0 | Status: SHIPPED | OUTPATIENT
Start: 2024-01-10 | End: 2024-01-25

## 2024-01-10 NOTE — PROGRESS NOTES
OFFICE NOTE     Patient ID: Jaun Burton is a 68 year old male.  Today's Date: 01/10/24  Chief Complaint: ER F/U    Jaun Burton is a 68 year old male with Pmhx MDS, HTN, CKD stage 4 (followed by nephrology), uncontrolled DM2 (followed by Dr. 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 He was seen in ED on 1/3 for ccough for over 3 weeks (wife had RSV) and pt also + RSV on 1/3, discharged with duoneb with CXR of bibasilar opacifty (and given abx doxycycline 100mg po BID for 5 days). Patient is still having ongoing cough with purulent sputum production, rhinorrhea sinus congestion mucus and occasional dyspnea.  Inhaler is helping although be it is very short-lived.  He still has fatigue and occasional fevers.  Denies any nausea, vomiting, diaphoresis    Does have increased BP and glucose levels but states has been feeling sick for going on 3 weeks.         Vitals:    01/10/24 1154   BP: 150/72   Pulse: 75   Temp: 98 °F (36.7 °C)   TempSrc: Oral   Weight: 228 lb (103.4 kg)     body mass index is 33.67 kg/m².  BP Readings from Last 3 Encounters:   01/10/24 150/72   01/03/24 144/73   12/15/23 134/61     The ASCVD Risk score (Carlisle DK, et al., 2019) failed to calculate for the following reasons:    The valid total cholesterol range is 130 to 320 mg/dL      Medications reviewed:  Current Outpatient Medications   Medication Sig Dispense Refill    amoxicillin clavulanate 875-125 MG Oral Tab Take 1 tablet by mouth 2 (two) times daily for 10 days. 20 tablet 0    azelastine 137 MCG/SPRAY Nasal Solution 1-2 sprays by Nasal route in the morning and 1-2 sprays before bedtime. FOR SINUS SYMPTOMS/NASAL CONGESTION.. 30 mL 3    fluticasone propionate 50 MCG/ACT Nasal  Suspension 2 sprays by Each Nare route daily. FOR NASAL CONGESTION/SINUS SYMPTOMS. 3 each 3    benzonatate 200 MG Oral Cap Take 1 capsule (200 mg total) by mouth 3 (three) times daily as needed for cough (FOR COUGH). 90 capsule 0    dextromethorphan-guaiFENesin  MG/5ML Oral Syrup Take 5 mL by mouth every 12 (twelve) hours for 15 days. 150 mL 0    fluticasone-salmeterol (ADVAIR DISKUS) 100-50 MCG/ACT Inhalation Aerosol Powder, Breath Activated Inhale 1 puff into the lungs 2 (two) times daily. 1 each 3    Dextromethorphan-Benzocaine 5-7.5 MG Mouth/Throat Lozenge Use as directed 1 lozenge in the mouth or throat every 4 to 6 hours as needed. 20 lozenge 0    methylPREDNISolone 4 MG Oral Tablet Therapy Pack Take as directed with food. 1 each 0    doxycycline 100 MG Oral Cap Take 1 capsule (100 mg total) by mouth 2 (two) times daily with meals for 10 days. 20 capsule 0    albuterol (2.5 MG/3ML) 0.083% Inhalation Nebu Soln Take 3 mL (2.5 mg total) by nebulization every 4 (four) hours as needed for Wheezing or Shortness of Breath. 30 each 0    benzonatate 200 MG Oral Cap Take 1 capsule (200 mg total) by mouth 3 (three) times daily as needed for cough. 21 capsule 0    pregabalin 300 MG Oral Cap Take 1 capsule (300 mg total) by mouth daily. 90 capsule 0    atorvastatin 40 MG Oral Tab Take 1 tablet (40 mg total) by mouth daily. FOR CHOLESTEROL. 90 tablet 9    fenofibrate 48 MG Oral Tab Take 1 tablet (48 mg total) by mouth daily. FOR TRIGLYCERIDES. 90 tablet 9    pantoprazole 40 MG Oral Tab EC Take 1 tablet (40 mg total) by mouth before breakfast. FOR ACID REFLUX. 90 tablet 9    cyanocobalamin 1000 MCG Oral Tab Take 1 tablet (1,000 mcg total) by mouth daily. 90 tablet 4    Betamethasone Dipropionate Aug 0.05 % External Cream Apply 1 Application topically 2 (two) times daily. APPLY TO AFFECTED AREA 50 g 1    [START ON 1/12/2024] HYDROcodone-acetaminophen  MG Oral Tab Take 1 tablet by mouth every 8 (eight) hours as  needed for Pain. 90 tablet 0    LANTUS 100 UNIT/ML Subcutaneous Solution Inject 35 Units into the skin nightly. 30 mL 1    amitriptyline 25 MG Oral Tab Take 1 tablet (25 mg total) by mouth nightly. 90 tablet 1    NOVOLOG 100 UNIT/ML Injection Solution INJECT 50 UNITS SUBCUTANEOUSLY ONCE DAILY VIA  CORRECTION  FACTOR 40 mL 0    metRONIDAZOLE 0.75 % External Cream Apply 1 Application topically daily. For breakout on face 45 g 0    Pimecrolimus 1 % External Cream APPLY   TOPICALLY AFFECTED AREA ON FACE ONCE DAILY TO TWICE DAILY 60 g 0    Omega-3 Fatty Acids (FISH OIL) 600 MG Oral Cap Take 1 capsule by mouth nightly.        Multiple Vitamins-Minerals (CENTRUM SILVER) Oral Tab Take 1 tablet by mouth daily.      aspirin 81 MG Oral Tab EC Take 1 tablet (81 mg total) by mouth daily. (Patient not taking: Reported on 1/10/2024)      metoprolol tartrate 50 MG Oral Tab 1 tablet Orally Twice a day for 30 day(s) (Patient not taking: Reported on 1/10/2024)      BD INSULIN SYRINGE U/F 31G X 5/16\" 0.5 ML Does not apply Misc Inject insulin 4 times per day as instructed. 400 each 0    FREESTYLE LITE TEST In Vitro Strip USE 1 STRIP TO CHECK BLOOD GLUCOSE 4 TIMES DAILY. 400 strip 0    RELION INSULIN SYRINGE 31G X 15/64\" 0.5 ML Does not apply Misc 1 Syringe 4 (four) times daily. 400 each 2    RELION INSULIN SYRINGE 31G X 15/64\" 0.5 ML Does not apply Misc Inject 1 Syringe into the skin 4 (four) times daily. 400 each 0    RELION INSULIN SYRINGE 31G X 15/64\" 0.5 ML Does not apply Misc USE 1 SYRINGE SUBCUTANEOUSLY 4 TIMES DAILY 200 each 0    Insulin Syringe-Needle U-100 (RELION INSULIN SYRINGE) 31G X 15/64\" 0.5 ML Does not apply Misc 1 Syringe by Does not apply route 4 (four) times daily. 400 each 0    Tazarotene 0.1 % External Cream Apply to chest nightly (Patient taking differently: Apply to chest nightly PRN) 60 g 3    Insulin Syringe-Needle U-100 (RELION INSULIN SYRINGE) 31G X 15/64\" 0.5 ML Does not apply Misc 1 Syringe by Does not apply  route 4 (four) times daily. 400 each 0    Blood Glucose Monitoring Suppl (ACCU-CHEK INÉS PLUS) w/Device Does not apply Kit Use as directed to check blood sugars. 1 kit 0    Glucose Blood (ACCU-CHEK INÉS PLUS) In Vitro Strip Use to check blood sugars 3 times a day. 300 strip 1    Ferrous Gluconate 225 (27 Fe) MG Oral Tab Take 27 mg by mouth daily.           Assessment & Plan    1. Sinusitis, unspecified chronicity, unspecified location (Primary)  -     Amoxicillin-Pot Clavulanate; Take 1 tablet by mouth 2 (two) times daily for 10 days.  Dispense: 20 tablet; Refill: 0  -     Azelastine HCl; 1-2 sprays by Nasal route in the morning and 1-2 sprays before bedtime. FOR SINUS SYMPTOMS/NASAL CONGESTION..  Dispense: 30 mL; Refill: 3  -     Fluticasone Propionate; 2 sprays by Each Nare route daily. FOR NASAL CONGESTION/SINUS SYMPTOMS.  Dispense: 3 each; Refill: 3  Patient presenting URI and now sinus congestion tenderness and erythematous nasal turbinates consistent with acute secondary acute bacterial rhinosinusitis.  Plan:  -Use Flonase and Azelastine 1 puff each nostril daily for next month or till symptom resolves  -Start Empiric Augmentin 1 tab BID for total 10 day course  -take hot showers, drink tea and increase fluid intake    2. Acute bacterial sinusitis  -     Amoxicillin-Pot Clavulanate; Take 1 tablet by mouth 2 (two) times daily for 10 days.  Dispense: 20 tablet; Refill: 0  -     Azelastine HCl; 1-2 sprays by Nasal route in the morning and 1-2 sprays before bedtime. FOR SINUS SYMPTOMS/NASAL CONGESTION..  Dispense: 30 mL; Refill: 3  -     Fluticasone Propionate; 2 sprays by Each Nare route daily. FOR NASAL CONGESTION/SINUS SYMPTOMS.  Dispense: 3 each; Refill: 3  Plan  As above   3. Cough variant asthma  -     Benzonatate; Take 1 capsule (200 mg total) by mouth 3 (three) times daily as needed for cough (FOR COUGH).  Dispense: 90 capsule; Refill: 0  -     Dextromethorphan-guaiFENesin; Take 5 mL by mouth every 12  (twelve) hours for 15 days.  Dispense: 150 mL; Refill: 0  -     Fluticasone-Salmeterol; Inhale 1 puff into the lungs 2 (two) times daily.  Dispense: 1 each; Refill: 3  -     Dextromethorphan-Benzocaine; Use as directed 1 lozenge in the mouth or throat every 4 to 6 hours as needed.  Dispense: 20 lozenge; Refill: 0  -     methylPREDNISolone; Take as directed with food.  Dispense: 1 each; Refill: 0  -     Doxycycline Hyclate; Take 1 capsule (100 mg total) by mouth 2 (two) times daily with meals for 10 days.  Dispense: 20 capsule; Refill: 0  Pt with ongoing SOB/wheezing despite albuterol and doxycyline and in setting of recent RSV + 3 weeks ago suspected might have Cough variant asthma/Reactive airway disease due to systemic effects  Plan:  -start medrol dose pack, caution to steroid levels next week has SS insulin at home  -Start ICS/LABA for controller inhaler,   -Start doxycycline for atypical coverage of PNA secondary to condition due to MDS and higher risk of CAP and atypical PNA (on augmentin and doxy for next 10 days)  -continue tessalon and robitussin dm PRN for cough  -if no improvement in 10-14 days, will need repeat CXR and further pulm workup.       Follow Up: As needed/if symptoms worsen or Return in about 2 weeks (around 1/24/2024), or if symptoms worsen or fail to improve..         Objective/ Results:   Physical Exam  Constitutional:       Appearance: He is well-developed.   HENT:      Nose: Congestion present.      Right Turbinates: Enlarged and swollen.      Left Turbinates: Enlarged and swollen.      Right Sinus: Frontal sinus tenderness present.      Left Sinus: Frontal sinus tenderness present.   Eyes:      Extraocular Movements: Extraocular movements intact.      Pupils: Pupils are equal, round, and reactive to light.   Cardiovascular:      Rate and Rhythm: Normal rate and regular rhythm.      Heart sounds: Normal heart sounds.   Pulmonary:      Effort: Pulmonary effort is normal.      Breath  sounds: Wheezing and rhonchi present.   Abdominal:      General: Bowel sounds are normal.      Palpations: Abdomen is soft.   Skin:     General: Skin is warm and dry.   Neurological:      Mental Status: He is alert and oriented to person, place, and time.      Deep Tendon Reflexes: Reflexes are normal and symmetric.        Reviewed:    Patient Active Problem List    Diagnosis    Sensorineural hearing loss, bilateral    Chronic renal disease, stage V (HCC)    CKD stage 4 due to type 1 diabetes mellitus (HCC)    MDS (myelodysplastic syndrome) ([facility])    Pancytopenia (HCC)    Hypertension associated with type 2 diabetes mellitus  (HCC)    Hyperlipidemia associated with type 2 diabetes mellitus  (HCC)    Gastroesophageal reflux disease with esophagitis    Type 2 DM with CKD stage 4 and hypertension ([facility])    Type 2 diabetes mellitus with both eyes affected by mild nonproliferative retinopathy without macular edema, without long-term current use of insulin ([facility])    Class 1 obesity due to excess calories with serious comorbidity in adult    Vitreous floaters    Rosacea    Nodular elastosis with cysts and comedones of Favre and Racouchot    Pseudophakia of both eyes    Sebopsoriasis      Allergies   Allergen Reactions    Cefdinir DIARRHEA    Zosyn [Piperacillin Sod-Tazobactam So] OTHER (SEE COMMENTS)     Heightened sense of smell; dry heaves, vomiting        Social History     Socioeconomic History    Marital status:    Tobacco Use    Smoking status: Former     Packs/day: 0     Types: Cigarettes     Quit date: 1989     Years since quittin.1    Smokeless tobacco: Former    Tobacco comments:     quit about 30 years ago.   Vaping Use    Vaping Use: Never used   Substance and Sexual Activity    Alcohol use: No    Drug use: No   Other Topics Concern    Caffeine Concern Yes     Comment: 1 cup coffee per day    Exercise No    Reaction to local anesthetic No      Review of Systems   Constitutional:  Positive for  fatigue and fever.   HENT:  Positive for congestion, postnasal drip, rhinorrhea, sinus pressure, sinus pain and sneezing.    Respiratory:  Positive for cough, shortness of breath and wheezing.    Cardiovascular: Negative.    Gastrointestinal: Negative.    Musculoskeletal:  Positive for arthralgias.   Skin: Negative.    Neurological: Negative.      All other systems negative unless otherwise stated in ROS or HPI above.       Paul Aguilar MD  Internal Medicine       Call office with any questions or seek emergency care if necessary.   Patient understands and agrees to follow directions and advice.      ----------------------------------------- PATIENT INSTRUCTIONS-----------------------------------------     There are no Patient Instructions on file for this visit.

## 2024-01-12 ENCOUNTER — NURSE ONLY (OUTPATIENT)
Dept: HEMATOLOGY/ONCOLOGY | Facility: HOSPITAL | Age: 69
End: 2024-01-12
Attending: INTERNAL MEDICINE
Payer: MEDICARE

## 2024-01-12 DIAGNOSIS — D61.818 PANCYTOPENIA (HCC): ICD-10-CM

## 2024-01-12 DIAGNOSIS — D46.9 MDS (MYELODYSPLASTIC SYNDROME) (HCC): Primary | ICD-10-CM

## 2024-01-12 PROCEDURE — 96372 THER/PROPH/DIAG INJ SC/IM: CPT

## 2024-01-17 RX ORDER — HYDROCODONE BITARTRATE AND ACETAMINOPHEN 10; 325 MG/1; MG/1
1 TABLET ORAL EVERY 8 HOURS PRN
Qty: 90 TABLET | Refills: 0 | Status: SHIPPED | OUTPATIENT
Start: 2024-01-17

## 2024-01-24 ENCOUNTER — LAB ENCOUNTER (OUTPATIENT)
Dept: LAB | Age: 69
End: 2024-01-24
Attending: INTERNAL MEDICINE
Payer: MEDICARE

## 2024-01-24 DIAGNOSIS — D46.9 MDS (MYELODYSPLASTIC SYNDROME) (HCC): ICD-10-CM

## 2024-01-24 DIAGNOSIS — D61.818 PANCYTOPENIA (HCC): ICD-10-CM

## 2024-01-24 PROCEDURE — 36415 COLL VENOUS BLD VENIPUNCTURE: CPT

## 2024-01-24 PROCEDURE — 85025 COMPLETE CBC W/AUTO DIFF WBC: CPT

## 2024-01-24 PROCEDURE — 85060 BLOOD SMEAR INTERPRETATION: CPT

## 2024-01-25 LAB
BASOPHILS # BLD AUTO: 0.01 X10(3) UL (ref 0–0.2)
BASOPHILS NFR BLD AUTO: 0.4 %
DEPRECATED RDW RBC AUTO: 63.1 FL (ref 35.1–46.3)
EOSINOPHIL # BLD AUTO: 0.05 X10(3) UL (ref 0–0.7)
EOSINOPHIL NFR BLD AUTO: 2.1 %
ERYTHROCYTE [DISTWIDTH] IN BLOOD BY AUTOMATED COUNT: 16.6 % (ref 11–15)
HCT VFR BLD AUTO: 28.7 %
HGB BLD-MCNC: 9.3 G/DL
IMM GRANULOCYTES # BLD AUTO: 0.01 X10(3) UL (ref 0–1)
IMM GRANULOCYTES NFR BLD: 0.4 %
LYMPHOCYTES # BLD AUTO: 0.97 X10(3) UL (ref 1–4)
LYMPHOCYTES NFR BLD AUTO: 41.1 %
MCH RBC QN AUTO: 35.4 PG (ref 26–34)
MCHC RBC AUTO-ENTMCNC: 32.4 G/DL (ref 31–37)
MCV RBC AUTO: 109.1 FL
MONOCYTES # BLD AUTO: 0.22 X10(3) UL (ref 0.1–1)
MONOCYTES NFR BLD AUTO: 9.3 %
NEUTROPHILS # BLD AUTO: 1.1 X10 (3) UL (ref 1.5–7.7)
NEUTROPHILS # BLD AUTO: 1.1 X10(3) UL (ref 1.5–7.7)
NEUTROPHILS NFR BLD AUTO: 46.7 %
PLATELET # BLD AUTO: 78 10(3)UL (ref 150–450)
RBC # BLD AUTO: 2.63 X10(6)UL
WBC # BLD AUTO: 2.4 X10(3) UL (ref 4–11)

## 2024-01-26 ENCOUNTER — NURSE ONLY (OUTPATIENT)
Dept: HEMATOLOGY/ONCOLOGY | Facility: HOSPITAL | Age: 69
End: 2024-01-26
Attending: INTERNAL MEDICINE
Payer: MEDICARE

## 2024-01-26 VITALS
SYSTOLIC BLOOD PRESSURE: 118 MMHG | DIASTOLIC BLOOD PRESSURE: 53 MMHG | OXYGEN SATURATION: 100 % | HEART RATE: 75 BPM | RESPIRATION RATE: 18 BRPM

## 2024-01-26 DIAGNOSIS — D46.9 MDS (MYELODYSPLASTIC SYNDROME) (HCC): Primary | ICD-10-CM

## 2024-01-26 DIAGNOSIS — D61.818 PANCYTOPENIA (HCC): ICD-10-CM

## 2024-01-26 PROCEDURE — 96372 THER/PROPH/DIAG INJ SC/IM: CPT

## 2024-01-26 NOTE — PROGRESS NOTES
Pt here for Q2 week aranesp injection - gets labs outpt at [place]  Hgl 9.3 on 1/24  Reports feeling good, denies new complaints/ concerns    Aranesp 500mcg given left upper arm subcutaneous, tolerated well - much less painful if given slow, warmed   Must give super slow, warm capped needle/ syringe in hand, do not apply bandage d/t hairy arms per pt.  Parameters are to hold if hgb is 11 or greater - pt is aware    Discharged stable to home with future appts. Given AVS  Sees Dr Helm in March  Gait steady, indep

## 2024-02-02 ENCOUNTER — OFFICE VISIT (OUTPATIENT)
Dept: NEPHROLOGY | Facility: CLINIC | Age: 69
End: 2024-02-02

## 2024-02-02 VITALS
HEART RATE: 80 BPM | SYSTOLIC BLOOD PRESSURE: 103 MMHG | WEIGHT: 236 LBS | BODY MASS INDEX: 35 KG/M2 | DIASTOLIC BLOOD PRESSURE: 54 MMHG

## 2024-02-02 DIAGNOSIS — E11.69 HYPERLIPIDEMIA ASSOCIATED WITH TYPE 2 DIABETES MELLITUS  (HCC): ICD-10-CM

## 2024-02-02 DIAGNOSIS — E10.22 CKD STAGE 4 DUE TO TYPE 1 DIABETES MELLITUS (HCC): ICD-10-CM

## 2024-02-02 DIAGNOSIS — I12.9 TYPE 2 DM WITH CKD STAGE 4 AND HYPERTENSION (HCC): Primary | ICD-10-CM

## 2024-02-02 DIAGNOSIS — E11.22 TYPE 2 DM WITH CKD STAGE 4 AND HYPERTENSION (HCC): Primary | ICD-10-CM

## 2024-02-02 DIAGNOSIS — E78.5 HYPERLIPIDEMIA ASSOCIATED WITH TYPE 2 DIABETES MELLITUS  (HCC): ICD-10-CM

## 2024-02-02 DIAGNOSIS — N18.4 CKD STAGE 4 DUE TO TYPE 1 DIABETES MELLITUS (HCC): ICD-10-CM

## 2024-02-02 DIAGNOSIS — G62.9 NEUROPATHY: ICD-10-CM

## 2024-02-02 DIAGNOSIS — E11.3293 TYPE 2 DIABETES MELLITUS WITH BOTH EYES AFFECTED BY MILD NONPROLIFERATIVE RETINOPATHY WITHOUT MACULAR EDEMA, WITHOUT LONG-TERM CURRENT USE OF INSULIN (HCC): ICD-10-CM

## 2024-02-02 DIAGNOSIS — N18.4 TYPE 2 DM WITH CKD STAGE 4 AND HYPERTENSION (HCC): Primary | ICD-10-CM

## 2024-02-02 PROCEDURE — 99214 OFFICE O/P EST MOD 30 MIN: CPT | Performed by: INTERNAL MEDICINE

## 2024-02-02 RX ORDER — HYDROCODONE BITARTRATE AND ACETAMINOPHEN 10; 325 MG/1; MG/1
1 TABLET ORAL EVERY 8 HOURS PRN
Qty: 90 TABLET | Refills: 0 | Status: SHIPPED | OUTPATIENT
Start: 2024-02-23 | End: 2024-02-02

## 2024-02-02 RX ORDER — HYDROCODONE BITARTRATE AND ACETAMINOPHEN 10; 325 MG/1; MG/1
1 TABLET ORAL EVERY 8 HOURS PRN
Qty: 90 TABLET | Refills: 0 | Status: SHIPPED | OUTPATIENT
Start: 2024-03-25

## 2024-02-02 NOTE — PATIENT INSTRUCTIONS
PLEASE FOLLOW LOW POTASSIUM DIET    I gave 2 months of norco  two separate rx    Do labs later in feb    See me mid April    Good to see you Hernan

## 2024-02-03 NOTE — PROGRESS NOTES
Progress Note     Jaun Burton    Here for renal follow-up type 2 diabetes CKD 4 chronic pain is on Norco 3 times a day does not abuse and is on Lyrica.  Has been having high potassium levels Harjeet of ACE or ARB did instruct on low potassium diet today feels stable  Sees Dr. Contreras for anemia gets Aranesp every 2 weeks    HISTORY:  Past Medical History:   Diagnosis Date    C2-3 mild central, C3-4 mild-mod diffuse, C4-5 mild diffuse, C5-6 mod diffuse bulging discs 3/15/2018    C5-6 mod central & bilateral mild foraminal, C4-5 left mild foraminal stenosis 2018    C6-7 mild-mod central herniated disc 3/15/2018    Cataract     2010    Chronic kidney disease (CKD)     stage 3    Diabetes mellitus (HCC)     Diabetes type 2, uncontrolled     Diabetic retinopathy (HCC)     [facility]    Hyperlipidemia     Meibomian gland dysfunction     Osteoarthritis of spine with radiculopathy, cervical region 2018    Pancreatitis     Primary osteoarthritis of both shoulders 2018    Proteinuria     Type II or unspecified type diabetes mellitus without mention of complication, not stated as uncontrolled     Pills & Insulin    Vitreous floaters       Past Surgical History:   Procedure Laterality Date    APPENDECTOMY      CATARACT EXTRACTION W/  INTRAOCULAR LENS IMPLANT Right 2018    Dr. Lopze    CATARACT EXTRACTION W/  INTRAOCULAR LENS IMPLANT Left 2018    Dr. Lopez    CHOLECYSTECTOMY      ELECTROCARDIOGRAM, COMPLETE  2012    scanned to media tab    HERNIA SURGERY        Social History     Tobacco Use    Smoking status: Former     Packs/day: 0     Types: Cigarettes     Quit date: 1989     Years since quittin.2    Smokeless tobacco: Former    Tobacco comments:     quit about 30 years ago.   Substance Use Topics    Alcohol use: No         Medications (Active prior to today's visit):  Current Outpatient Medications   Medication Sig Dispense Refill    [START  ON 3/25/2024] HYDROcodone-acetaminophen (NORCO)  MG Oral Tab Take 1 tablet by mouth every 8 (eight) hours as needed for Pain. 90 tablet 0    fluticasone propionate 50 MCG/ACT Nasal Suspension 2 sprays by Each Nare route daily. FOR NASAL CONGESTION/SINUS SYMPTOMS. 3 each 3    benzonatate 200 MG Oral Cap Take 1 capsule (200 mg total) by mouth 3 (three) times daily as needed for cough (FOR COUGH). 90 capsule 0    fluticasone-salmeterol (ADVAIR DISKUS) 100-50 MCG/ACT Inhalation Aerosol Powder, Breath Activated Inhale 1 puff into the lungs 2 (two) times daily. 1 each 3    Dextromethorphan-Benzocaine 5-7.5 MG Mouth/Throat Lozenge Use as directed 1 lozenge in the mouth or throat every 4 to 6 hours as needed. 20 lozenge 0    methylPREDNISolone 4 MG Oral Tablet Therapy Pack Take as directed with food. 1 each 0    albuterol (2.5 MG/3ML) 0.083% Inhalation Nebu Soln Take 3 mL (2.5 mg total) by nebulization every 4 (four) hours as needed for Wheezing or Shortness of Breath. 30 each 0    pregabalin 300 MG Oral Cap Take 1 capsule (300 mg total) by mouth daily. 90 capsule 0    aspirin 81 MG Oral Tab EC Take 1 tablet (81 mg total) by mouth daily.      metoprolol tartrate 50 MG Oral Tab       atorvastatin 40 MG Oral Tab Take 1 tablet (40 mg total) by mouth daily. FOR CHOLESTEROL. 90 tablet 9    fenofibrate 48 MG Oral Tab Take 1 tablet (48 mg total) by mouth daily. FOR TRIGLYCERIDES. 90 tablet 9    pantoprazole 40 MG Oral Tab EC Take 1 tablet (40 mg total) by mouth before breakfast. FOR ACID REFLUX. 90 tablet 9    cyanocobalamin 1000 MCG Oral Tab Take 1 tablet (1,000 mcg total) by mouth daily. 90 tablet 4    HYDROcodone-acetaminophen  MG Oral Tab Take 1 tablet by mouth every 8 (eight) hours as needed for Pain. 90 tablet 0    LANTUS 100 UNIT/ML Subcutaneous Solution Inject 35 Units into the skin nightly. 30 mL 1    amitriptyline 25 MG Oral Tab Take 1 tablet (25 mg total) by mouth nightly. 90 tablet 1    NOVOLOG 100 UNIT/ML  Injection Solution INJECT 50 UNITS SUBCUTANEOUSLY ONCE DAILY VIA  CORRECTION  FACTOR 40 mL 0    metRONIDAZOLE 0.75 % External Cream Apply 1 Application topically daily. For breakout on face 45 g 0    Pimecrolimus 1 % External Cream APPLY   TOPICALLY AFFECTED AREA ON FACE ONCE DAILY TO TWICE DAILY 60 g 0    Tazarotene 0.1 % External Cream Apply to chest nightly (Patient taking differently: Apply to chest nightly PRN) 60 g 3    Ferrous Gluconate 225 (27 Fe) MG Oral Tab Take 27 mg by mouth daily.      Omega-3 Fatty Acids (FISH OIL) 600 MG Oral Cap Take 1 capsule by mouth nightly.        Multiple Vitamins-Minerals (CENTRUM SILVER) Oral Tab Take 1 tablet by mouth daily.      azelastine 137 MCG/SPRAY Nasal Solution 1-2 sprays by Nasal route in the morning and 1-2 sprays before bedtime. FOR SINUS SYMPTOMS/NASAL CONGESTION.. 30 mL 3    BD INSULIN SYRINGE U/F 31G X 5/16\" 0.5 ML Does not apply Misc Inject insulin 4 times per day as instructed. 400 each 0    Betamethasone Dipropionate Aug 0.05 % External Cream Apply 1 Application topically 2 (two) times daily. APPLY TO AFFECTED AREA 50 g 1    FREESTYLE LITE TEST In Vitro Strip USE 1 STRIP TO CHECK BLOOD GLUCOSE 4 TIMES DAILY. 400 strip 0    RELION INSULIN SYRINGE 31G X 15/64\" 0.5 ML Does not apply Misc 1 Syringe 4 (four) times daily. 400 each 2    RELION INSULIN SYRINGE 31G X 15/64\" 0.5 ML Does not apply Misc Inject 1 Syringe into the skin 4 (four) times daily. 400 each 0    RELION INSULIN SYRINGE 31G X 15/64\" 0.5 ML Does not apply Misc USE 1 SYRINGE SUBCUTANEOUSLY 4 TIMES DAILY 200 each 0    Insulin Syringe-Needle U-100 (RELION INSULIN SYRINGE) 31G X 15/64\" 0.5 ML Does not apply Misc 1 Syringe by Does not apply route 4 (four) times daily. 400 each 0    Insulin Syringe-Needle U-100 (RELION INSULIN SYRINGE) 31G X 15/64\" 0.5 ML Does not apply Misc 1 Syringe by Does not apply route 4 (four) times daily. 400 each 0    Blood Glucose Monitoring Suppl (ACCU-CHEK INÉS PLUS) w/Device  Does not apply Kit Use as directed to check blood sugars. 1 kit 0    Glucose Blood (ACCU-CHEK INÉS PLUS) In Vitro Strip Use to check blood sugars 3 times a day. 300 strip 1       Allergies:  Allergies   Allergen Reactions    Cefdinir DIARRHEA    Zosyn [Piperacillin Sod-Tazobactam So] OTHER (SEE COMMENTS)     Heightened sense of smell; dry heaves, vomiting         ROS:     Constitutional:  Negative for decreased activity, fever, irritability and lethargy  ENMT:  Negative for ear drainage, hearing loss and nasal drainage  Eyes:  Negative for eye discharge and vision loss  Cardiovascular:  Negative for chest pain, sob  Respiratory:  Negative for cough, dyspnea and wheezing  Gastrointestinal:  Negative for abdominal pain, constipation  Genitourinary:  Negative for dysuria and hematuria  Endocrine:  Negative for abnormal sleep patterns  Hema/Lymph:  Negative for easy bleeding and easy bruising  Integumentary:  Negative for pruritus and rash  Musculoskeletal: Chronic pain continues  Neurological: Neuropathy continues  Psychiatric:  Negative for inappropriate interaction and psychiatric symptoms      Vitals:    02/02/24 1342   BP: 103/54   Pulse: 80       PHYSICAL EXAM:   Constitutional: appears well hydrated alert and responsive   Head/Face: normocephalic  Eyes/Vision: normal extraocular motion is intact  Nose/Mouth/Throat:mucous membranes are moist   Neck/Thyroid: neck is supple without adenopathy  Lymphatic: no abnormal cervical, supraclavicular adenopathy is noted  Respiratory:  lungs are clear to auscultation bilaterally  Cardiovascular: regular rate and rhythm   Abdomen: soft, non-tender, non-distended, BS normal  Skin/Hair: no unusual rashes present, no abnormal bruising noted  Back/Spine: no abnormalities noted  Musculoskeletal: no deformities  Extremities: no edema  Neurological:  Grossly normal       ASSESSMENT/PLAN:   Assessment   Encounter Diagnoses   Name Primary?    Neuropathy     Type 2 DM with CKD stage 4  and hypertension (HCC) Yes    Hyperlipidemia associated with type 2 diabetes mellitus  (HCC)     Type 2 diabetes mellitus with both eyes affected by mild nonproliferative retinopathy without macular edema, without long-term current use of insulin (HCC)        1 diabetes  Test A1c not bad at 7 him on his current insulin    #2 CKD 4 creatinine 3.4 to no acidosis GFR of 19 not a candidate for transplant with other medical problems     #3 high potassium instructed on low potassium diet  #4 anemia per Dr. 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      Meds This Visit:  Requested Prescriptions     Signed Prescriptions Disp Refills    HYDROcodone-acetaminophen (NORCO)  MG Oral Tab 90 tablet 0     Sig: Take 1 tablet by mouth every 8 (eight) hours as needed for Pain.       Imaging & Referrals:  None     2/2/2024  Jaun Enciso MD

## 2024-02-07 ENCOUNTER — LAB ENCOUNTER (OUTPATIENT)
Dept: LAB | Age: 69
End: 2024-02-07
Attending: INTERNAL MEDICINE
Payer: MEDICARE

## 2024-02-07 DIAGNOSIS — D46.9 MDS (MYELODYSPLASTIC SYNDROME) (HCC): ICD-10-CM

## 2024-02-07 DIAGNOSIS — D61.818 PANCYTOPENIA (HCC): ICD-10-CM

## 2024-02-07 LAB
BASOPHILS # BLD AUTO: 0.01 X10(3) UL (ref 0–0.2)
BASOPHILS NFR BLD AUTO: 0.4 %
DEPRECATED RDW RBC AUTO: 71.5 FL (ref 35.1–46.3)
EOSINOPHIL # BLD AUTO: 0.06 X10(3) UL (ref 0–0.7)
EOSINOPHIL NFR BLD AUTO: 2.7 %
ERYTHROCYTE [DISTWIDTH] IN BLOOD BY AUTOMATED COUNT: 18.1 % (ref 11–15)
HCT VFR BLD AUTO: 29.2 %
HGB BLD-MCNC: 9.2 G/DL
IMM GRANULOCYTES # BLD AUTO: 0.01 X10(3) UL (ref 0–1)
IMM GRANULOCYTES NFR BLD: 0.4 %
LYMPHOCYTES # BLD AUTO: 1.15 X10(3) UL (ref 1–4)
LYMPHOCYTES NFR BLD AUTO: 51.6 %
MCH RBC QN AUTO: 34.1 PG (ref 26–34)
MCHC RBC AUTO-ENTMCNC: 31.5 G/DL (ref 31–37)
MCV RBC AUTO: 108.1 FL
MONOCYTES # BLD AUTO: 0.21 X10(3) UL (ref 0.1–1)
MONOCYTES NFR BLD AUTO: 9.4 %
NEUTROPHILS # BLD AUTO: 0.79 X10 (3) UL (ref 1.5–7.7)
NEUTROPHILS # BLD AUTO: 0.79 X10(3) UL (ref 1.5–7.7)
NEUTROPHILS NFR BLD AUTO: 35.5 %
PLATELET # BLD AUTO: 90 10(3)UL (ref 150–450)
PLATELET MORPHOLOGY: NORMAL
RBC # BLD AUTO: 2.7 X10(6)UL
WBC # BLD AUTO: 2.2 X10(3) UL (ref 4–11)

## 2024-02-07 PROCEDURE — 85025 COMPLETE CBC W/AUTO DIFF WBC: CPT

## 2024-02-07 PROCEDURE — 36415 COLL VENOUS BLD VENIPUNCTURE: CPT

## 2024-02-09 ENCOUNTER — NURSE ONLY (OUTPATIENT)
Dept: HEMATOLOGY/ONCOLOGY | Facility: HOSPITAL | Age: 69
End: 2024-02-09
Attending: INTERNAL MEDICINE
Payer: MEDICARE

## 2024-02-09 DIAGNOSIS — D61.818 PANCYTOPENIA (HCC): ICD-10-CM

## 2024-02-09 DIAGNOSIS — D46.9 MDS (MYELODYSPLASTIC SYNDROME) (HCC): Primary | ICD-10-CM

## 2024-02-09 PROCEDURE — 96372 THER/PROPH/DIAG INJ SC/IM: CPT

## 2024-02-09 NOTE — PROGRESS NOTES
Pt here for Q2 week aranesp injection - gets labs outpt at [place]  Hgl 9.2 on 2/7 outpt lab  Reports feeling good, denies new complaints/ concerns    Aranesp 500mcg given left upper arm subcutaneous, tolerated well - much less painful if given slow, warmed   Must give super slow, warm capped needle/ syringe in hand, do not apply bandage d/t hairy arms per pt.  Parameters are to hold if hgb is 11 or greater - pt is aware    Discharged stable to home with future appts. Given AVS  Sees Dr Helm in March  Gait steady, indep

## 2024-02-21 ENCOUNTER — LAB ENCOUNTER (OUTPATIENT)
Dept: LAB | Age: 69
End: 2024-02-21
Attending: INTERNAL MEDICINE
Payer: MEDICARE

## 2024-02-21 ENCOUNTER — OFFICE VISIT (OUTPATIENT)
Dept: ENDOCRINOLOGY CLINIC | Facility: CLINIC | Age: 69
End: 2024-02-21

## 2024-02-21 VITALS
WEIGHT: 233 LBS | SYSTOLIC BLOOD PRESSURE: 116 MMHG | HEART RATE: 86 BPM | DIASTOLIC BLOOD PRESSURE: 63 MMHG | BODY MASS INDEX: 34 KG/M2

## 2024-02-21 DIAGNOSIS — D61.818 PANCYTOPENIA (HCC): ICD-10-CM

## 2024-02-21 DIAGNOSIS — E10.22 CKD STAGE 4 DUE TO TYPE 1 DIABETES MELLITUS (HCC): ICD-10-CM

## 2024-02-21 DIAGNOSIS — N18.4 CKD STAGE 4 DUE TO TYPE 1 DIABETES MELLITUS (HCC): ICD-10-CM

## 2024-02-21 DIAGNOSIS — I12.9 TYPE 2 DM WITH CKD STAGE 4 AND HYPERTENSION (HCC): ICD-10-CM

## 2024-02-21 DIAGNOSIS — E11.22 TYPE 2 DM WITH CKD STAGE 4 AND HYPERTENSION (HCC): ICD-10-CM

## 2024-02-21 DIAGNOSIS — D46.9 MDS (MYELODYSPLASTIC SYNDROME) (HCC): ICD-10-CM

## 2024-02-21 DIAGNOSIS — E11.3293 TYPE 2 DIABETES MELLITUS WITH BOTH EYES AFFECTED BY MILD NONPROLIFERATIVE RETINOPATHY WITHOUT MACULAR EDEMA, WITHOUT LONG-TERM CURRENT USE OF INSULIN (HCC): ICD-10-CM

## 2024-02-21 DIAGNOSIS — E11.69 HYPERLIPIDEMIA ASSOCIATED WITH TYPE 2 DIABETES MELLITUS (HCC): ICD-10-CM

## 2024-02-21 DIAGNOSIS — N18.4 TYPE 2 DM WITH CKD STAGE 4 AND HYPERTENSION (HCC): ICD-10-CM

## 2024-02-21 DIAGNOSIS — E11.65 UNCONTROLLED TYPE 2 DIABETES MELLITUS WITH HYPERGLYCEMIA (HCC): Primary | ICD-10-CM

## 2024-02-21 DIAGNOSIS — G62.9 NEUROPATHY: ICD-10-CM

## 2024-02-21 DIAGNOSIS — E78.5 HYPERLIPIDEMIA ASSOCIATED WITH TYPE 2 DIABETES MELLITUS (HCC): ICD-10-CM

## 2024-02-21 LAB
ALBUMIN SERPL-MCNC: 3.8 G/DL (ref 3.2–4.8)
ALBUMIN/GLOB SERPL: 1.6 {RATIO} (ref 1–2)
ALP LIVER SERPL-CCNC: 74 U/L
ALT SERPL-CCNC: 21 U/L
ANION GAP SERPL CALC-SCNC: 6 MMOL/L (ref 0–18)
AST SERPL-CCNC: 23 U/L (ref ?–34)
BASOPHILS # BLD AUTO: 0 X10(3) UL (ref 0–0.2)
BASOPHILS NFR BLD AUTO: 0 %
BILIRUB SERPL-MCNC: 0.8 MG/DL (ref 0.2–1.1)
BUN BLD-MCNC: 50 MG/DL (ref 9–23)
BUN/CREAT SERPL: 15.8 (ref 10–20)
CALCIUM BLD-MCNC: 8.5 MG/DL (ref 8.7–10.4)
CARTRIDGE LOT#: ABNORMAL NUMERIC
CHLORIDE SERPL-SCNC: 112 MMOL/L (ref 98–112)
CO2 SERPL-SCNC: 25 MMOL/L (ref 21–32)
CREAT BLD-MCNC: 3.17 MG/DL
DEPRECATED RDW RBC AUTO: 69.8 FL (ref 35.1–46.3)
EGFRCR SERPLBLD CKD-EPI 2021: 20 ML/MIN/1.73M2 (ref 60–?)
EOSINOPHIL # BLD AUTO: 0.06 X10(3) UL (ref 0–0.7)
EOSINOPHIL NFR BLD AUTO: 2.5 %
ERYTHROCYTE [DISTWIDTH] IN BLOOD BY AUTOMATED COUNT: 17.5 % (ref 11–15)
EST. AVERAGE GLUCOSE BLD GHB EST-MCNC: 151 MG/DL (ref 68–126)
FASTING STATUS PATIENT QL REPORTED: NO
GLOBULIN PLAS-MCNC: 2.4 G/DL (ref 2.8–4.4)
GLUCOSE BLD-MCNC: 162 MG/DL (ref 70–99)
GLUCOSE BLOOD: 180
HBA1C MFR BLD: 6.9 % (ref ?–5.7)
HCT VFR BLD AUTO: 31.4 %
HEMOGLOBIN A1C: 6.9 % (ref 4.3–5.6)
HGB BLD-MCNC: 9.7 G/DL
IMM GRANULOCYTES # BLD AUTO: 0.01 X10(3) UL (ref 0–1)
IMM GRANULOCYTES NFR BLD: 0.4 %
LYMPHOCYTES # BLD AUTO: 0.98 X10(3) UL (ref 1–4)
LYMPHOCYTES NFR BLD AUTO: 40.7 %
MCH RBC QN AUTO: 33.7 PG (ref 26–34)
MCHC RBC AUTO-ENTMCNC: 30.9 G/DL (ref 31–37)
MCV RBC AUTO: 109 FL
MONOCYTES # BLD AUTO: 0.24 X10(3) UL (ref 0.1–1)
MONOCYTES NFR BLD AUTO: 10 %
NEUTROPHILS # BLD AUTO: 1.12 X10 (3) UL (ref 1.5–7.7)
NEUTROPHILS # BLD AUTO: 1.12 X10(3) UL (ref 1.5–7.7)
NEUTROPHILS NFR BLD AUTO: 46.4 %
OSMOLALITY SERPL CALC.SUM OF ELEC: 313 MOSM/KG (ref 275–295)
PLATELET # BLD AUTO: 98 10(3)UL (ref 150–450)
PLATELET MORPHOLOGY: NORMAL
POTASSIUM SERPL-SCNC: 5.9 MMOL/L (ref 3.5–5.1)
PROT SERPL-MCNC: 6.2 G/DL (ref 5.7–8.2)
PTH-INTACT SERPL-MCNC: 138.4 PG/ML (ref 18.5–88)
RBC # BLD AUTO: 2.88 X10(6)UL
SODIUM SERPL-SCNC: 143 MMOL/L (ref 136–145)
TEST STRIP LOT #: NORMAL NUMERIC
WBC # BLD AUTO: 2.4 X10(3) UL (ref 4–11)

## 2024-02-21 PROCEDURE — 99214 OFFICE O/P EST MOD 30 MIN: CPT | Performed by: INTERNAL MEDICINE

## 2024-02-21 PROCEDURE — 83036 HEMOGLOBIN GLYCOSYLATED A1C: CPT | Performed by: INTERNAL MEDICINE

## 2024-02-21 PROCEDURE — 82947 ASSAY GLUCOSE BLOOD QUANT: CPT | Performed by: INTERNAL MEDICINE

## 2024-02-21 PROCEDURE — 85025 COMPLETE CBC W/AUTO DIFF WBC: CPT

## 2024-02-21 PROCEDURE — 80053 COMPREHEN METABOLIC PANEL: CPT

## 2024-02-21 PROCEDURE — 83970 ASSAY OF PARATHORMONE: CPT

## 2024-02-21 PROCEDURE — 36415 COLL VENOUS BLD VENIPUNCTURE: CPT

## 2024-02-21 PROCEDURE — 83036 HEMOGLOBIN GLYCOSYLATED A1C: CPT

## 2024-02-21 RX ORDER — INSULIN ASPART 100 [IU]/ML
INJECTION, SOLUTION INTRAVENOUS; SUBCUTANEOUS
Qty: 40 ML | Refills: 0 | Status: SHIPPED | OUTPATIENT
Start: 2024-02-21

## 2024-02-21 RX ORDER — INSULIN GLARGINE 100 [IU]/ML
35 INJECTION, SOLUTION SUBCUTANEOUS NIGHTLY
Qty: 30 ML | Refills: 1 | Status: SHIPPED | OUTPATIENT
Start: 2024-02-21

## 2024-02-21 NOTE — PROGRESS NOTES
Name: Jaun Burton  Date: 2024    Referring Physician: No ref. provider found    HISTORY OF PRESENT ILLNESS   Jaun Burton is a 68 year old male who presents for diabetes mellitus.      Prior HbA, C or glycohemoglobin were 9.8% 2014; 7.7% 2015; 7.2% 2016; 7.6% 2016; 6.8% 2016; 6.9% 10/2016; 7.1% 2017; 8.2% 2017; 7.1% 2017; 7.2% 2017; 7.4% 3/2018; 6.5% 2018; 6.9% 2018; 6.6% 2019; 6.7% 2019; 6.6% 2019; 7.2% 2020; 6.6% 2021; 7.3% 2021; 7.3% 2022; 7.2% 2022; 7.0% 2023; 6.4% 2023; 6.9% POC Today     Dietary compliance: Fair -->he admits to more cheating on diet   Exercise: Yes -->he has increased activity since last visit, riding bike and fishing; gym 3-5 times per week  Polyuria/polydipsia: No  Blurred vision: No    Episodes of hypoglycemia: Rarely - twice per month   Blood Glucose:  Checking 4-5 times per day  Fastin,252,109,87,180,173,140  Lunch: 113,168,245,157,94,179,163  Dinner: 304,168,127,265,211,148    -->patient is checking up to 5 times per day due to glucose variability and hypoglycemia unawareness     Medications for DM   Lantus 35 units SQ QHS (occ increasing dose to 40)  Humalog 6-8-10 units SQ TID with meals plus CF      REVIEW OF SYSTEMS  Eyes: Diabetic retinopathy present: No            Most recent visit to eye doctor in last 12 months: Yes    CV: Cardiovascular disease present: No         Hypertension present: Yes         Hyperlipidemia present: Yes         Peripheral Vascular Disease present: No    : Nephropathy present: Yes - followed by Dr. Enciso, Cr 3.0    Neuro: Neuropathy present: Yes - significant LE neuropathy treated with lyrica and narcotics    Skin: Infection or ulceration: No    Osteoporosis: No    Thyroid disease: No      Medications:     Current Outpatient Medications:     [START ON 3/25/2024] HYDROcodone-acetaminophen (NORCO)  MG Oral Tab, Take 1 tablet by mouth every 8 (eight) hours as needed for Pain., Disp: 90  tablet, Rfl: 0    azelastine 137 MCG/SPRAY Nasal Solution, 1-2 sprays by Nasal route in the morning and 1-2 sprays before bedtime. FOR SINUS SYMPTOMS/NASAL CONGESTION.., Disp: 30 mL, Rfl: 3    fluticasone propionate 50 MCG/ACT Nasal Suspension, 2 sprays by Each Nare route daily. FOR NASAL CONGESTION/SINUS SYMPTOMS., Disp: 3 each, Rfl: 3    fluticasone-salmeterol (ADVAIR DISKUS) 100-50 MCG/ACT Inhalation Aerosol Powder, Breath Activated, Inhale 1 puff into the lungs 2 (two) times daily., Disp: 1 each, Rfl: 3    Dextromethorphan-Benzocaine 5-7.5 MG Mouth/Throat Lozenge, Use as directed 1 lozenge in the mouth or throat every 4 to 6 hours as needed., Disp: 20 lozenge, Rfl: 0    methylPREDNISolone 4 MG Oral Tablet Therapy Pack, Take as directed with food., Disp: 1 each, Rfl: 0    pregabalin 300 MG Oral Cap, Take 1 capsule (300 mg total) by mouth daily., Disp: 90 capsule, Rfl: 0    aspirin 81 MG Oral Tab EC, Take 1 tablet (81 mg total) by mouth daily., Disp: , Rfl:     metoprolol tartrate 50 MG Oral Tab, , Disp: , Rfl:     BD INSULIN SYRINGE U/F 31G X 5/16\" 0.5 ML Does not apply Misc, Inject insulin 4 times per day as instructed., Disp: 400 each, Rfl: 0    atorvastatin 40 MG Oral Tab, Take 1 tablet (40 mg total) by mouth daily. FOR CHOLESTEROL., Disp: 90 tablet, Rfl: 9    fenofibrate 48 MG Oral Tab, Take 1 tablet (48 mg total) by mouth daily. FOR TRIGLYCERIDES., Disp: 90 tablet, Rfl: 9    pantoprazole 40 MG Oral Tab EC, Take 1 tablet (40 mg total) by mouth before breakfast. FOR ACID REFLUX., Disp: 90 tablet, Rfl: 9    cyanocobalamin 1000 MCG Oral Tab, Take 1 tablet (1,000 mcg total) by mouth daily., Disp: 90 tablet, Rfl: 4    Betamethasone Dipropionate Aug 0.05 % External Cream, Apply 1 Application topically 2 (two) times daily. APPLY TO AFFECTED AREA, Disp: 50 g, Rfl: 1    FREESTYLE LITE TEST In Vitro Strip, USE 1 STRIP TO CHECK BLOOD GLUCOSE 4 TIMES DAILY., Disp: 400 strip, Rfl: 0    HYDROcodone-acetaminophen  MG  Oral Tab, Take 1 tablet by mouth every 8 (eight) hours as needed for Pain., Disp: 90 tablet, Rfl: 0    LANTUS 100 UNIT/ML Subcutaneous Solution, Inject 35 Units into the skin nightly., Disp: 30 mL, Rfl: 1    amitriptyline 25 MG Oral Tab, Take 1 tablet (25 mg total) by mouth nightly., Disp: 90 tablet, Rfl: 1    NOVOLOG 100 UNIT/ML Injection Solution, INJECT 50 UNITS SUBCUTANEOUSLY ONCE DAILY VIA  CORRECTION  FACTOR, Disp: 40 mL, Rfl: 0    RELION INSULIN SYRINGE 31G X 15/64\" 0.5 ML Does not apply Misc, 1 Syringe 4 (four) times daily., Disp: 400 each, Rfl: 2    RELION INSULIN SYRINGE 31G X 15/64\" 0.5 ML Does not apply Misc, Inject 1 Syringe into the skin 4 (four) times daily., Disp: 400 each, Rfl: 0    RELION INSULIN SYRINGE 31G X 15/64\" 0.5 ML Does not apply Misc, USE 1 SYRINGE SUBCUTANEOUSLY 4 TIMES DAILY, Disp: 200 each, Rfl: 0    metRONIDAZOLE 0.75 % External Cream, Apply 1 Application topically daily. For breakout on face, Disp: 45 g, Rfl: 0    Insulin Syringe-Needle U-100 (RELION INSULIN SYRINGE) 31G X 15/64\" 0.5 ML Does not apply Misc, 1 Syringe by Does not apply route 4 (four) times daily., Disp: 400 each, Rfl: 0    Pimecrolimus 1 % External Cream, APPLY   TOPICALLY AFFECTED AREA ON FACE ONCE DAILY TO TWICE DAILY, Disp: 60 g, Rfl: 0    Tazarotene 0.1 % External Cream, Apply to chest nightly (Patient taking differently: Apply to chest nightly PRN), Disp: 60 g, Rfl: 3    Insulin Syringe-Needle U-100 (RELION INSULIN SYRINGE) 31G X 15/64\" 0.5 ML Does not apply Misc, 1 Syringe by Does not apply route 4 (four) times daily., Disp: 400 each, Rfl: 0    Blood Glucose Monitoring Suppl (ACCU-CHEK INÉS PLUS) w/Device Does not apply Kit, Use as directed to check blood sugars., Disp: 1 kit, Rfl: 0    Glucose Blood (ACCU-CHEK INÉS PLUS) In Vitro Strip, Use to check blood sugars 3 times a day., Disp: 300 strip, Rfl: 1    Ferrous Gluconate 225 (27 Fe) MG Oral Tab, Take 27 mg by mouth daily., Disp: , Rfl:     Omega-3 Fatty  Acids (FISH OIL) 600 MG Oral Cap, Take 1 capsule by mouth nightly.  , Disp: , Rfl:     Multiple Vitamins-Minerals (CENTRUM SILVER) Oral Tab, Take 1 tablet by mouth daily., Disp: , Rfl:      Allergies:   Allergies   Allergen Reactions    Cefdinir DIARRHEA    Zosyn [Piperacillin Sod-Tazobactam So] OTHER (SEE COMMENTS)     Heightened sense of smell; dry heaves, vomiting       Social History:   Social History     Socioeconomic History    Marital status:    Tobacco Use    Smoking status: Former     Packs/day: 0     Types: Cigarettes     Quit date: 1989     Years since quittin.3    Smokeless tobacco: Former    Tobacco comments:     quit about 30 years ago.   Vaping Use    Vaping Use: Never used   Substance and Sexual Activity    Alcohol use: No    Drug use: No   Other Topics Concern    Caffeine Concern Yes     Comment: 1 cup coffee per day    Exercise No    Reaction to local anesthetic No       Medical History:   Past Medical History:   Diagnosis Date    C2-3 mild central, C3-4 mild-mod diffuse, C4-5 mild diffuse, C5-6 mod diffuse bulging discs 3/15/2018    C5-6 mod central & bilateral mild foraminal, C4-5 left mild foraminal stenosis 2018    C6-7 mild-mod central herniated disc 3/15/2018    Cataract         Chronic kidney disease (CKD)     stage 3    Diabetes mellitus (HCC)     Diabetes type 2, uncontrolled     Diabetic retinopathy (HCC)     [facility]    Hyperlipidemia     Meibomian gland dysfunction     Osteoarthritis of spine with radiculopathy, cervical region 2018    Pancreatitis (HCC)     Primary osteoarthritis of both shoulders 2018    Proteinuria     Type II or unspecified type diabetes mellitus without mention of complication, not stated as uncontrolled     Pills & Insulin    Vitreous floaters        Surgical history:   Past Surgical History:   Procedure Laterality Date    APPENDECTOMY      CATARACT EXTRACTION W/  INTRAOCULAR LENS IMPLANT Right  06/11/2018    Dr. Lopez    CATARACT EXTRACTION W/  INTRAOCULAR LENS IMPLANT Left 07/12/2018    Dr. Lopez    CHOLECYSTECTOMY  2012    ELECTROCARDIOGRAM, COMPLETE  4/23/2012    scanned to media tab    HERNIA SURGERY           PHYSICAL EXAM  /63   Pulse 86   Wt 233 lb (105.7 kg)   BMI 34.41 kg/m²     General Appearance:  alert, well developed, in no acute distress  Eyes:  normal conjunctivae, sclera., normal sclera and normal pupils  Ears/Nose/Mouth/Throat/Neck:  no palpable thyroid nodules or cervical lymphadenopathy  Back: no kyphosis or back tenderness  Musculoskeletal:  normal muscle strength and tone  Skin:  normal moisture and skin texture  Neuro:  sensory grossly intact and motor grossly intact  Psychiatric:  oriented to time, self, and place  Nutritional:  no abnormal weight gain or loss    ASSESSMENT/PLAN:      1. Diabetes Mellitus, Type 2 controlled  -controlled, HgA1c 6.4% -->improved   -Congratulated patient on overall stable glycemic control   -Discussed importance of glycemic control to prevent complications of diabetes  -Discussed complications of diabetes include retinopathy, neuropathy, nephropathy and cardiovascular disease  -Discussed importance of SBGM  -Discussed importance of low CHO diet  -Safest for renal function to continue insulin at this time  -Continue Lantus 35 units SQ daily   -Continue Humalog 6-8-10  -Continue CF 1:40>140   -Unfortunately CGM was too expensive  -Normal lipids   -Normotensive  -Renal function stable and followed by Dr. Enciso  -Persistent neuropathy symptoms, Continue amitryptiline 25mg pO at bedtime, verbalized understanding of risks and benefits      A total of 30 minutes was spent on obtaining history, reviewing pertinent labs, evaluating patient, providing multiple treatment options, reinforcing diet/exercise and compliance, and completing documentation.       RTC 6 months     2/21/2024  Tanya Naranjo MD

## 2024-02-23 ENCOUNTER — TELEPHONE (OUTPATIENT)
Dept: NEPHROLOGY | Facility: CLINIC | Age: 69
End: 2024-02-23

## 2024-02-23 ENCOUNTER — NURSE ONLY (OUTPATIENT)
Dept: HEMATOLOGY/ONCOLOGY | Facility: HOSPITAL | Age: 69
End: 2024-02-23
Attending: INTERNAL MEDICINE
Payer: MEDICARE

## 2024-02-23 DIAGNOSIS — D46.9 MDS (MYELODYSPLASTIC SYNDROME) (HCC): Primary | ICD-10-CM

## 2024-02-23 DIAGNOSIS — D61.818 PANCYTOPENIA (HCC): ICD-10-CM

## 2024-02-23 DIAGNOSIS — E10.22 CKD STAGE 4 DUE TO TYPE 1 DIABETES MELLITUS (HCC): Primary | ICD-10-CM

## 2024-02-23 DIAGNOSIS — N18.4 CKD STAGE 4 DUE TO TYPE 1 DIABETES MELLITUS (HCC): Primary | ICD-10-CM

## 2024-02-23 PROCEDURE — 96372 THER/PROPH/DIAG INJ SC/IM: CPT

## 2024-02-23 NOTE — PROGRESS NOTES
Patient here for Aranesp injection today.    Cbc drawn on 2/21.. Hgb 9.7    Aranesp given per parameters.  Tolerated injection well.    Patient states he is doing well,  states throat little scratchy.  States he thinks it is from all the coughing he did when he had RSV in December.      Patient discharged ambulatory, aware of future appointments already made.

## 2024-02-23 NOTE — TELEPHONE ENCOUNTER
Jaun Enciso MD Rios, Jennette B, RN  Please notify wife the labs are stable, including kidneys and diabetes except potassium is running high again. He needs to really get a little stricter with his potassium restriction and I’d like him to take low Cal[place] 10 g twice a week please if we could call it in hopefully it’s not too expensive. Thank you let’s repeat a BMP in about 10 days.

## 2024-03-06 ENCOUNTER — LAB ENCOUNTER (OUTPATIENT)
Dept: LAB | Age: 69
End: 2024-03-06
Attending: INTERNAL MEDICINE
Payer: MEDICARE

## 2024-03-06 ENCOUNTER — TELEPHONE (OUTPATIENT)
Dept: NEPHROLOGY | Facility: CLINIC | Age: 69
End: 2024-03-06

## 2024-03-06 DIAGNOSIS — D46.9 MDS (MYELODYSPLASTIC SYNDROME) (HCC): ICD-10-CM

## 2024-03-06 DIAGNOSIS — D61.818 PANCYTOPENIA (HCC): ICD-10-CM

## 2024-03-06 LAB
BASOPHILS # BLD AUTO: 0.01 X10(3) UL (ref 0–0.2)
BASOPHILS NFR BLD AUTO: 0.4 %
DEPRECATED RDW RBC AUTO: 67.3 FL (ref 35.1–46.3)
EOSINOPHIL # BLD AUTO: 0.06 X10(3) UL (ref 0–0.7)
EOSINOPHIL NFR BLD AUTO: 2.3 %
ERYTHROCYTE [DISTWIDTH] IN BLOOD BY AUTOMATED COUNT: 16.8 % (ref 11–15)
HCT VFR BLD AUTO: 32.2 %
HGB BLD-MCNC: 10.4 G/DL
IMM GRANULOCYTES # BLD AUTO: 0.01 X10(3) UL (ref 0–1)
IMM GRANULOCYTES NFR BLD: 0.4 %
LYMPHOCYTES # BLD AUTO: 1.08 X10(3) UL (ref 1–4)
LYMPHOCYTES NFR BLD AUTO: 41.2 %
MCH RBC QN AUTO: 35.4 PG (ref 26–34)
MCHC RBC AUTO-ENTMCNC: 32.3 G/DL (ref 31–37)
MCV RBC AUTO: 109.5 FL
MONOCYTES # BLD AUTO: 0.24 X10(3) UL (ref 0.1–1)
MONOCYTES NFR BLD AUTO: 9.2 %
NEUTROPHILS # BLD AUTO: 1.22 X10 (3) UL (ref 1.5–7.7)
NEUTROPHILS # BLD AUTO: 1.22 X10(3) UL (ref 1.5–7.7)
NEUTROPHILS NFR BLD AUTO: 46.5 %
PLATELET # BLD AUTO: 93 10(3)UL (ref 150–450)
PLATELET MORPHOLOGY: NORMAL
RBC # BLD AUTO: 2.94 X10(6)UL
WBC # BLD AUTO: 2.6 X10(3) UL (ref 4–11)

## 2024-03-06 PROCEDURE — 36415 COLL VENOUS BLD VENIPUNCTURE: CPT

## 2024-03-06 PROCEDURE — 85025 COMPLETE CBC W/AUTO DIFF WBC: CPT

## 2024-03-06 NOTE — TELEPHONE ENCOUNTER
Pharmacy states medication not covered per insurance. Please switch to Veltassa or Sodium Polystyrene sulfonate and send new prescription.           sodium zirconium cyclosilicate (LOKELMA) 10 g Oral Powd Pack, Take 1 packet (10 g total) by mouth twice a week., Disp: 30 packet, Rfl: 0

## 2024-03-06 NOTE — TELEPHONE ENCOUNTER
Crystal from [facility] pharmacy calling to inform that the Lokelma is not covered but the insurance will cover the Veltassa medication.

## 2024-03-08 ENCOUNTER — NURSE ONLY (OUTPATIENT)
Dept: HEMATOLOGY/ONCOLOGY | Facility: HOSPITAL | Age: 69
End: 2024-03-08
Attending: INTERNAL MEDICINE
Payer: MEDICARE

## 2024-03-08 VITALS
DIASTOLIC BLOOD PRESSURE: 63 MMHG | OXYGEN SATURATION: 99 % | SYSTOLIC BLOOD PRESSURE: 134 MMHG | HEART RATE: 84 BPM | RESPIRATION RATE: 18 BRPM

## 2024-03-08 DIAGNOSIS — D61.818 PANCYTOPENIA (HCC): ICD-10-CM

## 2024-03-08 DIAGNOSIS — D46.9 MDS (MYELODYSPLASTIC SYNDROME) (HCC): Primary | ICD-10-CM

## 2024-03-08 PROCEDURE — 96372 THER/PROPH/DIAG INJ SC/IM: CPT

## 2024-03-08 NOTE — TELEPHONE ENCOUNTER
[facility] Dermatology Note      Dermatology Problem List:  1. Family history of melanoma  2. NMSC  -SCCIS, right forearm, s/p ED&C 2/11/19  -SCC, left dorsal hand, s/p Mohs 2/11/19  -BCC, right lower cutaneous lip, s/p Mohs 2/11/19  -SCC, right dorsal hand, s/p Mohs 3/2/2017  -SCC, left postauricular, s/p Mohs 9/3/2015  -BCC, superficial nodular, right mid cheek, s/p Mohs 9/4/14  -BCC, nodular pigmented, left nasolabial fold, s/p Mohs 9/4/14  -BCC, left superior helix, s/p Mohs 9/4/14  -Prior to 2005, history of BCC on the forehead, s/p excision  3. Actinic keratoses:   -s/p cryotherapy  -s/p Efudex cream     Encounter Date: Aug 20, 2019    CC:  Chief Complaint   Patient presents with     RECHECK     Filiberto is returning with no areas of concern        History of Present Illness:  Mr. Ulices Shah Paige is a 80 year old male who presents as a follow-up for history of NMSC. He was last seen in dermatology on 2/11/19 by Dr. Jones for Mohs surgery of a BCC on the right lower cutaneous lip and an SCC on the left dorsal hand. Also underwent ED&C of a SCCIS on the right forearm. Today he reports no areas of concern.      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   "    History of nonmelanoma skin cancer     Cardiomyopathy in diseases classified elsewhere (H)     Past Medical History:   Diagnosis Date     Actinic keratosis      Cancer (H)      Heart disease      History of blood transfusion      History of nonmelanoma skin cancer      Unspecified essential hypertension      Past Surgical History:   Procedure Laterality Date     C APPENDECTOMY  1951     C EXPLOR HEART SURG WND W CP BYPASS  10/25/01    4 way heart bypass     COLONOSCOPY  12/10/08     COLONOSCOPY N/A 3/7/2019    Procedure: COMBINED COLONOSCOPY,  MULTIPLE POLYPECTOMY BY BIOPSY;  Surgeon: Brayden Sharma DO;  Location:  GI     EXCISE MASS HAND Right 02/2017    \"skin cancer\" removal     HC REMV CATARACT EXTRACAP,INSERT LENS  10/21/2004    left     HC REMV CATARACT EXTRACAP,INSERT LENS  11/18/2004    right     HC YAG LASER CAPSULOTOMY  06/18/09    right eye     HEART CATH, ANGIOPLASTY  4/30/12    3 stents     HEMILAMINECTOMY, DISCECTOMY LUMBAR THREE LEVELS, COMBINED  6/26/2013    Procedure: COMBINED HEMILAMINECTOMY, DISCECTOMY LUMBAR THREE LEVELS;  Bilateral L3, L4 and L5 Guille-Laminectomies;  Surgeon: Ollie Dodson MD;  Location:  OR     Social History:  Reviewed and unchanged but kept in chart for clinician convenience  The patient is retired. He denies use of tanning beds. He has never been .  He has no children. He lives in [place]. Has a girlfriend.     Family History:  Kept in chart for clinician convenience  The patient reports a family history of melanoma in his father.  The patient denies family history of asthma, psoriasis, eczema or seasonal allergies.    Medications:  Current Outpatient Medications   Medication Sig Dispense Refill     aspirin 81 MG tablet Take 1 tablet (81 mg) by mouth daily 100 tablet 0     atorvastatin (LIPITOR) 40 MG tablet Take 40 mg by mouth daily       carvedilol (COREG) 25 MG tablet Take 25 mg by mouth 2 times daily (with meals).       cetirizine " (ZYRTEC) 10 MG tablet Take 1 tablet (10 mg) by mouth daily as needed for allergies (nasal congestion) 30 tablet 0     clopidogrel (PLAVIX) 75 MG tablet Take 1 tablet by mouth daily. 90 tablet 3     fluticasone (FLONASE) 50 MCG/ACT nasal spray Spray 2 sprays into both nostrils daily 1 g 6     lisinopril (PRINIVIL,ZESTRIL) 5 MG tablet Take 2.5 mg by mouth 2 times daily        mirtazapine (REMERON) 15 MG tablet Take 1 tablet (15 mg) by mouth At Bedtime 30 tablet 5     nitroglycerin (NITROSTAT) 0.4 MG SL tablet Place 1 tablet (0.4 mg) under the tongue every 5 minutes as needed 25 tablet 1     Omega-3 Fatty Acids (FISH OIL) 1000 MG CPDR Take 1 capsule by mouth 2 times daily.       Allergies   Allergen Reactions     No Known Drug Allergies      Review of Systems:   -Skin: Denies bleeding, crusting or changing lesions.   -Const: The patient is generally feeling well today. Denies fevers, chills or night sweats.     Physical exam:  There were no vitals taken for this visit.  GEN: This is a well-nourished, well developed male in no acute distress  SKIN: Total skin excluding the undergarment areas was performed. The exam included the head/face, neck, both arms, chest, back, abdomen, both legs, digits and/or nails.   - There are pink scaly patches and plaques on the back  - There are waxy stuck on tan to brown papules on the trunk and extremities   -No other lesions of concern on areas examined.     Impression/Plan:  1. Family history of melanoma    2. History of nonmelanoma skin cancer, no clincial evidence of recurrence:  Sun precaution was advised including the use of sun screens of SPF 30 or higher, and sun protective clothing.    3. History of Actinic keratoses:     4.   Eczema - back, mild    Start triamcinolone - apply BID for 1 week    Recommend moisturizers     5. SKs     No further intervention needed       Follow up in 6 months, earlier for new or changing lesions.     Staff Involved:  Scribe/Staff    Scribe  Disclosure  I, Ladonna Joe, am serving as a scribe to document services personally performed by Dr. Joceline Stone MD, based on data collection and the provider's statements to me.     Provider Disclosure:   The documentation recorded by the scribe accurately reflects the services I personally performed and the decisions made by me.    Joceline Stone MD    Department of Dermatology  [facility]: Phone: [phone], Fax:[phone]  [facility]: Phone: [phone], Fax: [phone]

## 2024-03-08 NOTE — PROGRESS NOTES
Pt here for Q2 week aranesp injection - gets labs outpt at [place]  Hgl 10.4on 3/6 outpt lab  Reports feeling good, denies new complaints/ concerns    Aranesp 500mcg given left upper arm subcutaneous, tolerated well - much less painful if given slow, warmed   Must give super slow, warm capped needle/ syringe in hand, do not apply bandage d/t hairy arms per pt.  Parameters are to hold if hgb is 11 or greater - pt is aware    Discharged stable to home with future appts. Given AVS - prefers appts after 1030 d/t parking  Sees Dr Helm in March  Gait steady, indep

## 2024-03-13 DIAGNOSIS — E11.65 UNCONTROLLED TYPE 2 DIABETES MELLITUS WITH HYPERGLYCEMIA (HCC): ICD-10-CM

## 2024-03-14 RX ORDER — BLOOD-GLUCOSE METER
KIT MISCELLANEOUS
Qty: 400 STRIP | Refills: 1 | Status: SHIPPED | OUTPATIENT
Start: 2024-03-14

## 2024-03-15 ENCOUNTER — APPOINTMENT (OUTPATIENT)
Dept: HEMATOLOGY/ONCOLOGY | Facility: HOSPITAL | Age: 69
End: 2024-03-15
Attending: INTERNAL MEDICINE
Payer: MEDICARE

## 2024-03-17 DIAGNOSIS — G62.9 NEUROPATHY: ICD-10-CM

## 2024-03-17 DIAGNOSIS — E11.3293 TYPE 2 DIABETES MELLITUS WITH BOTH EYES AFFECTED BY MILD NONPROLIFERATIVE RETINOPATHY WITHOUT MACULAR EDEMA, WITHOUT LONG-TERM CURRENT USE OF INSULIN (HCC): ICD-10-CM

## 2024-03-19 RX ORDER — PREGABALIN 300 MG/1
300 CAPSULE ORAL DAILY
Qty: 90 CAPSULE | Refills: 0 | Status: SHIPPED | OUTPATIENT
Start: 2024-03-19

## 2024-03-20 ENCOUNTER — LAB ENCOUNTER (OUTPATIENT)
Dept: LAB | Age: 69
End: 2024-03-20
Attending: INTERNAL MEDICINE
Payer: MEDICARE

## 2024-03-20 DIAGNOSIS — E10.22 CKD STAGE 4 DUE TO TYPE 1 DIABETES MELLITUS (HCC): ICD-10-CM

## 2024-03-20 DIAGNOSIS — D46.9 MDS (MYELODYSPLASTIC SYNDROME) (HCC): ICD-10-CM

## 2024-03-20 DIAGNOSIS — N18.4 CKD STAGE 4 DUE TO TYPE 1 DIABETES MELLITUS (HCC): ICD-10-CM

## 2024-03-20 DIAGNOSIS — D61.818 PANCYTOPENIA (HCC): ICD-10-CM

## 2024-03-20 LAB
ANION GAP SERPL CALC-SCNC: 6 MMOL/L (ref 0–18)
BASOPHILS # BLD AUTO: 0.01 X10(3) UL (ref 0–0.2)
BASOPHILS NFR BLD AUTO: 0.4 %
BUN BLD-MCNC: 56 MG/DL (ref 9–23)
BUN/CREAT SERPL: 17.3 (ref 10–20)
CALCIUM BLD-MCNC: 8.7 MG/DL (ref 8.7–10.4)
CHLORIDE SERPL-SCNC: 112 MMOL/L (ref 98–112)
CO2 SERPL-SCNC: 23 MMOL/L (ref 21–32)
CREAT BLD-MCNC: 3.23 MG/DL
DEPRECATED RDW RBC AUTO: 65.1 FL (ref 35.1–46.3)
EGFRCR SERPLBLD CKD-EPI 2021: 20 ML/MIN/1.73M2 (ref 60–?)
EOSINOPHIL # BLD AUTO: 0.06 X10(3) UL (ref 0–0.7)
EOSINOPHIL NFR BLD AUTO: 2.2 %
ERYTHROCYTE [DISTWIDTH] IN BLOOD BY AUTOMATED COUNT: 16.6 % (ref 11–15)
FASTING STATUS PATIENT QL REPORTED: NO
GLUCOSE BLD-MCNC: 265 MG/DL (ref 70–99)
HCT VFR BLD AUTO: 32.8 %
HGB BLD-MCNC: 10.5 G/DL
IMM GRANULOCYTES # BLD AUTO: 0 X10(3) UL (ref 0–1)
IMM GRANULOCYTES NFR BLD: 0 %
LYMPHOCYTES # BLD AUTO: 1.01 X10(3) UL (ref 1–4)
LYMPHOCYTES NFR BLD AUTO: 37.8 %
MCH RBC QN AUTO: 34.5 PG (ref 26–34)
MCHC RBC AUTO-ENTMCNC: 32 G/DL (ref 31–37)
MCV RBC AUTO: 107.9 FL
MONOCYTES # BLD AUTO: 0.27 X10(3) UL (ref 0.1–1)
MONOCYTES NFR BLD AUTO: 10.1 %
NEUTROPHILS # BLD AUTO: 1.32 X10 (3) UL (ref 1.5–7.7)
NEUTROPHILS # BLD AUTO: 1.32 X10(3) UL (ref 1.5–7.7)
NEUTROPHILS NFR BLD AUTO: 49.5 %
OSMOLALITY SERPL CALC.SUM OF ELEC: 317 MOSM/KG (ref 275–295)
PLATELET # BLD AUTO: 106 10(3)UL (ref 150–450)
PLATELET MORPHOLOGY: NORMAL
POTASSIUM SERPL-SCNC: 5.4 MMOL/L (ref 3.5–5.1)
RBC # BLD AUTO: 3.04 X10(6)UL
SODIUM SERPL-SCNC: 141 MMOL/L (ref 136–145)
WBC # BLD AUTO: 2.7 X10(3) UL (ref 4–11)

## 2024-03-20 PROCEDURE — 80048 BASIC METABOLIC PNL TOTAL CA: CPT

## 2024-03-20 PROCEDURE — 85025 COMPLETE CBC W/AUTO DIFF WBC: CPT

## 2024-03-20 PROCEDURE — 36415 COLL VENOUS BLD VENIPUNCTURE: CPT

## 2024-03-22 ENCOUNTER — APPOINTMENT (OUTPATIENT)
Dept: HEMATOLOGY/ONCOLOGY | Facility: HOSPITAL | Age: 69
End: 2024-03-22
Attending: INTERNAL MEDICINE
Payer: MEDICARE

## 2024-03-22 DIAGNOSIS — D46.9 MDS (MYELODYSPLASTIC SYNDROME) (HCC): Primary | ICD-10-CM

## 2024-03-22 DIAGNOSIS — D61.818 PANCYTOPENIA (HCC): ICD-10-CM

## 2024-03-22 PROCEDURE — 96372 THER/PROPH/DIAG INJ SC/IM: CPT

## 2024-03-24 DIAGNOSIS — E11.65 UNCONTROLLED TYPE 2 DIABETES MELLITUS WITH HYPERGLYCEMIA (HCC): ICD-10-CM

## 2024-03-25 RX ORDER — AMITRIPTYLINE HYDROCHLORIDE 25 MG/1
25 TABLET, FILM COATED ORAL NIGHTLY
Qty: 90 TABLET | Refills: 0 | Status: SHIPPED | OUTPATIENT
Start: 2024-03-25

## 2024-03-25 RX ORDER — BLOOD-GLUCOSE METER
KIT MISCELLANEOUS
Qty: 400 STRIP | Refills: 1 | Status: SHIPPED | OUTPATIENT
Start: 2024-03-25

## 2024-03-25 NOTE — TELEPHONE ENCOUNTER
Refill request for Amitriptyline    LOV: 2/21/2024  Next appointment is 8/21/24.    Last refill: 8/16/23, Called patient and verified with his wife that he is still taking the medication. Verified pharmacy, please send to Mohan Naranjo, the order has been pended.

## 2024-03-25 NOTE — TELEPHONE ENCOUNTER
Problem: Communication  Goal: The ability to communicate needs accurately and effectively will improve  Outcome: PROGRESSING AS EXPECTED    Pt updated on POC and all questions answered at this time. Hourly rounding in place.     Problem: Safety  Goal: Will remain free from injury  Outcome: PROGRESSING AS EXPECTED    Proper fall precautions in place. Call light within reach and encouraged to use. Hourly rounding in practice.       Endocrine Refill protocol for Glucose testing supplies       Protocol Criteria:  Appointment with Endocrinology completed in the last 12 months or scheduled in the next 6 months     Verify appointment has been completed or scheduled in the appropriate timeline. If so can send a 90 day supply with 1 refill.        Last completed office visit: 2/21/2024  Next scheduled Follow up: 8/21/2024    Per refill protocol, test strips refill sent to patient's pharmacy.

## 2024-04-02 ENCOUNTER — TELEPHONE (OUTPATIENT)
Dept: ENDOCRINOLOGY CLINIC | Facility: CLINIC | Age: 69
End: 2024-04-02

## 2024-04-02 DIAGNOSIS — E11.65 UNCONTROLLED TYPE 2 DIABETES MELLITUS WITH HYPERGLYCEMIA (HCC): ICD-10-CM

## 2024-04-02 NOTE — TELEPHONE ENCOUNTER
Pharm states test strip rx needs to be changed for medicare to approve - from testing 4 x daily to 3 x daily

## 2024-04-03 RX ORDER — BLOOD SUGAR DIAGNOSTIC
STRIP MISCELLANEOUS
Qty: 300 STRIP | Refills: 1 | Status: SHIPPED | OUTPATIENT
Start: 2024-04-03 | End: 2024-04-03

## 2024-04-03 RX ORDER — BLOOD-GLUCOSE METER
KIT MISCELLANEOUS
Qty: 300 STRIP | Refills: 1 | Status: SHIPPED | OUTPATIENT
Start: 2024-04-03

## 2024-04-03 NOTE — TELEPHONE ENCOUNTER
Called patient wife back and relayed below message.   Wife states they want the freestyle lite brand and sent the new RX.  RN did explain to wife that if they need to go back to QID testing, they can consider switching pharmacy to [facility] because it's easier to talk to someone in their Medicare department on cases like this.     Called [facility] to make sure RX is going through and it is now w/o any issues.

## 2024-04-03 NOTE — TELEPHONE ENCOUNTER
Chart reviewed and per LOV note 2/21/24 pt is testing more than 3x per day due to glucose variability and hypoglycemia unawareness.    Called local [facility] and states RN would have to call [phone] to get a prior authorization.      RN called the aforementioned number and got transferred to audit department who claim that RN will have to talk to their help desk as they don't have the patient on file for Audit.  Per help desk department they don't handle documentation from [facility] as they review records internally.      [facility] - internal review  PH: [phone]   FX: [phone]    RN tried to call above number twice and no answer and was not prompted with voicemail.    RN called patient's wife and discussed above. States they need the strips urgently because they have been waiting for the strips for 3 weeks now.      Dr. Naranjo - please advise if okay to switch testing frequency to TID so they can  strips w/o issues.  Wife would like to get your opinion on when pt should be testing -- wife feels that before breakfast, before dinner and bedtime would be the best times to check.  Wife believes lunch time is typically not a problem and maybe just decrease dose to 5 units (instead of 8 units).  Pt is on Humalog 6-8-10 + CF 1:40>140.

## 2024-04-03 NOTE — TELEPHONE ENCOUNTER
Yes, ok to change to 3 times per day.  Agree with plan per wife to check breakfast, dinner, bedtime. Thanks.

## 2024-04-04 ENCOUNTER — LAB ENCOUNTER (OUTPATIENT)
Dept: LAB | Age: 69
End: 2024-04-04
Attending: INTERNAL MEDICINE
Payer: MEDICARE

## 2024-04-04 DIAGNOSIS — D61.818 PANCYTOPENIA (HCC): ICD-10-CM

## 2024-04-04 DIAGNOSIS — D46.9 MDS (MYELODYSPLASTIC SYNDROME) (HCC): ICD-10-CM

## 2024-04-04 LAB
BASOPHILS # BLD AUTO: 0.01 X10(3) UL (ref 0–0.2)
BASOPHILS NFR BLD AUTO: 0.4 %
DEPRECATED RDW RBC AUTO: 62.5 FL (ref 35.1–46.3)
EOSINOPHIL # BLD AUTO: 0.06 X10(3) UL (ref 0–0.7)
EOSINOPHIL NFR BLD AUTO: 2.3 %
ERYTHROCYTE [DISTWIDTH] IN BLOOD BY AUTOMATED COUNT: 15.9 % (ref 11–15)
HCT VFR BLD AUTO: 32.8 %
HGB BLD-MCNC: 10.9 G/DL
IMM GRANULOCYTES # BLD AUTO: 0.01 X10(3) UL (ref 0–1)
IMM GRANULOCYTES NFR BLD: 0.4 %
LYMPHOCYTES # BLD AUTO: 1.16 X10(3) UL (ref 1–4)
LYMPHOCYTES NFR BLD AUTO: 44.8 %
MCH RBC QN AUTO: 35.5 PG (ref 26–34)
MCHC RBC AUTO-ENTMCNC: 33.2 G/DL (ref 31–37)
MCV RBC AUTO: 106.8 FL
MONOCYTES # BLD AUTO: 0.27 X10(3) UL (ref 0.1–1)
MONOCYTES NFR BLD AUTO: 10.4 %
NEUTROPHILS # BLD AUTO: 1.08 X10 (3) UL (ref 1.5–7.7)
NEUTROPHILS # BLD AUTO: 1.08 X10(3) UL (ref 1.5–7.7)
NEUTROPHILS NFR BLD AUTO: 41.7 %
PLATELET # BLD AUTO: 85 10(3)UL (ref 150–450)
PLATELETS.RETICULATED NFR BLD AUTO: 6.4 % (ref 0–7)
RBC # BLD AUTO: 3.07 X10(6)UL
WBC # BLD AUTO: 2.6 X10(3) UL (ref 4–11)

## 2024-04-04 PROCEDURE — 36415 COLL VENOUS BLD VENIPUNCTURE: CPT

## 2024-04-04 PROCEDURE — 85025 COMPLETE CBC W/AUTO DIFF WBC: CPT

## 2024-04-05 ENCOUNTER — NURSE ONLY (OUTPATIENT)
Dept: HEMATOLOGY/ONCOLOGY | Facility: HOSPITAL | Age: 69
End: 2024-04-05
Attending: INTERNAL MEDICINE
Payer: MEDICARE

## 2024-04-05 VITALS
RESPIRATION RATE: 18 BRPM | OXYGEN SATURATION: 98 % | BODY MASS INDEX: 35.1 KG/M2 | WEIGHT: 237 LBS | TEMPERATURE: 98 F | HEIGHT: 69 IN | HEART RATE: 77 BPM | SYSTOLIC BLOOD PRESSURE: 137 MMHG | DIASTOLIC BLOOD PRESSURE: 71 MMHG

## 2024-04-05 DIAGNOSIS — D46.9 MDS (MYELODYSPLASTIC SYNDROME) (HCC): Primary | ICD-10-CM

## 2024-04-05 DIAGNOSIS — D61.818 PANCYTOPENIA (HCC): ICD-10-CM

## 2024-04-05 DIAGNOSIS — Z51.81 MEDICATION MONITORING ENCOUNTER: ICD-10-CM

## 2024-04-05 PROCEDURE — 99214 OFFICE O/P EST MOD 30 MIN: CPT | Performed by: INTERNAL MEDICINE

## 2024-04-05 PROCEDURE — 96372 THER/PROPH/DIAG INJ SC/IM: CPT

## 2024-04-05 NOTE — PROGRESS NOTES
[facility] Progress Note    Patient Name: Jaun Burton   YOB: 1955   Medical Record Number: [id number]   Attending Physician: Antwan Helm M.D.     Chief Complaint:  pancytopenia    History of Present Illness:  68 year old  With chronic kidney disease been evaluate by hematology for pancytopenia.    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 U2AF1 c.101C>T, p.Ilw91Cws ([id number])   VAF: 38.6%   U2AF1 (also known as U2AF35) encodes a component of the   RNA-splicing machinery known as the spliceosome. Somatic mutations   of U2AF1 are found in approximately 5% of patients with acute   myeloid leukemia (AML)  (25), and in approximately 9% of patients   with myelodysplastic syndrome (MDS) (20) (29) (19). Most U2AF1   mutations affect codons Ser34 or Kgo468 (10). This particular   missense mutation has been reported in hematologic malignancies   (4). Mutations in spliceosomal genes, including U2AF1, are   associated with decreased disease-free and overall survival in   patients with AML (8) (13). In MDS, U2AF1 mutations are associated   with worse overall survival (11) and more rapid transformation to   AML (24), and do not predict response to hypomethylating therapies   (11).       2. GATA2 c.568dup, p.Myw878ru ([id number])   VAF: 40.6%   GATA2 belongs to the ZINA family of transcription factors and   regulates hematopoiesis through two conserved zinc finger domains.   Overall, somatic GATA2 mutations are found in 1-4% of patients   with sporadic myeloid malignancies (12) (17) (18). GATA2 mutations   are common in adult AML patients with biallelic CEBPA mutations,   but are rare in adult AML patients with wild-type CEBPA (5) (6)   (7). Somatic GATA2 mutations are infrequent in MDS (28).   Pathogenic germline variants of GATA2 cause a range of   hematopoietic defects, including MonoMAC syndrome, dendritic cell,   monocyte, B and NK lymphoid deficiency syndrome  (DCML), familial   MDS, AML, and blast transformation in chronic myeloid leukemia   (CML) (3) (23). Somatic GATA2 mutations in hematological   malignancies are typically missense mutations within the   N-terminal zinc-finger domain and in-frame deletions/insertions in   the C-terminal zinc-finger domain (9) (15) (16). Somatic   frameshift and nonsense mutations in GATA2 are generally detected   outside of the zinc-finger domains (15). This particular   frameshift mutation is predicted to disrupt the normal function of   GATA2 (3). In AML patients, GATA2 mutations are confined to the   N-terminal zinc finger domain, and frequently co-occurred with   biallelic CEBPA, KIT and FLT3 mutations (15). Some studies found   that GATA2 mutations had no impact on the clinical outcome in   CEBPA-double/CKZ3-HTV-msmyeqtu AML patients (6). Another study   found that GATA2 mutations were associated with favorable   prognosis in intermediate-risk karyotype AML with biallelic CEBPA   mutations (5). The prognostic significance of GATA2 mutation in   the absence of CEBPA or FLT3 mutation is unclear. In MDS patients,   GATA2 is frequently co-mutated with BCOR and U2AF1 (15). In GATA2   mutated primary and advanced MDS patients, GATA2 mutation is often   germline in origin and is generally associated with monosomy 7   (28).       TIER 2: Variants of Unknown Clinical Significance in Hematologic   Malignancies       1. KMT2A c.4240G>A, p.[id number] ([id number])   VAF: 44.1%   KMT2A encodes a histone methyltransferase that acts as a   transcriptional coactivator to regulate gene expression during   early development and hematopoiesis (21). Somatic mutations of   KMT2A (formerly known as MLL) are found in 6-10% of AML patients   (1) (26) (27) and very rarely in other myeloid malignancies (22).   The reported AML-associated KMT2A mutations are mostly partial   tandem duplications that span KMT2A exons 2-11 (2) (14). This   particular  KMT2A missense variant alters a moderately conserved   amino acid and has not been reported in hematologic malignancies,   to the best of our knowledge. Its functional consequences are   unknown. In addition, this variant is listed in the dbSNP database   ([id number]). Given that the variant frequency is close to 50%,   it is unclear whether this is a germline or somatic variant. The   clinical significance, if any, is uncertain    He returns for routine follow-up continues to feel fatigue denies any unintentional weight changes he is able to complete his activities of daily living    Performance Status:  ECOG 0  Past Medical History:  Past Medical History:   Diagnosis Date    C2-3 mild central, C3-4 mild-mod diffuse, C4-5 mild diffuse, C5-6 mod diffuse bulging discs 3/15/2018    C5-6 mod central & bilateral mild foraminal, C4-5 left mild foraminal stenosis 2/1/2018    C6-7 mild-mod central herniated disc 3/15/2018    Cataract     2010    Chronic kidney disease (CKD)     stage 3    Diabetes mellitus (HCC)     Diabetes type 2, uncontrolled     Diabetic retinopathy (HCC)     [facility] for eval    Hyperlipidemia     Meibomian gland dysfunction     Osteoarthritis of spine with radiculopathy, cervical region 2/1/2018    Pancreatitis (HCC) 2012    Primary osteoarthritis of both shoulders 2/1/2018    Proteinuria     Type II or unspecified type diabetes mellitus without mention of complication, not stated as uncontrolled     Pills & Insulin    Vitreous floaters        Past Surgical History:  Past Surgical History:   Procedure Laterality Date    APPENDECTOMY      CATARACT EXTRACTION W/  INTRAOCULAR LENS IMPLANT Right 06/11/2018    Dr. Lopez    CATARACT EXTRACTION W/  INTRAOCULAR LENS IMPLANT Left 07/12/2018    Dr. Lopez    CHOLECYSTECTOMY  2012    ELECTROCARDIOGRAM, COMPLETE  4/23/2012    scanned to media tab    HERNIA SURGERY         Family History:  Family History   Problem Relation Age of Onset     Diabetes Other         close relative    Diabetes Maternal Grandmother     Diabetes Father     Macular degeneration Neg     Glaucoma Neg         family h/o       Social History:  Social History     Socioeconomic History    Marital status:    Tobacco Use    Smoking status: Former     Packs/day: 0     Types: Cigarettes     Quit date: 1989     Years since quittin.4    Smokeless tobacco: Former    Tobacco comments:     quit about 30 years ago.   Vaping Use    Vaping Use: Never used   Substance and Sexual Activity    Alcohol use: No    Drug use: No   Other Topics Concern    Caffeine Concern Yes     Comment: 1 cup coffee per day    Exercise No    Reaction to local anesthetic No         Current Medications:    Current Outpatient Medications:     FREESTYLE LITE TEST In Vitro Strip, USE 1 STRIP TO CHECK BLOOD GLUCOSE 3 TIMES DAILY. DX: E11.65, insulin dependent, Disp: 300 strip, Rfl: 1    amitriptyline 25 MG Oral Tab, Take 1 tablet (25 mg total) by mouth nightly., Disp: 90 tablet, Rfl: 0    pregabalin 300 MG Oral Cap, Take 1 capsule (300 mg total) by mouth daily., Disp: 90 capsule, Rfl: 0    sodium zirconium cyclosilicate (LOKELMA) 10 g Oral Powd Pack, Take 1 packet (10 g total) by mouth twice a week., Disp: 30 packet, Rfl: 0    LANTUS 100 UNIT/ML Subcutaneous Solution, Inject 35 Units into the skin nightly., Disp: 30 mL, Rfl: 1    NOVOLOG 100 UNIT/ML Injection Solution, INJECT 50 UNITS SUBCUTANEOUSLY ONCE DAILY VIA  CORRECTION  FACTOR, Disp: 40 mL, Rfl: 0    HYDROcodone-acetaminophen (NORCO)  MG Oral Tab, Take 1 tablet by mouth every 8 (eight) hours as needed for Pain., Disp: 90 tablet, Rfl: 0    azelastine 137 MCG/SPRAY Nasal Solution, 1-2 sprays by Nasal route in the morning and 1-2 sprays before bedtime. FOR SINUS SYMPTOMS/NASAL CONGESTION.., Disp: 30 mL, Rfl: 3    fluticasone propionate 50 MCG/ACT Nasal Suspension, 2 sprays by Each Nare route daily. FOR NASAL CONGESTION/SINUS SYMPTOMS., Disp:  3 each, Rfl: 3    fluticasone-salmeterol (ADVAIR DISKUS) 100-50 MCG/ACT Inhalation Aerosol Powder, Breath Activated, Inhale 1 puff into the lungs 2 (two) times daily., Disp: 1 each, Rfl: 3    Dextromethorphan-Benzocaine 5-7.5 MG Mouth/Throat Lozenge, Use as directed 1 lozenge in the mouth or throat every 4 to 6 hours as needed., Disp: 20 lozenge, Rfl: 0    aspirin 81 MG Oral Tab EC, Take 1 tablet (81 mg total) by mouth daily., Disp: , Rfl:     metoprolol tartrate 50 MG Oral Tab, , Disp: , Rfl:     BD INSULIN SYRINGE U/F 31G X 5/16\" 0.5 ML Does not apply Misc, Inject insulin 4 times per day as instructed., Disp: 400 each, Rfl: 0    atorvastatin 40 MG Oral Tab, Take 1 tablet (40 mg total) by mouth daily. FOR CHOLESTEROL., Disp: 90 tablet, Rfl: 9    fenofibrate 48 MG Oral Tab, Take 1 tablet (48 mg total) by mouth daily. FOR TRIGLYCERIDES., Disp: 90 tablet, Rfl: 9    pantoprazole 40 MG Oral Tab EC, Take 1 tablet (40 mg total) by mouth before breakfast. FOR ACID REFLUX., Disp: 90 tablet, Rfl: 9    cyanocobalamin 1000 MCG Oral Tab, Take 1 tablet (1,000 mcg total) by mouth daily., Disp: 90 tablet, Rfl: 4    Betamethasone Dipropionate Aug 0.05 % External Cream, Apply 1 Application topically 2 (two) times daily. APPLY TO AFFECTED AREA, Disp: 50 g, Rfl: 1    RELION INSULIN SYRINGE 31G X 15/64\" 0.5 ML Does not apply Misc, 1 Syringe 4 (four) times daily., Disp: 400 each, Rfl: 2    RELION INSULIN SYRINGE 31G X 15/64\" 0.5 ML Does not apply Misc, Inject 1 Syringe into the skin 4 (four) times daily., Disp: 400 each, Rfl: 0    RELION INSULIN SYRINGE 31G X 15/64\" 0.5 ML Does not apply Misc, USE 1 SYRINGE SUBCUTANEOUSLY 4 TIMES DAILY, Disp: 200 each, Rfl: 0    metRONIDAZOLE 0.75 % External Cream, Apply 1 Application topically daily. For breakout on face, Disp: 45 g, Rfl: 0    Insulin Syringe-Needle U-100 (RELION INSULIN SYRINGE) 31G X 15/64\" 0.5 ML Does not apply Misc, 1 Syringe by Does not apply route 4 (four) times daily., Disp: 400  each, Rfl: 0    Pimecrolimus 1 % External Cream, APPLY   TOPICALLY AFFECTED AREA ON FACE ONCE DAILY TO TWICE DAILY, Disp: 60 g, Rfl: 0    Tazarotene 0.1 % External Cream, Apply to chest nightly (Patient taking differently: Apply to chest nightly PRN), Disp: 60 g, Rfl: 3    Insulin Syringe-Needle U-100 (RELION INSULIN SYRINGE) 31G X 15/64\" 0.5 ML Does not apply Misc, 1 Syringe by Does not apply route 4 (four) times daily., Disp: 400 each, Rfl: 0    Blood Glucose Monitoring Suppl (ACCU-CHEK INÉS PLUS) w/Device Does not apply Kit, Use as directed to check blood sugars., Disp: 1 kit, Rfl: 0    Ferrous Gluconate 225 (27 Fe) MG Oral Tab, Take 27 mg by mouth daily., Disp: , Rfl:     Omega-3 Fatty Acids (FISH OIL) 600 MG Oral Cap, Take 1 capsule by mouth nightly.  , Disp: , Rfl:     Multiple Vitamins-Minerals (CENTRUM SILVER) Oral Tab, Take 1 tablet by mouth daily., Disp: , Rfl:     methylPREDNISolone 4 MG Oral Tablet Therapy Pack, Take as directed with food., Disp: 1 each, Rfl: 0    Current Outpatient Medications on File Prior to Visit   Medication Sig Dispense Refill    FREESTYLE LITE TEST In Vitro Strip USE 1 STRIP TO CHECK BLOOD GLUCOSE 3 TIMES DAILY. DX: E11.65, insulin dependent 300 strip 1    amitriptyline 25 MG Oral Tab Take 1 tablet (25 mg total) by mouth nightly. 90 tablet 0    pregabalin 300 MG Oral Cap Take 1 capsule (300 mg total) by mouth daily. 90 capsule 0    sodium zirconium cyclosilicate (LOKELMA) 10 g Oral Powd Pack Take 1 packet (10 g total) by mouth twice a week. 30 packet 0    LANTUS 100 UNIT/ML Subcutaneous Solution Inject 35 Units into the skin nightly. 30 mL 1    NOVOLOG 100 UNIT/ML Injection Solution INJECT 50 UNITS SUBCUTANEOUSLY ONCE DAILY VIA  CORRECTION  FACTOR 40 mL 0    HYDROcodone-acetaminophen (NORCO)  MG Oral Tab Take 1 tablet by mouth every 8 (eight) hours as needed for Pain. 90 tablet 0    azelastine 137 MCG/SPRAY Nasal Solution 1-2 sprays by Nasal route in the morning and 1-2  sprays before bedtime. FOR SINUS SYMPTOMS/NASAL CONGESTION.. 30 mL 3    fluticasone propionate 50 MCG/ACT Nasal Suspension 2 sprays by Each Nare route daily. FOR NASAL CONGESTION/SINUS SYMPTOMS. 3 each 3    fluticasone-salmeterol (ADVAIR DISKUS) 100-50 MCG/ACT Inhalation Aerosol Powder, Breath Activated Inhale 1 puff into the lungs 2 (two) times daily. 1 each 3    Dextromethorphan-Benzocaine 5-7.5 MG Mouth/Throat Lozenge Use as directed 1 lozenge in the mouth or throat every 4 to 6 hours as needed. 20 lozenge 0    aspirin 81 MG Oral Tab EC Take 1 tablet (81 mg total) by mouth daily.      metoprolol tartrate 50 MG Oral Tab       BD INSULIN SYRINGE U/F 31G X 5/16\" 0.5 ML Does not apply Misc Inject insulin 4 times per day as instructed. 400 each 0    atorvastatin 40 MG Oral Tab Take 1 tablet (40 mg total) by mouth daily. FOR CHOLESTEROL. 90 tablet 9    fenofibrate 48 MG Oral Tab Take 1 tablet (48 mg total) by mouth daily. FOR TRIGLYCERIDES. 90 tablet 9    pantoprazole 40 MG Oral Tab EC Take 1 tablet (40 mg total) by mouth before breakfast. FOR ACID REFLUX. 90 tablet 9    cyanocobalamin 1000 MCG Oral Tab Take 1 tablet (1,000 mcg total) by mouth daily. 90 tablet 4    Betamethasone Dipropionate Aug 0.05 % External Cream Apply 1 Application topically 2 (two) times daily. APPLY TO AFFECTED AREA 50 g 1    RELION INSULIN SYRINGE 31G X 15/64\" 0.5 ML Does not apply Misc 1 Syringe 4 (four) times daily. 400 each 2    RELION INSULIN SYRINGE 31G X 15/64\" 0.5 ML Does not apply Misc Inject 1 Syringe into the skin 4 (four) times daily. 400 each 0    RELION INSULIN SYRINGE 31G X 15/64\" 0.5 ML Does not apply Misc USE 1 SYRINGE SUBCUTANEOUSLY 4 TIMES DAILY 200 each 0    metRONIDAZOLE 0.75 % External Cream Apply 1 Application topically daily. For breakout on face 45 g 0    Insulin Syringe-Needle U-100 (RELION INSULIN SYRINGE) 31G X 15/64\" 0.5 ML Does not apply Misc 1 Syringe by Does not apply route 4 (four) times daily. 400 each 0     Pimecrolimus 1 % External Cream APPLY   TOPICALLY AFFECTED AREA ON FACE ONCE DAILY TO TWICE DAILY 60 g 0    Tazarotene 0.1 % External Cream Apply to chest nightly (Patient taking differently: Apply to chest nightly PRN) 60 g 3    Insulin Syringe-Needle U-100 (RELION INSULIN SYRINGE) 31G X 15/64\" 0.5 ML Does not apply Misc 1 Syringe by Does not apply route 4 (four) times daily. 400 each 0    Blood Glucose Monitoring Suppl (ACCU-CHEK INÉS PLUS) w/Device Does not apply Kit Use as directed to check blood sugars. 1 kit 0    Ferrous Gluconate 225 (27 Fe) MG Oral Tab Take 27 mg by mouth daily.      Omega-3 Fatty Acids (FISH OIL) 600 MG Oral Cap Take 1 capsule by mouth nightly.        Multiple Vitamins-Minerals (CENTRUM SILVER) Oral Tab Take 1 tablet by mouth daily.      methylPREDNISolone 4 MG Oral Tablet Therapy Pack Take as directed with food. 1 each 0     Current Facility-Administered Medications on File Prior to Visit   Medication Dose Route Frequency Provider Last Rate Last Admin    [COMPLETED] darbepoetin grey-polysorbate (Aranesp-Albumin Free) 500 MCG/ML injection 500 mcg  500 mcg Subcutaneous Once Antwan Helm MD   500 mcg at 03/22/24 1316    [COMPLETED] darbepoetin grey-polysorbate (Aranesp-Albumin Free) 500 MCG/ML injection 500 mcg  500 mcg Subcutaneous Once Antwan Helm MD   500 mcg at 03/08/24 1022    darbepoetin grey (Aranesp) 200 MCG/0.4ML injection 200 mcg  200 mcg Subcutaneous Q21 Days Jaun Enciso MD   200 mcg at 03/24/23 1028         Allergies:  Allergies   Allergen Reactions    Cefdinir DIARRHEA    Zosyn [Piperacillin Sod-Tazobactam So] OTHER (SEE COMMENTS)     Heightened sense of smell; dry heaves, vomiting        Review of Systems:  All other systems reviewed and negative x12    Vital Signs:  /71 (BP Location: Left arm, Patient Position: Sitting, Cuff Size: large)   Pulse 77   Temp 97.7 °F (36.5 °C) (Oral)   Resp 18   Ht 1.753 m (5' 9\")   Wt 107.5 kg (237 lb)   SpO2 98%   BMI  35.00 kg/m²     Physical Examination:  General: Patient is alert and oriented x 3, not in acute distress.  Psych:  Mood and affect appropriate  HEENT: EOMs intact. PERRL. Oropharynx is clear.   Neck: No JVD. No palpable lymphadenopathy. Neck is supple.  Lymphatics: There is no palpable peripheral lymphadenopathy   Chest: Symmetric expansion, nonlabored breathing  Cardiovascular: Regular with palpable distal pulses   Abdomen: Soft, non tender.   Extremities: No edema.  Neurological: 5/5 motor x4.        Laboratory:  WBC: 2.6, done on 4/4/2024.  HGB: 10.9, done on 4/4/2024.  PLT: 85, done on 4/4/2024.           Lab Results   Component Value Date     (H) 03/20/2024    BUN 56 (H) 03/20/2024    BUNCREA 17.3 03/20/2024    CREATSERUM 3.23 (H) 03/20/2024    ANIONGAP 6 03/20/2024    GFRNAA 21 (L) 07/23/2022    GFRAA 24 (L) 07/23/2022    CA 8.7 03/20/2024    OSMOCALC 317 (H) 03/20/2024    ALKPHO 74 02/21/2024    AST 23 02/21/2024    ALT 21 02/21/2024    ALKPHOS 114 (H) 04/25/2013    BILT 0.8 02/21/2024    TP 6.2 02/21/2024    ALB 3.8 02/21/2024    GLOBULIN 2.4 (L) 02/21/2024    AGRATIO 1.3 04/25/2013     03/20/2024    K 5.4 (H) 03/20/2024     03/20/2024    CO2 23.0 03/20/2024       Radiology:       Cancer Staging   No matching staging information was found for the patient.      Impression and Plan:  68 year old  With chronic kidney disease been divided by hematology for pancytopenia. 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      we will continue to be focal point of care in setting of malignancy undergoing ongoing treatment.       Antwan Helm MD

## 2024-04-05 NOTE — PROGRESS NOTES
Jaun to lab for aranesp. HGB yesterday 10.9. Aranesp given and tolerated well.  Discharged to [facility] to await MD visit today. Pt has future appointments.

## 2024-04-16 ENCOUNTER — TELEPHONE (OUTPATIENT)
Dept: INTERNAL MEDICINE CLINIC | Facility: CLINIC | Age: 69
End: 2024-04-16

## 2024-04-16 NOTE — TELEPHONE ENCOUNTER
Spoke with wife yuli, cristi beauchamp.  Patient has visit with dr garcia in June for diabetes eye exam.  Reminded her pt is also due for colorectal screening  She verbalized understanding

## 2024-04-18 ENCOUNTER — OFFICE VISIT (OUTPATIENT)
Dept: NEPHROLOGY | Facility: CLINIC | Age: 69
End: 2024-04-18

## 2024-04-18 ENCOUNTER — LAB ENCOUNTER (OUTPATIENT)
Dept: LAB | Age: 69
End: 2024-04-18
Attending: INTERNAL MEDICINE
Payer: MEDICARE

## 2024-04-18 VITALS
BODY MASS INDEX: 35.4 KG/M2 | WEIGHT: 239 LBS | HEART RATE: 81 BPM | DIASTOLIC BLOOD PRESSURE: 72 MMHG | SYSTOLIC BLOOD PRESSURE: 129 MMHG | HEIGHT: 69 IN

## 2024-04-18 DIAGNOSIS — G62.9 NEUROPATHY: ICD-10-CM

## 2024-04-18 DIAGNOSIS — N18.4 CKD STAGE 4 DUE TO TYPE 1 DIABETES MELLITUS (HCC): ICD-10-CM

## 2024-04-18 DIAGNOSIS — Z51.81 MEDICATION MONITORING ENCOUNTER: ICD-10-CM

## 2024-04-18 DIAGNOSIS — D46.9 MDS (MYELODYSPLASTIC SYNDROME) (HCC): ICD-10-CM

## 2024-04-18 DIAGNOSIS — D61.818 PANCYTOPENIA (HCC): ICD-10-CM

## 2024-04-18 DIAGNOSIS — E11.3293 TYPE 2 DIABETES MELLITUS WITH BOTH EYES AFFECTED BY MILD NONPROLIFERATIVE RETINOPATHY WITHOUT MACULAR EDEMA, WITHOUT LONG-TERM CURRENT USE OF INSULIN (HCC): Primary | ICD-10-CM

## 2024-04-18 DIAGNOSIS — E10.22 CKD STAGE 4 DUE TO TYPE 1 DIABETES MELLITUS (HCC): ICD-10-CM

## 2024-04-18 LAB
BASOPHILS # BLD AUTO: 0.01 X10(3) UL (ref 0–0.2)
BASOPHILS NFR BLD AUTO: 0.5 %
DEPRECATED RDW RBC AUTO: 61.3 FL (ref 35.1–46.3)
EOSINOPHIL # BLD AUTO: 0.05 X10(3) UL (ref 0–0.7)
EOSINOPHIL NFR BLD AUTO: 2.4 %
ERYTHROCYTE [DISTWIDTH] IN BLOOD BY AUTOMATED COUNT: 15.7 % (ref 11–15)
HCT VFR BLD AUTO: 34.2 %
HGB BLD-MCNC: 10.7 G/DL
IMM GRANULOCYTES # BLD AUTO: 0.01 X10(3) UL (ref 0–1)
IMM GRANULOCYTES NFR BLD: 0.5 %
LYMPHOCYTES # BLD AUTO: 0.93 X10(3) UL (ref 1–4)
LYMPHOCYTES NFR BLD AUTO: 44.3 %
MCH RBC QN AUTO: 33.8 PG (ref 26–34)
MCHC RBC AUTO-ENTMCNC: 31.3 G/DL (ref 31–37)
MCV RBC AUTO: 107.9 FL
MONOCYTES # BLD AUTO: 0.26 X10(3) UL (ref 0.1–1)
MONOCYTES NFR BLD AUTO: 12.4 %
NEUTROPHILS # BLD AUTO: 0.84 X10 (3) UL (ref 1.5–7.7)
NEUTROPHILS # BLD AUTO: 0.84 X10(3) UL (ref 1.5–7.7)
NEUTROPHILS NFR BLD AUTO: 39.9 %
PLATELET # BLD AUTO: 80 10(3)UL (ref 150–450)
PLATELETS.RETICULATED NFR BLD AUTO: 8.7 % (ref 0–7)
RBC # BLD AUTO: 3.17 X10(6)UL
WBC # BLD AUTO: 2.1 X10(3) UL (ref 4–11)

## 2024-04-18 PROCEDURE — 85060 BLOOD SMEAR INTERPRETATION: CPT

## 2024-04-18 PROCEDURE — 85025 COMPLETE CBC W/AUTO DIFF WBC: CPT

## 2024-04-18 PROCEDURE — 99214 OFFICE O/P EST MOD 30 MIN: CPT | Performed by: INTERNAL MEDICINE

## 2024-04-18 PROCEDURE — 36415 COLL VENOUS BLD VENIPUNCTURE: CPT

## 2024-04-18 RX ORDER — HYDROCODONE BITARTRATE AND ACETAMINOPHEN 10; 325 MG/1; MG/1
1 TABLET ORAL EVERY 8 HOURS PRN
Qty: 90 TABLET | Refills: 0 | Status: SHIPPED | OUTPATIENT
Start: 2024-04-26

## 2024-04-18 NOTE — PATIENT INSTRUCTIONS
Good job Jaun Yen will be ready after April 25    Call me as needed for prescriptions for that now    Please do labs mid June    See me again in July    Have a great spring

## 2024-04-19 ENCOUNTER — NURSE ONLY (OUTPATIENT)
Dept: HEMATOLOGY/ONCOLOGY | Facility: HOSPITAL | Age: 69
End: 2024-04-19
Attending: INTERNAL MEDICINE
Payer: MEDICARE

## 2024-04-19 DIAGNOSIS — D61.818 PANCYTOPENIA (HCC): ICD-10-CM

## 2024-04-19 DIAGNOSIS — D46.9 MDS (MYELODYSPLASTIC SYNDROME) (HCC): Primary | ICD-10-CM

## 2024-04-19 PROCEDURE — 96372 THER/PROPH/DIAG INJ SC/IM: CPT

## 2024-04-19 NOTE — PROGRESS NOTES
Patient went to outpatient lab yesterday for cbc.  Hgb 10.7  Aranesp given per parameters today.  Tolerated injection well    AVS printed with future appointments made previously

## 2024-04-19 NOTE — PROGRESS NOTES
Progress Note     Jaun Burton    Is here with his wife Heather doing fine myelodysplastic syndrome is stable sees Dr. Contreras merlin Loweryp has CKD 4 hyper kalemia takes twice a wee denies chest pain or shortness of breath does take Norco 3 times a day does not abuse   takes Lokelma twice a week for high potassium on low K diet currently on Toprol insulin Advair Lipitor sugars are doing fine according to the patient.      HISTORY:  Past Medical History:    C2-3 mild central, C3-4 mild-mod diffuse, C4-5 mild diffuse, C5-6 mod diffuse bulging discs    C5-6 mod central & bilateral mild foraminal, C4-5 left mild foraminal stenosis    C6-7 mild-mod central herniated disc    Cataract        Chronic kidney disease (CKD)    stage 3    Diabetes mellitus (HCC)    Diabetes type 2, uncontrolled    Diabetic retinopathy (HCC)    [facility]    Hyperlipidemia    Meibomian gland dysfunction    Osteoarthritis of spine with radiculopathy, cervical region    Pancreatitis (HCC)    Primary osteoarthritis of both shoulders    Proteinuria    Type II or unspecified type diabetes mellitus without mention of complication, not stated as uncontrolled    Pills & Insulin    Vitreous floaters      Past Surgical History:   Procedure Laterality Date    Appendectomy      Cataract extraction w/  intraocular lens implant Right 2018    Dr. Lopez    Cataract extraction w/  intraocular lens implant Left 2018    Dr. Lopez    Cholecystectomy      Electrocardiogram, complete  2012    scanned to media tab    Hernia surgery        Social History     Tobacco Use    Smoking status: Former     Current packs/day: 0.00     Types: Cigarettes     Quit date: 1989     Years since quittin.4    Smokeless tobacco: Former    Tobacco comments:     quit about 30 years ago.   Substance Use Topics    Alcohol use: No         Medications (Active prior to today's visit):  Current Outpatient Medications    Medication Sig Dispense Refill    [START ON 4/26/2024] HYDROcodone-acetaminophen (NORCO)  MG Oral Tab Take 1 tablet by mouth every 8 (eight) hours as needed for Pain. 90 tablet 0    FREESTYLE LITE TEST In Vitro Strip USE 1 STRIP TO CHECK BLOOD GLUCOSE 3 TIMES DAILY. DX: E11.65, insulin dependent 300 strip 1    amitriptyline 25 MG Oral Tab Take 1 tablet (25 mg total) by mouth nightly. 90 tablet 0    pregabalin 300 MG Oral Cap Take 1 capsule (300 mg total) by mouth daily. 90 capsule 0    sodium zirconium cyclosilicate (LOKELMA) 10 g Oral Powd Pack Take 1 packet (10 g total) by mouth twice a week. 30 packet 0    LANTUS 100 UNIT/ML Subcutaneous Solution Inject 35 Units into the skin nightly. 30 mL 1    NOVOLOG 100 UNIT/ML Injection Solution INJECT 50 UNITS SUBCUTANEOUSLY ONCE DAILY VIA  CORRECTION  FACTOR 40 mL 0    azelastine 137 MCG/SPRAY Nasal Solution 1-2 sprays by Nasal route in the morning and 1-2 sprays before bedtime. FOR SINUS SYMPTOMS/NASAL CONGESTION.. 30 mL 3    fluticasone propionate 50 MCG/ACT Nasal Suspension 2 sprays by Each Nare route daily. FOR NASAL CONGESTION/SINUS SYMPTOMS. 3 each 3    fluticasone-salmeterol (ADVAIR DISKUS) 100-50 MCG/ACT Inhalation Aerosol Powder, Breath Activated Inhale 1 puff into the lungs 2 (two) times daily. 1 each 3    metoprolol tartrate 50 MG Oral Tab       BD INSULIN SYRINGE U/F 31G X 5/16\" 0.5 ML Does not apply Misc Inject insulin 4 times per day as instructed. 400 each 0    atorvastatin 40 MG Oral Tab Take 1 tablet (40 mg total) by mouth daily. FOR CHOLESTEROL. 90 tablet 9    fenofibrate 48 MG Oral Tab Take 1 tablet (48 mg total) by mouth daily. FOR TRIGLYCERIDES. 90 tablet 9    pantoprazole 40 MG Oral Tab EC Take 1 tablet (40 mg total) by mouth before breakfast. FOR ACID REFLUX. 90 tablet 9    cyanocobalamin 1000 MCG Oral Tab Take 1 tablet (1,000 mcg total) by mouth daily. 90 tablet 4    Betamethasone Dipropionate Aug 0.05 % External Cream Apply 1 Application  topically 2 (two) times daily. APPLY TO AFFECTED AREA 50 g 1    RELION INSULIN SYRINGE 31G X 15/64\" 0.5 ML Does not apply Misc 1 Syringe 4 (four) times daily. 400 each 2    RELION INSULIN SYRINGE 31G X 15/64\" 0.5 ML Does not apply Misc Inject 1 Syringe into the skin 4 (four) times daily. 400 each 0    RELION INSULIN SYRINGE 31G X 15/64\" 0.5 ML Does not apply Misc USE 1 SYRINGE SUBCUTANEOUSLY 4 TIMES DAILY 200 each 0    metRONIDAZOLE 0.75 % External Cream Apply 1 Application topically daily. For breakout on face 45 g 0    Insulin Syringe-Needle U-100 (RELION INSULIN SYRINGE) 31G X 15/64\" 0.5 ML Does not apply Misc 1 Syringe by Does not apply route 4 (four) times daily. 400 each 0    Pimecrolimus 1 % External Cream APPLY   TOPICALLY AFFECTED AREA ON FACE ONCE DAILY TO TWICE DAILY 60 g 0    Tazarotene 0.1 % External Cream Apply to chest nightly (Patient taking differently: Apply to chest nightly PRN) 60 g 3    Insulin Syringe-Needle U-100 (RELION INSULIN SYRINGE) 31G X 15/64\" 0.5 ML Does not apply Misc 1 Syringe by Does not apply route 4 (four) times daily. 400 each 0    Blood Glucose Monitoring Suppl (ACCU-CHEK INÉS PLUS) w/Device Does not apply Kit Use as directed to check blood sugars. 1 kit 0    Ferrous Gluconate 225 (27 Fe) MG Oral Tab Take 27 mg by mouth daily.      Omega-3 Fatty Acids (FISH OIL) 600 MG Oral Cap Take 1 capsule by mouth nightly.        Multiple Vitamins-Minerals (CENTRUM SILVER) Oral Tab Take 1 tablet by mouth daily.         Allergies:  Allergies   Allergen Reactions    Cefdinir DIARRHEA    Zosyn [Piperacillin Sod-Tazobactam So] OTHER (SEE COMMENTS)     Heightened sense of smell; dry heaves, vomiting         ROS:     Constitutional:  Negative for decreased activity, fever, irritability and lethargy  ENMT:  Negative for ear drainage, hearing loss and nasal drainage  Eyes:  Negative for eye discharge and vision loss  Cardiovascular:  Negative for chest pain, sob  Respiratory:  Negative for cough,  dyspnea and wheezing  Gastrointestinal:  Negative for abdominal pain, constipation  Genitourinary:  Negative for dysuria and hematuria  Endocrine:  Negative for abnormal sleep patterns  Hema/Lymph:  Negative for easy bleeding and easy bruising  Integumentary:  Negative for pruritus and rash  Musculoskeletal:  Negative for bone/joint symptoms  Neurological: Positive chronic neuropathy  Psychiatric:  Negative for inappropriate interaction and psychiatric symptoms      Vitals:    04/18/24 1450   BP: 129/72   Pulse: 81       PHYSICAL EXAM:   Constitutional: appears well hydrated alert and responsive   Head/Face: normocephalic  Eyes/Vision: normal extraocular motion is intact  Nose/Mouth/Throat:mucous membranes are moist   Neck/Thyroid: neck is supple without adenopathy  Lymphatic: no abnormal cervical, supraclavicular adenopathy is noted  Respiratory:  lungs are clear to auscultation bilaterally  Cardiovascular: regular rate and rhythm   Abdomen: soft, non-tender, non-distended, BS normal  Skin/Hair: no unusual rashes present, no abnormal bruising noted  Back/Spine: no abnormalities noted  Musculoskeletal: no deformities  Extremities: no edema  Neurological:  Grossly normal       ASSESSMENT/PLAN:   Assessment   Encounter Diagnoses   Name Primary?    Neuropathy     Type 2 diabetes mellitus with both eyes affected by mild nonproliferative retinopathy without macular edema, without long-term current use of insulin ([facility]) Yes    CKD stage 4 due to type 1 diabetes mellitus ([facility])     MDS (myelodysplastic syndrome) ([facility])        1 myelodysplasia  White count 2.1 hemoglobin 10.7 hematocrit 34 platelets  80 K follow-up with hematology       #2 diabetes stable  #3 chronic pain refill Norco takes 3 times a day given 1 month supply also on Lyrica  #4 CKD 4 bicarb okay calcium good GFR of 20 creatinine 3.23 stable  Positive proteinuria cannot afford SGLT pattern ACE or ARB due to hyperkalemia  #5 hyperkalemia stable with twice a week  Angie on low K diet    See him back in 2 months do labs in about a month  Orders This Visit:  Orders Placed This Encounter   Procedures    Renal Function Panel    Hemoglobin A1C       Meds This Visit:  Requested Prescriptions     Signed Prescriptions Disp Refills    HYDROcodone-acetaminophen (NORCO)  MG Oral Tab 90 tablet 0     Sig: Take 1 tablet by mouth every 8 (eight) hours as needed for Pain.       Imaging & Referrals:  None     4/18/2024  Jaun Enciso MD

## 2024-05-02 ENCOUNTER — LAB ENCOUNTER (OUTPATIENT)
Dept: LAB | Age: 69
End: 2024-05-02
Attending: INTERNAL MEDICINE
Payer: MEDICARE

## 2024-05-02 DIAGNOSIS — D46.9 MDS (MYELODYSPLASTIC SYNDROME) (HCC): ICD-10-CM

## 2024-05-02 DIAGNOSIS — D61.818 PANCYTOPENIA (HCC): ICD-10-CM

## 2024-05-02 LAB
BASOPHILS # BLD AUTO: 0.01 X10(3) UL (ref 0–0.2)
BASOPHILS NFR BLD AUTO: 0.3 %
DEPRECATED RDW RBC AUTO: 61.3 FL (ref 35.1–46.3)
EOSINOPHIL # BLD AUTO: 0.09 X10(3) UL (ref 0–0.7)
EOSINOPHIL NFR BLD AUTO: 3.1 %
ERYTHROCYTE [DISTWIDTH] IN BLOOD BY AUTOMATED COUNT: 15.6 % (ref 11–15)
HCT VFR BLD AUTO: 36.8 %
HGB BLD-MCNC: 11.5 G/DL
IMM GRANULOCYTES # BLD AUTO: 0 X10(3) UL (ref 0–1)
IMM GRANULOCYTES NFR BLD: 0 %
LYMPHOCYTES # BLD AUTO: 1.26 X10(3) UL (ref 1–4)
LYMPHOCYTES NFR BLD AUTO: 43.2 %
MCH RBC QN AUTO: 33.6 PG (ref 26–34)
MCHC RBC AUTO-ENTMCNC: 31.3 G/DL (ref 31–37)
MCV RBC AUTO: 107.6 FL
MONOCYTES # BLD AUTO: 0.28 X10(3) UL (ref 0.1–1)
MONOCYTES NFR BLD AUTO: 9.6 %
NEUTROPHILS # BLD AUTO: 1.28 X10 (3) UL (ref 1.5–7.7)
NEUTROPHILS # BLD AUTO: 1.28 X10(3) UL (ref 1.5–7.7)
NEUTROPHILS NFR BLD AUTO: 43.8 %
PLATELET # BLD AUTO: 99 10(3)UL (ref 150–450)
PLATELETS.RETICULATED NFR BLD AUTO: 7.7 % (ref 0–7)
RBC # BLD AUTO: 3.42 X10(6)UL
WBC # BLD AUTO: 2.9 X10(3) UL (ref 4–11)

## 2024-05-02 PROCEDURE — 85025 COMPLETE CBC W/AUTO DIFF WBC: CPT

## 2024-05-02 PROCEDURE — 36415 COLL VENOUS BLD VENIPUNCTURE: CPT

## 2024-05-03 ENCOUNTER — APPOINTMENT (OUTPATIENT)
Dept: HEMATOLOGY/ONCOLOGY | Facility: HOSPITAL | Age: 69
End: 2024-05-03
Attending: INTERNAL MEDICINE
Payer: MEDICARE

## 2024-05-08 NOTE — TELEPHONE ENCOUNTER
Anticoagulation Summary  As of 2024      INR goal:  2.5-3.5   TTR:  65.6% (11 y)   INR used for dosin.1 (2024)   Warfarin maintenance plan:  2.5 mg (2.5 mg x 1) every , ; 3.75 mg (2.5 mg x 1.5) all other days   Weekly warfarin total:  23.75 mg   Plan last modified:  Ena Ann RN (2024)   Next INR check:  2024   Priority:  Follow-Up - 2 Weeks   Target end date:  Indefinite    Indications    Mitral valve disorder (Mechanical MVR) [I05.9]  Chronic diastolic congestive heart failure (CMD) [I50.32]  H/O mitral valve replacement with mechanical valve [Z95.2]  Encounter for monitoring Coumadin therapy [Z51.81  Z79.01]  Chronic atrial fibrillation  (CMD) [I48.20]  Acute on chronic diastolic CHF (congestive heart failure)  (CMD) (Resolved) [I50.33]  Long term current use of anticoagulant therapy [Z79.01]                 Anticoagulation Episode Summary       INR check location:  Anticoagulation Clinic    Preferred lab:      Send INR reminders to:  DIANDRA (OPEN ENROLLMENT) [place]    Comments:  [facility] Dr. Ruff 23  AM DOSING          Anticoagulation Care Providers       Provider Role Specialty Phone number    Ron Ruff,  Responsible Internal Medicine-Cardiovascular Disease [phone]            Assessment  Yes No   [x]  [] Missed doses:   []  [x] Extra doses:   []  [x] Significant medication changes (RX, OTC, Herbal):   []  [x] High-risk maintenance medications:                []  [x] Vitamin K / dietary changes    []  [x] Bleeding / bruising:   []  [x] Falls / injury:   []  [x] Acute illness:    []  [x] Alcohol intake:   []  [x] Procedures / hospitalization / ER visits:   []  [x] Other:    Patient came to clinic for anticoagulation monitoring.  Last INR on 24 was 4.2.  Dose held x1 and decreased.   Today's INR is 2.1 and is below goal range of 2.5-3.5.      Patient held warfarin on  as directed, missed warfarin dose yesterday.  Current warfarin total weekly dose of  LOV 11/25/22 - Ok to proceed? Thank you. 

## 2024-05-09 ENCOUNTER — OFFICE VISIT (OUTPATIENT)
Dept: DERMATOLOGY CLINIC | Facility: CLINIC | Age: 69
End: 2024-05-09

## 2024-05-09 DIAGNOSIS — L70.0 NODULAR ELASTOSIS WITH CYSTS AND COMEDONES OF FAVRE AND RACOUCHOT: ICD-10-CM

## 2024-05-09 DIAGNOSIS — L71.9 ROSACEA: ICD-10-CM

## 2024-05-09 DIAGNOSIS — L29.9 PRURITUS: ICD-10-CM

## 2024-05-09 DIAGNOSIS — D23.9 BENIGN NEOPLASM OF SKIN, UNSPECIFIED LOCATION: ICD-10-CM

## 2024-05-09 DIAGNOSIS — Z51.81 MEDICATION MONITORING ENCOUNTER: ICD-10-CM

## 2024-05-09 DIAGNOSIS — L40.8 SEBOPSORIASIS: Primary | ICD-10-CM

## 2024-05-09 DIAGNOSIS — L57.8 NODULAR ELASTOSIS WITH CYSTS AND COMEDONES OF FAVRE AND RACOUCHOT: ICD-10-CM

## 2024-05-09 PROCEDURE — 99214 OFFICE O/P EST MOD 30 MIN: CPT | Performed by: DERMATOLOGY

## 2024-05-09 RX ORDER — MOMETASONE FUROATE 1 MG/ML
SOLUTION TOPICAL
Qty: 60 ML | Refills: 3 | Status: SHIPPED | OUTPATIENT
Start: 2024-05-09

## 2024-05-16 ENCOUNTER — LAB ENCOUNTER (OUTPATIENT)
Dept: LAB | Age: 69
End: 2024-05-16
Attending: INTERNAL MEDICINE

## 2024-05-16 DIAGNOSIS — D46.9 MDS (MYELODYSPLASTIC SYNDROME) (HCC): ICD-10-CM

## 2024-05-16 DIAGNOSIS — D61.818 PANCYTOPENIA (HCC): ICD-10-CM

## 2024-05-16 LAB
BASOPHILS # BLD AUTO: 0.01 X10(3) UL (ref 0–0.2)
BASOPHILS NFR BLD AUTO: 0.4 %
DEPRECATED RDW RBC AUTO: 56 FL (ref 35.1–46.3)
EOSINOPHIL # BLD AUTO: 0.07 X10(3) UL (ref 0–0.7)
EOSINOPHIL NFR BLD AUTO: 2.7 %
ERYTHROCYTE [DISTWIDTH] IN BLOOD BY AUTOMATED COUNT: 14.3 % (ref 11–15)
HCT VFR BLD AUTO: 31.7 %
HGB BLD-MCNC: 10.2 G/DL
IMM GRANULOCYTES # BLD AUTO: 0.01 X10(3) UL (ref 0–1)
IMM GRANULOCYTES NFR BLD: 0.4 %
LYMPHOCYTES # BLD AUTO: 1.14 X10(3) UL (ref 1–4)
LYMPHOCYTES NFR BLD AUTO: 43.7 %
MCH RBC QN AUTO: 34.1 PG (ref 26–34)
MCHC RBC AUTO-ENTMCNC: 32.2 G/DL (ref 31–37)
MCV RBC AUTO: 106 FL
MONOCYTES # BLD AUTO: 0.22 X10(3) UL (ref 0.1–1)
MONOCYTES NFR BLD AUTO: 8.4 %
NEUTROPHILS # BLD AUTO: 1.16 X10 (3) UL (ref 1.5–7.7)
NEUTROPHILS # BLD AUTO: 1.16 X10(3) UL (ref 1.5–7.7)
NEUTROPHILS NFR BLD AUTO: 44.4 %
PLATELET # BLD AUTO: 67 10(3)UL (ref 150–450)
PLATELETS.RETICULATED NFR BLD AUTO: 8.7 % (ref 0–7)
RBC # BLD AUTO: 2.99 X10(6)UL
WBC # BLD AUTO: 2.6 X10(3) UL (ref 4–11)

## 2024-05-16 PROCEDURE — 85025 COMPLETE CBC W/AUTO DIFF WBC: CPT

## 2024-05-16 PROCEDURE — 36415 COLL VENOUS BLD VENIPUNCTURE: CPT

## 2024-05-17 ENCOUNTER — NURSE ONLY (OUTPATIENT)
Dept: HEMATOLOGY/ONCOLOGY | Facility: HOSPITAL | Age: 69
End: 2024-05-17
Attending: INTERNAL MEDICINE
Payer: MEDICARE

## 2024-05-17 DIAGNOSIS — D46.9 MDS (MYELODYSPLASTIC SYNDROME) (HCC): Primary | ICD-10-CM

## 2024-05-17 DIAGNOSIS — D61.818 PANCYTOPENIA (HCC): ICD-10-CM

## 2024-05-17 PROCEDURE — 96372 THER/PROPH/DIAG INJ SC/IM: CPT

## 2024-05-19 NOTE — PROGRESS NOTES
Jaun Burton is a 68 year old male.    Chief Complaint   Patient presents with    Rosacea     LOV 11/25/22. Patient present for rosacea evaluation and med refill. Has had several flare ups, since last visit. Requesting refill for mometasone at this time. Also has Hx of sebopsoriasis.             Cefdinir and Zosyn [piperacillin sod-tazobactam so]  Current Outpatient Medications   Medication Sig Dispense Refill    Mometasone Furoate 0.1 % External Solution Use bid to ears as directd 60 mL 3    HYDROcodone-acetaminophen (NORCO)  MG Oral Tab Take 1 tablet by mouth every 8 (eight) hours as needed for Pain. 90 tablet 0    FREESTYLE LITE TEST In Vitro Strip USE 1 STRIP TO CHECK BLOOD GLUCOSE 3 TIMES DAILY. DX: E11.65, insulin dependent 300 strip 1    amitriptyline 25 MG Oral Tab Take 1 tablet (25 mg total) by mouth nightly. 90 tablet 0    pregabalin 300 MG Oral Cap Take 1 capsule (300 mg total) by mouth daily. 90 capsule 0    sodium zirconium cyclosilicate (LOKELMA) 10 g Oral Powd Pack Take 1 packet (10 g total) by mouth twice a week. 30 packet 0    LANTUS 100 UNIT/ML Subcutaneous Solution Inject 35 Units into the skin nightly. 30 mL 1    NOVOLOG 100 UNIT/ML Injection Solution INJECT 50 UNITS SUBCUTANEOUSLY ONCE DAILY VIA  CORRECTION  FACTOR 40 mL 0    azelastine 137 MCG/SPRAY Nasal Solution 1-2 sprays by Nasal route in the morning and 1-2 sprays before bedtime. FOR SINUS SYMPTOMS/NASAL CONGESTION.. 30 mL 3    fluticasone propionate 50 MCG/ACT Nasal Suspension 2 sprays by Each Nare route daily. FOR NASAL CONGESTION/SINUS SYMPTOMS. 3 each 3    fluticasone-salmeterol (ADVAIR DISKUS) 100-50 MCG/ACT Inhalation Aerosol Powder, Breath Activated Inhale 1 puff into the lungs 2 (two) times daily. 1 each 3    metoprolol tartrate 50 MG Oral Tab       BD INSULIN SYRINGE U/F 31G X 5/16\" 0.5 ML Does not apply Misc Inject insulin 4 times per day as instructed. 400 each 0    atorvastatin 40 MG Oral Tab Take 1 tablet (40 mg  total) by mouth daily. FOR CHOLESTEROL. 90 tablet 9    fenofibrate 48 MG Oral Tab Take 1 tablet (48 mg total) by mouth daily. FOR TRIGLYCERIDES. 90 tablet 9    pantoprazole 40 MG Oral Tab EC Take 1 tablet (40 mg total) by mouth before breakfast. FOR ACID REFLUX. 90 tablet 9    cyanocobalamin 1000 MCG Oral Tab Take 1 tablet (1,000 mcg total) by mouth daily. 90 tablet 4    Betamethasone Dipropionate Aug 0.05 % External Cream Apply 1 Application topically 2 (two) times daily. APPLY TO AFFECTED AREA 50 g 1    RELION INSULIN SYRINGE 31G X 15/64\" 0.5 ML Does not apply Misc 1 Syringe 4 (four) times daily. 400 each 2    RELION INSULIN SYRINGE 31G X 15/64\" 0.5 ML Does not apply Misc Inject 1 Syringe into the skin 4 (four) times daily. 400 each 0    RELION INSULIN SYRINGE 31G X 15/64\" 0.5 ML Does not apply Misc USE 1 SYRINGE SUBCUTANEOUSLY 4 TIMES DAILY 200 each 0    metRONIDAZOLE 0.75 % External Cream Apply 1 Application topically daily. For breakout on face 45 g 0    Insulin Syringe-Needle U-100 (RELION INSULIN SYRINGE) 31G X 15/64\" 0.5 ML Does not apply Misc 1 Syringe by Does not apply route 4 (four) times daily. 400 each 0    Pimecrolimus 1 % External Cream APPLY   TOPICALLY AFFECTED AREA ON FACE ONCE DAILY TO TWICE DAILY 60 g 0    Tazarotene 0.1 % External Cream Apply to chest nightly (Patient taking differently: Apply to chest nightly PRN) 60 g 3    Insulin Syringe-Needle U-100 (RELION INSULIN SYRINGE) 31G X 15/64\" 0.5 ML Does not apply Misc 1 Syringe by Does not apply route 4 (four) times daily. 400 each 0    Blood Glucose Monitoring Suppl (ACCU-CHEK INÉS PLUS) w/Device Does not apply Kit Use as directed to check blood sugars. 1 kit 0    Ferrous Gluconate 225 (27 Fe) MG Oral Tab Take 27 mg by mouth daily.      Omega-3 Fatty Acids (FISH OIL) 600 MG Oral Cap Take 1 capsule by mouth nightly.        Multiple Vitamins-Minerals (CENTRUM SILVER) Oral Tab Take 1 tablet by mouth daily.        Past Medical History:    C2-3 mild  central, C3-4 mild-mod diffuse, C4-5 mild diffuse, C5-6 mod diffuse bulging discs    C5-6 mod central & bilateral mild foraminal, C4-5 left mild foraminal stenosis    C6-7 mild-mod central herniated disc    Cataract        Chronic kidney disease (CKD)    stage 3    Diabetes mellitus (HCC)    Diabetes type 2, uncontrolled    Diabetic retinopathy (HCC)    referred to [facility] for eval    Hyperlipidemia    Meibomian gland dysfunction    Osteoarthritis of spine with radiculopathy, cervical region    Pancreatitis (HCC)    Primary osteoarthritis of both shoulders    Proteinuria    Type II or unspecified type diabetes mellitus without mention of complication, not stated as uncontrolled    Pills & Insulin    Vitreous floaters      Social History:  Social History     Socioeconomic History    Marital status:    Tobacco Use    Smoking status: Former     Current packs/day: 0.00     Types: Cigarettes     Quit date: 1989     Years since quittin.5    Smokeless tobacco: Former    Tobacco comments:     quit about 30 years ago.   Vaping Use    Vaping status: Never Used   Substance and Sexual Activity    Alcohol use: No    Drug use: No   Other Topics Concern    Caffeine Concern Yes     Comment: 1 cup coffee per day    Exercise No    History of tanning Yes    Reaction to local anesthetic No                 Current Outpatient Medications   Medication Sig Dispense Refill    Mometasone Furoate 0.1 % External Solution Use bid to ears as directd 60 mL 3    HYDROcodone-acetaminophen (NORCO)  MG Oral Tab Take 1 tablet by mouth every 8 (eight) hours as needed for Pain. 90 tablet 0    FREESTYLE LITE TEST In Vitro Strip USE 1 STRIP TO CHECK BLOOD GLUCOSE 3 TIMES DAILY. DX: E11.65, insulin dependent 300 strip 1    amitriptyline 25 MG Oral Tab Take 1 tablet (25 mg total) by mouth nightly. 90 tablet 0    pregabalin 300 MG Oral Cap Take 1 capsule (300 mg total) by mouth daily. 90 capsule 0    sodium zirconium  cyclosilicate (LOKELMA) 10 g Oral Powd Pack Take 1 packet (10 g total) by mouth twice a week. 30 packet 0    LANTUS 100 UNIT/ML Subcutaneous Solution Inject 35 Units into the skin nightly. 30 mL 1    NOVOLOG 100 UNIT/ML Injection Solution INJECT 50 UNITS SUBCUTANEOUSLY ONCE DAILY VIA  CORRECTION  FACTOR 40 mL 0    azelastine 137 MCG/SPRAY Nasal Solution 1-2 sprays by Nasal route in the morning and 1-2 sprays before bedtime. FOR SINUS SYMPTOMS/NASAL CONGESTION.. 30 mL 3    fluticasone propionate 50 MCG/ACT Nasal Suspension 2 sprays by Each Nare route daily. FOR NASAL CONGESTION/SINUS SYMPTOMS. 3 each 3    fluticasone-salmeterol (ADVAIR DISKUS) 100-50 MCG/ACT Inhalation Aerosol Powder, Breath Activated Inhale 1 puff into the lungs 2 (two) times daily. 1 each 3    metoprolol tartrate 50 MG Oral Tab       BD INSULIN SYRINGE U/F 31G X 5/16\" 0.5 ML Does not apply Misc Inject insulin 4 times per day as instructed. 400 each 0    atorvastatin 40 MG Oral Tab Take 1 tablet (40 mg total) by mouth daily. FOR CHOLESTEROL. 90 tablet 9    fenofibrate 48 MG Oral Tab Take 1 tablet (48 mg total) by mouth daily. FOR TRIGLYCERIDES. 90 tablet 9    pantoprazole 40 MG Oral Tab EC Take 1 tablet (40 mg total) by mouth before breakfast. FOR ACID REFLUX. 90 tablet 9    cyanocobalamin 1000 MCG Oral Tab Take 1 tablet (1,000 mcg total) by mouth daily. 90 tablet 4    Betamethasone Dipropionate Aug 0.05 % External Cream Apply 1 Application topically 2 (two) times daily. APPLY TO AFFECTED AREA 50 g 1    RELION INSULIN SYRINGE 31G X 15/64\" 0.5 ML Does not apply Misc 1 Syringe 4 (four) times daily. 400 each 2    RELION INSULIN SYRINGE 31G X 15/64\" 0.5 ML Does not apply Misc Inject 1 Syringe into the skin 4 (four) times daily. 400 each 0    RELION INSULIN SYRINGE 31G X 15/64\" 0.5 ML Does not apply Misc USE 1 SYRINGE SUBCUTANEOUSLY 4 TIMES DAILY 200 each 0    metRONIDAZOLE 0.75 % External Cream Apply 1 Application topically daily. For breakout on face  45 g 0    Insulin Syringe-Needle U-100 (RELION INSULIN SYRINGE) 31G X 15/64\" 0.5 ML Does not apply Misc 1 Syringe by Does not apply route 4 (four) times daily. 400 each 0    Pimecrolimus 1 % External Cream APPLY   TOPICALLY AFFECTED AREA ON FACE ONCE DAILY TO TWICE DAILY 60 g 0    Tazarotene 0.1 % External Cream Apply to chest nightly (Patient taking differently: Apply to chest nightly PRN) 60 g 3    Insulin Syringe-Needle U-100 (RELION INSULIN SYRINGE) 31G X 15/64\" 0.5 ML Does not apply Misc 1 Syringe by Does not apply route 4 (four) times daily. 400 each 0    Blood Glucose Monitoring Suppl (ACCU-CHEK INÉS PLUS) w/Device Does not apply Kit Use as directed to check blood sugars. 1 kit 0    Ferrous Gluconate 225 (27 Fe) MG Oral Tab Take 27 mg by mouth daily.      Omega-3 Fatty Acids (FISH OIL) 600 MG Oral Cap Take 1 capsule by mouth nightly.        Multiple Vitamins-Minerals (CENTRUM SILVER) Oral Tab Take 1 tablet by mouth daily.       Allergies:   Allergies   Allergen Reactions    Cefdinir DIARRHEA    Zosyn [Piperacillin Sod-Tazobactam So] OTHER (SEE COMMENTS)     Heightened sense of smell; dry heaves, vomiting       Past Medical History:    C2-3 mild central, C3-4 mild-mod diffuse, C4-5 mild diffuse, C5-6 mod diffuse bulging discs    C5-6 mod central & bilateral mild foraminal, C4-5 left mild foraminal stenosis    C6-7 mild-mod central herniated disc    Cataract    2010    Chronic kidney disease (CKD)    stage 3    Diabetes mellitus (HCC)    Diabetes type 2, uncontrolled    Diabetic retinopathy (HCC)    referred to [facility] for eval    Hyperlipidemia    Meibomian gland dysfunction    Osteoarthritis of spine with radiculopathy, cervical region    Pancreatitis (HCC)    Primary osteoarthritis of both shoulders    Proteinuria    Type II or unspecified type diabetes mellitus without mention of complication, not stated as uncontrolled    Pills & Insulin    Vitreous floaters     Past Surgical History:    Procedure Laterality Date    Appendectomy      Cataract extraction w/  intraocular lens implant Right 2018    Dr. Lopez    Cataract extraction w/  intraocular lens implant Left 2018    Dr. Lopez    Cholecystectomy      Electrocardiogram, complete  2012    scanned to media tab    Hernia surgery       Social History     Socioeconomic History    Marital status:      Spouse name: Not on file    Number of children: Not on file    Years of education: Not on file    Highest education level: Not on file   Occupational History    Not on file   Tobacco Use    Smoking status: Former     Current packs/day: 0.00     Types: Cigarettes     Quit date: 1989     Years since quittin.5    Smokeless tobacco: Former    Tobacco comments:     quit about 30 years ago.   Vaping Use    Vaping status: Never Used   Substance and Sexual Activity    Alcohol use: No    Drug use: No    Sexual activity: Not on file   Other Topics Concern     Service Not Asked    Blood Transfusions Not Asked    Caffeine Concern Yes     Comment: 1 cup coffee per day    Occupational Exposure Not Asked    Hobby Hazards Not Asked    Sleep Concern Not Asked    Stress Concern Not Asked    Weight Concern Not Asked    Special Diet Not Asked    Back Care Not Asked    Exercise No    Bike Helmet Not Asked    Seat Belt Not Asked    Self-Exams Not Asked    Grew up on a farm Not Asked    History of tanning Yes    Outdoor occupation Not Asked    Reaction to local anesthetic No    Pt has a pacemaker Not Asked    Pt has a defibrillator Not Asked   Social History Narrative    Not on file     Social Determinants of Health     Financial Resource Strain: Not on file   Food Insecurity: Not on file   Transportation Needs: Not on file   Physical Activity: Not on file   Stress: Not on file   Social Connections: Not on file   Housing Stability: Not on file     Family History   Problem Relation Age of Onset    Diabetes Other         close  relative    Diabetes Maternal Grandmother     Diabetes Father     Macular degeneration Neg     Glaucoma Neg         family h/o                      HPI :      Chief Complaint   Patient presents with    Rosacea     LOV 11/25/22. Patient present for rosacea evaluation and med refill. Has had several flare ups, since last visit. Requesting refill for mometasone at this time. Also has Hx of sebopsoriasis.     Patient for follow-up \"rosacea\" complaining scaling itching at forehead glabella, psoriasis.  Previous treatments doxycycline, topical antibiotics not improved.  Ketoconazole shampoo not helpful  Otezla not covered previously  Overalls been doing pretty well with current topicals.  At this point in time is happy with improvement control.  Itching at times   Stopped Xyzal due to sedation.  Pimecrolimus had helped however is very expensive patient does not want to continue on this.    Follow-up on meds as noted Doxy, Diflucan for stable psoriasis and rosacea note still itchy using CeraVe anti-itch Eucerin eczema    Itching in ears improved with mometasone has MetroGel -not using does have nasal allergy spray uses this inconsistently.    Does not feel ketoconazole shampoo is helpful.  Does feel pimecrolimus is helpful however this is very expensive has not used this in quite a while due to cost    Not taking any oral antihistamines.  Patient presents with concerns above.    Past notes/ records and appropriate/relevant lab results including pathology and past body maps reviewed. Updated and new information noted in current visit.     ROS:    Denies any other systemic complaints.  No fevers, chills, night sweats, sensitivity to the sun, deeper lumps or bumps.  No other skin complaints.  History, medications, allergies as noted.    Physical examination: Patient  well-developed well-nourished, alert oriented in no acute distress.  Exam of involved, appropriate areas of skin performed, including scalp, head, neck,  face,nails, hair, external eyes, including conjunctival mucosa, eyelids, lips, external ears, back, chest, abdomen, arms, legs, palms.  Remarkable for lesions as noted   Erythematous scaling patches over the brows glabella ears, sideburns, posterior scalp, patchy areas over the elbows knees, feet with scaling,     ASSESSMENT AND PLAN:     Encounter Diagnoses   Name Primary?    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  Continues to use topical steroids which have been very helpful.  Itching has improved with this.  Although not ideal continue mometasone.  Has been the most helpful.  Patient happy with improvement.    Occasionally uses betamethasone on the elbows knees not using on the face.    Chronic nature of seborrheic dermatitis/psoriasis/rosacea discussed.  Continue current shampoos,   Continue mometasone topicals, betamethasone as needed.  Numerous medications tried previously as noted cost an issue    Likely tinea pedis at right medial foot  More scaling over the foot add clotrimazole daily to twice daily over-the-counter    At this point in time we will continue with topical medications.  Management of itching and scaling has been the priority.  Patient happy at this time with mometasone.  Has been cost effective.  Continue solution scalp, cream, ointment as needed for ears, betamethasone for arms and legs.  This has been costly uses this sparingly    Add betamethasone plus clotrimazole to feet if scaling persists.  Patient currently managing his mild dysplastic syndrome.  Control this may be helping as well.  Labs reviewed.  Again would not recommend additional systemic medications.    As noted previously byLSS more diffuse  actinic damage with comedones and cysts as well solar elastosis.  Prominent telangiectasias along with this is diffusely over the upper body.  Again this is not going to resolve with his current medications due to past sun damage.    Itching may be related to his underlying conditions especially diabetes.    All of these concerns are longstanding dating back to at least 2017 or 16.  Patient follows up sporadically not clear how consistent he has with medications.  Sounds like he does use these more on a as needed basis.    No other susupicious lesions on todays  exam.      Pathophysiology discussed with patient.  Therapeutic options reviewed.  See  Medications in grid.  Instructions reviewed at length.     General skin care questions answered.   Reassurance regarding benign skin lesions.Signs and symptoms of skin cancer, ABCDE's of melanoma briefly reviewed.  Sunscreen use, sun protection, encouraged.  Followup as noted in rtc or p.r.n.    Encounter Times  Including precharting, reviewing chart, prior notes obtaining history: 5 minutes, medical exam :10 minutes, notes on body map, plan, counseling 10minutes My total time spent caring for the patient on the day of the encounter: 25 minutes       The patient indicates understanding of these issues and agrees to the plan.  The patient is asked to return as noted in follow-up /as noted above    This note was generated using Dragon voice recognition software.  Please contact me regarding any confusion resulting from errors in recognition.    No orders of the defined types were placed in this encounter.      Meds & Refills for this Visit:   Requested Prescriptions     Signed Prescriptions Disp Refills    Mometasone Furoate 0.1 % External Solution 60 mL 3     Sig: Use bid to ears as directd       Sebopsoriasis  (primary encounter diagnosis)  Rosacea  Pruritus    No orders of the defined types were placed in this encounter.      Results From Past 48 Hours:  No results found  for this or any previous visit (from the past 48 hour(s)).    Meds This Visit:      Imaging Orders:  None     Referral Orders:  No orders of the defined types were placed in this encounter.

## 2024-05-20 RX ORDER — PEN NEEDLE, DIABETIC 29 G X1/2"
NEEDLE, DISPOSABLE MISCELLANEOUS
Qty: 400 EACH | Refills: 3 | Status: SHIPPED | OUTPATIENT
Start: 2024-05-20

## 2024-05-21 NOTE — TELEPHONE ENCOUNTER
Refill Passed Per Protocol    Requested Prescriptions   Pending Prescriptions Disp Refills    BD INSULIN SYRINGE U/F 31G X 5/16\" 0.5 ML Does not apply Misc [Pharmacy Med Name: BD UF II SHORT 0.5CC 8MM SYR M] 400 each 0     Sig: USE 1 SYRINGE 4 TIMES DAILY       Diabetic Supplies Protocol Passed - 5/17/2024 11:36 AM        Passed - In person appointment or virtual visit in the past 12 mos or appointment in next 3 mos     Recent Outpatient Visits              3 days ago MDS (myelodysplastic syndrome) ([facility]ly [facility])    [facility]    Nurse Only    1 week ago Sebopsoriasis    [facility] [address], [place] ThomasCarissa MD    Office Visit    1 month ago MDS (myelodysplastic syndrome) (Former[facility])    [facility]    Nurse Only    1 month ago Type 2 diabetes mellitus with both eyes affected by mild nonproliferative retinopathy without macular edema, without long-term current use of insulin ([facility])    [facility] Jaun Enciso MD    Office Visit    1 month ago MDS (myelodysplastic syndrome) (Formerly [facility])    [facility]    Nurse Only          Future Appointments         Provider Department Appt Notes    In 1 week EM [facility] LAB1 [facility] Aranesp inj-MYJ  (oupt labs)    In 3 weeks [facility]1 [facility] Aranesp inj-MYJ  (oupt labs)    In 1 month Bret Arauz MD [facility] EP DM EE    In 1 month EM [facility] LAB2 [facility] Aranesp inj-MYJ  (oupt labs)    In 1 month Antwan Helm MD [facility] 3 month f/u.  cl    In 1 month EM [facility]1 [facility] Aranesp inj-MYJ  (oupt labs)    In 1 month Jaun Enciso MD [facility] 3 months    In 2 months EM CC LAB2 [facility] WILSON  [facility] Aranesp inj-MYJ  (oupt labs)    In 2 months EM [facility] LAB1 [facility] Aranesp inj-MYJ  (oupt labs)    In 3 months Tanya Naranjo MD [facility] 6 month f/u                         Future Appointments         Provider Department Appt Notes    In 1 week EM [facility] LAB1 [facility] Aranesp inj-MYJ  (oupt labs)    In 3 weeks EM [facility] LAB1 [facility] Aranesp inj-MYJ  (oupt labs)    In 1 month Bret Arauz MD [facility] EP DM EE    In 1 month EM [facility] LAB2 [facility] Aranesp inj-MYJ  (oupt labs)    In 1 month Antwan Helm MD [facility] Hematology Oncology 3 month f/u.  cl    In 1 month EM [facility] LAB1 [facility] Aranesp inj-MYJ  (oupt labs)    In 1 month Jaun Enciso MD Count [facility] 3 months    In 2 months EM [facility] LAB2 [facility] Aranesp inj-MYJ  (oupt labs)    In 2 months EM [facility] LAB1 [facility] Aranesp inj-MYJ  (oupt labs)    In 3 months Tanya Naranjo MD [facility] 6 month f/u          Recent Outpatient Visits              3 days ago MDS (myelodysplastic syndrome) (HCC)    [facility]    Nurse Only    1 week ago Sebopsoriasis    [facility] [place] Thomas, Kathryn, MD    Office Visit    1 month ago MDS (myelodysplastic syndrome) (HCC)    [facility]    Nurse Only    1 month ago Type 2 diabetes mellitus with both eyes affected by mild nonproliferative retinopathy without macular edema, without long-term current use of insulin (HCC)    Count includes the [facility] Jaun Enciso MD    Office Visit     1 month ago MDS (myelodysplastic syndrome) (HCC)    [facility]    Nurse Only

## 2024-05-30 ENCOUNTER — LAB ENCOUNTER (OUTPATIENT)
Dept: LAB | Age: 69
End: 2024-05-30
Attending: INTERNAL MEDICINE
Payer: MEDICARE

## 2024-05-30 DIAGNOSIS — D61.818 PANCYTOPENIA (HCC): ICD-10-CM

## 2024-05-30 DIAGNOSIS — D46.9 MDS (MYELODYSPLASTIC SYNDROME) (HCC): ICD-10-CM

## 2024-05-30 LAB
BASOPHILS # BLD AUTO: 0.01 X10(3) UL (ref 0–0.2)
BASOPHILS NFR BLD AUTO: 0.4 %
DEPRECATED RDW RBC AUTO: 58.2 FL (ref 35.1–46.3)
EOSINOPHIL # BLD AUTO: 0.06 X10(3) UL (ref 0–0.7)
EOSINOPHIL NFR BLD AUTO: 2.7 %
ERYTHROCYTE [DISTWIDTH] IN BLOOD BY AUTOMATED COUNT: 15.2 % (ref 11–15)
HCT VFR BLD AUTO: 31.1 %
HGB BLD-MCNC: 9.9 G/DL
IMM GRANULOCYTES # BLD AUTO: 0.01 X10(3) UL (ref 0–1)
IMM GRANULOCYTES NFR BLD: 0.4 %
LYMPHOCYTES # BLD AUTO: 0.8 X10(3) UL (ref 1–4)
LYMPHOCYTES NFR BLD AUTO: 35.4 %
MCH RBC QN AUTO: 33.7 PG (ref 26–34)
MCHC RBC AUTO-ENTMCNC: 31.8 G/DL (ref 31–37)
MCV RBC AUTO: 105.8 FL
MONOCYTES # BLD AUTO: 0.23 X10(3) UL (ref 0.1–1)
MONOCYTES NFR BLD AUTO: 10.2 %
NEUTROPHILS # BLD AUTO: 1.15 X10 (3) UL (ref 1.5–7.7)
NEUTROPHILS # BLD AUTO: 1.15 X10(3) UL (ref 1.5–7.7)
NEUTROPHILS NFR BLD AUTO: 50.9 %
PLATELET # BLD AUTO: 76 10(3)UL (ref 150–450)
PLATELETS.RETICULATED NFR BLD AUTO: 9.9 % (ref 0–7)
RBC # BLD AUTO: 2.94 X10(6)UL
WBC # BLD AUTO: 2.3 X10(3) UL (ref 4–11)

## 2024-05-30 PROCEDURE — 36415 COLL VENOUS BLD VENIPUNCTURE: CPT

## 2024-05-30 PROCEDURE — 85060 BLOOD SMEAR INTERPRETATION: CPT

## 2024-05-30 PROCEDURE — 85025 COMPLETE CBC W/AUTO DIFF WBC: CPT

## 2024-05-31 ENCOUNTER — NURSE ONLY (OUTPATIENT)
Dept: HEMATOLOGY/ONCOLOGY | Facility: HOSPITAL | Age: 69
End: 2024-05-31
Attending: INTERNAL MEDICINE
Payer: MEDICARE

## 2024-05-31 DIAGNOSIS — D61.818 PANCYTOPENIA (HCC): ICD-10-CM

## 2024-05-31 DIAGNOSIS — D46.9 MDS (MYELODYSPLASTIC SYNDROME) (HCC): Primary | ICD-10-CM

## 2024-05-31 PROCEDURE — 96372 THER/PROPH/DIAG INJ SC/IM: CPT

## 2024-05-31 NOTE — PROGRESS NOTES
Pt here for Q2 week aranesp injection - gets labs outpt at [facility]  Hgl 9.9 on 5/30 outpt lab  Reports feeling good, denies new complaints/ concerns  He is not eating differently, denies any s/s bleeding/bruising    Aranesp 500mcg given left upper arm subcutaneous, tolerated well - much less painful if given slow, warmed   Must give super slow, warm capped needle/ syringe in hand, do not apply bandage d/t hairy arms per pt.  Parameters are to hold if hgb is 11 or greater - pt is aware    Discharged stable to home with future appts. Given AVS - prefers appts after 1030 d/t parking  Sees Dr Rdz in June for his first visit  Gait steady, indep

## 2024-06-12 DIAGNOSIS — E11.3293 TYPE 2 DIABETES MELLITUS WITH BOTH EYES AFFECTED BY MILD NONPROLIFERATIVE RETINOPATHY WITHOUT MACULAR EDEMA, WITHOUT LONG-TERM CURRENT USE OF INSULIN (HCC): ICD-10-CM

## 2024-06-12 DIAGNOSIS — G62.9 NEUROPATHY: ICD-10-CM

## 2024-06-12 NOTE — TELEPHONE ENCOUNTER
Last  office visit  4/18/2024    Return to clinic July/2024    Follow up appointment  not schedule

## 2024-06-13 ENCOUNTER — LAB ENCOUNTER (OUTPATIENT)
Dept: LAB | Age: 69
End: 2024-06-13
Attending: INTERNAL MEDICINE
Payer: MEDICARE

## 2024-06-13 DIAGNOSIS — G62.9 NEUROPATHY: ICD-10-CM

## 2024-06-13 DIAGNOSIS — D61.818 PANCYTOPENIA (HCC): ICD-10-CM

## 2024-06-13 DIAGNOSIS — N18.4 CKD STAGE 4 DUE TO TYPE 1 DIABETES MELLITUS (HCC): ICD-10-CM

## 2024-06-13 DIAGNOSIS — D46.9 MDS (MYELODYSPLASTIC SYNDROME) (HCC): ICD-10-CM

## 2024-06-13 DIAGNOSIS — E10.22 CKD STAGE 4 DUE TO TYPE 1 DIABETES MELLITUS (HCC): ICD-10-CM

## 2024-06-13 DIAGNOSIS — E11.3293 TYPE 2 DIABETES MELLITUS WITH BOTH EYES AFFECTED BY MILD NONPROLIFERATIVE RETINOPATHY WITHOUT MACULAR EDEMA, WITHOUT LONG-TERM CURRENT USE OF INSULIN (HCC): ICD-10-CM

## 2024-06-13 LAB
ALBUMIN SERPL-MCNC: 4 G/DL (ref 3.2–4.8)
ANION GAP SERPL CALC-SCNC: 7 MMOL/L (ref 0–18)
BASOPHILS # BLD AUTO: 0.01 X10(3) UL (ref 0–0.2)
BASOPHILS NFR BLD AUTO: 0.4 %
BUN BLD-MCNC: 68 MG/DL (ref 9–23)
BUN/CREAT SERPL: 17.2 (ref 10–20)
CALCIUM BLD-MCNC: 8.3 MG/DL (ref 8.7–10.4)
CHLORIDE SERPL-SCNC: 112 MMOL/L (ref 98–112)
CO2 SERPL-SCNC: 23 MMOL/L (ref 21–32)
CREAT BLD-MCNC: 3.96 MG/DL
DEPRECATED RDW RBC AUTO: 64 FL (ref 35.1–46.3)
EGFRCR SERPLBLD CKD-EPI 2021: 16 ML/MIN/1.73M2 (ref 60–?)
EOSINOPHIL # BLD AUTO: 0.06 X10(3) UL (ref 0–0.7)
EOSINOPHIL NFR BLD AUTO: 2.1 %
ERYTHROCYTE [DISTWIDTH] IN BLOOD BY AUTOMATED COUNT: 16.3 % (ref 11–15)
EST. AVERAGE GLUCOSE BLD GHB EST-MCNC: 177 MG/DL (ref 68–126)
GLUCOSE BLD-MCNC: 257 MG/DL (ref 70–99)
HBA1C MFR BLD: 7.8 % (ref ?–5.7)
HCT VFR BLD AUTO: 32.9 %
HGB BLD-MCNC: 10.4 G/DL
IMM GRANULOCYTES # BLD AUTO: 0.01 X10(3) UL (ref 0–1)
IMM GRANULOCYTES NFR BLD: 0.4 %
LYMPHOCYTES # BLD AUTO: 1.2 X10(3) UL (ref 1–4)
LYMPHOCYTES NFR BLD AUTO: 42.7 %
MCH RBC QN AUTO: 34.3 PG (ref 26–34)
MCHC RBC AUTO-ENTMCNC: 31.6 G/DL (ref 31–37)
MCV RBC AUTO: 108.6 FL
MONOCYTES # BLD AUTO: 0.29 X10(3) UL (ref 0.1–1)
MONOCYTES NFR BLD AUTO: 10.3 %
NEUTROPHILS # BLD AUTO: 1.24 X10 (3) UL (ref 1.5–7.7)
NEUTROPHILS # BLD AUTO: 1.24 X10(3) UL (ref 1.5–7.7)
NEUTROPHILS NFR BLD AUTO: 44.1 %
OSMOLALITY SERPL CALC.SUM OF ELEC: 323 MOSM/KG (ref 275–295)
PHOSPHATE SERPL-MCNC: 5.5 MG/DL (ref 2.4–5.1)
PLATELET # BLD AUTO: 91 10(3)UL (ref 150–450)
PLATELETS.RETICULATED NFR BLD AUTO: 8.3 % (ref 0–7)
POTASSIUM SERPL-SCNC: 5.6 MMOL/L (ref 3.5–5.1)
RBC # BLD AUTO: 3.03 X10(6)UL
SODIUM SERPL-SCNC: 142 MMOL/L (ref 136–145)
WBC # BLD AUTO: 2.8 X10(3) UL (ref 4–11)

## 2024-06-13 PROCEDURE — 80069 RENAL FUNCTION PANEL: CPT

## 2024-06-13 PROCEDURE — 83036 HEMOGLOBIN GLYCOSYLATED A1C: CPT

## 2024-06-13 PROCEDURE — 85025 COMPLETE CBC W/AUTO DIFF WBC: CPT

## 2024-06-13 PROCEDURE — 36415 COLL VENOUS BLD VENIPUNCTURE: CPT

## 2024-06-14 ENCOUNTER — NURSE ONLY (OUTPATIENT)
Dept: HEMATOLOGY/ONCOLOGY | Facility: HOSPITAL | Age: 69
End: 2024-06-14
Attending: INTERNAL MEDICINE
Payer: MEDICARE

## 2024-06-14 DIAGNOSIS — D46.9 MDS (MYELODYSPLASTIC SYNDROME) (HCC): Primary | ICD-10-CM

## 2024-06-14 DIAGNOSIS — D61.818 PANCYTOPENIA (HCC): ICD-10-CM

## 2024-06-14 PROCEDURE — 96372 THER/PROPH/DIAG INJ SC/IM: CPT

## 2024-06-14 RX ORDER — PREGABALIN 300 MG/1
300 CAPSULE ORAL DAILY
Qty: 30 CAPSULE | Refills: 0 | Status: SHIPPED | OUTPATIENT
Start: 2024-06-14

## 2024-06-17 ENCOUNTER — TELEPHONE (OUTPATIENT)
Dept: NEPHROLOGY | Facility: CLINIC | Age: 69
End: 2024-06-17

## 2024-06-17 DIAGNOSIS — N18.4 CKD STAGE 4 DUE TO TYPE 1 DIABETES MELLITUS (HCC): Primary | ICD-10-CM

## 2024-06-17 DIAGNOSIS — E10.22 CKD STAGE 4 DUE TO TYPE 1 DIABETES MELLITUS (HCC): Primary | ICD-10-CM

## 2024-06-17 DIAGNOSIS — E11.65 UNCONTROLLED TYPE 2 DIABETES MELLITUS WITH HYPERGLYCEMIA (HCC): ICD-10-CM

## 2024-06-17 RX ORDER — INSULIN ASPART 100 [IU]/ML
INJECTION, SOLUTION INTRAVENOUS; SUBCUTANEOUS
Qty: 40 ML | Refills: 1 | Status: SHIPPED | OUTPATIENT
Start: 2024-06-17

## 2024-06-17 NOTE — TELEPHONE ENCOUNTER
Jaun Enciso MD Calvillo, Maria, LPN  Please let him know. Kidneys are a little worse. I would like to see him in the office maybe the upcoming Friday thank you.

## 2024-06-17 NOTE — TELEPHONE ENCOUNTER
Dr. Enciso test result message was related.    Patient scheduled 7/18/24 3:20 pm; can he keep or be re-scheduled for next clinical Friday 6/28/24 ?    Patient has a new appointment with Dr. Rdz 6/28/24 3:00 pm;  What is best time to schedule appointment?

## 2024-06-17 NOTE — TELEPHONE ENCOUNTER
Endocrine refill protocol for rapid acting, regular, intermediate, and mixed insulin:    Protocol Criteria: PASSED  Appointment with Endocrinology completed in the last 6 months or scheduled in the next 3 months     Verify appointment has been completed or scheduled in the appropriate timeline. If so can send a 90 day supply with 1 refill.   Verify A1C has been completed in the last 6 months and is <8.5%    -May substitute prescriptions for Novolog and Humalog unless documented allergy (pens and vials) at the same dose and concentration per insurance preference and provider protocol.   -May substitute prescriptions for Novolin R and Humulin R unless documented allergy (pens and vials) at the same dose and concentration per insurance preference and provider protocol.   -May substitute prescriptions for Novolin N and Humulin N unless documented allergy (pens and vials) at the same dose and concentration per insurance preference and provider protocol.   -May substitute prescriptions for Humulin and Novolin 70/30 insulin unless documented allergy at the same dose and concentration per insurance preference and provider protocol.   Last completed office visit: 2/21/2024 Tanya Naranjo MD   Next scheduled Follow up:   Future Appointments   Date Time Provider Department Center   8/21/2024 11:00 AM Tanya Naranjo MD [facility]      Last A1C result: 7.8% done 6/13/2024.

## 2024-06-19 NOTE — TELEPHONE ENCOUNTER
Left voice message order is active- fasting 8 hours.  Wife was comfortable with leaving a voice message for directions.

## 2024-06-26 ENCOUNTER — OFFICE VISIT (OUTPATIENT)
Dept: OPHTHALMOLOGY | Facility: CLINIC | Age: 69
End: 2024-06-26

## 2024-06-26 DIAGNOSIS — Z96.1 PSEUDOPHAKIA OF BOTH EYES: ICD-10-CM

## 2024-06-26 DIAGNOSIS — E11.3293 TYPE 2 DIABETES MELLITUS WITH BOTH EYES AFFECTED BY MILD NONPROLIFERATIVE RETINOPATHY WITHOUT MACULAR EDEMA, WITHOUT LONG-TERM CURRENT USE OF INSULIN (HCC): Primary | ICD-10-CM

## 2024-06-26 DIAGNOSIS — H43.393 VITREOUS FLOATERS OF BOTH EYES: ICD-10-CM

## 2024-06-26 PROCEDURE — 92014 COMPRE OPH EXAM EST PT 1/>: CPT | Performed by: OPHTHALMOLOGY

## 2024-06-26 PROCEDURE — 92015 DETERMINE REFRACTIVE STATE: CPT | Performed by: OPHTHALMOLOGY

## 2024-06-26 NOTE — ASSESSMENT & PLAN NOTE
No treatment.  New glasses today; update as needed.  Discussed that new prescription is only a slight change.

## 2024-06-26 NOTE — PATIENT INSTRUCTIONS
Pseudophakia of both eyes  No treatment.  New glasses today; update as needed.  Discussed that new prescription is only a slight change.       Type 2 diabetes mellitus with both eyes affected by mild nonproliferative retinopathy without macular edema, without long-term current use of insulin (HCC)  Diabetes type II: mild background diabetic retinopathy, no signs of neovascularization noted.  No treatment necessary at this time.  Patient was instructed to monitor vision for sudden changes and to call if visual changes noted.  Discussed ocular and systemic benefits of blood sugar control.  Recommend yearly eye exams with an ophthalmologist.      Vitreous floaters  No treatment.

## 2024-06-26 NOTE — ASSESSMENT & PLAN NOTE
Diabetes type II: mild background diabetic retinopathy, no signs of neovascularization noted.  No treatment necessary at this time.  Patient was instructed to monitor vision for sudden changes and to call if visual changes noted.  Discussed ocular and systemic benefits of blood sugar control.  Recommend yearly eye exams with an ophthalmologist.

## 2024-06-26 NOTE — PROGRESS NOTES
Jaun Burton is a 68 year old male.    HPI:     HPI    Pt is here for a diabetic eye exam. Pt states vision occasionally gets blurry without glasses. Sates distance vision is stable.    Pt has been a diabetic for 22+years       Pt's diabetes is currently controlled by insulin   Pt checks BS daily   Pt's last blood sugar was 210 this morning   Last HA1C was 7.8 on 6/13/24  Endocrinologist: Dr Naranjo  Last edited by Heather Denson on 6/26/2024 11:22 AM.        Patient History:  Past Medical History:    C2-3 mild central, C3-4 mild-mod diffuse, C4-5 mild diffuse, C5-6 mod diffuse bulging discs    C5-6 mod central & bilateral mild foraminal, C4-5 left mild foraminal stenosis    C6-7 mild-mod central herniated disc    Cataract    2010    Chronic kidney disease (CKD)    stage 3    Diabetes mellitus (HCC)    Diabetes type 2, uncontrolled    Diabetic retinopathy (HCC)    [facility]    Hyperlipidemia    Meibomian gland dysfunction    Osteoarthritis of spine with radiculopathy, cervical region    Pancreatitis (HCC)    Primary osteoarthritis of both shoulders    Proteinuria    Type II or unspecified type diabetes mellitus without mention of complication, not stated as uncontrolled    Pills & Insulin    Vitreous floaters       Surgical History: Jaun Burton has a past surgical history that includes cholecystectomy (2012); appendectomy; hernia surgery; electrocardiogram, complete (4/23/2012) (scanned to media tab); Cataract extraction w/  intraocular lens implant (Right, 06/11/2018) (Dr. Lopez); and Cataract extraction w/  intraocular lens implant (Left, 07/12/2018) (Dr. Lopze).    Family History   Problem Relation Age of Onset    Diabetes Other         close relative    Diabetes Maternal Grandmother     Diabetes Father     Macular degeneration Neg     Glaucoma Neg         family h/o       Social History:   Social History     Socioeconomic History    Marital status:    Tobacco Use     Smoking status: Former     Current packs/day: 0.00     Types: Cigarettes     Quit date: 1989     Years since quittin.6    Smokeless tobacco: Former    Tobacco comments:     quit about 30 years ago.   Vaping Use    Vaping status: Never Used   Substance and Sexual Activity    Alcohol use: No    Drug use: No   Other Topics Concern    Caffeine Concern Yes     Comment: 1 cup coffee per day    Exercise No    History of tanning Yes    Reaction to local anesthetic No       Medications:  Current Outpatient Medications   Medication Sig Dispense Refill    NOVOLOG 100 UNIT/ML Injection Solution INJECT 50 UNITS SUBCUTANEOUSLY ONCE DAILY VIA  CORRECTION  FACTOR 40 mL 1    pregabalin 300 MG Oral Cap Take 1 capsule (300 mg total) by mouth daily. 30 capsule 0    BD INSULIN SYRINGE U/F 31G X \" 0.5 ML Does not apply Misc USE 1 SYRINGE 4 TIMES DAILY 400 each 3    Mometasone Furoate 0.1 % External Solution Use bid to ears as directd 60 mL 3    HYDROcodone-acetaminophen (NORCO)  MG Oral Tab Take 1 tablet by mouth every 8 (eight) hours as needed for Pain. 90 tablet 0    FREESTYLE LITE TEST In Vitro Strip USE 1 STRIP TO CHECK BLOOD GLUCOSE 3 TIMES DAILY. DX: E11.65, insulin dependent 300 strip 1    amitriptyline 25 MG Oral Tab Take 1 tablet (25 mg total) by mouth nightly. 90 tablet 0    sodium zirconium cyclosilicate (LOKELMA) 10 g Oral Powd Pack Take 1 packet (10 g total) by mouth twice a week. 30 packet 0    LANTUS 100 UNIT/ML Subcutaneous Solution Inject 35 Units into the skin nightly. 30 mL 1    azelastine 137 MCG/SPRAY Nasal Solution 1-2 sprays by Nasal route in the morning and 1-2 sprays before bedtime. FOR SINUS SYMPTOMS/NASAL CONGESTION.. 30 mL 3    fluticasone propionate 50 MCG/ACT Nasal Suspension 2 sprays by Each Nare route daily. FOR NASAL CONGESTION/SINUS SYMPTOMS. 3 each 3    fluticasone-salmeterol (ADVAIR DISKUS) 100-50 MCG/ACT Inhalation Aerosol Powder, Breath Activated Inhale 1 puff into the lungs 2  (two) times daily. 1 each 3    metoprolol tartrate 50 MG Oral Tab       atorvastatin 40 MG Oral Tab Take 1 tablet (40 mg total) by mouth daily. FOR CHOLESTEROL. 90 tablet 9    fenofibrate 48 MG Oral Tab Take 1 tablet (48 mg total) by mouth daily. FOR TRIGLYCERIDES. 90 tablet 9    pantoprazole 40 MG Oral Tab EC Take 1 tablet (40 mg total) by mouth before breakfast. FOR ACID REFLUX. 90 tablet 9    cyanocobalamin 1000 MCG Oral Tab Take 1 tablet (1,000 mcg total) by mouth daily. 90 tablet 4    Betamethasone Dipropionate Aug 0.05 % External Cream Apply 1 Application topically 2 (two) times daily. APPLY TO AFFECTED AREA 50 g 1    RELION INSULIN SYRINGE 31G X 15/64\" 0.5 ML Does not apply Misc 1 Syringe 4 (four) times daily. 400 each 2    RELION INSULIN SYRINGE 31G X 15/64\" 0.5 ML Does not apply Misc Inject 1 Syringe into the skin 4 (four) times daily. 400 each 0    RELION INSULIN SYRINGE 31G X 15/64\" 0.5 ML Does not apply Misc USE 1 SYRINGE SUBCUTANEOUSLY 4 TIMES DAILY 200 each 0    metRONIDAZOLE 0.75 % External Cream Apply 1 Application topically daily. For breakout on face 45 g 0    Insulin Syringe-Needle U-100 (RELION INSULIN SYRINGE) 31G X 15/64\" 0.5 ML Does not apply Misc 1 Syringe by Does not apply route 4 (four) times daily. 400 each 0    Pimecrolimus 1 % External Cream APPLY   TOPICALLY AFFECTED AREA ON FACE ONCE DAILY TO TWICE DAILY 60 g 0    Tazarotene 0.1 % External Cream Apply to chest nightly (Patient taking differently: Apply to chest nightly PRN) 60 g 3    Insulin Syringe-Needle U-100 (RELION INSULIN SYRINGE) 31G X 15/64\" 0.5 ML Does not apply Misc 1 Syringe by Does not apply route 4 (four) times daily. 400 each 0    Blood Glucose Monitoring Suppl (ACCU-CHEK INÉS PLUS) w/Device Does not apply Kit Use as directed to check blood sugars. 1 kit 0    Ferrous Gluconate 225 (27 Fe) MG Oral Tab Take 27 mg by mouth daily.      Omega-3 Fatty Acids (FISH OIL) 600 MG Oral Cap Take 1 capsule by mouth nightly.         Multiple Vitamins-Minerals (CENTRUM SILVER) Oral Tab Take 1 tablet by mouth daily.         Allergies:  Allergies   Allergen Reactions    Cefdinir DIARRHEA    Zosyn [Piperacillin Sod-Tazobactam So] OTHER (SEE COMMENTS)     Heightened sense of smell; dry heaves, vomiting       ROS:     ROS    Positive for: Eyes  Last edited by Sandra Jackson OT on 6/26/2024 10:42 AM.          PHYSICAL EXAM:     Base Eye Exam       Visual Acuity (Snellen - Linear)         Right Left    Dist cc 20/40 20/30    Dist ph cc NI NI    Near cc 20/40 20/25      Correction: Glasses              Tonometry (Applanation, 11:07 AM)         Right Left    Pressure 16 16              Pupils         Pupils    Right PERRL    Left PERRL              Visual Fields         Left Right     Full Full              Extraocular Movement         Right Left     Full, Ortho Full, Ortho              Neuro/Psych       Oriented x3: Yes    Mood/Affect: Normal              Dilation       Both eyes: 1.0% Mydriacyl and 2.5% Markie Synephrine @ 11:07 AM                  Slit Lamp and Fundus Exam       Slit Lamp Exam         Right Left    Lids/Lashes Dermatochalasis, Meibomian gland dysfunction Meibomian gland dysfunction, Dermatochalasis    Conjunctiva/Sclera Nasal/temp pinguecula Temp pinguecula    Cornea Clear Clear    Anterior Chamber Deep and quiet Deep and quiet    Iris Transillumination defects from 7-9 o'clock 2nd to cataract surgery Normal    Lens PC IOL with trace PC opacity- haptic visible through transillumination PC IOL with clear capsule     Vitreous Vitreous floaters Vitreous floaters              Fundus Exam         Right Left    Disc Good rim, Temporal crescent Good rim, Temporal crescent    C/D Ratio 0.1 0.1    Macula 1 MA below  fovea, few MA and DBH nasal to disc, few MA above and temp few MA nasal to disc, few MA below fovea    Vessels Normal Normal    Periphery Normal Normal                  Refraction       Wearing Rx         Sphere Cylinder Axis Add     Right +0.50 +0.75 135 +2.75    Left +0.75 +0.50 150 +2.75      Age: 1yrs    Type: Flat top bifocal              Manifest Refraction (Auto)         Sphere Cylinder [place] Dist VA Add Near VA    Right +0.50 +1.00 105       Left +0.50 +0.75 120                 Manifest Refraction #2         Sphere Cylinder [place] Dist VA Add Near VA    Right +0.75 +1.00 135 20/40+ +3.00 20/25    Left +0.50 +0.50 150 20/30+ +3.00 20/20              Final Rx         Sphere Cylinder [place] Dist VA Add Near VA    Right +0.75 +1.00 135 20/40+ +3.00 20/25    Left +0.50 +0.50 150 20/30+ +3.00 20/20      Type: Flat top bifocal                     ASSESSMENT/PLAN:     Diagnoses and Plan:     Pseudophakia of both eyes  No treatment.  New glasses today; update as needed.  Discussed that new prescription is only a slight change.       Type 2 diabetes mellitus with both eyes affected by mild nonproliferative retinopathy without macular edema, without long-term current use of insulin (HCC)  Diabetes type II: mild background diabetic retinopathy, no signs of neovascularization noted.  No treatment necessary at this time.  Patient was instructed to monitor vision for sudden changes and to call if visual changes noted.  Discussed ocular and systemic benefits of blood sugar control.  Recommend yearly eye exams with an ophthalmologist.      Vitreous floaters  No treatment.     No orders of the defined types were placed in this encounter.      Meds This Visit:  Requested Prescriptions      No prescriptions requested or ordered in this encounter        Follow up instructions:  Return in about 1 year (around 6/26/2025) for Diabetic eye exam.    6/26/2024  Scribed by: Bret Arauz MD

## 2024-06-27 ENCOUNTER — LAB ENCOUNTER (OUTPATIENT)
Dept: LAB | Age: 69
End: 2024-06-27
Attending: INTERNAL MEDICINE
Payer: MEDICARE

## 2024-06-27 DIAGNOSIS — D46.9 MDS (MYELODYSPLASTIC SYNDROME) (HCC): ICD-10-CM

## 2024-06-27 DIAGNOSIS — D61.818 PANCYTOPENIA (HCC): ICD-10-CM

## 2024-06-27 DIAGNOSIS — E10.22 CKD STAGE 4 DUE TO TYPE 1 DIABETES MELLITUS (HCC): ICD-10-CM

## 2024-06-27 DIAGNOSIS — N18.4 CKD STAGE 4 DUE TO TYPE 1 DIABETES MELLITUS (HCC): ICD-10-CM

## 2024-06-27 LAB
ANION GAP SERPL CALC-SCNC: 4 MMOL/L (ref 0–18)
BUN BLD-MCNC: 52 MG/DL (ref 9–23)
BUN/CREAT SERPL: 17 (ref 10–20)
CALCIUM BLD-MCNC: 8.8 MG/DL (ref 8.7–10.4)
CHLORIDE SERPL-SCNC: 114 MMOL/L (ref 98–112)
CO2 SERPL-SCNC: 24 MMOL/L (ref 21–32)
CREAT BLD-MCNC: 3.05 MG/DL
EGFRCR SERPLBLD CKD-EPI 2021: 21 ML/MIN/1.73M2 (ref 60–?)
FASTING STATUS PATIENT QL REPORTED: YES
GLUCOSE BLD-MCNC: 186 MG/DL (ref 70–99)
MAGNESIUM SERPL-MCNC: 1.4 MG/DL (ref 1.6–2.6)
OSMOLALITY SERPL CALC.SUM OF ELEC: 313 MOSM/KG (ref 275–295)
POTASSIUM SERPL-SCNC: 5.4 MMOL/L (ref 3.5–5.1)
SODIUM SERPL-SCNC: 142 MMOL/L (ref 136–145)

## 2024-06-27 PROCEDURE — 83735 ASSAY OF MAGNESIUM: CPT

## 2024-06-27 PROCEDURE — 85060 BLOOD SMEAR INTERPRETATION: CPT

## 2024-06-27 PROCEDURE — 80048 BASIC METABOLIC PNL TOTAL CA: CPT

## 2024-06-27 PROCEDURE — 85025 COMPLETE CBC W/AUTO DIFF WBC: CPT

## 2024-06-27 PROCEDURE — 36415 COLL VENOUS BLD VENIPUNCTURE: CPT

## 2024-06-28 ENCOUNTER — OFFICE VISIT (OUTPATIENT)
Dept: NEPHROLOGY | Facility: CLINIC | Age: 69
End: 2024-06-28

## 2024-06-28 ENCOUNTER — OFFICE VISIT (OUTPATIENT)
Dept: HEMATOLOGY/ONCOLOGY | Facility: HOSPITAL | Age: 69
End: 2024-06-28
Attending: INTERNAL MEDICINE

## 2024-06-28 VITALS
BODY MASS INDEX: 34 KG/M2 | HEART RATE: 69 BPM | WEIGHT: 233 LBS | SYSTOLIC BLOOD PRESSURE: 110 MMHG | DIASTOLIC BLOOD PRESSURE: 63 MMHG

## 2024-06-28 VITALS
HEART RATE: 72 BPM | DIASTOLIC BLOOD PRESSURE: 60 MMHG | RESPIRATION RATE: 18 BRPM | HEIGHT: 69 IN | SYSTOLIC BLOOD PRESSURE: 128 MMHG | BODY MASS INDEX: 34.57 KG/M2 | OXYGEN SATURATION: 97 % | WEIGHT: 233.38 LBS | TEMPERATURE: 98 F

## 2024-06-28 DIAGNOSIS — D61.818 PANCYTOPENIA (HCC): ICD-10-CM

## 2024-06-28 DIAGNOSIS — Z51.81 MEDICATION MONITORING ENCOUNTER: ICD-10-CM

## 2024-06-28 DIAGNOSIS — N18.4 CKD STAGE 4 DUE TO TYPE 1 DIABETES MELLITUS (HCC): Primary | ICD-10-CM

## 2024-06-28 DIAGNOSIS — G62.9 NEUROPATHY: ICD-10-CM

## 2024-06-28 DIAGNOSIS — N18.9 CHRONIC KIDNEY DISEASE, UNSPECIFIED CKD STAGE: ICD-10-CM

## 2024-06-28 DIAGNOSIS — D46.9 MDS (MYELODYSPLASTIC SYNDROME) (HCC): ICD-10-CM

## 2024-06-28 DIAGNOSIS — I15.2 HYPERTENSION ASSOCIATED WITH TYPE 2 DIABETES MELLITUS (HCC): ICD-10-CM

## 2024-06-28 DIAGNOSIS — E10.22 CKD STAGE 4 DUE TO TYPE 1 DIABETES MELLITUS (HCC): Primary | ICD-10-CM

## 2024-06-28 DIAGNOSIS — D46.9 MDS (MYELODYSPLASTIC SYNDROME) (HCC): Primary | ICD-10-CM

## 2024-06-28 DIAGNOSIS — E11.59 HYPERTENSION ASSOCIATED WITH TYPE 2 DIABETES MELLITUS (HCC): ICD-10-CM

## 2024-06-28 DIAGNOSIS — D61.818 PANCYTOPENIA (HCC): Primary | ICD-10-CM

## 2024-06-28 LAB
BASOPHILS # BLD AUTO: 0 X10(3) UL (ref 0–0.2)
BASOPHILS NFR BLD AUTO: 0 %
DEPRECATED RDW RBC AUTO: 66.6 FL (ref 35.1–46.3)
EOSINOPHIL # BLD AUTO: 0.08 X10(3) UL (ref 0–0.7)
EOSINOPHIL NFR BLD AUTO: 3.3 %
ERYTHROCYTE [DISTWIDTH] IN BLOOD BY AUTOMATED COUNT: 16.6 % (ref 11–15)
HCT VFR BLD AUTO: 32.7 %
HGB BLD-MCNC: 10.2 G/DL
IMM GRANULOCYTES # BLD AUTO: 0.01 X10(3) UL (ref 0–1)
IMM GRANULOCYTES NFR BLD: 0.4 %
LYMPHOCYTES # BLD AUTO: 0.95 X10(3) UL (ref 1–4)
LYMPHOCYTES NFR BLD AUTO: 39.6 %
MCH RBC QN AUTO: 34.3 PG (ref 26–34)
MCHC RBC AUTO-ENTMCNC: 31.2 G/DL (ref 31–37)
MCV RBC AUTO: 110.1 FL
MONOCYTES # BLD AUTO: 0.23 X10(3) UL (ref 0.1–1)
MONOCYTES NFR BLD AUTO: 9.6 %
NEUTROPHILS # BLD AUTO: 1.13 X10 (3) UL (ref 1.5–7.7)
NEUTROPHILS # BLD AUTO: 1.13 X10(3) UL (ref 1.5–7.7)
NEUTROPHILS NFR BLD AUTO: 47.1 %
PLATELET # BLD AUTO: 78 10(3)UL (ref 150–450)
PLATELET MORPHOLOGY: NORMAL
PLATELETS.RETICULATED NFR BLD AUTO: 7.4 % (ref 0–7)
RBC # BLD AUTO: 2.97 X10(6)UL
WBC # BLD AUTO: 2.4 X10(3) UL (ref 4–11)

## 2024-06-28 PROCEDURE — 96372 THER/PROPH/DIAG INJ SC/IM: CPT

## 2024-06-28 PROCEDURE — 99214 OFFICE O/P EST MOD 30 MIN: CPT | Performed by: INTERNAL MEDICINE

## 2024-06-28 PROCEDURE — G2211 COMPLEX E/M VISIT ADD ON: HCPCS | Performed by: INTERNAL MEDICINE

## 2024-06-28 RX ORDER — MAGNESIUM OXIDE 400 MG/1
400 TABLET ORAL DAILY
Qty: 90 TABLET | Refills: 0 | Status: SHIPPED | OUTPATIENT
Start: 2024-06-28

## 2024-06-28 RX ORDER — AMITRIPTYLINE HYDROCHLORIDE 25 MG/1
25 TABLET, FILM COATED ORAL NIGHTLY
Qty: 90 TABLET | Refills: 1 | Status: SHIPPED | OUTPATIENT
Start: 2024-06-28

## 2024-06-28 NOTE — PATIENT INSTRUCTIONS
Add magnesium oxide one daily     Do labs four weeks       magnesium     norco two weeks      See me three months    Good to see you both..      Lokelma  once a week for now

## 2024-06-28 NOTE — PROGRESS NOTES
[facility]cer Center Progress Note    Patient Name: Jaun Burton   YOB: 1955   Medical Record Number: [id number]   Attending Physician: Payam Rdz M.D.     Chief Complaint:  Pancytopenia due to MDS and CKD    History of Present Illness:  68 year old  With chronic kidney disease been evaluate by hematology for pancytopenia.    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 U2AF1 c.101C>T, p.Fon87Vzg ([id number])   VAF: 38.6%   U2AF1 (also known as U2AF35) encodes a component of the   RNA-splicing machinery known as the spliceosome. Somatic mutations   of U2AF1 are found in approximately 5% of patients with acute   myeloid leukemia (AML)  (25), and in approximately 9% of patients   with myelodysplastic syndrome (MDS) (20) (29) (19). Most U2AF1   mutations affect codons Ser34 or Pwc112 (10). This particular   missense mutation has been reported in hematologic malignancies   (4). Mutations in spliceosomal genes, including U2AF1, are   associated with decreased disease-free and overall survival in   patients with AML (8) (13). In MDS, U2AF1 mutations are associated   with worse overall survival (11) and more rapid transformation to   AML (24), and do not predict response to hypomethylating therapies   (11).       2. GATA2 c.568dup, p.Uyb415nq ([id number])   VAF: 40.6%   GATA2 belongs to the ZINA family of transcription factors and   regulates hematopoiesis through two conserved zinc finger domains.   Overall, somatic GATA2 mutations are found in 1-4% of patients   with sporadic myeloid malignancies (12) (17) (18). GATA2 mutations   are common in adult AML patients with biallelic CEBPA mutations,   but are rare in adult AML patients with wild-type CEBPA (5) (6)   (7). Somatic GATA2 mutations are infrequent in MDS (28).   Pathogenic germline variants of GATA2 cause a range of   hematopoietic defects, including MonoMAC syndrome, dendritic cell,   monocyte, B and NK lymphoid  deficiency syndrome (DCML), familial   MDS, AML, and blast transformation in chronic myeloid leukemia   (CML) (3) (23). Somatic GATA2 mutations in hematological   malignancies are typically missense mutations within the   N-terminal zinc-finger domain and in-frame deletions/insertions in   the C-terminal zinc-finger domain (9) (15) (16). Somatic   frameshift and nonsense mutations in GATA2 are generally detected   outside of the zinc-finger domains (15). This particular   frameshift mutation is predicted to disrupt the normal function of   GATA2 (3). In AML patients, GATA2 mutations are confined to the   N-terminal zinc finger domain, and frequently co-occurred with   biallelic CEBPA, KIT and FLT3 mutations (15). Some studies found   that GATA2 mutations had no impact on the clinical outcome in   CEBPA-double/BDE9-YMD-nbzyuldj AML patients (6). Another study   found that GATA2 mutations were associated with favorable   prognosis in intermediate-risk karyotype AML with biallelic CEBPA   mutations (5). The prognostic significance of GATA2 mutation in   the absence of CEBPA or FLT3 mutation is unclear. In MDS patients,   GATA2 is frequently co-mutated with BCOR and U2AF1 (15). In GATA2   mutated primary and advanced MDS patients, GATA2 mutation is often   germline in origin and is generally associated with monosomy 7   (28).       TIER 2: Variants of Unknown Clinical Significance in Hematologic   Malignancies       1. KMT2A c.4240G>A, p.Aes7441Jnr ([id number])   VAF: 44.1%   KMT2A encodes a histone methyltransferase that acts as a   transcriptional coactivator to regulate gene expression during   early development and hematopoiesis (21). Somatic mutations of   KMT2A (formerly known as MLL) are found in 6-10% of AML patients   (1) (26) (27) and very rarely in other myeloid malignancies (22).   The reported AML-associated KMT2A mutations are mostly partial   tandem duplications that span KMT2A exons 2-11 (2) (14).  This   particular KMT2A missense variant alters a moderately conserved   amino acid and has not been reported in hematologic malignancies,   to the best of our knowledge. Its functional consequences are   unknown. In addition, this variant is listed in the dbSNP database   ([id number]). Given that the variant frequency is close to 50%,   it is unclear whether this is a germline or somatic variant. The   clinical significance, if any, is uncertain    Interval History:  He returns for routine follow-up of MDS on CED. Patient reports feeling well, w/o reports of bleeding, recurrent fevers or infections or systemic signs of illness. He denies any chest pain, dyspnea, n/v/mentions some abd pain due to need for bowel movement, no URI symptoms or new concerns on ROS.    Performance Status:  ECOG 0    Past Medical History:  Past Medical History:    C2-3 mild central, C3-4 mild-mod diffuse, C4-5 mild diffuse, C5-6 mod diffuse bulging discs    C5-6 mod central & bilateral mild foraminal, C4-5 left mild foraminal stenosis    C6-7 mild-mod central herniated disc    Cataract    2010    Chronic kidney disease (CKD)    stage 3    Diabetes mellitus (HCC)    Diabetes type 2, uncontrolled    Diabetic retinopathy (HCC)    [facility] for eval    Hyperlipidemia    Meibomian gland dysfunction    Osteoarthritis of spine with radiculopathy, cervical region    Pancreatitis (HCC)    Primary osteoarthritis of both shoulders    Proteinuria    Type II or unspecified type diabetes mellitus without mention of complication, not stated as uncontrolled    Pills & Insulin    Vitreous floaters       Past Surgical History:  Past Surgical History:   Procedure Laterality Date    Appendectomy      Cataract extraction w/  intraocular lens implant Right 06/11/2018    Dr. Lopez    Cataract extraction w/  intraocular lens implant Left 07/12/2018    Dr. Lopez    Cholecystectomy  2012    Electrocardiogram, complete  4/23/2012    scanned  to media tab    Hernia surgery         Family History:  Family History   Problem Relation Age of Onset    Diabetes Other         close relative    Diabetes Maternal Grandmother     Diabetes Father     Macular degeneration Neg     Glaucoma Neg         family h/o       Social History:  Social History     Socioeconomic History    Marital status:    Tobacco Use    Smoking status: Former     Current packs/day: 0.00     Types: Cigarettes     Quit date: 1989     Years since quittin.6    Smokeless tobacco: Former    Tobacco comments:     quit about 30 years ago.   Vaping Use    Vaping status: Never Used   Substance and Sexual Activity    Alcohol use: No    Drug use: No   Other Topics Concern    Caffeine Concern Yes     Comment: 1 cup coffee per day    Exercise No    History of tanning Yes    Reaction to local anesthetic No         Current Medications:    Current Outpatient Medications:     AMITRIPTYLINE 25 MG Oral Tab, Take 1 tablet by mouth nightly, Disp: 90 tablet, Rfl: 1    NOVOLOG 100 UNIT/ML Injection Solution, INJECT 50 UNITS SUBCUTANEOUSLY ONCE DAILY VIA  CORRECTION  FACTOR, Disp: 40 mL, Rfl: 1    pregabalin 300 MG Oral Cap, Take 1 capsule (300 mg total) by mouth daily., Disp: 30 capsule, Rfl: 0    BD INSULIN SYRINGE U/F 31G X \" 0.5 ML Does not apply Misc, USE 1 SYRINGE 4 TIMES DAILY, Disp: 400 each, Rfl: 3    Mometasone Furoate 0.1 % External Solution, Use bid to ears as directd, Disp: 60 mL, Rfl: 3    HYDROcodone-acetaminophen (NORCO)  MG Oral Tab, Take 1 tablet by mouth every 8 (eight) hours as needed for Pain., Disp: 90 tablet, Rfl: 0    FREESTYLE LITE TEST In Vitro Strip, USE 1 STRIP TO CHECK BLOOD GLUCOSE 3 TIMES DAILY. DX: E11.65, insulin dependent, Disp: 300 strip, Rfl: 1    sodium zirconium cyclosilicate (LOKELMA) 10 g Oral Powd Pack, Take 1 packet (10 g total) by mouth twice a week., Disp: 30 packet, Rfl: 0    LANTUS 100 UNIT/ML Subcutaneous Solution, Inject 35 Units into the  skin nightly., Disp: 30 mL, Rfl: 1    azelastine 137 MCG/SPRAY Nasal Solution, 1-2 sprays by Nasal route in the morning and 1-2 sprays before bedtime. FOR SINUS SYMPTOMS/NASAL CONGESTION.., Disp: 30 mL, Rfl: 3    fluticasone propionate 50 MCG/ACT Nasal Suspension, 2 sprays by Each Nare route daily. FOR NASAL CONGESTION/SINUS SYMPTOMS., Disp: 3 each, Rfl: 3    fluticasone-salmeterol (ADVAIR DISKUS) 100-50 MCG/ACT Inhalation Aerosol Powder, Breath Activated, Inhale 1 puff into the lungs 2 (two) times daily., Disp: 1 each, Rfl: 3    metoprolol tartrate 50 MG Oral Tab, , Disp: , Rfl:     atorvastatin 40 MG Oral Tab, Take 1 tablet (40 mg total) by mouth daily. FOR CHOLESTEROL., Disp: 90 tablet, Rfl: 9    fenofibrate 48 MG Oral Tab, Take 1 tablet (48 mg total) by mouth daily. FOR TRIGLYCERIDES., Disp: 90 tablet, Rfl: 9    pantoprazole 40 MG Oral Tab EC, Take 1 tablet (40 mg total) by mouth before breakfast. FOR ACID REFLUX., Disp: 90 tablet, Rfl: 9    cyanocobalamin 1000 MCG Oral Tab, Take 1 tablet (1,000 mcg total) by mouth daily., Disp: 90 tablet, Rfl: 4    Betamethasone Dipropionate Aug 0.05 % External Cream, Apply 1 Application topically 2 (two) times daily. APPLY TO AFFECTED AREA, Disp: 50 g, Rfl: 1    metRONIDAZOLE 0.75 % External Cream, Apply 1 Application topically daily. For breakout on face, Disp: 45 g, Rfl: 0    Insulin Syringe-Needle U-100 (RELION INSULIN SYRINGE) 31G X 15/64\" 0.5 ML Does not apply Misc, 1 Syringe by Does not apply route 4 (four) times daily., Disp: 400 each, Rfl: 0    Pimecrolimus 1 % External Cream, APPLY   TOPICALLY AFFECTED AREA ON FACE ONCE DAILY TO TWICE DAILY, Disp: 60 g, Rfl: 0    Tazarotene 0.1 % External Cream, Apply to chest nightly (Patient taking differently: Apply to chest nightly PRN), Disp: 60 g, Rfl: 3    Insulin Syringe-Needle U-100 (RELION INSULIN SYRINGE) 31G X 15/64\" 0.5 ML Does not apply Misc, 1 Syringe by Does not apply route 4 (four) times daily., Disp: 400 each, Rfl:  0    Blood Glucose Monitoring Suppl (ACCU-CHEK INÉS PLUS) w/Device Does not apply Kit, Use as directed to check blood sugars., Disp: 1 kit, Rfl: 0    Ferrous Gluconate 225 (27 Fe) MG Oral Tab, Take 27 mg by mouth daily., Disp: , Rfl:     Omega-3 Fatty Acids (FISH OIL) 600 MG Oral Cap, Take 1 capsule by mouth nightly.  , Disp: , Rfl:     Multiple Vitamins-Minerals (CENTRUM SILVER) Oral Tab, Take 1 tablet by mouth daily., Disp: , Rfl:     Current Outpatient Medications on File Prior to Visit   Medication Sig Dispense Refill    AMITRIPTYLINE 25 MG Oral Tab Take 1 tablet by mouth nightly 90 tablet 1    NOVOLOG 100 UNIT/ML Injection Solution INJECT 50 UNITS SUBCUTANEOUSLY ONCE DAILY VIA  CORRECTION  FACTOR 40 mL 1    pregabalin 300 MG Oral Cap Take 1 capsule (300 mg total) by mouth daily. 30 capsule 0    BD INSULIN SYRINGE U/F 31G X 5/16\" 0.5 ML Does not apply Misc USE 1 SYRINGE 4 TIMES DAILY 400 each 3    Mometasone Furoate 0.1 % External Solution Use bid to ears as directd 60 mL 3    HYDROcodone-acetaminophen (NORCO)  MG Oral Tab Take 1 tablet by mouth every 8 (eight) hours as needed for Pain. 90 tablet 0    FREESTYLE LITE TEST In Vitro Strip USE 1 STRIP TO CHECK BLOOD GLUCOSE 3 TIMES DAILY. DX: E11.65, insulin dependent 300 strip 1    sodium zirconium cyclosilicate (LOKELMA) 10 g Oral Powd Pack Take 1 packet (10 g total) by mouth twice a week. 30 packet 0    LANTUS 100 UNIT/ML Subcutaneous Solution Inject 35 Units into the skin nightly. 30 mL 1    azelastine 137 MCG/SPRAY Nasal Solution 1-2 sprays by Nasal route in the morning and 1-2 sprays before bedtime. FOR SINUS SYMPTOMS/NASAL CONGESTION.. 30 mL 3    fluticasone propionate 50 MCG/ACT Nasal Suspension 2 sprays by Each Nare route daily. FOR NASAL CONGESTION/SINUS SYMPTOMS. 3 each 3    fluticasone-salmeterol (ADVAIR DISKUS) 100-50 MCG/ACT Inhalation Aerosol Powder, Breath Activated Inhale 1 puff into the lungs 2 (two) times daily. 1 each 3    metoprolol  tartrate 50 MG Oral Tab       atorvastatin 40 MG Oral Tab Take 1 tablet (40 mg total) by mouth daily. FOR CHOLESTEROL. 90 tablet 9    fenofibrate 48 MG Oral Tab Take 1 tablet (48 mg total) by mouth daily. FOR TRIGLYCERIDES. 90 tablet 9    pantoprazole 40 MG Oral Tab EC Take 1 tablet (40 mg total) by mouth before breakfast. FOR ACID REFLUX. 90 tablet 9    cyanocobalamin 1000 MCG Oral Tab Take 1 tablet (1,000 mcg total) by mouth daily. 90 tablet 4    Betamethasone Dipropionate Aug 0.05 % External Cream Apply 1 Application topically 2 (two) times daily. APPLY TO AFFECTED AREA 50 g 1    metRONIDAZOLE 0.75 % External Cream Apply 1 Application topically daily. For breakout on face 45 g 0    Insulin Syringe-Needle U-100 (RELION INSULIN SYRINGE) 31G X 15/64\" 0.5 ML Does not apply Misc 1 Syringe by Does not apply route 4 (four) times daily. 400 each 0    Pimecrolimus 1 % External Cream APPLY   TOPICALLY AFFECTED AREA ON FACE ONCE DAILY TO TWICE DAILY 60 g 0    Tazarotene 0.1 % External Cream Apply to chest nightly (Patient taking differently: Apply to chest nightly PRN) 60 g 3    Insulin Syringe-Needle U-100 (RELION INSULIN SYRINGE) 31G X 15/64\" 0.5 ML Does not apply Misc 1 Syringe by Does not apply route 4 (four) times daily. 400 each 0    Blood Glucose Monitoring Suppl (ACCU-CHEK INÉS PLUS) w/Device Does not apply Kit Use as directed to check blood sugars. 1 kit 0    Ferrous Gluconate 225 (27 Fe) MG Oral Tab Take 27 mg by mouth daily.      Omega-3 Fatty Acids (FISH OIL) 600 MG Oral Cap Take 1 capsule by mouth nightly.        Multiple Vitamins-Minerals (CENTRUM SILVER) Oral Tab Take 1 tablet by mouth daily.       Current Facility-Administered Medications on File Prior to Visit   Medication Dose Route Frequency Provider Last Rate Last Admin    [COMPLETED] darbepoetin grey-polysorbate (Aranesp-Albumin Free) 500 MCG/ML injection 500 mcg  500 mcg Subcutaneous Once Shannon Morales APRN   500 mcg at 06/28/24 1382     [COMPLETED] darbepoetin grey-polysorbate (Aranesp-Albumin Free) 500 MCG/ML injection 500 mcg  500 mcg Subcutaneous Once Shannon Morales APRN   500 mcg at 06/14/24 1049    [COMPLETED] darbepoetin grey-polysorbate (Aranesp-Albumin Free) 500 MCG/ML injection 500 mcg  500 mcg Subcutaneous Once Shannon Morales APRN   500 mcg at 05/31/24 1421    darbepoetin grey (Aranesp) 200 MCG/0.4ML injection 200 mcg  200 mcg Subcutaneous Q21 Days Jaun Enciso MD   200 mcg at 03/24/23 1028         Allergies:  Allergies   Allergen Reactions    Cefdinir DIARRHEA    Zosyn [Piperacillin Sod-Tazobactam So] OTHER (SEE COMMENTS)     Heightened sense of smell; dry heaves, vomiting        Review of Systems:  All other systems reviewed and negative x12    Vital Signs:  /60 (BP Location: Left arm, Patient Position: Sitting, Cuff Size: large)   Pulse 72   Temp 97.8 °F (36.6 °C) (Oral)   Resp 18   Ht 1.753 m (5' 9\")   Wt 105.9 kg (233 lb 6.4 oz)   SpO2 97%   BMI 34.47 kg/m²     Physical Examination:  General: Patient is alert and oriented x 3, not in acute distress.  Psych:  Mood and affect appropriate  HEENT: EOMs intact. Oropharynx is clear.   Neck: No palpable lymphadenopathy. Neck is supple.  Lymphatics: There is no palpable peripheral lymphadenopathy   Chest: Clear to auscultation, nonlabored breathing  Cardiovascular: Regular with S1/S2  Abdomen: Soft, non tender.   Extremities: No edema.  Neurological: 5/5 motor x4.        Laboratory:  Lab Results   Component Value Date    WBC 2.4 (L) 06/27/2024    RBC 2.97 (L) 06/27/2024    HGB 10.2 (L) 06/27/2024    HCT 32.7 (L) 06/27/2024    .1 (H) 06/27/2024    MCH 34.3 (H) 06/27/2024    MCHC 31.2 06/27/2024    RDW 16.6 (H) 06/27/2024    PLT 78.0 (L) 06/27/2024    MPV 9.6 01/24/2019         Lab Results   Component Value Date     (H) 06/27/2024    BUN 52 (H) 06/27/2024    BUNCREA 17.0 06/27/2024    CREATSERUM 3.05 (H) 06/27/2024    ANIONGAP 4 06/27/2024    GFRNAA  21 (L) 07/23/2022    GFRAA 24 (L) 07/23/2022    CA 8.8 06/27/2024    OSMOCALC 313 (H) 06/27/2024    ALKPHO 74 02/21/2024    AST 23 02/21/2024    ALT 21 02/21/2024    ALKPHOS 114 (H) 04/25/2013    BILT 0.8 02/21/2024    TP 6.2 02/21/2024    ALB 4.0 06/13/2024    GLOBULIN 2.4 (L) 02/21/2024    AGRATIO 1.3 04/25/2013     06/27/2024    K 5.4 (H) 06/27/2024     (H) 06/27/2024    CO2 24.0 06/27/2024       Radiology:       Cancer Staging   No matching staging information was found for the patient.      Impression and Plan:  68 year old  With chronic kidney disease been divided by hematology for pancytopenia. Bone marrow 3/23 showed MDS ipss-r low risk (del 20, 1%blasts)    1.) MDS  - NGS as above  -CED hemoglobin now significantly improved we will continue darbepoetin  --hgb is at goal, do not want to exceed value >11g  --RTC 3 months for f/u    2.) CKD  --follows with Dr. Enciso for this condition; also likely contributing to his anemia         MDM: Moderate  : Ongoing continuing of complex care      Payam Rdz MD  [facility]  [address], [place] [address]

## 2024-06-28 NOTE — TELEPHONE ENCOUNTER
Endocrine Refill protocol for oral medications    Protocol Criteria: passed    -Appointment with Endocrinology completed in the last 6 months or scheduled in the next 3 months     Verify the above has been completed or scheduled in the appropriate timeline. If so can send a 90 day supply with 1 refill.     Last completed office visit: 2/21/2024 Tanya Naranjo MD   Next scheduled Follow up:   Future Appointments   Date Time Provider Department Center   6/28/2024  2:15 PM EM CC LAB2 [facility] CHEMO EMO   6/28/2024  3:00 PM Payam Rdz MD [facility] HEM ONC [facility]   6/28/2024  4:20 PM Jaun Enciso MD [id number] [facility]   7/12/2024  1:30 PM EM CC LAB1 [facility] CHEMO [facility]   7/18/2024  3:20 PM Jaun Enciso MD [id number] [facility]   7/26/2024 11:30 AM EM CC LAB2 [facility] CHEMO EMO   8/9/2024 12:00 PM EM CC LAB1 [facility] CHEMO EMO   8/21/2024 11:00 AM Tanya Naranjo MD ECADOENDO  [facility]

## 2024-06-30 RX ORDER — HYDROCODONE BITARTRATE AND ACETAMINOPHEN 10; 325 MG/1; MG/1
1 TABLET ORAL EVERY 8 HOURS PRN
Qty: 90 TABLET | Refills: 0 | Status: SHIPPED | OUTPATIENT
Start: 2024-07-10

## 2024-06-30 NOTE — PROGRESS NOTES
Progress Note     Jaun Burton    Here w wife kuldip  Feels ok  k better   Ran out of [facility]   Had low mag 1.4  No cp  no sob no edema      HISTORY:  Past Medical History:    C2-3 mild central, C3-4 mild-mod diffuse, C4-5 mild diffuse, C5-6 mod diffuse bulging discs    C5-6 mod central & bilateral mild foraminal, C4-5 left mild foraminal stenosis    C6-7 mild-mod central herniated disc    Cataract        Chronic kidney disease (CKD)    stage 3    Diabetes mellitus (HCC)    Diabetes type 2, uncontrolled    Diabetic retinopathy (HCC)    [facility]    Hyperlipidemia    Meibomian gland dysfunction    Osteoarthritis of spine with radiculopathy, cervical region    Pancreatitis (HCC)    Primary osteoarthritis of both shoulders    Proteinuria    Type II or unspecified type diabetes mellitus without mention of complication, not stated as uncontrolled    Pills & Insulin    Vitreous floaters      Past Surgical History:   Procedure Laterality Date    Appendectomy      Cataract extraction w/  intraocular lens implant Right 2018    Dr. Lopez    Cataract extraction w/  intraocular lens implant Left 2018    Dr. Lopez    Cholecystectomy      Electrocardiogram, complete  2012    scanned to media tab    Hernia surgery        Social History     Tobacco Use    Smoking status: Former     Current packs/day: 0.00     Types: Cigarettes     Quit date: 1989     Years since quittin.6    Smokeless tobacco: Former    Tobacco comments:     quit about 30 years ago.   Substance Use Topics    Alcohol use: No         Medications (Active prior to today's visit):  Current Outpatient Medications   Medication Sig Dispense Refill    AMITRIPTYLINE 25 MG Oral Tab Take 1 tablet by mouth nightly 90 tablet 1    magnesium oxide 400 MG Oral Tab Take 1 tablet (400 mg total) by mouth daily. 90 tablet 0    NOVOLOG 100 UNIT/ML Injection Solution INJECT 50 UNITS SUBCUTANEOUSLY ONCE DAILY  VIA  CORRECTION  FACTOR 40 mL 1    pregabalin 300 MG Oral Cap Take 1 capsule (300 mg total) by mouth daily. 30 capsule 0    Mometasone Furoate 0.1 % External Solution Use bid to ears as directd 60 mL 3    HYDROcodone-acetaminophen (NORCO)  MG Oral Tab Take 1 tablet by mouth every 8 (eight) hours as needed for Pain. 90 tablet 0    sodium zirconium cyclosilicate (LOKELMA) 10 g Oral Powd Pack Take 1 packet (10 g total) by mouth twice a week. 30 packet 0    LANTUS 100 UNIT/ML Subcutaneous Solution Inject 35 Units into the skin nightly. 30 mL 1    azelastine 137 MCG/SPRAY Nasal Solution 1-2 sprays by Nasal route in the morning and 1-2 sprays before bedtime. FOR SINUS SYMPTOMS/NASAL CONGESTION.. 30 mL 3    fluticasone propionate 50 MCG/ACT Nasal Suspension 2 sprays by Each Nare route daily. FOR NASAL CONGESTION/SINUS SYMPTOMS. 3 each 3    metoprolol tartrate 50 MG Oral Tab       atorvastatin 40 MG Oral Tab Take 1 tablet (40 mg total) by mouth daily. FOR CHOLESTEROL. 90 tablet 9    fenofibrate 48 MG Oral Tab Take 1 tablet (48 mg total) by mouth daily. FOR TRIGLYCERIDES. 90 tablet 9    pantoprazole 40 MG Oral Tab EC Take 1 tablet (40 mg total) by mouth before breakfast. FOR ACID REFLUX. 90 tablet 9    cyanocobalamin 1000 MCG Oral Tab Take 1 tablet (1,000 mcg total) by mouth daily. 90 tablet 4    Betamethasone Dipropionate Aug 0.05 % External Cream Apply 1 Application topically 2 (two) times daily. APPLY TO AFFECTED AREA 50 g 1    metRONIDAZOLE 0.75 % External Cream Apply 1 Application topically daily. For breakout on face 45 g 0    Pimecrolimus 1 % External Cream APPLY   TOPICALLY AFFECTED AREA ON FACE ONCE DAILY TO TWICE DAILY 60 g 0    Tazarotene 0.1 % External Cream Apply to chest nightly (Patient taking differently: Apply to chest nightly PRN) 60 g 3    Ferrous Gluconate 225 (27 Fe) MG Oral Tab Take 27 mg by mouth daily.      Omega-3 Fatty Acids (FISH OIL) 600 MG Oral Cap Take 1 capsule by mouth nightly.         Multiple Vitamins-Minerals (CENTRUM SILVER) Oral Tab Take 1 tablet by mouth daily.      BD INSULIN SYRINGE U/F 31G X 5/16\" 0.5 ML Does not apply Misc USE 1 SYRINGE 4 TIMES DAILY 400 each 3    FREESTYLE LITE TEST In Vitro Strip USE 1 STRIP TO CHECK BLOOD GLUCOSE 3 TIMES DAILY. DX: E11.65, insulin dependent 300 strip 1    Insulin Syringe-Needle U-100 (RELION INSULIN SYRINGE) 31G X 15/64\" 0.5 ML Does not apply Misc 1 Syringe by Does not apply route 4 (four) times daily. 400 each 0    Insulin Syringe-Needle U-100 (RELION INSULIN SYRINGE) 31G X 15/64\" 0.5 ML Does not apply Misc 1 Syringe by Does not apply route 4 (four) times daily. 400 each 0    Blood Glucose Monitoring Suppl (ACCU-CHEK INÉS PLUS) w/Device Does not apply Kit Use as directed to check blood sugars. 1 kit 0       Allergies:  Allergies   Allergen Reactions    Cefdinir DIARRHEA    Zosyn [Piperacillin Sod-Tazobactam So] OTHER (SEE COMMENTS)     Heightened sense of smell; dry heaves, vomiting         ROS:     Constitutional:  Negative for decreased activity, fever, irritability and lethargy  ENMT:  Negative for ear drainage, hearing loss and nasal drainage  Eyes:  Negative for eye discharge and vision loss  Cardiovascular:  Negative for chest pain, sob  Respiratory:  Negative for cough, dyspnea and wheezing  Gastrointestinal:  Negative for abdominal pain, constipation  Genitourinary:  Negative for dysuria and hematuria  Endocrine:  Negative for abnormal sleep patterns  Hema/Lymph:  Negative for easy bleeding and easy bruising  Integumentary:  Negative for pruritus and rash  Musculoskeletal:  Negative for bone/joint symptoms  Neurological:  Negative for gait disturbance  Psychiatric:  Negative for inappropriate interaction and psychiatric symptoms      Vitals:    06/28/24 1607   BP: 110/63   Pulse: 69       PHYSICAL EXAM:   Constitutional: appears well hydrated alert and responsive   Head/Face: normocephalic  Eyes/Vision: normal extraocular motion is  intact  Nose/Mouth/Throat:mucous membranes are moist   Neck/Thyroid: neck is supple without adenopathy  Lymphatic: no abnormal cervical, supraclavicular adenopathy is noted  Respiratory:  lungs are clear to auscultation bilaterally  Cardiovascular: regular rate and rhythm   Abdomen: soft, non-tender, non-distended, BS normal  Skin/Hair: no unusual rashes present, no abnormal bruising noted  Back/Spine: no abnormalities noted  Musculoskeletal: no deformities  Extremities: no edema  Neurological:  Grossly normal       ASSESSMENT/PLAN:   Assessment   Encounter Diagnoses   Name Primary?    CKD stage 4 due to type 1 diabetes mellitus (HCC) Yes    MDS (myelodysplastic syndrome) (HCC)     Hypertension associated with type 2 diabetes mellitus (HCC)        1 neuropathy chronic refilled norco 3 day doesn't abus=e  2 low mag starft mag oxy  400 mg day  Rehcek  3 ckd 4  creat better 4 now 3  + proeinut  4 myelodysplastic  per oncology  5 high k  lokelma 2x week    Cpm  Do labs four weeks seeme threemonths       Orders This Visit:  Orders Placed This Encounter   Procedures    Renal Function Panel    Magnesium       Meds This Visit:  Requested Prescriptions     Signed Prescriptions Disp Refills    magnesium oxide 400 MG Oral Tab 90 tablet 0     Sig: Take 1 tablet (400 mg total) by mouth daily.       Imaging & Referrals:  None     6/30/2024  Jaun Enciso MD

## 2024-07-01 NOTE — PROGRESS NOTES
Noted, diabetic eye exam on 6/26/24 mild background diabetic retinopathy, no signs of neovascularization noted -documented in chart

## 2024-07-05 ENCOUNTER — APPOINTMENT (OUTPATIENT)
Dept: HEMATOLOGY/ONCOLOGY | Facility: HOSPITAL | Age: 69
End: 2024-07-05
Attending: INTERNAL MEDICINE
Payer: MEDICARE

## 2024-07-11 ENCOUNTER — LAB ENCOUNTER (OUTPATIENT)
Dept: LAB | Age: 69
End: 2024-07-11
Attending: INTERNAL MEDICINE
Payer: MEDICARE

## 2024-07-11 DIAGNOSIS — D61.818 PANCYTOPENIA (HCC): ICD-10-CM

## 2024-07-11 DIAGNOSIS — D46.9 MDS (MYELODYSPLASTIC SYNDROME) (HCC): ICD-10-CM

## 2024-07-11 DIAGNOSIS — N18.4 CKD STAGE 4 DUE TO TYPE 1 DIABETES MELLITUS (HCC): ICD-10-CM

## 2024-07-11 DIAGNOSIS — E10.22 CKD STAGE 4 DUE TO TYPE 1 DIABETES MELLITUS (HCC): ICD-10-CM

## 2024-07-11 LAB
ALBUMIN SERPL-MCNC: 3.7 G/DL (ref 3.2–4.8)
ANION GAP SERPL CALC-SCNC: 5 MMOL/L (ref 0–18)
BASOPHILS # BLD AUTO: 0.01 X10(3) UL (ref 0–0.2)
BASOPHILS NFR BLD AUTO: 0.4 %
BUN BLD-MCNC: 46 MG/DL (ref 9–23)
BUN/CREAT SERPL: 12.6 (ref 10–20)
CALCIUM BLD-MCNC: 8.8 MG/DL (ref 8.7–10.4)
CHLORIDE SERPL-SCNC: 112 MMOL/L (ref 98–112)
CO2 SERPL-SCNC: 24 MMOL/L (ref 21–32)
CREAT BLD-MCNC: 3.65 MG/DL
DEPRECATED RDW RBC AUTO: 65.1 FL (ref 35.1–46.3)
EGFRCR SERPLBLD CKD-EPI 2021: 17 ML/MIN/1.73M2 (ref 60–?)
EOSINOPHIL # BLD AUTO: 0.07 X10(3) UL (ref 0–0.7)
EOSINOPHIL NFR BLD AUTO: 2.8 %
ERYTHROCYTE [DISTWIDTH] IN BLOOD BY AUTOMATED COUNT: 16.3 % (ref 11–15)
GLUCOSE BLD-MCNC: 234 MG/DL (ref 70–99)
HCT VFR BLD AUTO: 32.6 %
HGB BLD-MCNC: 10.4 G/DL
IMM GRANULOCYTES # BLD AUTO: 0.01 X10(3) UL (ref 0–1)
IMM GRANULOCYTES NFR BLD: 0.4 %
LYMPHOCYTES # BLD AUTO: 1.06 X10(3) UL (ref 1–4)
LYMPHOCYTES NFR BLD AUTO: 42.6 %
MAGNESIUM SERPL-MCNC: 1.7 MG/DL (ref 1.6–2.6)
MCH RBC QN AUTO: 34.6 PG (ref 26–34)
MCHC RBC AUTO-ENTMCNC: 31.9 G/DL (ref 31–37)
MCV RBC AUTO: 108.3 FL
MONOCYTES # BLD AUTO: 0.28 X10(3) UL (ref 0.1–1)
MONOCYTES NFR BLD AUTO: 11.2 %
NEUTROPHILS # BLD AUTO: 1.06 X10 (3) UL (ref 1.5–7.7)
NEUTROPHILS # BLD AUTO: 1.06 X10(3) UL (ref 1.5–7.7)
NEUTROPHILS NFR BLD AUTO: 42.6 %
OSMOLALITY SERPL CALC.SUM OF ELEC: 311 MOSM/KG (ref 275–295)
PHOSPHATE SERPL-MCNC: 4.7 MG/DL (ref 2.4–5.1)
PLATELET # BLD AUTO: 86 10(3)UL (ref 150–450)
PLATELET MORPHOLOGY: NORMAL
PLATELETS.RETICULATED NFR BLD AUTO: 7 % (ref 0–7)
POTASSIUM SERPL-SCNC: 5.9 MMOL/L (ref 3.5–5.1)
RBC # BLD AUTO: 3.01 X10(6)UL
SODIUM SERPL-SCNC: 141 MMOL/L (ref 136–145)
WBC # BLD AUTO: 2.5 X10(3) UL (ref 4–11)

## 2024-07-11 PROCEDURE — 36415 COLL VENOUS BLD VENIPUNCTURE: CPT

## 2024-07-11 PROCEDURE — 85025 COMPLETE CBC W/AUTO DIFF WBC: CPT

## 2024-07-11 PROCEDURE — 80069 RENAL FUNCTION PANEL: CPT

## 2024-07-11 PROCEDURE — 83735 ASSAY OF MAGNESIUM: CPT

## 2024-07-12 ENCOUNTER — NURSE ONLY (OUTPATIENT)
Dept: HEMATOLOGY/ONCOLOGY | Facility: HOSPITAL | Age: 69
End: 2024-07-12
Attending: INTERNAL MEDICINE
Payer: MEDICARE

## 2024-07-12 DIAGNOSIS — D61.818 PANCYTOPENIA (HCC): ICD-10-CM

## 2024-07-12 DIAGNOSIS — D46.9 MDS (MYELODYSPLASTIC SYNDROME) (HCC): Primary | ICD-10-CM

## 2024-07-12 PROCEDURE — 96372 THER/PROPH/DIAG INJ SC/IM: CPT

## 2024-07-14 DIAGNOSIS — E11.3293 TYPE 2 DIABETES MELLITUS WITH BOTH EYES AFFECTED BY MILD NONPROLIFERATIVE RETINOPATHY WITHOUT MACULAR EDEMA, WITHOUT LONG-TERM CURRENT USE OF INSULIN (HCC): ICD-10-CM

## 2024-07-14 DIAGNOSIS — G62.9 NEUROPATHY: ICD-10-CM

## 2024-07-15 ENCOUNTER — TELEPHONE (OUTPATIENT)
Dept: NEPHROLOGY | Facility: CLINIC | Age: 69
End: 2024-07-15

## 2024-07-15 NOTE — TELEPHONE ENCOUNTER
Jaun Enciso MD Zwiesler, Laura, MA  Left message on pt vm he needs to take liokelma  twice a week

## 2024-07-16 NOTE — TELEPHONE ENCOUNTER
LOV-6/2/8/24  RTC-3 months  Follow up-10/4/24  Dr Arrington patient of Dr Enciso is out of the office ,please sign pending order thanks.

## 2024-07-17 RX ORDER — PREGABALIN 300 MG/1
300 CAPSULE ORAL DAILY
Qty: 30 CAPSULE | Refills: 0 | Status: SHIPPED | OUTPATIENT
Start: 2024-07-17

## 2024-07-25 ENCOUNTER — LAB ENCOUNTER (OUTPATIENT)
Dept: LAB | Age: 69
End: 2024-07-25
Attending: INTERNAL MEDICINE
Payer: MEDICARE

## 2024-07-25 DIAGNOSIS — D46.9 MDS (MYELODYSPLASTIC SYNDROME) (HCC): ICD-10-CM

## 2024-07-25 DIAGNOSIS — D61.818 PANCYTOPENIA (HCC): ICD-10-CM

## 2024-07-25 LAB
BASOPHILS # BLD AUTO: 0 X10(3) UL (ref 0–0.2)
BASOPHILS NFR BLD AUTO: 0 %
DEPRECATED RDW RBC AUTO: 64.1 FL (ref 35.1–46.3)
EOSINOPHIL # BLD AUTO: 0.07 X10(3) UL (ref 0–0.7)
EOSINOPHIL NFR BLD AUTO: 3 %
ERYTHROCYTE [DISTWIDTH] IN BLOOD BY AUTOMATED COUNT: 16.2 % (ref 11–15)
HCT VFR BLD AUTO: 33.1 %
HGB BLD-MCNC: 10.7 G/DL
IMM GRANULOCYTES # BLD AUTO: 0.01 X10(3) UL (ref 0–1)
IMM GRANULOCYTES NFR BLD: 0.4 %
LYMPHOCYTES # BLD AUTO: 0.7 X10(3) UL (ref 1–4)
LYMPHOCYTES NFR BLD AUTO: 30.2 %
MCH RBC QN AUTO: 34.9 PG (ref 26–34)
MCHC RBC AUTO-ENTMCNC: 32.3 G/DL (ref 31–37)
MCV RBC AUTO: 107.8 FL
MONOCYTES # BLD AUTO: 0.21 X10(3) UL (ref 0.1–1)
MONOCYTES NFR BLD AUTO: 9.1 %
NEUTROPHILS # BLD AUTO: 1.33 X10 (3) UL (ref 1.5–7.7)
NEUTROPHILS # BLD AUTO: 1.33 X10(3) UL (ref 1.5–7.7)
NEUTROPHILS NFR BLD AUTO: 57.3 %
PLATELET # BLD AUTO: 82 10(3)UL (ref 150–450)
PLATELETS.RETICULATED NFR BLD AUTO: 6.8 % (ref 0–7)
RBC # BLD AUTO: 3.07 X10(6)UL
WBC # BLD AUTO: 2.3 X10(3) UL (ref 4–11)

## 2024-07-25 PROCEDURE — 85060 BLOOD SMEAR INTERPRETATION: CPT

## 2024-07-25 PROCEDURE — 36415 COLL VENOUS BLD VENIPUNCTURE: CPT

## 2024-07-25 PROCEDURE — 85025 COMPLETE CBC W/AUTO DIFF WBC: CPT

## 2024-07-26 ENCOUNTER — NURSE ONLY (OUTPATIENT)
Dept: HEMATOLOGY/ONCOLOGY | Facility: HOSPITAL | Age: 69
End: 2024-07-26
Attending: INTERNAL MEDICINE
Payer: MEDICARE

## 2024-07-26 DIAGNOSIS — D61.818 PANCYTOPENIA (HCC): ICD-10-CM

## 2024-07-26 DIAGNOSIS — D46.9 MDS (MYELODYSPLASTIC SYNDROME) (HCC): Primary | ICD-10-CM

## 2024-07-26 PROCEDURE — 96372 THER/PROPH/DIAG INJ SC/IM: CPT

## 2024-08-08 ENCOUNTER — LAB ENCOUNTER (OUTPATIENT)
Dept: LAB | Age: 69
End: 2024-08-08
Attending: INTERNAL MEDICINE
Payer: MEDICARE

## 2024-08-08 DIAGNOSIS — D61.818 PANCYTOPENIA (HCC): ICD-10-CM

## 2024-08-08 DIAGNOSIS — D46.9 MDS (MYELODYSPLASTIC SYNDROME) (HCC): ICD-10-CM

## 2024-08-08 LAB
BASOPHILS # BLD AUTO: 0.01 X10(3) UL (ref 0–0.2)
BASOPHILS NFR BLD AUTO: 0.4 %
DEPRECATED RDW RBC AUTO: 66.5 FL (ref 35.1–46.3)
EOSINOPHIL # BLD AUTO: 0.07 X10(3) UL (ref 0–0.7)
EOSINOPHIL NFR BLD AUTO: 2.6 %
ERYTHROCYTE [DISTWIDTH] IN BLOOD BY AUTOMATED COUNT: 16.4 % (ref 11–15)
HCT VFR BLD AUTO: 32.7 %
HGB BLD-MCNC: 10.4 G/DL
IMM GRANULOCYTES # BLD AUTO: 0.02 X10(3) UL (ref 0–1)
IMM GRANULOCYTES NFR BLD: 0.7 %
LYMPHOCYTES # BLD AUTO: 1.24 X10(3) UL (ref 1–4)
LYMPHOCYTES NFR BLD AUTO: 46.3 %
MCH RBC QN AUTO: 34.9 PG (ref 26–34)
MCHC RBC AUTO-ENTMCNC: 31.8 G/DL (ref 31–37)
MCV RBC AUTO: 109.7 FL
MONOCYTES # BLD AUTO: 0.27 X10(3) UL (ref 0.1–1)
MONOCYTES NFR BLD AUTO: 10.1 %
NEUTROPHILS # BLD AUTO: 1.07 X10 (3) UL (ref 1.5–7.7)
NEUTROPHILS # BLD AUTO: 1.07 X10(3) UL (ref 1.5–7.7)
NEUTROPHILS NFR BLD AUTO: 39.9 %
PLATELET # BLD AUTO: 83 10(3)UL (ref 150–450)
PLATELET MORPHOLOGY: NORMAL
PLATELETS.RETICULATED NFR BLD AUTO: 7 % (ref 0–7)
RBC # BLD AUTO: 2.98 X10(6)UL
WBC # BLD AUTO: 2.7 X10(3) UL (ref 4–11)

## 2024-08-08 PROCEDURE — 36415 COLL VENOUS BLD VENIPUNCTURE: CPT

## 2024-08-08 PROCEDURE — 85025 COMPLETE CBC W/AUTO DIFF WBC: CPT

## 2024-08-09 ENCOUNTER — NURSE ONLY (OUTPATIENT)
Dept: HEMATOLOGY/ONCOLOGY | Facility: HOSPITAL | Age: 69
End: 2024-08-09
Attending: INTERNAL MEDICINE
Payer: MEDICARE

## 2024-08-09 DIAGNOSIS — D61.818 PANCYTOPENIA (HCC): ICD-10-CM

## 2024-08-09 DIAGNOSIS — D46.9 MDS (MYELODYSPLASTIC SYNDROME) (HCC): Primary | ICD-10-CM

## 2024-08-09 PROCEDURE — 96372 THER/PROPH/DIAG INJ SC/IM: CPT

## 2024-08-15 DIAGNOSIS — E11.3293 TYPE 2 DIABETES MELLITUS WITH BOTH EYES AFFECTED BY MILD NONPROLIFERATIVE RETINOPATHY WITHOUT MACULAR EDEMA, WITHOUT LONG-TERM CURRENT USE OF INSULIN (HCC): ICD-10-CM

## 2024-08-15 DIAGNOSIS — G62.9 NEUROPATHY: ICD-10-CM

## 2024-08-15 RX ORDER — PREGABALIN 300 MG/1
300 CAPSULE ORAL DAILY
Qty: 30 CAPSULE | Refills: 0 | Status: SHIPPED | OUTPATIENT
Start: 2024-08-15

## 2024-08-15 RX ORDER — HYDROCODONE BITARTRATE AND ACETAMINOPHEN 10; 325 MG/1; MG/1
1 TABLET ORAL EVERY 8 HOURS PRN
Qty: 90 TABLET | Refills: 0 | Status: SHIPPED | OUTPATIENT
Start: 2024-08-15

## 2024-08-15 NOTE — TELEPHONE ENCOUNTER
Patient wife calling for refills to be sent to [facility].  Patient will be out of Hydrocodone by Saturday.        Current Outpatient Medications:       PREGABALIN 300 MG Oral Cap, Take 1 capsule by mouth once daily, Disp: 30 capsule, Rfl: 0      HYDROcodone-acetaminophen (NORCO)  MG Oral Tab, Take 1 tablet by mouth every 8 (eight) hours as needed for Pain., Disp: 90 tablet, Rfl: 0

## 2024-08-21 ENCOUNTER — LAB ENCOUNTER (OUTPATIENT)
Dept: LAB | Age: 69
End: 2024-08-21
Attending: INTERNAL MEDICINE
Payer: MEDICARE

## 2024-08-21 ENCOUNTER — TELEPHONE (OUTPATIENT)
Dept: ENDOCRINOLOGY CLINIC | Facility: CLINIC | Age: 69
End: 2024-08-21

## 2024-08-21 ENCOUNTER — OFFICE VISIT (OUTPATIENT)
Dept: ENDOCRINOLOGY CLINIC | Facility: CLINIC | Age: 69
End: 2024-08-21

## 2024-08-21 VITALS
HEART RATE: 89 BPM | DIASTOLIC BLOOD PRESSURE: 73 MMHG | SYSTOLIC BLOOD PRESSURE: 135 MMHG | BODY MASS INDEX: 35 KG/M2 | WEIGHT: 234 LBS

## 2024-08-21 DIAGNOSIS — E11.65 UNCONTROLLED TYPE 2 DIABETES MELLITUS WITH HYPERGLYCEMIA (HCC): Primary | ICD-10-CM

## 2024-08-21 DIAGNOSIS — D46.9 MDS (MYELODYSPLASTIC SYNDROME) (HCC): ICD-10-CM

## 2024-08-21 DIAGNOSIS — D61.818 PANCYTOPENIA (HCC): ICD-10-CM

## 2024-08-21 LAB
BASOPHILS # BLD AUTO: 0.01 X10(3) UL (ref 0–0.2)
BASOPHILS NFR BLD AUTO: 0.4 %
DEPRECATED RDW RBC AUTO: 63 FL (ref 35.1–46.3)
EOSINOPHIL # BLD AUTO: 0.06 X10(3) UL (ref 0–0.7)
EOSINOPHIL NFR BLD AUTO: 2.5 %
ERYTHROCYTE [DISTWIDTH] IN BLOOD BY AUTOMATED COUNT: 16 % (ref 11–15)
GLUCOSE BLOOD: 242
HCT VFR BLD AUTO: 32.1 %
HEMOGLOBIN A1C: 8.1 % (ref 4.3–5.6)
HGB BLD-MCNC: 10.5 G/DL
IMM GRANULOCYTES # BLD AUTO: 0.01 X10(3) UL (ref 0–1)
IMM GRANULOCYTES NFR BLD: 0.4 %
LYMPHOCYTES # BLD AUTO: 0.91 X10(3) UL (ref 1–4)
LYMPHOCYTES NFR BLD AUTO: 37.4 %
MCH RBC QN AUTO: 35.2 PG (ref 26–34)
MCHC RBC AUTO-ENTMCNC: 32.7 G/DL (ref 31–37)
MCV RBC AUTO: 107.7 FL
MONOCYTES # BLD AUTO: 0.27 X10(3) UL (ref 0.1–1)
MONOCYTES NFR BLD AUTO: 11.1 %
NEUTROPHILS # BLD AUTO: 1.17 X10 (3) UL (ref 1.5–7.7)
NEUTROPHILS # BLD AUTO: 1.17 X10(3) UL (ref 1.5–7.7)
NEUTROPHILS NFR BLD AUTO: 48.2 %
PLATELET # BLD AUTO: 81 10(3)UL (ref 150–450)
PLATELETS.RETICULATED NFR BLD AUTO: 6.7 % (ref 0–7)
RBC # BLD AUTO: 2.98 X10(6)UL
TEST STRIP LOT #: NORMAL NUMERIC
WBC # BLD AUTO: 2.4 X10(3) UL (ref 4–11)

## 2024-08-21 PROCEDURE — 82947 ASSAY GLUCOSE BLOOD QUANT: CPT | Performed by: INTERNAL MEDICINE

## 2024-08-21 PROCEDURE — 99214 OFFICE O/P EST MOD 30 MIN: CPT | Performed by: INTERNAL MEDICINE

## 2024-08-21 PROCEDURE — 83036 HEMOGLOBIN GLYCOSYLATED A1C: CPT | Performed by: INTERNAL MEDICINE

## 2024-08-21 PROCEDURE — 85025 COMPLETE CBC W/AUTO DIFF WBC: CPT

## 2024-08-21 PROCEDURE — 85060 BLOOD SMEAR INTERPRETATION: CPT

## 2024-08-21 PROCEDURE — 36415 COLL VENOUS BLD VENIPUNCTURE: CPT

## 2024-08-21 NOTE — PATIENT INSTRUCTIONS
INCREASE Lantus to 40 units subcutaneous daily     INCREASE Humalog to 10 units subcutaneous lunch and dinner 14 units subcutaneous dinner    INSULIN SLIDING SCALE  Base Values  Breakfast: 10  Lunch: 10  Dinner: 14  Ranges:  80-99: -2  100-119: 0  120-139: 0  140-159: 1  160-179: 1  180-199: 1  200-219: 2  220-239: 2  240-259: 2  260-279: 3  280-299: 3  300-319: 3  320-339: 4  340-359: 4  360-379: 4  380-399: 5  400-400: 5

## 2024-08-21 NOTE — PROGRESS NOTES
Name: Jaun Burton  Date: 2024    Referring Physician: No ref. provider found    HISTORY OF PRESENT ILLNESS   Jaun Burton is a 69 year old male who presents for diabetes mellitus.      Prior HbA, C or glycohemoglobin were 9.8% 2014; 7.7% 2015; 7.2% 2016; 7.6% 2016; 6.8% 2016; 6.9% 10/2016; 7.1% 2017; 8.2% 2017; 7.1% 2017; 7.2% 2017; 7.4% 3/2018; 6.5% 2018; 6.9% 2018; 6.6% 2019; 6.7% 2019; 6.6% 2019; 7.2% 2020; 6.6% 2021; 7.3% 2021; 7.3% 2022; 7.2% 2022; 7.0% 2023; 6.4% 2023; 6.9% 2024; 8.1% POC Today     Dietary compliance: Fair -->he admits to more cheating on diet   Exercise: Yes -->he has increased activity since last visit, riding bike and fishing; gym 3-5 times per week  Polyuria/polydipsia: No  Blurred vision: No    Episodes of hypoglycemia: Rarely - twice per month   Blood Glucose:  Checking 4-5 times per day  Fastin,190,330,309,250,233,198,217,168  Lunch: 381,212,322,307  Dinner: 217,308,354,370,306,311,345,266,353    -->patient is checking up to 5 times per day due to glucose variability and hypoglycemia unawareness; however insurance no longer covered    Medications for DM   Lantus 35 units SQ QHS (occ increasing dose to 40)  Humalog 6-8-10 units SQ TID with meals plus CF      REVIEW OF SYSTEMS  Eyes: Diabetic retinopathy present: No            Most recent visit to eye doctor in last 12 months: Yes    CV: Cardiovascular disease present: No         Hypertension present: Yes         Hyperlipidemia present: Yes         Peripheral Vascular Disease present: No    : Nephropathy present: Yes - followed by Dr. Enciso, Cr 3.0    Neuro: Neuropathy present: Yes - significant LE neuropathy treated with lyrica and narcotics    Skin: Infection or ulceration: No    Osteoporosis: No    Thyroid disease: No      Medications:     Current Outpatient Medications:     pregabalin 300 MG Oral Cap, Take 1 capsule (300 mg total) by mouth daily., Disp: 30  capsule, Rfl: 0    HYDROcodone-acetaminophen (NORCO)  MG Oral Tab, Take 1 tablet by mouth every 8 (eight) hours as needed for Pain., Disp: 90 tablet, Rfl: 0    AMITRIPTYLINE 25 MG Oral Tab, Take 1 tablet by mouth nightly, Disp: 90 tablet, Rfl: 1    magnesium oxide 400 MG Oral Tab, Take 1 tablet (400 mg total) by mouth daily., Disp: 90 tablet, Rfl: 0    NOVOLOG 100 UNIT/ML Injection Solution, INJECT 50 UNITS SUBCUTANEOUSLY ONCE DAILY VIA  CORRECTION  FACTOR, Disp: 40 mL, Rfl: 1    BD INSULIN SYRINGE U/F 31G X 5/16\" 0.5 ML Does not apply Misc, USE 1 SYRINGE 4 TIMES DAILY, Disp: 400 each, Rfl: 3    Mometasone Furoate 0.1 % External Solution, Use bid to ears as directd, Disp: 60 mL, Rfl: 3    FREESTYLE LITE TEST In Vitro Strip, USE 1 STRIP TO CHECK BLOOD GLUCOSE 3 TIMES DAILY. DX: E11.65, insulin dependent, Disp: 300 strip, Rfl: 1    sodium zirconium cyclosilicate (LOKELMA) 10 g Oral Powd Pack, Take 1 packet (10 g total) by mouth twice a week., Disp: 30 packet, Rfl: 0    LANTUS 100 UNIT/ML Subcutaneous Solution, Inject 35 Units into the skin nightly., Disp: 30 mL, Rfl: 1    azelastine 137 MCG/SPRAY Nasal Solution, 1-2 sprays by Nasal route in the morning and 1-2 sprays before bedtime. FOR SINUS SYMPTOMS/NASAL CONGESTION.., Disp: 30 mL, Rfl: 3    fluticasone propionate 50 MCG/ACT Nasal Suspension, 2 sprays by Each Nare route daily. FOR NASAL CONGESTION/SINUS SYMPTOMS., Disp: 3 each, Rfl: 3    metoprolol tartrate 50 MG Oral Tab, , Disp: , Rfl:     atorvastatin 40 MG Oral Tab, Take 1 tablet (40 mg total) by mouth daily. FOR CHOLESTEROL., Disp: 90 tablet, Rfl: 9    fenofibrate 48 MG Oral Tab, Take 1 tablet (48 mg total) by mouth daily. FOR TRIGLYCERIDES., Disp: 90 tablet, Rfl: 9    pantoprazole 40 MG Oral Tab EC, Take 1 tablet (40 mg total) by mouth before breakfast. FOR ACID REFLUX., Disp: 90 tablet, Rfl: 9    cyanocobalamin 1000 MCG Oral Tab, Take 1 tablet (1,000 mcg total) by mouth daily., Disp: 90 tablet, Rfl: 4     Betamethasone Dipropionate Aug 0.05 % External Cream, Apply 1 Application topically 2 (two) times daily. APPLY TO AFFECTED AREA, Disp: 50 g, Rfl: 1    metRONIDAZOLE 0.75 % External Cream, Apply 1 Application topically daily. For breakout on face, Disp: 45 g, Rfl: 0    Insulin Syringe-Needle U-100 (RELION INSULIN SYRINGE) 31G X 15/64\" 0.5 ML Does not apply Misc, 1 Syringe by Does not apply route 4 (four) times daily., Disp: 400 each, Rfl: 0    Pimecrolimus 1 % External Cream, APPLY   TOPICALLY AFFECTED AREA ON FACE ONCE DAILY TO TWICE DAILY, Disp: 60 g, Rfl: 0    Tazarotene 0.1 % External Cream, Apply to chest nightly (Patient taking differently: Apply to chest nightly PRN), Disp: 60 g, Rfl: 3    Insulin Syringe-Needle U-100 (RELION INSULIN SYRINGE) 31G X 15/64\" 0.5 ML Does not apply Misc, 1 Syringe by Does not apply route 4 (four) times daily., Disp: 400 each, Rfl: 0    Blood Glucose Monitoring Suppl (ACCU-CHEK INÉS PLUS) w/Device Does not apply Kit, Use as directed to check blood sugars., Disp: 1 kit, Rfl: 0    Ferrous Gluconate 225 (27 Fe) MG Oral Tab, Take 27 mg by mouth daily., Disp: , Rfl:     Omega-3 Fatty Acids (FISH OIL) 600 MG Oral Cap, Take 1 capsule by mouth nightly.  , Disp: , Rfl:     Multiple Vitamins-Minerals (CENTRUM SILVER) Oral Tab, Take 1 tablet by mouth daily., Disp: , Rfl:      Allergies:   Allergies   Allergen Reactions    Cefdinir DIARRHEA    Zosyn [Piperacillin Sod-Tazobactam So] OTHER (SEE COMMENTS)     Heightened sense of smell; dry heaves, vomiting       Social History:   Social History     Socioeconomic History    Marital status:    Tobacco Use    Smoking status: Former     Current packs/day: 0.00     Types: Cigarettes     Quit date: 1989     Years since quittin.8    Smokeless tobacco: Former    Tobacco comments:     quit about 30 years ago.   Vaping Use    Vaping status: Never Used   Substance and Sexual Activity    Alcohol use: No    Drug use: No   Other Topics  Concern    Caffeine Concern Yes     Comment: 1 cup coffee per day    Exercise No    History of tanning Yes    Reaction to local anesthetic No       Medical History:   Past Medical History:    C2-3 mild central, C3-4 mild-mod diffuse, C4-5 mild diffuse, C5-6 mod diffuse bulging discs    C5-6 mod central & bilateral mild foraminal, C4-5 left mild foraminal stenosis    C6-7 mild-mod central herniated disc    Cataract    2010    Chronic kidney disease (CKD)    stage 3    Diabetes mellitus (HCC)    Diabetes type 2, uncontrolled    Diabetic retinopathy (HCC)    [facility]    Hyperlipidemia    Meibomian gland dysfunction    Osteoarthritis of spine with radiculopathy, cervical region    Pancreatitis (HCC)    Primary osteoarthritis of both shoulders    Proteinuria    Type II or unspecified type diabetes mellitus without mention of complication, not stated as uncontrolled    Pills & Insulin    Vitreous floaters       Surgical history:   Past Surgical History:   Procedure Laterality Date    Appendectomy      Cataract extraction w/  intraocular lens implant Right 06/11/2018    Dr. Lopez    Cataract extraction w/  intraocular lens implant Left 07/12/2018    Dr. Lopez    Cholecystectomy  2012    Electrocardiogram, complete  4/23/2012    scanned to media tab    Hernia surgery           PHYSICAL EXAM  /73   Pulse 89   Wt 234 lb (106.1 kg)   BMI 34.56 kg/m²     General Appearance:  alert, well developed, in no acute distress  Eyes:  normal conjunctivae, sclera., normal sclera and normal pupils  Ears/Nose/Mouth/Throat/Neck:  no palpable thyroid nodules or cervical lymphadenopathy  Back: no kyphosis or back tenderness  Musculoskeletal:  normal muscle strength and tone  Skin:  normal moisture and skin texture  Neuro:  sensory grossly intact and motor grossly intact  Psychiatric:  oriented to time, self, and place  Nutritional:  no abnormal weight gain or loss    ASSESSMENT/PLAN:      1. Diabetes  Mellitus, Type 2 controlled  -controlled, HgA1c 8.1% -->significant increase   -Congratulated patient on overall stable glycemic control   -Discussed importance of glycemic control to prevent complications of diabetes  -Discussed complications of diabetes include retinopathy, neuropathy, nephropathy and cardiovascular disease  -Discussed importance of SBGM  -Discussed importance of low CHO diet  -Safest for renal function to continue insulin at this time  -Increase Lantus to 40 units SQ daily   -Increase Humalog to 10-10-14  -Continue CF 1:60>160   -Unfortunately CGM was too expensive - discussed again today and he will reconsider Dexcom, schedule CMA visit to start sensor   -Normal lipids   -Normotensive  -Renal function stable and followed by Dr. Enciso  -Persistent neuropathy symptoms, Continue amitryptiline 25mg pO at bedtime, verbalized understanding of risks and benefits      A total of 30 minutes was spent on obtaining history, reviewing pertinent labs, evaluating patient, providing multiple treatment options, reinforcing diet/exercise and compliance, and completing documentation.       RTC 4 months     8/21/2024  Tanya Naranjo MD

## 2024-08-23 ENCOUNTER — NURSE ONLY (OUTPATIENT)
Dept: HEMATOLOGY/ONCOLOGY | Facility: HOSPITAL | Age: 69
End: 2024-08-23
Attending: INTERNAL MEDICINE
Payer: MEDICARE

## 2024-08-23 DIAGNOSIS — D46.9 MDS (MYELODYSPLASTIC SYNDROME) (HCC): Primary | ICD-10-CM

## 2024-08-23 DIAGNOSIS — D61.818 PANCYTOPENIA (HCC): ICD-10-CM

## 2024-08-23 PROCEDURE — 96372 THER/PROPH/DIAG INJ SC/IM: CPT

## 2024-08-23 NOTE — PROGRESS NOTES
Pt here for Q2 week aranesp injection - gets labs outpt at [place]  Hgl 10.5 on 8/21 outpt lab  Reports feeling good, denies new complaints/ concerns    Aranesp 500mcg given left upper arm subcutaneous, tolerated well - much less painful if given slow, warmed   Must give super slow, warm capped needle/ syringe in hand, do not apply bandage d/t hairy arms per pt.  Parameters are to hold if hgb is 11 or greater - pt is aware    Discharged stable to home with future appts. Given AVS - prefers appts after 1030 d/t parking  Sees Dr Rdz in Sept for 3 month follow up  Gait steady, indep

## 2024-08-28 ENCOUNTER — NURSE ONLY (OUTPATIENT)
Dept: ENDOCRINOLOGY CLINIC | Facility: CLINIC | Age: 69
End: 2024-08-28

## 2024-08-28 DIAGNOSIS — E11.65 UNCONTROLLED TYPE 2 DIABETES MELLITUS WITH HYPERGLYCEMIA (HCC): Primary | ICD-10-CM

## 2024-08-28 NOTE — TELEPHONE ENCOUNTER
Patient was seen in office today accompanied by Wife, Explained to patient and his wife regarding Dexcom G7 and how it properly works. When we tried to download the Dexcom [employer] patient did not know password for the [employer] store.  We then to proceed to call [employer] to reset password,was apple to reset password.  30 plus minutes were spent with patient and his wife, while having the new password reset tried downloading the Dexcom Edson once again patient's iphone is required to have an update.   Unable to download edson patient will need to reschedule apt

## 2024-08-30 ENCOUNTER — TELEPHONE (OUTPATIENT)
Dept: ENDOCRINOLOGY CLINIC | Facility: CLINIC | Age: 69
End: 2024-08-30

## 2024-08-30 NOTE — TELEPHONE ENCOUNTER
Patients spouse calling regarding nurse visit needed to set up Dexcom G7. Please call at [phone],thanks.

## 2024-08-30 NOTE — TELEPHONE ENCOUNTER
Spoke with patient's wife and she got Dexcom joaquim downloaded to the phone. Advised her to also get the Dexcom Clarity joaquim downloaded to phone and to bring sample with them to download. Appt scheduled with CDE for 9/6/24.    She received call from [employer] regarding sensors but wasn't sure of the company. Advised her that that is the correct company, so she should proceed with them. They have a G7 sensor that they received at the office visit that they will bring to appt on 9/6/24.

## 2024-09-05 ENCOUNTER — LAB ENCOUNTER (OUTPATIENT)
Dept: LAB | Age: 69
End: 2024-09-05
Attending: INTERNAL MEDICINE
Payer: MEDICARE

## 2024-09-05 DIAGNOSIS — D46.9 MDS (MYELODYSPLASTIC SYNDROME) (HCC): ICD-10-CM

## 2024-09-05 DIAGNOSIS — D61.818 PANCYTOPENIA (HCC): ICD-10-CM

## 2024-09-05 LAB
BASOPHILS # BLD AUTO: 0.01 X10(3) UL (ref 0–0.2)
BASOPHILS NFR BLD AUTO: 0.5 %
DEPRECATED RDW RBC AUTO: 66.2 FL (ref 35.1–46.3)
EOSINOPHIL # BLD AUTO: 0.06 X10(3) UL (ref 0–0.7)
EOSINOPHIL NFR BLD AUTO: 2.9 %
ERYTHROCYTE [DISTWIDTH] IN BLOOD BY AUTOMATED COUNT: 16.3 % (ref 11–15)
HCT VFR BLD AUTO: 32 %
HGB BLD-MCNC: 10.1 G/DL
IMM GRANULOCYTES # BLD AUTO: 0 X10(3) UL (ref 0–1)
IMM GRANULOCYTES NFR BLD: 0 %
LYMPHOCYTES # BLD AUTO: 0.93 X10(3) UL (ref 1–4)
LYMPHOCYTES NFR BLD AUTO: 44.5 %
MCH RBC QN AUTO: 34.9 PG (ref 26–34)
MCHC RBC AUTO-ENTMCNC: 31.6 G/DL (ref 31–37)
MCV RBC AUTO: 110.7 FL
MONOCYTES # BLD AUTO: 0.23 X10(3) UL (ref 0.1–1)
MONOCYTES NFR BLD AUTO: 11 %
NEUTROPHILS # BLD AUTO: 0.86 X10 (3) UL (ref 1.5–7.7)
NEUTROPHILS # BLD AUTO: 0.86 X10(3) UL (ref 1.5–7.7)
NEUTROPHILS NFR BLD AUTO: 41.1 %
PLATELET # BLD AUTO: 84 10(3)UL (ref 150–450)
PLATELET MORPHOLOGY: NORMAL
RBC # BLD AUTO: 2.89 X10(6)UL
WBC # BLD AUTO: 2.1 X10(3) UL (ref 4–11)

## 2024-09-05 PROCEDURE — 85060 BLOOD SMEAR INTERPRETATION: CPT

## 2024-09-05 PROCEDURE — 85025 COMPLETE CBC W/AUTO DIFF WBC: CPT

## 2024-09-05 PROCEDURE — 36415 COLL VENOUS BLD VENIPUNCTURE: CPT

## 2024-09-06 ENCOUNTER — TELEPHONE (OUTPATIENT)
Dept: ENDOCRINOLOGY CLINIC | Facility: CLINIC | Age: 69
End: 2024-09-06

## 2024-09-06 ENCOUNTER — NURSE ONLY (OUTPATIENT)
Dept: HEMATOLOGY/ONCOLOGY | Facility: HOSPITAL | Age: 69
End: 2024-09-06
Attending: INTERNAL MEDICINE
Payer: MEDICARE

## 2024-09-06 ENCOUNTER — NURSE ONLY (OUTPATIENT)
Dept: ENDOCRINOLOGY CLINIC | Facility: CLINIC | Age: 69
End: 2024-09-06

## 2024-09-06 DIAGNOSIS — D61.818 PANCYTOPENIA (HCC): ICD-10-CM

## 2024-09-06 DIAGNOSIS — D46.9 MDS (MYELODYSPLASTIC SYNDROME) (HCC): Primary | ICD-10-CM

## 2024-09-06 DIAGNOSIS — E11.65 UNCONTROLLED TYPE 2 DIABETES MELLITUS WITH HYPERGLYCEMIA (HCC): Primary | ICD-10-CM

## 2024-09-06 DIAGNOSIS — E11.65 UNCONTROLLED TYPE 2 DIABETES MELLITUS WITH HYPERGLYCEMIA (HCC): ICD-10-CM

## 2024-09-06 PROCEDURE — 96372 THER/PROPH/DIAG INJ SC/IM: CPT

## 2024-09-06 RX ORDER — INSULIN GLARGINE 100 [IU]/ML
40 INJECTION, SOLUTION SUBCUTANEOUS NIGHTLY
Qty: 36 ML | Refills: 1 | Status: SHIPPED | OUTPATIENT
Start: 2024-09-06

## 2024-09-06 NOTE — PROGRESS NOTES
Dexcom G7 Start  Patient started on a dexcom G7 device. Patient brought sample from home.   Patient using  for readings: no  Patient using phone for readings: iphone    Topics Discussed:  - Dexcom G7 downloaded on phone  - Dexcom Clarity joaquim downloaded  - Reviewed Dexcom process and application  - Dexcom sensor applied on left arm and 4 digit sensor code entered (5528). Hands washed and gloves worn. Skin cleansed with alcohol, skin tac applied, sensor applied, over patch applied. Discussed with patient how to change sensor in 10 days with 12 hour isidoro period. Discussed may get wet but for no more than 30 minutes at a time.  - Warm up period in process  - Reviewed alerts. Alarms set. High alarm at 300.   - Reviewed contacting Dexcom if sensor fails for replacement  - Reviewed checking blood sugar with capillary finger stick if feeling symptoms of hypo- or hyperglycemia and dexcom is showing a normal reading. When in doubt, take your meter out.   - May call office with questions or concerns or for further training if needed.   - Blood sugars will not be reviewed unless patient calls to request a blood sugar review.  - Handout given with Dexcom phone number.     Currently taking Lantus to 40 units SQ daily, Humalog to 10-10-14  CF 1:60>160   Requesting a refill of lantus. Sent to pharmacy on file.     Return in 2 weeks for report review. 1/8/2025 with provider.     Total of 65 minutes spent with dexcom system set up and educating patient.       Julieta WALLACE RN [facility]  Diabetes Care and

## 2024-09-06 NOTE — TELEPHONE ENCOUNTER
Endocrine refill protocol for basal insulins     Protocol Criteria: PASSED Reason: N/A  - Appointment with Endocrinology completed in the last 6 months or scheduled in the next 3 months    - A1c result completed in the last 6 months and is below 8.5%     Verify appointment has been completed or scheduled in the appropriate timeline. If so can send a 90 day supply with 1 refill per provider protocol.    Verify A1c has been completed within the last 6 months and is below 8.5%     Last completed office visit:8/21/2024 Tanya Naranjo MD   Next scheduled Follow up:   Future Appointments   Date Time Provider Department Center   9/20/2024 10:30 AM [facility] ENDO CDE [facility]   9/20/2024 12:00 PM EM CC LAB1 [facility] CHEMO [facility]   9/20/2024  1:20 PM Payam Rdz MD [facility] HEM ONC [facility]   10/4/2024  1:00 PM EM CC LAB2 [facility] CHEMO [facility]   10/4/2024  3:40 PM Jaun Enciso MD [id number]   1/8/2025  1:30 PM Tanya Naranjo MD [facility]ADOENDO [facility]      Last A1c result: Last A1c value was 8.1% done 8/21/2024.    Patient requested refill of lantus during appointment with CDE.

## 2024-09-12 DIAGNOSIS — E11.3293 TYPE 2 DIABETES MELLITUS WITH BOTH EYES AFFECTED BY MILD NONPROLIFERATIVE RETINOPATHY WITHOUT MACULAR EDEMA, WITHOUT LONG-TERM CURRENT USE OF INSULIN (HCC): ICD-10-CM

## 2024-09-12 DIAGNOSIS — G62.9 NEUROPATHY: ICD-10-CM

## 2024-09-12 RX ORDER — PREGABALIN 300 MG/1
300 CAPSULE ORAL DAILY
Qty: 30 CAPSULE | Refills: 0 | Status: SHIPPED | OUTPATIENT
Start: 2024-09-12

## 2024-09-19 ENCOUNTER — LAB ENCOUNTER (OUTPATIENT)
Dept: LAB | Age: 69
End: 2024-09-19
Attending: INTERNAL MEDICINE
Payer: MEDICARE

## 2024-09-19 DIAGNOSIS — D46.9 MDS (MYELODYSPLASTIC SYNDROME) (HCC): ICD-10-CM

## 2024-09-19 DIAGNOSIS — D61.818 PANCYTOPENIA (HCC): ICD-10-CM

## 2024-09-19 LAB
BASOPHILS # BLD AUTO: 0.01 X10(3) UL (ref 0–0.2)
BASOPHILS NFR BLD AUTO: 0.4 %
DEPRECATED RDW RBC AUTO: 64.3 FL (ref 35.1–46.3)
EOSINOPHIL # BLD AUTO: 0.05 X10(3) UL (ref 0–0.7)
EOSINOPHIL NFR BLD AUTO: 2.1 %
ERYTHROCYTE [DISTWIDTH] IN BLOOD BY AUTOMATED COUNT: 16.2 % (ref 11–15)
HCT VFR BLD AUTO: 29.9 %
HGB BLD-MCNC: 9.5 G/DL
IMM GRANULOCYTES # BLD AUTO: 0.01 X10(3) UL (ref 0–1)
IMM GRANULOCYTES NFR BLD: 0.4 %
LYMPHOCYTES # BLD AUTO: 1.07 X10(3) UL (ref 1–4)
LYMPHOCYTES NFR BLD AUTO: 44.4 %
MCH RBC QN AUTO: 34.7 PG (ref 26–34)
MCHC RBC AUTO-ENTMCNC: 31.8 G/DL (ref 31–37)
MCV RBC AUTO: 109.1 FL
MONOCYTES # BLD AUTO: 0.25 X10(3) UL (ref 0.1–1)
MONOCYTES NFR BLD AUTO: 10.4 %
NEUTROPHILS # BLD AUTO: 1.02 X10 (3) UL (ref 1.5–7.7)
NEUTROPHILS # BLD AUTO: 1.02 X10(3) UL (ref 1.5–7.7)
NEUTROPHILS NFR BLD AUTO: 42.3 %
PLATELET # BLD AUTO: 98 10(3)UL (ref 150–450)
PLATELETS.RETICULATED NFR BLD AUTO: 5.9 % (ref 0–7)
RBC # BLD AUTO: 2.74 X10(6)UL
WBC # BLD AUTO: 2.4 X10(3) UL (ref 4–11)

## 2024-09-19 PROCEDURE — 36415 COLL VENOUS BLD VENIPUNCTURE: CPT

## 2024-09-19 PROCEDURE — 85025 COMPLETE CBC W/AUTO DIFF WBC: CPT

## 2024-09-20 ENCOUNTER — NURSE ONLY (OUTPATIENT)
Dept: ENDOCRINOLOGY CLINIC | Facility: CLINIC | Age: 69
End: 2024-09-20

## 2024-09-20 ENCOUNTER — OFFICE VISIT (OUTPATIENT)
Dept: HEMATOLOGY/ONCOLOGY | Facility: HOSPITAL | Age: 69
End: 2024-09-20
Attending: INTERNAL MEDICINE
Payer: MEDICARE

## 2024-09-20 VITALS
OXYGEN SATURATION: 97 % | HEART RATE: 75 BPM | TEMPERATURE: 98 F | SYSTOLIC BLOOD PRESSURE: 118 MMHG | HEIGHT: 69 IN | WEIGHT: 241 LBS | BODY MASS INDEX: 35.7 KG/M2 | DIASTOLIC BLOOD PRESSURE: 54 MMHG | RESPIRATION RATE: 18 BRPM

## 2024-09-20 DIAGNOSIS — Z51.81 MEDICATION MONITORING ENCOUNTER: ICD-10-CM

## 2024-09-20 DIAGNOSIS — D46.9 MDS (MYELODYSPLASTIC SYNDROME) (HCC): Primary | ICD-10-CM

## 2024-09-20 DIAGNOSIS — D61.818 PANCYTOPENIA (HCC): ICD-10-CM

## 2024-09-20 DIAGNOSIS — E11.65 UNCONTROLLED TYPE 2 DIABETES MELLITUS WITH HYPERGLYCEMIA (HCC): Primary | ICD-10-CM

## 2024-09-20 DIAGNOSIS — N18.9 CHRONIC KIDNEY DISEASE, UNSPECIFIED CKD STAGE: ICD-10-CM

## 2024-09-20 PROCEDURE — G2211 COMPLEX E/M VISIT ADD ON: HCPCS | Performed by: INTERNAL MEDICINE

## 2024-09-20 PROCEDURE — 96372 THER/PROPH/DIAG INJ SC/IM: CPT

## 2024-09-20 PROCEDURE — 99214 OFFICE O/P EST MOD 30 MIN: CPT | Performed by: INTERNAL MEDICINE

## 2024-09-20 NOTE — PROGRESS NOTES
Cancer Center Progress Note    Patient Name: Jaun Burton   YOB: 1955   Medical Record Number: [id number]   Attending Physician: Payam Rdz M.D.     Chief Complaint:  Pancytopenia due to MDS and CKD    History of Present Illness:  69 year old  With chronic kidney disease been evaluate by hematology for pancytopenia.    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 U2AF1 c.101C>T, p.Qer51Psg ([id number])   VAF: 38.6%   U2AF1 (also known as U2AF35) encodes a component of the   RNA-splicing machinery known as the spliceosome. Somatic mutations   of U2AF1 are found in approximately 5% of patients with acute   myeloid leukemia (AML)  (25), and in approximately 9% of patients   with myelodysplastic syndrome (MDS) (20) (29) (19). Most U2AF1   mutations affect codons Ser34 or Fmx737 (10). This particular   missense mutation has been reported in hematologic malignancies   (4). Mutations in spliceosomal genes, including U2AF1, are   associated with decreased disease-free and overall survival in   patients with AML (8) (13). In MDS, U2AF1 mutations are associated   with worse overall survival (11) and more rapid transformation to   AML (24), and do not predict response to hypomethylating therapies   (11).       2. GATA2 c.568dup, p.Tri210od ([id number])   VAF: 40.6%   GATA2 belongs to the ZINA family of transcription factors and   regulates hematopoiesis through two conserved zinc finger domains.   Overall, somatic GATA2 mutations are found in 1-4% of patients   with sporadic myeloid malignancies (12) (17) (18). GATA2 mutations   are common in adult AML patients with biallelic CEBPA mutations,   but are rare in adult AML patients with wild-type CEBPA (5) (6)   (7). Somatic GATA2 mutations are infrequent in MDS (28).   Pathogenic germline variants of GATA2 cause a range of   hematopoietic defects, including MonoMAC syndrome, dendritic cell,   monocyte, B and NK lymphoid  deficiency syndrome (DCML), familial   MDS, AML, and blast transformation in chronic myeloid leukemia   (CML) (3) (23). Somatic GATA2 mutations in hematological   malignancies are typically missense mutations within the   N-terminal zinc-finger domain and in-frame deletions/insertions in   the C-terminal zinc-finger domain (9) (15) (16). Somatic   frameshift and nonsense mutations in GATA2 are generally detected   outside of the zinc-finger domains (15). This particular   frameshift mutation is predicted to disrupt the normal function of   GATA2 (3). In AML patients, GATA2 mutations are confined to the   N-terminal zinc finger domain, and frequently co-occurred with   biallelic CEBPA, KIT and FLT3 mutations (15). Some studies found   that GATA2 mutations had no impact on the clinical outcome in   CEBPA-double/KLW3-[id number] AML patients (6). Another study   found that GATA2 mutations were associated with favorable   prognosis in intermediate-risk karyotype AML with biallelic CEBPA   mutations (5). The prognostic significance of GATA2 mutation in   the absence of CEBPA or FLT3 mutation is unclear. In MDS patients,   GATA2 is frequently co-mutated with BCOR and U2AF1 (15). In GATA2   mutated primary and advanced MDS patients, GATA2 mutation is often   germline in origin and is generally associated with monosomy 7   (28).       TIER 2: Variants of Unknown Clinical Significance in Hematologic   Malignancies       1. KMT2A c.4240G>A, p.Hes8307Edi ([id number])   VAF: 44.1%   KMT2A encodes a histone methyltransferase that acts as a   transcriptional coactivator to regulate gene expression during   early development and hematopoiesis (21). Somatic mutations of   KMT2A (formerly known as MLL) are found in 6-10% of AML patients   (1) (26) (27) and very rarely in other myeloid malignancies (22).   The reported AML-associated KMT2A mutations are mostly partial   tandem duplications that span KMT2A exons 2-11 (2) (14).  This   particular KMT2A missense variant alters a moderately conserved   amino acid and has not been reported in hematologic malignancies,   to the best of our knowledge. Its functional consequences are   unknown. In addition, this variant is listed in the dbSNP database   ([id number]). Given that the variant frequency is close to 50%,   it is unclear whether this is a germline or somatic variant. The   clinical significance, if any, is uncertain    Interval History:  He returns for routine follow-up of MDS on CED. Patient reports feeling well, mentions fatigue, but denies reports of bleeding, recurrent fevers or infections or systemic signs of illness. He denies any chest pain, dyspnea, n/v/mentions some abd pain due to need for bowel movement, no URI symptoms or new concerns on ROS.    Performance Status:  ECOG 0    Past Medical History:  Past Medical History:    C2-3 mild central, C3-4 mild-mod diffuse, C4-5 mild diffuse, C5-6 mod diffuse bulging discs    C5-6 mod central & bilateral mild foraminal, C4-5 left mild foraminal stenosis    C6-7 mild-mod central herniated disc    Cataract    2010    Chronic kidney disease (CKD)    stage 3    Diabetes mellitus (HCC)    Diabetes type 2, uncontrolled    Diabetic retinopathy (HCC)    [facility]    Hyperlipidemia    Meibomian gland dysfunction    Osteoarthritis of spine with radiculopathy, cervical region    Pancreatitis (HCC)    Primary osteoarthritis of both shoulders    Proteinuria    Type II or unspecified type diabetes mellitus without mention of complication, not stated as uncontrolled    Pills & Insulin    Vitreous floaters       Past Surgical History:  Past Surgical History:   Procedure Laterality Date    Appendectomy      Cataract extraction w/  intraocular lens implant Right 06/11/2018    Dr. Lopez    Cataract extraction w/  intraocular lens implant Left 07/12/2018    Dr. Lopez    Cholecystectomy  2012    Electrocardiogram,  complete  2012    scanned to media tab    Hernia surgery         Family History:  Family History   Problem Relation Age of Onset    Diabetes Other         close relative    Diabetes Maternal Grandmother     Diabetes Father     Macular degeneration Neg     Glaucoma Neg         family h/o       Social History:  Social History     Socioeconomic History    Marital status:    Tobacco Use    Smoking status: Former     Current packs/day: 0.00     Types: Cigarettes     Quit date: 1989     Years since quittin.8    Smokeless tobacco: Former    Tobacco comments:     quit about 30 years ago.   Vaping Use    Vaping status: Never Used   Substance and Sexual Activity    Alcohol use: No    Drug use: No   Other Topics Concern    Caffeine Concern Yes     Comment: 1 cup coffee per day    Exercise No    History of tanning Yes    Reaction to local anesthetic No         Current Medications:    Current Outpatient Medications:     PREGABALIN 300 MG Oral Cap, Take 1 capsule by mouth once daily, Disp: 30 capsule, Rfl: 0    LANTUS 100 UNIT/ML Subcutaneous Solution, Inject 40 Units into the skin nightly., Disp: 36 mL, Rfl: 1    HYDROcodone-acetaminophen (NORCO)  MG Oral Tab, Take 1 tablet by mouth every 8 (eight) hours as needed for Pain., Disp: 90 tablet, Rfl: 0    AMITRIPTYLINE 25 MG Oral Tab, Take 1 tablet by mouth nightly, Disp: 90 tablet, Rfl: 1    magnesium oxide 400 MG Oral Tab, Take 1 tablet (400 mg total) by mouth daily., Disp: 90 tablet, Rfl: 0    NOVOLOG 100 UNIT/ML Injection Solution, INJECT 50 UNITS SUBCUTANEOUSLY ONCE DAILY VIA  CORRECTION  FACTOR, Disp: 40 mL, Rfl: 1    BD INSULIN SYRINGE U/F 31G X 516\" 0.5 ML Does not apply Misc, USE 1 SYRINGE 4 TIMES DAILY, Disp: 400 each, Rfl: 3    Mometasone Furoate 0.1 % External Solution, Use bid to ears as directd, Disp: 60 mL, Rfl: 3    FREESTYLE LITE TEST In Vitro Strip, USE 1 STRIP TO CHECK BLOOD GLUCOSE 3 TIMES DAILY. DX: E11.65, insulin dependent, Disp:  300 strip, Rfl: 1    sodium zirconium cyclosilicate (LOKELMA) 10 g Oral Powd Pack, Take 1 packet (10 g total) by mouth twice a week., Disp: 30 packet, Rfl: 0    azelastine 137 MCG/SPRAY Nasal Solution, 1-2 sprays by Nasal route in the morning and 1-2 sprays before bedtime. FOR SINUS SYMPTOMS/NASAL CONGESTION.., Disp: 30 mL, Rfl: 3    fluticasone propionate 50 MCG/ACT Nasal Suspension, 2 sprays by Each Nare route daily. FOR NASAL CONGESTION/SINUS SYMPTOMS., Disp: 3 each, Rfl: 3    metoprolol tartrate 50 MG Oral Tab, , Disp: , Rfl:     atorvastatin 40 MG Oral Tab, Take 1 tablet (40 mg total) by mouth daily. FOR CHOLESTEROL., Disp: 90 tablet, Rfl: 9    fenofibrate 48 MG Oral Tab, Take 1 tablet (48 mg total) by mouth daily. FOR TRIGLYCERIDES., Disp: 90 tablet, Rfl: 9    pantoprazole 40 MG Oral Tab EC, Take 1 tablet (40 mg total) by mouth before breakfast. FOR ACID REFLUX., Disp: 90 tablet, Rfl: 9    cyanocobalamin 1000 MCG Oral Tab, Take 1 tablet (1,000 mcg total) by mouth daily., Disp: 90 tablet, Rfl: 4    Betamethasone Dipropionate Aug 0.05 % External Cream, Apply 1 Application topically 2 (two) times daily. APPLY TO AFFECTED AREA, Disp: 50 g, Rfl: 1    metRONIDAZOLE 0.75 % External Cream, Apply 1 Application topically daily. For breakout on face, Disp: 45 g, Rfl: 0    Insulin Syringe-Needle U-100 (RELION INSULIN SYRINGE) 31G X 15/64\" 0.5 ML Does not apply Misc, 1 Syringe by Does not apply route 4 (four) times daily., Disp: 400 each, Rfl: 0    Pimecrolimus 1 % External Cream, APPLY   TOPICALLY AFFECTED AREA ON FACE ONCE DAILY TO TWICE DAILY, Disp: 60 g, Rfl: 0    Tazarotene 0.1 % External Cream, Apply to chest nightly (Patient taking differently: Apply to chest nightly PRN), Disp: 60 g, Rfl: 3    Insulin Syringe-Needle U-100 (RELION INSULIN SYRINGE) 31G X 15/64\" 0.5 ML Does not apply Misc, 1 Syringe by Does not apply route 4 (four) times daily., Disp: 400 each, Rfl: 0    Blood Glucose Monitoring Suppl (ACCU-CHEK INÉS  PLUS) w/Device Does not apply Kit, Use as directed to check blood sugars., Disp: 1 kit, Rfl: 0    Ferrous Gluconate 225 (27 Fe) MG Oral Tab, Take 27 mg by mouth daily., Disp: , Rfl:     Omega-3 Fatty Acids (FISH OIL) 600 MG Oral Cap, Take 1 capsule by mouth nightly.  , Disp: , Rfl:     Multiple Vitamins-Minerals (CENTRUM SILVER) Oral Tab, Take 1 tablet by mouth daily., Disp: , Rfl:     Current Outpatient Medications on File Prior to Visit   Medication Sig Dispense Refill    PREGABALIN 300 MG Oral Cap Take 1 capsule by mouth once daily 30 capsule 0    LANTUS 100 UNIT/ML Subcutaneous Solution Inject 40 Units into the skin nightly. 36 mL 1    HYDROcodone-acetaminophen (NORCO)  MG Oral Tab Take 1 tablet by mouth every 8 (eight) hours as needed for Pain. 90 tablet 0    AMITRIPTYLINE 25 MG Oral Tab Take 1 tablet by mouth nightly 90 tablet 1    magnesium oxide 400 MG Oral Tab Take 1 tablet (400 mg total) by mouth daily. 90 tablet 0    NOVOLOG 100 UNIT/ML Injection Solution INJECT 50 UNITS SUBCUTANEOUSLY ONCE DAILY VIA  CORRECTION  FACTOR 40 mL 1    BD INSULIN SYRINGE U/F 31G X 5/16\" 0.5 ML Does not apply Misc USE 1 SYRINGE 4 TIMES DAILY 400 each 3    Mometasone Furoate 0.1 % External Solution Use bid to ears as directd 60 mL 3    FREESTYLE LITE TEST In Vitro Strip USE 1 STRIP TO CHECK BLOOD GLUCOSE 3 TIMES DAILY. DX: E11.65, insulin dependent 300 strip 1    sodium zirconium cyclosilicate (LOKELMA) 10 g Oral Powd Pack Take 1 packet (10 g total) by mouth twice a week. 30 packet 0    azelastine 137 MCG/SPRAY Nasal Solution 1-2 sprays by Nasal route in the morning and 1-2 sprays before bedtime. FOR SINUS SYMPTOMS/NASAL CONGESTION.. 30 mL 3    fluticasone propionate 50 MCG/ACT Nasal Suspension 2 sprays by Each Nare route daily. FOR NASAL CONGESTION/SINUS SYMPTOMS. 3 each 3    metoprolol tartrate 50 MG Oral Tab       atorvastatin 40 MG Oral Tab Take 1 tablet (40 mg total) by mouth daily. FOR CHOLESTEROL. 90 tablet 9     fenofibrate 48 MG Oral Tab Take 1 tablet (48 mg total) by mouth daily. FOR TRIGLYCERIDES. 90 tablet 9    pantoprazole 40 MG Oral Tab EC Take 1 tablet (40 mg total) by mouth before breakfast. FOR ACID REFLUX. 90 tablet 9    cyanocobalamin 1000 MCG Oral Tab Take 1 tablet (1,000 mcg total) by mouth daily. 90 tablet 4    Betamethasone Dipropionate Aug 0.05 % External Cream Apply 1 Application topically 2 (two) times daily. APPLY TO AFFECTED AREA 50 g 1    metRONIDAZOLE 0.75 % External Cream Apply 1 Application topically daily. For breakout on face 45 g 0    Insulin Syringe-Needle U-100 (RELION INSULIN SYRINGE) 31G X 15/64\" 0.5 ML Does not apply Misc 1 Syringe by Does not apply route 4 (four) times daily. 400 each 0    Pimecrolimus 1 % External Cream APPLY   TOPICALLY AFFECTED AREA ON FACE ONCE DAILY TO TWICE DAILY 60 g 0    Tazarotene 0.1 % External Cream Apply to chest nightly (Patient taking differently: Apply to chest nightly PRN) 60 g 3    Insulin Syringe-Needle U-100 (RELION INSULIN SYRINGE) 31G X 15/64\" 0.5 ML Does not apply Misc 1 Syringe by Does not apply route 4 (four) times daily. 400 each 0    Blood Glucose Monitoring Suppl (ACCU-CHEK INÉS PLUS) w/Device Does not apply Kit Use as directed to check blood sugars. 1 kit 0    Ferrous Gluconate 225 (27 Fe) MG Oral Tab Take 27 mg by mouth daily.      Omega-3 Fatty Acids (FISH OIL) 600 MG Oral Cap Take 1 capsule by mouth nightly.        Multiple Vitamins-Minerals (CENTRUM SILVER) Oral Tab Take 1 tablet by mouth daily.       Current Facility-Administered Medications on File Prior to Visit   Medication Dose Route Frequency Provider Last Rate Last Admin    [COMPLETED] darbepoetin grey-polysorbate (Aranesp-Albumin Free) 500 MCG/ML injection 500 mcg  500 mcg Subcutaneous Once Clarisa Gallego APRN   500 mcg at 09/20/24 1213    [COMPLETED] darbepoetin grey-polysorbate (Aranesp-Albumin Free) 500 MCG/ML injection 500 mcg  500 mcg Subcutaneous Once Clarisa Gallego APRN    500 mcg at 09/06/24 1145    [COMPLETED] darbepoetin grey-polysorbate (Aranesp-Albumin Free) 500 MCG/ML injection 500 mcg  500 mcg Subcutaneous Once Clarisa Gallego APRN   500 mcg at 08/23/24 1305    darbepoetin grey (Aranesp) 200 MCG/0.4ML injection 200 mcg  200 mcg Subcutaneous Q21 Days Jaun Enciso MD   200 mcg at 03/24/23 1028         Allergies:  Allergies   Allergen Reactions    Cefdinir DIARRHEA    Zosyn [Piperacillin Sod-Tazobactam So] OTHER (SEE COMMENTS)     Heightened sense of smell; dry heaves, vomiting        Review of Systems:  All other systems reviewed and negative x12    Vital Signs:  /54 (BP Location: Left arm, Patient Position: Sitting, Cuff Size: large)   Pulse 75   Temp 97.8 °F (36.6 °C) (Oral)   Resp 18   Ht 1.753 m (5' 9\")   Wt 109.3 kg (241 lb)   SpO2 97%   BMI 35.59 kg/m²     Physical Examination:  General: Patient is alert and oriented x 3, not in acute distress.  Psych:  Mood and affect appropriate  HEENT: EOMs intact. Oropharynx is clear.   Neck: No palpable lymphadenopathy. Neck is supple.  Lymphatics: There is no palpable peripheral lymphadenopathy   Chest: Clear to auscultation, nonlabored breathing  Cardiovascular: Regular with S1/S2  Abdomen: Soft, non tender.   Extremities: No edema.  Neurological: 5/5 motor x4.        Laboratory:  Lab Results   Component Value Date    WBC 2.4 (L) 09/19/2024    RBC 2.74 (L) 09/19/2024    HGB 9.5 (L) 09/19/2024    HCT 29.9 (L) 09/19/2024    .1 (H) 09/19/2024    MCH 34.7 (H) 09/19/2024    MCHC 31.8 09/19/2024    RDW 16.2 (H) 09/19/2024    PLT 98.0 (L) 09/19/2024    MPV 9.6 01/24/2019         Lab Results   Component Value Date     (H) 07/11/2024    BUN 46 (H) 07/11/2024    BUNCREA 12.6 07/11/2024    CREATSERUM 3.65 (H) 07/11/2024    ANIONGAP 5 07/11/2024    GFRNAA 21 (L) 07/23/2022    GFRAA 24 (L) 07/23/2022    CA 8.8 07/11/2024    OSMOCALC 311 (H) 07/11/2024    ALKPHO 74 02/21/2024    AST 23 02/21/2024    ALT 21  02/21/2024    ALKPHOS 114 (H) 04/25/2013    BILT 0.8 02/21/2024    TP 6.2 02/21/2024    ALB 3.7 07/11/2024    GLOBULIN 2.4 (L) 02/21/2024    AGRATIO 1.3 04/25/2013     07/11/2024    K 5.9 (H) 07/11/2024     07/11/2024    CO2 24.0 07/11/2024       Radiology:       Cancer Staging   No matching staging information was found for the patient.      Impression and Plan:  69 year old  With chronic kidney disease been divided by hematology for pancytopenia. Bone marrow 3/23 showed MDS ipss-r low risk (del 20, 1%blasts)    1.) MDS  - NGS as above  -CED hemoglobin now significantly improved we will continue darbepoetin  --hgb is at goal, do not want to exceed value >11g  --discussed w/ pt that he should discuss possible consideration of stem cell transplant, so referred to Sandra Mills at [facility]  --Dr. Mills may also recommend switching to HMA like azacitadine or decitabine to help the pancytopenia  --RTC 1 months for f/u    2.) CKD  --follows with Dr. Enciso for this condition; also likely contributing to his anemia         MDM: Moderate  : Ongoing continuing of complex care      Payam Rdz MD  [facility]  [address], [place] [address]

## 2024-09-20 NOTE — PROGRESS NOTES
Pt here for Q2 week aranesp injection - gets labs outpt at [facility]  Hgl 9.5 on 9/19 outpt lab  Reports feeling good, but chronic fatigue, mild dyspnea upon exertion, mild weakness to legs    Aranesp 500mcg given left upper arm subcutaneous, tolerated well - much less painful if given slow, warmed   Must give super slow, warm capped needle/ syringe in hand, do not apply bandage d/t hairy arms per pt.  Parameters are to hold if hgb is 11 or greater - pt is aware    Discharged stable to home with future appts. Given AVS - prefers appts after 1030 d/t parking  Sees Dr Rdz today  Gait steady, indep  
4 = No assist / stand by assistance

## 2024-09-20 NOTE — PROGRESS NOTES
Continuous Glucose Monitor Download - Dexcom G7    Jaun Burton  8/7/1955    Dexcom placed on 9/6/24. Here today for glucose report review. Per chart review, patient to take lantus 40 units daily, humalog 10-10-14 and scale of 1:60>160          Medical Background        Lab Results   Component Value Date    A1C 8.1 (A) 08/21/2024    A1C 7.8 (H) 06/13/2024    A1C 6.9 (H) 02/21/2024    A1C 6.9 (A) 02/21/2024    A1C 7.0 (H) 11/16/2023         Patient has T2DM, seen today regarding [facility] report review    Medication Review      DM Meds: Lantus Soln - 100 UNIT/ML; NovoLOG Soln - 100 UNIT/ML         Patient Currently Taking:   Taking lantus 40 units daily but if evening glucose is under 200 takes 35 or sometimes 30 units of lantus.  Taking Humalog 10-10-12 and scale of 1:60>160. Takes humalog when he starts eating, on occasion after the meal. Patient will take humalog per provider's instructions at last visit of 10-10-14 plus correction scale.   Reviewed how insulin works, different timings for long acting vs short acting, how humalog affects glucose levels depending on when it's taken. Patient using vials and syringes. Has confused vials in the past and taken humalog instead of lantus. Declines changing to insulin pens. Explored ways to keep vials noticeable different from each other.       Blood Glucose Review      Using Dexcom G7. Reviewed report with patient and his wife.    Interpretation of Data:                  No hypoglycemia noted. Patient had a glucose reading in the low 70s September 18, 2024. GMI at 7.6%. Patient verbalized being satisfied that glucose is no longer in the high 300s. Recommended goal of time in rage of 70% reviewed with patient.     Patient Concerns      Patient and wife had several questions regarding the use of dexcom, how to apply, use of joaquim, 30 ft range.     Meals and Dosing Timing:    Time Food Insulin    Breakfast Potatoes, eggs, sausage                     Some of the post prandial  readings for evening related to eating out and a wedding patient attended. Likes to eat snack in the evening, mostly cookies. Gave patient handout of snack ideas to have in the evening that are low carbohydrate. Hypoglycemia precautions reviewed with patient.         Recommendations / Plan    Take humalog 10-15 minutes before meals. Take recommended dosage of 10 units before breakfast plus correction scale, 10 units before lunch plus correction, and 14 units instead of 12 before dinner plus correction per provider's instructions on 8/21/24.  Patient to call back with any questions or concerns. Ok to schedule follow up for nutrition education as needed.         Next Follow up scheduled for 1/8/25 with Dr. Naranjo.      Routed to provider for review.      9/20/2024  Julieta WALLACE RN [facility]  Diabetes Care &      A total of 90 minutes was spent with the patient including chart review, discussion and education / advisement pertinent to patient and provider specified concerns as documented above.     Note to patient: The 21 Century Cures Act makes medical notes like these available to patients in the interest of transparency. However, be advised this is a medical document. It is intended as peer to peer communication. It is written in medical language and may contain abbreviations or verbiage that are unfamiliar. It may appear blunt or direct. Medical documents are intended to carry relevant information, facts as evident, and the clinical opinion of the practitioner.

## 2024-09-23 ENCOUNTER — TELEPHONE (OUTPATIENT)
Dept: HEMATOLOGY/ONCOLOGY | Facility: HOSPITAL | Age: 69
End: 2024-09-23

## 2024-09-23 ENCOUNTER — TELEPHONE (OUTPATIENT)
Dept: NEPHROLOGY | Facility: CLINIC | Age: 69
End: 2024-09-23

## 2024-09-23 DIAGNOSIS — G62.9 NEUROPATHY: ICD-10-CM

## 2024-09-23 DIAGNOSIS — G62.9 NEUROPATHY: Primary | ICD-10-CM

## 2024-09-23 RX ORDER — HYDROCODONE BITARTRATE AND ACETAMINOPHEN 10; 325 MG/1; MG/1
1 TABLET ORAL EVERY 8 HOURS PRN
Qty: 90 TABLET | Refills: 0 | Status: SHIPPED | OUTPATIENT
Start: 2024-09-23

## 2024-09-23 RX ORDER — MAGNESIUM OXIDE TAB 400 MG (241.3 MG ELEMENTAL MG) 400 (241.3 MG) MG
1 TAB ORAL DAILY
Qty: 90 TABLET | Refills: 1 | Status: SHIPPED | OUTPATIENT
Start: 2024-09-23

## 2024-09-23 NOTE — TELEPHONE ENCOUNTER
Last office visit- 6/28/24     Return to clinic- 3 months     Follow up- 10/4/24  Please sign pending order if appropriate thanks.

## 2024-09-23 NOTE — TELEPHONE ENCOUNTER
Patient's wife called for refill.  Patient is almost out of medications.  Please call.       Current Outpatient Medications:     HYDROcodone-acetaminophen (NORCO)  MG Oral Tab, Take 1 tablet by mouth every 8 (eight) hours as needed for Pain., Disp: 90 tablet, Rfl: 0

## 2024-09-23 NOTE — TELEPHONE ENCOUNTER
Called and spoke with patient's wife Vanesa as patient was eating. Informed patient that Dr. Rdz will no longer be available in the afternoon on 10/18 for Jaun's appointment. Wife requested to call back at a later time to reschedule. Gave her our phone number at [phone] and informed her to let them know the appointment needs to be rescheduled due to the doctor being unavailable. Vanesa stated understanding and thanked me for the call.

## 2024-09-24 ENCOUNTER — TELEPHONE (OUTPATIENT)
Dept: INTERNAL MEDICINE CLINIC | Facility: CLINIC | Age: 69
End: 2024-09-24

## 2024-09-24 ENCOUNTER — TELEPHONE (OUTPATIENT)
Dept: NEPHROLOGY | Facility: CLINIC | Age: 69
End: 2024-09-24

## 2024-09-24 RX ORDER — HYDROCODONE BITARTRATE AND ACETAMINOPHEN 10; 325 MG/1; MG/1
1 TABLET ORAL EVERY 8 HOURS PRN
Qty: 90 TABLET | Refills: 0 | Status: SHIPPED | OUTPATIENT
Start: 2024-09-24

## 2024-09-24 RX ORDER — HYDROCODONE BITARTRATE AND ACETAMINOPHEN 10; 325 MG/1; MG/1
1 TABLET ORAL EVERY 8 HOURS PRN
Qty: 90 TABLET | Refills: 0 | Status: CANCELLED | OUTPATIENT
Start: 2024-09-24

## 2024-09-24 NOTE — TELEPHONE ENCOUNTER
Spoke with pt name and  verified ,  Advised patient he is due for colonoscopy and cologuard order has already been placed by Dr Aguilar.  Pt also due for MA PX in December. Pt declined to schedule physical at this time

## 2024-09-24 NOTE — TELEPHONE ENCOUNTER
[facility] pharmacy in [place] called to speak with a nurse in regards to the patient's Norco. Please call.       HYDROcodone-acetaminophen (NORCO)  MG Oral Tab, Take 1 tablet by mouth every 8 (eight) hours as needed for Pain., Disp: 90 tablet, Rfl: 0

## 2024-10-03 ENCOUNTER — LAB ENCOUNTER (OUTPATIENT)
Dept: LAB | Age: 69
End: 2024-10-03
Attending: INTERNAL MEDICINE
Payer: MEDICARE

## 2024-10-03 DIAGNOSIS — D46.9 MDS (MYELODYSPLASTIC SYNDROME) (HCC): ICD-10-CM

## 2024-10-03 DIAGNOSIS — D61.818 PANCYTOPENIA (HCC): ICD-10-CM

## 2024-10-03 LAB
BASOPHILS # BLD AUTO: 0.01 X10(3) UL (ref 0–0.2)
BASOPHILS NFR BLD AUTO: 0.4 %
DEPRECATED RDW RBC AUTO: 63.8 FL (ref 35.1–46.3)
EOSINOPHIL # BLD AUTO: 0.04 X10(3) UL (ref 0–0.7)
EOSINOPHIL NFR BLD AUTO: 1.8 %
ERYTHROCYTE [DISTWIDTH] IN BLOOD BY AUTOMATED COUNT: 16.1 % (ref 11–15)
HCT VFR BLD AUTO: 30.8 %
HGB BLD-MCNC: 10 G/DL
IMM GRANULOCYTES # BLD AUTO: 0.02 X10(3) UL (ref 0–1)
IMM GRANULOCYTES NFR BLD: 0.9 %
LYMPHOCYTES # BLD AUTO: 0.83 X10(3) UL (ref 1–4)
LYMPHOCYTES NFR BLD AUTO: 36.4 %
MCH RBC QN AUTO: 34.8 PG (ref 26–34)
MCHC RBC AUTO-ENTMCNC: 32.5 G/DL (ref 31–37)
MCV RBC AUTO: 107.3 FL
MONOCYTES # BLD AUTO: 0.22 X10(3) UL (ref 0.1–1)
MONOCYTES NFR BLD AUTO: 9.6 %
NEUTROPHILS # BLD AUTO: 1.16 X10 (3) UL (ref 1.5–7.7)
NEUTROPHILS # BLD AUTO: 1.16 X10(3) UL (ref 1.5–7.7)
NEUTROPHILS NFR BLD AUTO: 50.9 %
PLATELET # BLD AUTO: 90 10(3)UL (ref 150–450)
PLATELETS.RETICULATED NFR BLD AUTO: 6.2 % (ref 0–7)
RBC # BLD AUTO: 2.87 X10(6)UL
WBC # BLD AUTO: 2.3 X10(3) UL (ref 4–11)

## 2024-10-03 PROCEDURE — 85025 COMPLETE CBC W/AUTO DIFF WBC: CPT

## 2024-10-03 PROCEDURE — 36415 COLL VENOUS BLD VENIPUNCTURE: CPT

## 2024-10-04 ENCOUNTER — NURSE ONLY (OUTPATIENT)
Dept: HEMATOLOGY/ONCOLOGY | Facility: HOSPITAL | Age: 69
End: 2024-10-04
Attending: INTERNAL MEDICINE
Payer: MEDICARE

## 2024-10-04 ENCOUNTER — TELEPHONE (OUTPATIENT)
Dept: HEMATOLOGY/ONCOLOGY | Facility: HOSPITAL | Age: 69
End: 2024-10-04

## 2024-10-04 ENCOUNTER — OFFICE VISIT (OUTPATIENT)
Dept: NEPHROLOGY | Facility: CLINIC | Age: 69
End: 2024-10-04

## 2024-10-04 VITALS
WEIGHT: 239 LBS | HEART RATE: 86 BPM | DIASTOLIC BLOOD PRESSURE: 58 MMHG | SYSTOLIC BLOOD PRESSURE: 110 MMHG | BODY MASS INDEX: 35 KG/M2

## 2024-10-04 DIAGNOSIS — D46.9 MDS (MYELODYSPLASTIC SYNDROME) (HCC): ICD-10-CM

## 2024-10-04 DIAGNOSIS — G62.9 NEUROPATHY: ICD-10-CM

## 2024-10-04 DIAGNOSIS — N18.32 ANEMIA IN STAGE 3B CHRONIC KIDNEY DISEASE (HCC): ICD-10-CM

## 2024-10-04 DIAGNOSIS — D46.9 MDS (MYELODYSPLASTIC SYNDROME) (HCC): Primary | ICD-10-CM

## 2024-10-04 DIAGNOSIS — E10.22 CKD STAGE 4 DUE TO TYPE 1 DIABETES MELLITUS (HCC): Primary | ICD-10-CM

## 2024-10-04 DIAGNOSIS — D63.1 ANEMIA IN STAGE 3B CHRONIC KIDNEY DISEASE (HCC): ICD-10-CM

## 2024-10-04 DIAGNOSIS — N18.4 CKD STAGE 4 DUE TO TYPE 1 DIABETES MELLITUS (HCC): Primary | ICD-10-CM

## 2024-10-04 DIAGNOSIS — D61.818 PANCYTOPENIA (HCC): ICD-10-CM

## 2024-10-04 PROCEDURE — 99214 OFFICE O/P EST MOD 30 MIN: CPT | Performed by: INTERNAL MEDICINE

## 2024-10-04 PROCEDURE — 96372 THER/PROPH/DIAG INJ SC/IM: CPT

## 2024-10-04 RX ORDER — HYDROCODONE BITARTRATE AND ACETAMINOPHEN 10; 325 MG/1; MG/1
1 TABLET ORAL EVERY 8 HOURS PRN
Qty: 90 TABLET | Refills: 0 | Status: SHIPPED | OUTPATIENT
Start: 2024-10-04

## 2024-10-04 NOTE — PATIENT INSTRUCTIONS
Please do my labs next time you get labs    Do rsv vaccine in November    Keep me posted about condition    Iwill call in [place]    Saima three months    I will let you know when I have vadim Lindsey in there scotty

## 2024-10-04 NOTE — TELEPHONE ENCOUNTER
Patient was here.  He needs an earlier appointment with Dr Rdz because he needs a  stem cell transplant.  HE would like Dr Rdz to speak to Dr Sandra Mills at [facility] before he comes back for a visit.  Please call back.  Thank you.

## 2024-10-05 PROBLEM — N18.32 ANEMIA IN STAGE 3B CHRONIC KIDNEY DISEASE (HCC): Status: ACTIVE | Noted: 2024-10-05

## 2024-10-05 PROBLEM — D63.1 ANEMIA IN STAGE 3B CHRONIC KIDNEY DISEASE (HCC): Status: ACTIVE | Noted: 2024-10-05

## 2024-10-05 NOTE — PROGRESS NOTES
Progress Note     Jaun Burton    Here w wife yuli today very [place]   [place] for stem cell transplant but not candidagte  Some depresson   Chronic ppain  on norco 3 x day        HISTORY:  Past Medical History:    C2-3 mild central, C3-4 mild-mod diffuse, C4-5 mild diffuse, C5-6 mod diffuse bulging discs    C5-6 mod central & bilateral mild foraminal, C4-5 left mild foraminal stenosis    C6-7 mild-mod central herniated disc    Cataract        Chronic kidney disease (CKD)    stage 3    Diabetes mellitus (HCC)    Diabetes type 2, uncontrolled    Diabetic retinopathy (HCC)    [facility]    Hyperlipidemia    Meibomian gland dysfunction    Osteoarthritis of spine with radiculopathy, cervical region    Pancreatitis (HCC)    Primary osteoarthritis of both shoulders    Proteinuria    Type II or unspecified type diabetes mellitus without mention of complication, not stated as uncontrolled    Pills & Insulin    Vitreous floaters      Past Surgical History:   Procedure Laterality Date    Appendectomy      Cataract extraction w/  intraocular lens implant Right 2018    Dr. Lopez    Cataract extraction w/  intraocular lens implant Left 2018    Dr. Lopez    Cholecystectomy      Electrocardiogram, complete  2012    scanned to media tab    Hernia surgery        Social History     Tobacco Use    Smoking status: Former     Current packs/day: 0.00     Types: Cigarettes     Quit date: 1989     Years since quittin.9    Smokeless tobacco: Former    Tobacco comments:     quit about 30 years ago.   Substance Use Topics    Alcohol use: No         Medications (Active prior to today's visit):  Current Outpatient Medications   Medication Sig Dispense Refill    HYDROcodone-acetaminophen (NORCO)  MG Oral Tab Take 1 tablet by mouth every 8 (eight) hours as needed for Pain. 90 tablet 0    MAGNESIUM-OXIDE 400 (240 Mg) MG Oral Tab Take 1 tablet by mouth once  daily 90 tablet 1    PREGABALIN 300 MG Oral Cap Take 1 capsule by mouth once daily 30 capsule 0    LANTUS 100 UNIT/ML Subcutaneous Solution Inject 40 Units into the skin nightly. 36 mL 1    AMITRIPTYLINE 25 MG Oral Tab Take 1 tablet by mouth nightly 90 tablet 1    NOVOLOG 100 UNIT/ML Injection Solution INJECT 50 UNITS SUBCUTANEOUSLY ONCE DAILY VIA  CORRECTION  FACTOR 40 mL 1    Mometasone Furoate 0.1 % External Solution Use bid to ears as directd 60 mL 3    sodium zirconium cyclosilicate (LOKELMA) 10 g Oral Powd Pack Take 1 packet (10 g total) by mouth twice a week. 30 packet 0    azelastine 137 MCG/SPRAY Nasal Solution 1-2 sprays by Nasal route in the morning and 1-2 sprays before bedtime. FOR SINUS SYMPTOMS/NASAL CONGESTION.. 30 mL 3    fluticasone propionate 50 MCG/ACT Nasal Suspension 2 sprays by Each Nare route daily. FOR NASAL CONGESTION/SINUS SYMPTOMS. 3 each 3    atorvastatin 40 MG Oral Tab Take 1 tablet (40 mg total) by mouth daily. FOR CHOLESTEROL. 90 tablet 9    fenofibrate 48 MG Oral Tab Take 1 tablet (48 mg total) by mouth daily. FOR TRIGLYCERIDES. 90 tablet 9    pantoprazole 40 MG Oral Tab EC Take 1 tablet (40 mg total) by mouth before breakfast. FOR ACID REFLUX. 90 tablet 9    cyanocobalamin 1000 MCG Oral Tab Take 1 tablet (1,000 mcg total) by mouth daily. 90 tablet 4    Betamethasone Dipropionate Aug 0.05 % External Cream Apply 1 Application topically 2 (two) times daily. APPLY TO AFFECTED AREA 50 g 1    metRONIDAZOLE 0.75 % External Cream Apply 1 Application topically daily. For breakout on face 45 g 0    Pimecrolimus 1 % External Cream APPLY   TOPICALLY AFFECTED AREA ON FACE ONCE DAILY TO TWICE DAILY 60 g 0    Tazarotene 0.1 % External Cream Apply to chest nightly (Patient taking differently: Apply to chest nightly PRN) 60 g 3    Ferrous Gluconate 225 (27 Fe) MG Oral Tab Take 27 mg by mouth daily.      Omega-3 Fatty Acids (FISH OIL) 600 MG Oral Cap Take 1 capsule by mouth nightly.        Multiple  Vitamins-Minerals (CENTRUM SILVER) Oral Tab Take 1 tablet by mouth daily.      BD INSULIN SYRINGE U/F 31G X 5/16\" 0.5 ML Does not apply Misc USE 1 SYRINGE 4 TIMES DAILY 400 each 3    FREESTYLE LITE TEST In Vitro Strip USE 1 STRIP TO CHECK BLOOD GLUCOSE 3 TIMES DAILY. DX: E11.65, insulin dependent 300 strip 1    Insulin Syringe-Needle U-100 (RELION INSULIN SYRINGE) 31G X 15/64\" 0.5 ML Does not apply Misc 1 Syringe by Does not apply route 4 (four) times daily. 400 each 0    Insulin Syringe-Needle U-100 (RELION INSULIN SYRINGE) 31G X 15/64\" 0.5 ML Does not apply Misc 1 Syringe by Does not apply route 4 (four) times daily. 400 each 0    Blood Glucose Monitoring Suppl (ACCU-CHEK INÉS PLUS) w/Device Does not apply Kit Use as directed to check blood sugars. 1 kit 0       Allergies:  Allergies   Allergen Reactions    Cefdinir DIARRHEA    Zosyn [Piperacillin Sod-Tazobactam So] OTHER (SEE COMMENTS)     Heightened sense of smell; dry heaves, vomiting         ROS:     Constitutional: feels depressed  ENMT:  Negative for ear drainage, hearing loss and nasal drainage  Eyes:  Negative for eye discharge and vision loss  Cardiovascular:  Negative for chest pain, sob  Respiratory:  Negative for cough, dyspnea and wheezing  Gastrointestinal:  Negative for abdominal pain, constipation  Genitourinary:  Negative for dysuria and hematuria  Endocrine:  Negative for abnormal sleep patterns  Hema/Lymph:  Negative for easy bleeding and easy bruising  Integumentary:  Negative for pruritus and rash  Musculoskeletal: chronic pain  Neurological:  Negative for gait disturbance  Psychiatric:  Negative for inappropriate interaction and psychiatric symptoms      Vitals:    10/04/24 1559   BP: 110/58   Pulse: 86       PHYSICAL EXAM:   Constitutional: appears well hydrated alert and responsive   Head/Face: normocephalic  Eyes/Vision: normal extraocular motion is intact  Nose/Mouth/Throat:mucous membranes are moist   Neck/Thyroid: neck is supple  without adenopathy  Lymphatic: no abnormal cervical, supraclavicular adenopathy is noted  Respiratory:  lungs are clear to auscultation bilaterally  Cardiovascular: regular rate and rhythm   Abdomen: soft, non-tender, non-distended, BS normal  Skin/Hair: no unusual rashes present, no abnormal bruising noted  Back/Spine: no abnormalities noted  Musculoskeletal: no deformities  Extremities: no edema  Neurological:  Grossly normal       ASSESSMENT/PLAN:   Assessment   Encounter Diagnoses   Name Primary?    CKD stage 4 due to type 1 diabetes mellitus (HCC) Yes    MDS (myelodysplastic syndrome) (HCC)     Anemia in stage 3b chronic kidney disease (HCC)     Neuropathy        1 dm 8.1 aic  defer to endoc  2. Chronic pain refilled norco and lyrica  No abuse  3. Myelodysplaisa  per dr walker counts not too badf on arnesep  4. Ckd 4 last creat 3.6  gfr 17   not transplant cand  May need hd soon  5. Hyperkal  off ace arb  on lokelma 2cx week  Do labs November Rec rsv flu covid  Seeme three monthns       Orders This Visit:  Orders Placed This Encounter   Procedures    Renal Function Panel    PTH, Intact    Magnesium       Meds This Visit:  Requested Prescriptions     Signed Prescriptions Disp Refills    HYDROcodone-acetaminophen (NORCO)  MG Oral Tab 90 tablet 0     Sig: Take 1 tablet by mouth every 8 (eight) hours as needed for Pain.       Imaging & Referrals:  None     10/5/2024  Jaun Enciso MD

## 2024-10-08 NOTE — TELEPHONE ENCOUNTER
Called and unable to reach patient. Left  requesting a call back to schedule an earlier appointment with Dr. Rdz for this week. Provided office phone number.

## 2024-10-10 ENCOUNTER — OFFICE VISIT (OUTPATIENT)
Dept: HEMATOLOGY/ONCOLOGY | Facility: HOSPITAL | Age: 69
End: 2024-10-10
Attending: INTERNAL MEDICINE
Payer: MEDICARE

## 2024-10-10 VITALS
TEMPERATURE: 98 F | RESPIRATION RATE: 18 BRPM | SYSTOLIC BLOOD PRESSURE: 138 MMHG | OXYGEN SATURATION: 99 % | BODY MASS INDEX: 35.25 KG/M2 | WEIGHT: 238 LBS | DIASTOLIC BLOOD PRESSURE: 62 MMHG | HEART RATE: 79 BPM | HEIGHT: 69 IN

## 2024-10-10 DIAGNOSIS — D61.818 PANCYTOPENIA (HCC): ICD-10-CM

## 2024-10-10 DIAGNOSIS — Z51.81 MEDICATION MONITORING ENCOUNTER: ICD-10-CM

## 2024-10-10 DIAGNOSIS — N18.9 CHRONIC KIDNEY DISEASE, UNSPECIFIED CKD STAGE: ICD-10-CM

## 2024-10-10 DIAGNOSIS — D46.9 MDS (MYELODYSPLASTIC SYNDROME) (HCC): Primary | ICD-10-CM

## 2024-10-10 PROCEDURE — G2211 COMPLEX E/M VISIT ADD ON: HCPCS | Performed by: INTERNAL MEDICINE

## 2024-10-10 PROCEDURE — 99214 OFFICE O/P EST MOD 30 MIN: CPT | Performed by: INTERNAL MEDICINE

## 2024-10-10 NOTE — PROGRESS NOTES
Cancer Center Progress Note    Patient Name: Jaun Burton   YOB: 1955   Medical Record Number: [id number]   Attending Physician: Payam Rdz M.D.     Chief Complaint:  Pancytopenia due to MDS and CKD    History of Present Illness:  69 year old  With chronic kidney disease been evaluate by hematology for pancytopenia.    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 U2AF1 c.101C>T, p.Cfy31Lpi ([id number])   VAF: 38.6%   U2AF1 (also known as U2AF35) encodes a component of the   RNA-splicing machinery known as the spliceosome. Somatic mutations   of U2AF1 are found in approximately 5% of patients with acute   myeloid leukemia (AML)  (25), and in approximately 9% of patients   with myelodysplastic syndrome (MDS) (20) (29) (19). Most U2AF1   mutations affect codons Ser34 or Lqq244 (10). This particular   missense mutation has been reported in hematologic malignancies   (4). Mutations in spliceosomal genes, including U2AF1, are   associated with decreased disease-free and overall survival in   patients with AML (8) (13). In MDS, U2AF1 mutations are associated   with worse overall survival (11) and more rapid transformation to   AML (24), and do not predict response to hypomethylating therapies   (11).       2. GATA2 c.568dup, p.Itf708kb ([id number])   VAF: 40.6%   GATA2 belongs to the ZINA family of transcription factors and   regulates hematopoiesis through two conserved zinc finger domains.   Overall, somatic GATA2 mutations are found in 1-4% of patients   with sporadic myeloid malignancies (12) (17) (18). GATA2 mutations   are common in adult AML patients with biallelic CEBPA mutations,   but are rare in adult AML patients with wild-type CEBPA (5) (6)   (7). Somatic GATA2 mutations are infrequent in MDS (28).   Pathogenic germline variants of GATA2 cause a range of   hematopoietic defects, including MonoMAC syndrome, dendritic cell,   monocyte, B and NK lymphoid  deficiency syndrome (DCML), familial   MDS, AML, and blast transformation in chronic myeloid leukemia   (CML) (3) (23). Somatic GATA2 mutations in hematological   malignancies are typically missense mutations within the   N-terminal zinc-finger domain and in-frame deletions/insertions in   the C-terminal zinc-finger domain (9) (15) (16). Somatic   frameshift and nonsense mutations in GATA2 are generally detected   outside of the zinc-finger domains (15). This particular   frameshift mutation is predicted to disrupt the normal function of   GATA2 (3). In AML patients, GATA2 mutations are confined to the   N-terminal zinc finger domain, and frequently co-occurred with   biallelic CEBPA, KIT and FLT3 mutations (15). Some studies found   that GATA2 mutations had no impact on the clinical outcome in   CEBPA-double/CPX7-[id number]-[id number] AML patients (6). Another study   found that GATA2 mutations were associated with favorable   prognosis in intermediate-risk karyotype AML with biallelic CEBPA   mutations (5). The prognostic significance of GATA2 mutation in   the absence of CEBPA or FLT3 mutation is unclear. In MDS patients,   GATA2 is frequently co-mutated with BCOR and U2AF1 (15). In GATA2   mutated primary and advanced MDS patients, GATA2 mutation is often   germline in origin and is generally associated with monosomy 7   (28).       TIER 2: Variants of Unknown Clinical Significance in Hematologic   Malignancies       1. KMT2A c.4240G>A, p.Ecx6618Frl ([id number])   VAF: 44.1%   KMT2A encodes a histone methyltransferase that acts as a   transcriptional coactivator to regulate gene expression during   early development and hematopoiesis (21). Somatic mutations of   KMT2A (formerly known as MLL) are found in 6-10% of AML patients   (1) (26) (27) and very rarely in other myeloid malignancies (22).   The reported AML-associated KMT2A mutations are mostly partial   tandem duplications that span KMT2A exons 2-11 (2) (14).  This   particular KMT2A missense variant alters a moderately conserved   amino acid and has not been reported in hematologic malignancies,   to the best of our knowledge. Its functional consequences are   unknown. In addition, this variant is listed in the dbSNP database   ([id number]). Given that the variant frequency is close to 50%,   it is unclear whether this is a germline or somatic variant. The   clinical significance, if any, is uncertain    Interval History:  He returns for routine follow-up of MDS on CED. He was recently seen and evaluated by my colleague at [facility] for stem cell therapy in light of his MDS.  He was informed that in light of his CKD, he is in eligible for stem cell transplant.  Patient was recommended to continue current management.  He was informed there are other therapeutic options that could be offered to him if he develops further cytopenias     Hernan reports feeling well, mentions fatigue, but denies reports of bleeding, recurrent fevers or infections or systemic signs of illness. He denies any chest pain, dyspnea, n/v/mentions some abd pain due to need for bowel movement, no URI symptoms or new concerns on ROS.    Performance Status:  ECOG 0    Past Medical History:  Past Medical History:    C2-3 mild central, C3-4 mild-mod diffuse, C4-5 mild diffuse, C5-6 mod diffuse bulging discs    C5-6 mod central & bilateral mild foraminal, C4-5 left mild foraminal stenosis    C6-7 mild-mod central herniated disc    Cataract    2010    Chronic kidney disease (CKD)    stage 3    Diabetes mellitus (HCC)    Diabetes type 2, uncontrolled    Diabetic retinopathy (HCC)    [facility] for eval    Hyperlipidemia    Meibomian gland dysfunction    Osteoarthritis of spine with radiculopathy, cervical region    Pancreatitis (HCC)    Primary osteoarthritis of both shoulders    Proteinuria    Type II or unspecified type diabetes mellitus without mention of complication, not  stated as uncontrolled    Pills & Insulin    Vitreous floaters       Past Surgical History:  Past Surgical History:   Procedure Laterality Date    Appendectomy      Cataract extraction w/  intraocular lens implant Right 2018    Dr. Lopez    Cataract extraction w/  intraocular lens implant Left 2018    Dr. Lopez    Cholecystectomy  2012    Electrocardiogram, complete  2012    scanned to media tab    Hernia surgery         Family History:  Family History   Problem Relation Age of Onset    Diabetes Other         close relative    Diabetes Maternal Grandmother     Diabetes Father     Macular degeneration Neg     Glaucoma Neg         family h/o       Social History:  Social History     Socioeconomic History    Marital status:    Tobacco Use    Smoking status: Former     Current packs/day: 0.00     Types: Cigarettes     Quit date: 1989     Years since quittin.9    Smokeless tobacco: Former    Tobacco comments:     quit about 30 years ago.   Vaping Use    Vaping status: Never Used   Substance and Sexual Activity    Alcohol use: No    Drug use: No   Other Topics Concern    Caffeine Concern Yes     Comment: 1 cup coffee per day    Exercise No    History of tanning Yes    Reaction to local anesthetic No         Current Medications:    Current Outpatient Medications:     HYDROcodone-acetaminophen (NORCO)  MG Oral Tab, Take 1 tablet by mouth every 8 (eight) hours as needed for Pain., Disp: 90 tablet, Rfl: 0    MAGNESIUM-OXIDE 400 (240 Mg) MG Oral Tab, Take 1 tablet by mouth once daily, Disp: 90 tablet, Rfl: 1    PREGABALIN 300 MG Oral Cap, Take 1 capsule by mouth once daily, Disp: 30 capsule, Rfl: 0    LANTUS 100 UNIT/ML Subcutaneous Solution, Inject 40 Units into the skin nightly., Disp: 36 mL, Rfl: 1    AMITRIPTYLINE 25 MG Oral Tab, Take 1 tablet by mouth nightly, Disp: 90 tablet, Rfl: 1    NOVOLOG 100 UNIT/ML Injection Solution, INJECT 50 UNITS SUBCUTANEOUSLY ONCE DAILY VIA   CORRECTION  FACTOR, Disp: 40 mL, Rfl: 1    BD INSULIN SYRINGE U/F 31G X 5/16\" 0.5 ML Does not apply Misc, USE 1 SYRINGE 4 TIMES DAILY, Disp: 400 each, Rfl: 3    Mometasone Furoate 0.1 % External Solution, Use bid to ears as directd, Disp: 60 mL, Rfl: 3    FREESTYLE LITE TEST In Vitro Strip, USE 1 STRIP TO CHECK BLOOD GLUCOSE 3 TIMES DAILY. DX: E11.65, insulin dependent, Disp: 300 strip, Rfl: 1    sodium zirconium cyclosilicate (LOKELMA) 10 g Oral Powd Pack, Take 1 packet (10 g total) by mouth twice a week., Disp: 30 packet, Rfl: 0    azelastine 137 MCG/SPRAY Nasal Solution, 1-2 sprays by Nasal route in the morning and 1-2 sprays before bedtime. FOR SINUS SYMPTOMS/NASAL CONGESTION.., Disp: 30 mL, Rfl: 3    fluticasone propionate 50 MCG/ACT Nasal Suspension, 2 sprays by Each Nare route daily. FOR NASAL CONGESTION/SINUS SYMPTOMS., Disp: 3 each, Rfl: 3    atorvastatin 40 MG Oral Tab, Take 1 tablet (40 mg total) by mouth daily. FOR CHOLESTEROL., Disp: 90 tablet, Rfl: 9    fenofibrate 48 MG Oral Tab, Take 1 tablet (48 mg total) by mouth daily. FOR TRIGLYCERIDES., Disp: 90 tablet, Rfl: 9    pantoprazole 40 MG Oral Tab EC, Take 1 tablet (40 mg total) by mouth before breakfast. FOR ACID REFLUX., Disp: 90 tablet, Rfl: 9    cyanocobalamin 1000 MCG Oral Tab, Take 1 tablet (1,000 mcg total) by mouth daily., Disp: 90 tablet, Rfl: 4    Betamethasone Dipropionate Aug 0.05 % External Cream, Apply 1 Application topically 2 (two) times daily. APPLY TO AFFECTED AREA, Disp: 50 g, Rfl: 1    metRONIDAZOLE 0.75 % External Cream, Apply 1 Application topically daily. For breakout on face, Disp: 45 g, Rfl: 0    Insulin Syringe-Needle U-100 (RELION INSULIN SYRINGE) 31G X 15/64\" 0.5 ML Does not apply Misc, 1 Syringe by Does not apply route 4 (four) times daily., Disp: 400 each, Rfl: 0    Pimecrolimus 1 % External Cream, APPLY   TOPICALLY AFFECTED AREA ON FACE ONCE DAILY TO TWICE DAILY, Disp: 60 g, Rfl: 0    Tazarotene 0.1 % External Cream,  Apply to chest nightly (Patient taking differently: Apply to chest nightly PRN), Disp: 60 g, Rfl: 3    Insulin Syringe-Needle U-100 (RELION INSULIN SYRINGE) 31G X 15/64\" 0.5 ML Does not apply Misc, 1 Syringe by Does not apply route 4 (four) times daily., Disp: 400 each, Rfl: 0    Blood Glucose Monitoring Suppl (ACCU-CHEK INÉS PLUS) w/Device Does not apply Kit, Use as directed to check blood sugars., Disp: 1 kit, Rfl: 0    Ferrous Gluconate 225 (27 Fe) MG Oral Tab, Take 27 mg by mouth daily., Disp: , Rfl:     Omega-3 Fatty Acids (FISH OIL) 600 MG Oral Cap, Take 1 capsule by mouth nightly.  , Disp: , Rfl:     Multiple Vitamins-Minerals (CENTRUM SILVER) Oral Tab, Take 1 tablet by mouth daily., Disp: , Rfl:     Current Outpatient Medications on File Prior to Visit   Medication Sig Dispense Refill    HYDROcodone-acetaminophen (NORCO)  MG Oral Tab Take 1 tablet by mouth every 8 (eight) hours as needed for Pain. 90 tablet 0    MAGNESIUM-OXIDE 400 (240 Mg) MG Oral Tab Take 1 tablet by mouth once daily 90 tablet 1    PREGABALIN 300 MG Oral Cap Take 1 capsule by mouth once daily 30 capsule 0    LANTUS 100 UNIT/ML Subcutaneous Solution Inject 40 Units into the skin nightly. 36 mL 1    AMITRIPTYLINE 25 MG Oral Tab Take 1 tablet by mouth nightly 90 tablet 1    NOVOLOG 100 UNIT/ML Injection Solution INJECT 50 UNITS SUBCUTANEOUSLY ONCE DAILY VIA  CORRECTION  FACTOR 40 mL 1    BD INSULIN SYRINGE U/F 31G X 5/16\" 0.5 ML Does not apply Misc USE 1 SYRINGE 4 TIMES DAILY 400 each 3    Mometasone Furoate 0.1 % External Solution Use bid to ears as directd 60 mL 3    FREESTYLE LITE TEST In Vitro Strip USE 1 STRIP TO CHECK BLOOD GLUCOSE 3 TIMES DAILY. DX: E11.65, insulin dependent 300 strip 1    sodium zirconium cyclosilicate (LOKELMA) 10 g Oral Powd Pack Take 1 packet (10 g total) by mouth twice a week. 30 packet 0    azelastine 137 MCG/SPRAY Nasal Solution 1-2 sprays by Nasal route in the morning and 1-2 sprays before bedtime. FOR  SINUS SYMPTOMS/NASAL CONGESTION.. 30 mL 3    fluticasone propionate 50 MCG/ACT Nasal Suspension 2 sprays by Each Nare route daily. FOR NASAL CONGESTION/SINUS SYMPTOMS. 3 each 3    atorvastatin 40 MG Oral Tab Take 1 tablet (40 mg total) by mouth daily. FOR CHOLESTEROL. 90 tablet 9    fenofibrate 48 MG Oral Tab Take 1 tablet (48 mg total) by mouth daily. FOR TRIGLYCERIDES. 90 tablet 9    pantoprazole 40 MG Oral Tab EC Take 1 tablet (40 mg total) by mouth before breakfast. FOR ACID REFLUX. 90 tablet 9    cyanocobalamin 1000 MCG Oral Tab Take 1 tablet (1,000 mcg total) by mouth daily. 90 tablet 4    Betamethasone Dipropionate Aug 0.05 % External Cream Apply 1 Application topically 2 (two) times daily. APPLY TO AFFECTED AREA 50 g 1    metRONIDAZOLE 0.75 % External Cream Apply 1 Application topically daily. For breakout on face 45 g 0    Insulin Syringe-Needle U-100 (RELION INSULIN SYRINGE) 31G X 15/64\" 0.5 ML Does not apply Misc 1 Syringe by Does not apply route 4 (four) times daily. 400 each 0    Pimecrolimus 1 % External Cream APPLY   TOPICALLY AFFECTED AREA ON FACE ONCE DAILY TO TWICE DAILY 60 g 0    Tazarotene 0.1 % External Cream Apply to chest nightly (Patient taking differently: Apply to chest nightly PRN) 60 g 3    Insulin Syringe-Needle U-100 (RELION INSULIN SYRINGE) 31G X 15/64\" 0.5 ML Does not apply Misc 1 Syringe by Does not apply route 4 (four) times daily. 400 each 0    Blood Glucose Monitoring Suppl (ACCU-CHEK IÉNS PLUS) w/Device Does not apply Kit Use as directed to check blood sugars. 1 kit 0    Ferrous Gluconate 225 (27 Fe) MG Oral Tab Take 27 mg by mouth daily.      Omega-3 Fatty Acids (FISH OIL) 600 MG Oral Cap Take 1 capsule by mouth nightly.        Multiple Vitamins-Minerals (CENTRUM SILVER) Oral Tab Take 1 tablet by mouth daily.       Current Facility-Administered Medications on File Prior to Visit   Medication Dose Route Frequency Provider Last Rate Last Admin    [COMPLETED] darbepoetin  grey-polysorbate (Aranesp-Albumin Free) 500 MCG/ML injection 500 mcg  500 mcg Subcutaneous Once Clarisa Gallego APRN   500 mcg at 10/04/24 1300    [COMPLETED] darbepoetin grey-polysorbate (Aranesp-Albumin Free) 500 MCG/ML injection 500 mcg  500 mcg Subcutaneous Once Clarisa Gallego APRN   500 mcg at 09/20/24 1213    darbepoetin grey (Aranesp) 200 MCG/0.4ML injection 200 mcg  200 mcg Subcutaneous Q21 Days Jaun Enciso MD   200 mcg at 03/24/23 1028         Allergies:  Allergies   Allergen Reactions    Cefdinir DIARRHEA    Zosyn [Piperacillin Sod-Tazobactam So] OTHER (SEE COMMENTS)     Heightened sense of smell; dry heaves, vomiting        Review of Systems:  All other systems reviewed and negative x12    Vital Signs:  /62 (BP Location: Left arm, Patient Position: Sitting, Cuff Size: large)   Pulse 79   Temp 97.9 °F (36.6 °C) (Oral)   Resp 18   Ht 1.753 m (5' 9\")   Wt 108 kg (238 lb)   SpO2 99%   BMI 35.15 kg/m²     Physical Examination:  General: Patient is alert and oriented x 3, not in acute distress.  Psych:  Mood and affect appropriate  HEENT: EOMs intact. Oropharynx is clear.   Neck: No palpable lymphadenopathy. Neck is supple.  Lymphatics: There is no palpable peripheral lymphadenopathy   Chest: Clear to auscultation, nonlabored breathing  Cardiovascular: Regular with S1/S2  Abdomen: Soft, non tender.   Extremities: No edema.  Neurological: 5/5 motor x4.        Laboratory:  Lab Results   Component Value Date    WBC 2.3 (L) 10/03/2024    RBC 2.87 (L) 10/03/2024    HGB 10.0 (L) 10/03/2024    HCT 30.8 (L) 10/03/2024    .3 (H) 10/03/2024    MCH 34.8 (H) 10/03/2024    MCHC 32.5 10/03/2024    RDW 16.1 (H) 10/03/2024    PLT 90.0 (L) 10/03/2024    MPV 9.6 01/24/2019         Lab Results   Component Value Date     (H) 07/11/2024    BUN 46 (H) 07/11/2024    BUNCREA 12.6 07/11/2024    CREATSERUM 3.65 (H) 07/11/2024    ANIONGAP 5 07/11/2024    GFRNAA 21 (L) 07/23/2022    GFRAA 24 (L)  07/23/2022    CA 8.8 07/11/2024    OSMOCALC 311 (H) 07/11/2024    ALKPHO 74 02/21/2024    AST 23 02/21/2024    ALT 21 02/21/2024    ALKPHOS 114 (H) 04/25/2013    BILT 0.8 02/21/2024    TP 6.2 02/21/2024    ALB 3.7 07/11/2024    GLOBULIN 2.4 (L) 02/21/2024    AGRATIO 1.3 04/25/2013     07/11/2024    K 5.9 (H) 07/11/2024     07/11/2024    CO2 24.0 07/11/2024       Radiology:       Cancer Staging   No matching staging information was found for the patient.      Impression and Plan:  69 year old  With chronic kidney disease been divided by hematology for pancytopenia. Bone marrow 3/23 showed MDS ipss-r low risk (del 20, 1%blasts)    1.) MDS  - NGS as above  -CED hemoglobin now significantly improved we will continue darbepoetin  --hgb is at goal, do not want to exceed value >11g  --discussed hat following discussed with Dr. Sandra Mills at [facility], the pt is not eligible for stem cell transplant, so continue current management  --we can consider GCSF therapy if white blood cell count declines further or TPO therapy to improve platelet counts as needed  --if counts drastically change, will need repeat bone marrow before consider switching to HMA like azacitadine or decitabine to help the pancytopenia  --RTC at the time of next injection on 11/15; pt is followed her monthly    2.) CKD  --follows with Dr. Enciso for this condition; also likely contributing to his anemia         MDM: Moderate  : Ongoing continuing of complex care      Payam Rdz MD  [facility]  [place] [address], [place] [address]

## 2024-10-13 DIAGNOSIS — G62.9 NEUROPATHY: ICD-10-CM

## 2024-10-13 DIAGNOSIS — E11.3293 TYPE 2 DIABETES MELLITUS WITH BOTH EYES AFFECTED BY MILD NONPROLIFERATIVE RETINOPATHY WITHOUT MACULAR EDEMA, WITHOUT LONG-TERM CURRENT USE OF INSULIN (HCC): ICD-10-CM

## 2024-10-15 RX ORDER — PREGABALIN 300 MG/1
300 CAPSULE ORAL DAILY
Qty: 30 CAPSULE | Refills: 0 | Status: SHIPPED | OUTPATIENT
Start: 2024-10-15

## 2024-10-15 NOTE — TELEPHONE ENCOUNTER
Last office visit -10/4/24  Return to clinic-3 months  Follow up-1/9/25 please sign pending order thanks.  
Yes

## 2024-10-16 ENCOUNTER — TELEPHONE (OUTPATIENT)
Dept: HEMATOLOGY/ONCOLOGY | Facility: HOSPITAL | Age: 69
End: 2024-10-16

## 2024-10-16 NOTE — TELEPHONE ENCOUNTER
Called and spoke with patient's wife Vanesa. Told her since Dr. Rdz saw Jaun last week, he does not need to see him on 10/18 for a follow up. Told her we will cancel that appointment and he just needs to come in at 2:15 for his injection. She stated understanding and thanked me for the call.

## 2024-10-17 ENCOUNTER — LAB ENCOUNTER (OUTPATIENT)
Dept: LAB | Age: 69
End: 2024-10-17
Attending: INTERNAL MEDICINE
Payer: MEDICARE

## 2024-10-17 DIAGNOSIS — N18.32 ANEMIA IN STAGE 3B CHRONIC KIDNEY DISEASE (HCC): ICD-10-CM

## 2024-10-17 DIAGNOSIS — E10.22 CKD STAGE 4 DUE TO TYPE 1 DIABETES MELLITUS (HCC): ICD-10-CM

## 2024-10-17 DIAGNOSIS — N18.4 CKD STAGE 4 DUE TO TYPE 1 DIABETES MELLITUS (HCC): ICD-10-CM

## 2024-10-17 DIAGNOSIS — D61.818 PANCYTOPENIA (HCC): ICD-10-CM

## 2024-10-17 DIAGNOSIS — D63.1 ANEMIA IN STAGE 3B CHRONIC KIDNEY DISEASE (HCC): ICD-10-CM

## 2024-10-17 DIAGNOSIS — D46.9 MDS (MYELODYSPLASTIC SYNDROME) (HCC): ICD-10-CM

## 2024-10-17 LAB
ALBUMIN SERPL-MCNC: 4 G/DL (ref 3.2–4.8)
ANION GAP SERPL CALC-SCNC: 7 MMOL/L (ref 0–18)
BUN BLD-MCNC: 62 MG/DL (ref 9–23)
BUN/CREAT SERPL: 18.9 (ref 10–20)
CALCIUM BLD-MCNC: 8.5 MG/DL (ref 8.7–10.4)
CHLORIDE SERPL-SCNC: 113 MMOL/L (ref 98–112)
CO2 SERPL-SCNC: 23 MMOL/L (ref 21–32)
CREAT BLD-MCNC: 3.28 MG/DL
EGFRCR SERPLBLD CKD-EPI 2021: 19 ML/MIN/1.73M2 (ref 60–?)
GLUCOSE BLD-MCNC: 114 MG/DL (ref 70–99)
MAGNESIUM SERPL-MCNC: 1.7 MG/DL (ref 1.6–2.6)
OSMOLALITY SERPL CALC.SUM OF ELEC: 314 MOSM/KG (ref 275–295)
PHOSPHATE SERPL-MCNC: 4.7 MG/DL (ref 2.4–5.1)
POTASSIUM SERPL-SCNC: 5.9 MMOL/L (ref 3.5–5.1)
PTH-INTACT SERPL-MCNC: 161.7 PG/ML (ref 18.5–88)
SODIUM SERPL-SCNC: 143 MMOL/L (ref 136–145)

## 2024-10-17 PROCEDURE — 85025 COMPLETE CBC W/AUTO DIFF WBC: CPT

## 2024-10-17 PROCEDURE — 83735 ASSAY OF MAGNESIUM: CPT

## 2024-10-17 PROCEDURE — 80069 RENAL FUNCTION PANEL: CPT

## 2024-10-17 PROCEDURE — 83970 ASSAY OF PARATHORMONE: CPT

## 2024-10-17 PROCEDURE — 85060 BLOOD SMEAR INTERPRETATION: CPT

## 2024-10-17 PROCEDURE — 36415 COLL VENOUS BLD VENIPUNCTURE: CPT

## 2024-10-18 ENCOUNTER — NURSE ONLY (OUTPATIENT)
Dept: HEMATOLOGY/ONCOLOGY | Facility: HOSPITAL | Age: 69
End: 2024-10-18
Attending: INTERNAL MEDICINE
Payer: MEDICARE

## 2024-10-18 ENCOUNTER — TELEPHONE (OUTPATIENT)
Dept: NEPHROLOGY | Facility: CLINIC | Age: 69
End: 2024-10-18

## 2024-10-18 DIAGNOSIS — D46.9 MDS (MYELODYSPLASTIC SYNDROME) (HCC): Primary | ICD-10-CM

## 2024-10-18 DIAGNOSIS — D61.818 PANCYTOPENIA (HCC): ICD-10-CM

## 2024-10-18 LAB
BASOPHILS # BLD AUTO: 0.01 X10(3) UL (ref 0–0.2)
BASOPHILS NFR BLD AUTO: 0.4 %
DEPRECATED RDW RBC AUTO: 68.5 FL (ref 35.1–46.3)
EOSINOPHIL # BLD AUTO: 0.04 X10(3) UL (ref 0–0.7)
EOSINOPHIL NFR BLD AUTO: 1.4 %
ERYTHROCYTE [DISTWIDTH] IN BLOOD BY AUTOMATED COUNT: 16.7 % (ref 11–15)
HCT VFR BLD AUTO: 29.5 %
HGB BLD-MCNC: 9.7 G/DL
IMM GRANULOCYTES # BLD AUTO: 0.02 X10(3) UL (ref 0–1)
IMM GRANULOCYTES NFR BLD: 0.7 %
LYMPHOCYTES # BLD AUTO: 0.96 X10(3) UL (ref 1–4)
LYMPHOCYTES NFR BLD AUTO: 34 %
MCH RBC QN AUTO: 36.7 PG (ref 26–34)
MCHC RBC AUTO-ENTMCNC: 32.9 G/DL (ref 31–37)
MCV RBC AUTO: 111.7 FL
MONOCYTES # BLD AUTO: 0.29 X10(3) UL (ref 0.1–1)
MONOCYTES NFR BLD AUTO: 10.3 %
NEUTROPHILS # BLD AUTO: 1.5 X10 (3) UL (ref 1.5–7.7)
NEUTROPHILS # BLD AUTO: 1.5 X10(3) UL (ref 1.5–7.7)
NEUTROPHILS NFR BLD AUTO: 53.2 %
PLATELET # BLD AUTO: 82 10(3)UL (ref 150–450)
PLATELETS.RETICULATED NFR BLD AUTO: 6.4 % (ref 0–7)
RBC # BLD AUTO: 2.64 X10(6)UL
WBC # BLD AUTO: 2.8 X10(3) UL (ref 4–11)

## 2024-10-18 PROCEDURE — 96372 THER/PROPH/DIAG INJ SC/IM: CPT

## 2024-10-18 NOTE — PROGRESS NOTES
Pt here for Q2 week aranesp injection - gets labs outpt at [place]  Hgl 9.7 on 10/17 outpt lab  Reports feeling fairly good - no changes/ concerns    Aranesp 500mcg given right upper arm subcutaneous, tolerated well - much less painful if given slow, warmed   Must give super slow, warm capped needle/ syringe in hand, do not apply bandage d/t hairy arms per pt.  Parameters are to hold if hgb is 11 or greater - pt is aware    Discharged stable to home with future appts. Given AVS - prefers appts after 1030 d/t parking  Sees Dr Rdz 11/15  Gait steady, indep

## 2024-10-18 NOTE — TELEPHONE ENCOUNTER
Jaun Enciso MD Rios, Jennette B RN  Caller: Unspecified (Today, 10:14 AM)  Needs 2x week.   Will have to pay for it. Nothing else I can do or we may need dialysis soon.

## 2024-10-18 NOTE — TELEPHONE ENCOUNTER
Jaun Enciso MD Rios, Jennette B, RN  Please tell patient kidneys are better CBC about the same but potassium still high  Is he taking his Lokelma?

## 2024-10-18 NOTE — TELEPHONE ENCOUNTER
Dr Enciso informed patient wife Vanesa of note below DARIA verified is taking Lokelma but only once a week instead of twice due to supply.

## 2024-10-21 ENCOUNTER — TELEPHONE (OUTPATIENT)
Dept: NEPHROLOGY | Facility: CLINIC | Age: 69
End: 2024-10-21

## 2024-10-21 DIAGNOSIS — N18.4 CKD STAGE 4 DUE TO TYPE 1 DIABETES MELLITUS (HCC): Primary | ICD-10-CM

## 2024-10-21 DIAGNOSIS — E10.22 CKD STAGE 4 DUE TO TYPE 1 DIABETES MELLITUS (HCC): Primary | ICD-10-CM

## 2024-10-21 NOTE — TELEPHONE ENCOUNTER
Jaun Enciso MD Rios, Jennette B, RN  Please repeat renal panel in about two weeks. Must take  lokelma at least twice a week. Thank you.

## 2024-10-21 NOTE — TELEPHONE ENCOUNTER
Informed patient wife Camila DARIA verified of note below and verbalized understanding .Rn generated order for lab.

## 2024-10-31 ENCOUNTER — TELEPHONE (OUTPATIENT)
Dept: NEPHROLOGY | Facility: CLINIC | Age: 69
End: 2024-10-31

## 2024-10-31 ENCOUNTER — LAB ENCOUNTER (OUTPATIENT)
Dept: LAB | Age: 69
End: 2024-10-31
Attending: INTERNAL MEDICINE
Payer: MEDICARE

## 2024-10-31 DIAGNOSIS — N18.4 CKD STAGE 4 DUE TO TYPE 1 DIABETES MELLITUS (HCC): ICD-10-CM

## 2024-10-31 DIAGNOSIS — D61.818 PANCYTOPENIA (HCC): ICD-10-CM

## 2024-10-31 DIAGNOSIS — D46.9 MDS (MYELODYSPLASTIC SYNDROME) (HCC): ICD-10-CM

## 2024-10-31 DIAGNOSIS — E10.22 CKD STAGE 4 DUE TO TYPE 1 DIABETES MELLITUS (HCC): ICD-10-CM

## 2024-10-31 LAB
ALBUMIN SERPL-MCNC: 3.9 G/DL (ref 3.2–4.8)
ANION GAP SERPL CALC-SCNC: 8 MMOL/L (ref 0–18)
BASOPHILS # BLD AUTO: 0 X10(3) UL (ref 0–0.2)
BASOPHILS NFR BLD AUTO: 0 %
BUN BLD-MCNC: 64 MG/DL (ref 9–23)
BUN/CREAT SERPL: 18.4 (ref 10–20)
CALCIUM BLD-MCNC: 8.9 MG/DL (ref 8.7–10.4)
CHLORIDE SERPL-SCNC: 115 MMOL/L (ref 98–112)
CO2 SERPL-SCNC: 23 MMOL/L (ref 21–32)
CREAT BLD-MCNC: 3.48 MG/DL
DEPRECATED RDW RBC AUTO: 65 FL (ref 35.1–46.3)
EGFRCR SERPLBLD CKD-EPI 2021: 18 ML/MIN/1.73M2 (ref 60–?)
EOSINOPHIL # BLD AUTO: 0.07 X10(3) UL (ref 0–0.7)
EOSINOPHIL NFR BLD AUTO: 3.1 %
ERYTHROCYTE [DISTWIDTH] IN BLOOD BY AUTOMATED COUNT: 16.1 % (ref 11–15)
GLUCOSE BLD-MCNC: 196 MG/DL (ref 70–99)
HCT VFR BLD AUTO: 31.7 %
HGB BLD-MCNC: 10 G/DL
IMM GRANULOCYTES # BLD AUTO: 0.01 X10(3) UL (ref 0–1)
IMM GRANULOCYTES NFR BLD: 0.4 %
LYMPHOCYTES # BLD AUTO: 0.98 X10(3) UL (ref 1–4)
LYMPHOCYTES NFR BLD AUTO: 43.2 %
MCH RBC QN AUTO: 34.7 PG (ref 26–34)
MCHC RBC AUTO-ENTMCNC: 31.5 G/DL (ref 31–37)
MCV RBC AUTO: 110.1 FL
MONOCYTES # BLD AUTO: 0.27 X10(3) UL (ref 0.1–1)
MONOCYTES NFR BLD AUTO: 11.9 %
NEUTROPHILS # BLD AUTO: 0.94 X10 (3) UL (ref 1.5–7.7)
NEUTROPHILS # BLD AUTO: 0.94 X10(3) UL (ref 1.5–7.7)
NEUTROPHILS NFR BLD AUTO: 41.4 %
OSMOLALITY SERPL CALC.SUM OF ELEC: 326 MOSM/KG (ref 275–295)
PHOSPHATE SERPL-MCNC: 4.1 MG/DL (ref 2.4–5.1)
PLATELET # BLD AUTO: 85 10(3)UL (ref 150–450)
PLATELETS.RETICULATED NFR BLD AUTO: 5.7 % (ref 0–7)
POTASSIUM SERPL-SCNC: 5.1 MMOL/L (ref 3.5–5.1)
RBC # BLD AUTO: 2.88 X10(6)UL
SODIUM SERPL-SCNC: 146 MMOL/L (ref 136–145)
WBC # BLD AUTO: 2.3 X10(3) UL (ref 4–11)

## 2024-10-31 PROCEDURE — 80069 RENAL FUNCTION PANEL: CPT

## 2024-10-31 PROCEDURE — 85025 COMPLETE CBC W/AUTO DIFF WBC: CPT

## 2024-10-31 PROCEDURE — 85060 BLOOD SMEAR INTERPRETATION: CPT

## 2024-10-31 PROCEDURE — 36415 COLL VENOUS BLD VENIPUNCTURE: CPT

## 2024-11-01 ENCOUNTER — NURSE ONLY (OUTPATIENT)
Dept: HEMATOLOGY/ONCOLOGY | Facility: HOSPITAL | Age: 69
End: 2024-11-01
Attending: INTERNAL MEDICINE
Payer: MEDICARE

## 2024-11-01 DIAGNOSIS — D46.9 MDS (MYELODYSPLASTIC SYNDROME) (HCC): Primary | ICD-10-CM

## 2024-11-01 DIAGNOSIS — D61.818 PANCYTOPENIA (HCC): ICD-10-CM

## 2024-11-01 PROCEDURE — 96372 THER/PROPH/DIAG INJ SC/IM: CPT

## 2024-11-13 ENCOUNTER — TELEPHONE (OUTPATIENT)
Dept: INTERNAL MEDICINE CLINIC | Facility: CLINIC | Age: 69
End: 2024-11-13

## 2024-11-13 ENCOUNTER — LAB ENCOUNTER (OUTPATIENT)
Dept: LAB | Age: 69
End: 2024-11-13
Attending: INTERNAL MEDICINE
Payer: MEDICARE

## 2024-11-13 DIAGNOSIS — Z12.11 COLON CANCER SCREENING: Primary | ICD-10-CM

## 2024-11-13 DIAGNOSIS — D61.818 PANCYTOPENIA (HCC): ICD-10-CM

## 2024-11-13 DIAGNOSIS — D46.9 MDS (MYELODYSPLASTIC SYNDROME) (HCC): ICD-10-CM

## 2024-11-13 LAB
BASOPHILS # BLD AUTO: 0.01 X10(3) UL (ref 0–0.2)
BASOPHILS NFR BLD AUTO: 0.4 %
DEPRECATED RDW RBC AUTO: 67.7 FL (ref 35.1–46.3)
EOSINOPHIL # BLD AUTO: 0.06 X10(3) UL (ref 0–0.7)
EOSINOPHIL NFR BLD AUTO: 2.6 %
ERYTHROCYTE [DISTWIDTH] IN BLOOD BY AUTOMATED COUNT: 16.6 % (ref 11–15)
HCT VFR BLD AUTO: 30.4 %
HGB BLD-MCNC: 9.5 G/DL
IMM GRANULOCYTES # BLD AUTO: 0.01 X10(3) UL (ref 0–1)
IMM GRANULOCYTES NFR BLD: 0.4 %
LYMPHOCYTES # BLD AUTO: 0.97 X10(3) UL (ref 1–4)
LYMPHOCYTES NFR BLD AUTO: 41.8 %
MCH RBC QN AUTO: 35.2 PG (ref 26–34)
MCHC RBC AUTO-ENTMCNC: 31.3 G/DL (ref 31–37)
MCV RBC AUTO: 112.6 FL
MONOCYTES # BLD AUTO: 0.22 X10(3) UL (ref 0.1–1)
MONOCYTES NFR BLD AUTO: 9.5 %
NEUTROPHILS # BLD AUTO: 1.05 X10 (3) UL (ref 1.5–7.7)
NEUTROPHILS # BLD AUTO: 1.05 X10(3) UL (ref 1.5–7.7)
NEUTROPHILS NFR BLD AUTO: 45.3 %
PLATELET # BLD AUTO: 84 10(3)UL (ref 150–450)
PLATELET MORPHOLOGY: NORMAL
PLATELETS.RETICULATED NFR BLD AUTO: 6.3 % (ref 0–7)
RBC # BLD AUTO: 2.7 X10(6)UL
WBC # BLD AUTO: 2.3 X10(3) UL (ref 4–11)

## 2024-11-13 PROCEDURE — 85025 COMPLETE CBC W/AUTO DIFF WBC: CPT

## 2024-11-13 PROCEDURE — 36415 COLL VENOUS BLD VENIPUNCTURE: CPT

## 2024-11-13 NOTE — TELEPHONE ENCOUNTER
Hello Please inform patient they are due for colorectal cancer screening. Given patient is above the age of +45 with average risk, and desire for non-invasive testing, order for Cologuard which detects stool sample for cancer was placed and will be shipped to your home within the next 48 hours and has a return UPS pre-paid postage to send back in the mail. Please complete this within the next week to ensure Dr. Aguilar is delivering High quality/value care to his patients. If you have any questions, please schedule follow up with him and he would gladly discuss.

## 2024-11-15 ENCOUNTER — OFFICE VISIT (OUTPATIENT)
Dept: HEMATOLOGY/ONCOLOGY | Facility: HOSPITAL | Age: 69
End: 2024-11-15
Attending: INTERNAL MEDICINE
Payer: MEDICARE

## 2024-11-15 VITALS
WEIGHT: 239 LBS | HEIGHT: 69 IN | OXYGEN SATURATION: 98 % | BODY MASS INDEX: 35.4 KG/M2 | DIASTOLIC BLOOD PRESSURE: 70 MMHG | TEMPERATURE: 98 F | HEART RATE: 79 BPM | RESPIRATION RATE: 18 BRPM | SYSTOLIC BLOOD PRESSURE: 153 MMHG

## 2024-11-15 DIAGNOSIS — D46.9 MDS (MYELODYSPLASTIC SYNDROME) (HCC): Primary | ICD-10-CM

## 2024-11-15 DIAGNOSIS — G62.9 NEUROPATHY: Primary | ICD-10-CM

## 2024-11-15 DIAGNOSIS — Z51.81 MEDICATION MONITORING ENCOUNTER: ICD-10-CM

## 2024-11-15 DIAGNOSIS — N18.9 CHRONIC KIDNEY DISEASE, UNSPECIFIED CKD STAGE: ICD-10-CM

## 2024-11-15 DIAGNOSIS — E11.3293 TYPE 2 DIABETES MELLITUS WITH BOTH EYES AFFECTED BY MILD NONPROLIFERATIVE RETINOPATHY WITHOUT MACULAR EDEMA, WITHOUT LONG-TERM CURRENT USE OF INSULIN (HCC): ICD-10-CM

## 2024-11-15 DIAGNOSIS — D61.818 PANCYTOPENIA (HCC): ICD-10-CM

## 2024-11-15 PROCEDURE — 99214 OFFICE O/P EST MOD 30 MIN: CPT | Performed by: INTERNAL MEDICINE

## 2024-11-15 PROCEDURE — G2211 COMPLEX E/M VISIT ADD ON: HCPCS | Performed by: INTERNAL MEDICINE

## 2024-11-15 PROCEDURE — 96372 THER/PROPH/DIAG INJ SC/IM: CPT

## 2024-11-15 NOTE — PROGRESS NOTES
[facility]cer Center Progress Note    Patient Name: Jaun Burton   YOB: 1955   Medical Record Number: [id number]   Attending Physician: Payam Rdz M.D.     Chief Complaint:  Pancytopenia due to MDS and CKD    History of Present Illness:  69 year old  With chronic kidney disease been evaluate by hematology for pancytopenia.    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 U2AF1 c.101C>T, p.Ozd52Ibr ([id number])   VAF: 38.6%   U2AF1 (also known as U2AF35) encodes a component of the   RNA-splicing machinery known as the spliceosome. Somatic mutations   of U2AF1 are found in approximately 5% of patients with acute   myeloid leukemia (AML)  (25), and in approximately 9% of patients   with myelodysplastic syndrome (MDS) (20) (29) (19). Most U2AF1   mutations affect codons Ser34 or Rwh609 (10). This particular   missense mutation has been reported in hematologic malignancies   (4). Mutations in spliceosomal genes, including U2AF1, are   associated with decreased disease-free and overall survival in   patients with AML (8) (13). In MDS, U2AF1 mutations are associated   with worse overall survival (11) and more rapid transformation to   AML (24), and do not predict response to hypomethylating therapies   (11).       2. GATA2 c.568dup, p.[id number] ([id number])   VAF: 40.6%   GATA2 belongs to the ZINA family of transcription factors and   regulates hematopoiesis through two conserved zinc finger domains.   Overall, somatic GATA2 mutations are found in 1-4% of patients   with sporadic myeloid malignancies (12) (17) (18). GATA2 mutations   are common in adult AML patients with biallelic CEBPA mutations,   but are rare in adult AML patients with wild-type CEBPA (5) (6)   (7). Somatic GATA2 mutations are infrequent in MDS (28).   Pathogenic germline variants of GATA2 cause a range of   hematopoietic defects, including MonoMAC syndrome, dendritic cell,   monocyte, B and NK lymphoid  deficiency syndrome (DCML), familial   MDS, AML, and blast transformation in chronic myeloid leukemia   (CML) (3) (23). Somatic GATA2 mutations in hematological   malignancies are typically missense mutations within the   N-terminal zinc-finger domain and in-frame deletions/insertions in   the C-terminal zinc-finger domain (9) (15) (16). Somatic   frameshift and nonsense mutations in GATA2 are generally detected   outside of the zinc-finger domains (15). This particular   frameshift mutation is predicted to disrupt the normal function of   GATA2 (3). In AML patients, GATA2 mutations are confined to the   N-terminal zinc finger domain, and frequently co-occurred with   biallelic CEBPA, KIT and FLT3 mutations (15). Some studies found   that GATA2 mutations had no impact on the clinical outcome in   CEBPA-double/FOY6-GSR-gmvyuqkf AML patients (6). Another study   found that GATA2 mutations were associated with favorable   prognosis in intermediate-risk karyotype AML with biallelic CEBPA   mutations (5). The prognostic significance of GATA2 mutation in   the absence of CEBPA or FLT3 mutation is unclear. In MDS patients,   GATA2 is frequently co-mutated with BCOR and U2AF1 (15). In GATA2   mutated primary and advanced MDS patients, GATA2 mutation is often   germline in origin and is generally associated with monosomy 7   (28).       TIER 2: Variants of Unknown Clinical Significance in Hematologic   Malignancies       1. KMT2A c.4240G>A, p.Oyk8098Uji ([id number])   VAF: 44.1%   KMT2A encodes a histone methyltransferase that acts as a   transcriptional coactivator to regulate gene expression during   early development and hematopoiesis (21). Somatic mutations of   KMT2A (formerly known as MLL) are found in 6-10% of AML patients   (1) (26) (27) and very rarely in other myeloid malignancies (22).   The reported AML-associated KMT2A mutations are mostly partial   tandem duplications that span KMT2A exons 2-11 (2) (14).  This   particular KMT2A missense variant alters a moderately conserved   amino acid and has not been reported in hematologic malignancies,   to the best of our knowledge. Its functional consequences are   unknown. In addition, this variant is listed in the dbSNP database   ([id number]). Given that the variant frequency is close to 50%,   it is unclear whether this is a germline or somatic variant. The   clinical significance, if any, is uncertain    Interval History:  He returns for routine follow-up of MDS on CED.    Hernan reports feeling well, mentions fatigue, but denies reports of bleeding, recurrent fevers or infections or systemic signs of illness. He denies any chest pain, dyspnea, n/v/abd pain or new concerns on ROS.    Performance Status:  ECOG 0    Past Medical History:  Past Medical History:    C2-3 mild central, C3-4 mild-mod diffuse, C4-5 mild diffuse, C5-6 mod diffuse bulging discs    C5-6 mod central & bilateral mild foraminal, C4-5 left mild foraminal stenosis    C6-7 mild-mod central herniated disc    Cataract    2010    Chronic kidney disease (CKD)    stage 3    Diabetes mellitus (HCC)    Diabetes type 2, uncontrolled    Diabetic retinopathy (HCC)    [facility] for [facility]    Hyperlipidemia    Meibomian gland dysfunction    Osteoarthritis of spine with radiculopathy, cervical region    Pancreatitis (HCC)    Primary osteoarthritis of both shoulders    Proteinuria    Type II or unspecified type diabetes mellitus without mention of complication, not stated as uncontrolled    Pills & Insulin    Vitreous floaters       Past Surgical History:  Past Surgical History:   Procedure Laterality Date    Appendectomy      Cataract extraction w/  intraocular lens implant Right 06/11/2018    Dr. Lopez    Cataract extraction w/  intraocular lens implant Left 07/12/2018    Dr. Lopez    Cholecystectomy  2012    Electrocardiogram, complete  4/23/2012    scanned to media tab    Hernia surgery          Family History:  Family History   Problem Relation Age of Onset    Diabetes Other         close relative    Diabetes Maternal Grandmother     Diabetes Father     Macular degeneration Neg     Glaucoma Neg         family h/o       Social History:  Social History     Socioeconomic History    Marital status:    Tobacco Use    Smoking status: Former     Current packs/day: 0.00     Types: Cigarettes     Quit date: 1989     Years since quittin.0    Smokeless tobacco: Former    Tobacco comments:     quit about 30 years ago.   Vaping Use    Vaping status: Never Used   Substance and Sexual Activity    Alcohol use: No    Drug use: No   Other Topics Concern    Caffeine Concern Yes     Comment: 1 cup coffee per day    Exercise No    History of tanning Yes    Reaction to local anesthetic No         Current Medications:    Current Outpatient Medications:     PREGABALIN 300 MG Oral Cap, Take 1 capsule by mouth once daily, Disp: 30 capsule, Rfl: 0    HYDROcodone-acetaminophen (NORCO)  MG Oral Tab, Take 1 tablet by mouth every 8 (eight) hours as needed for Pain., Disp: 90 tablet, Rfl: 0    MAGNESIUM-OXIDE 400 (240 Mg) MG Oral Tab, Take 1 tablet by mouth once daily, Disp: 90 tablet, Rfl: 1    LANTUS 100 UNIT/ML Subcutaneous Solution, Inject 40 Units into the skin nightly., Disp: 36 mL, Rfl: 1    AMITRIPTYLINE 25 MG Oral Tab, Take 1 tablet by mouth nightly, Disp: 90 tablet, Rfl: 1    NOVOLOG 100 UNIT/ML Injection Solution, INJECT 50 UNITS SUBCUTANEOUSLY ONCE DAILY VIA  CORRECTION  FACTOR, Disp: 40 mL, Rfl: 1    BD INSULIN SYRINGE U/F 31G X \" 0.5 ML Does not apply Misc, USE 1 SYRINGE 4 TIMES DAILY, Disp: 400 each, Rfl: 3    Mometasone Furoate 0.1 % External Solution, Use bid to ears as directd, Disp: 60 mL, Rfl: 3    FREESTYLE LITE TEST In Vitro Strip, USE 1 STRIP TO CHECK BLOOD GLUCOSE 3 TIMES DAILY. DX: E11.65, insulin dependent, Disp: 300 strip, Rfl: 1    sodium zirconium cyclosilicate (LOKELMA) 10 g  Oral Powd Pack, Take 1 packet (10 g total) by mouth twice a week., Disp: 30 packet, Rfl: 0    azelastine 137 MCG/SPRAY Nasal Solution, 1-2 sprays by Nasal route in the morning and 1-2 sprays before bedtime. FOR SINUS SYMPTOMS/NASAL CONGESTION.., Disp: 30 mL, Rfl: 3    fluticasone propionate 50 MCG/ACT Nasal Suspension, 2 sprays by Each Nare route daily. FOR NASAL CONGESTION/SINUS SYMPTOMS., Disp: 3 each, Rfl: 3    atorvastatin 40 MG Oral Tab, Take 1 tablet (40 mg total) by mouth daily. FOR CHOLESTEROL., Disp: 90 tablet, Rfl: 9    fenofibrate 48 MG Oral Tab, Take 1 tablet (48 mg total) by mouth daily. FOR TRIGLYCERIDES., Disp: 90 tablet, Rfl: 9    pantoprazole 40 MG Oral Tab EC, Take 1 tablet (40 mg total) by mouth before breakfast. FOR ACID REFLUX., Disp: 90 tablet, Rfl: 9    cyanocobalamin 1000 MCG Oral Tab, Take 1 tablet (1,000 mcg total) by mouth daily., Disp: 90 tablet, Rfl: 4    Betamethasone Dipropionate Aug 0.05 % External Cream, Apply 1 Application topically 2 (two) times daily. APPLY TO AFFECTED AREA, Disp: 50 g, Rfl: 1    metRONIDAZOLE 0.75 % External Cream, Apply 1 Application topically daily. For breakout on face, Disp: 45 g, Rfl: 0    Insulin Syringe-Needle U-100 (RELION INSULIN SYRINGE) 31G X 15/64\" 0.5 ML Does not apply Misc, 1 Syringe by Does not apply route 4 (four) times daily., Disp: 400 each, Rfl: 0    Pimecrolimus 1 % External Cream, APPLY   TOPICALLY AFFECTED AREA ON FACE ONCE DAILY TO TWICE DAILY, Disp: 60 g, Rfl: 0    Tazarotene 0.1 % External Cream, Apply to chest nightly (Patient taking differently: Apply to chest nightly PRN), Disp: 60 g, Rfl: 3    Insulin Syringe-Needle U-100 (RELION INSULIN SYRINGE) 31G X 15/64\" 0.5 ML Does not apply Misc, 1 Syringe by Does not apply route 4 (four) times daily., Disp: 400 each, Rfl: 0    Blood Glucose Monitoring Suppl (ACCU-CHEK INÉS PLUS) w/Device Does not apply Kit, Use as directed to check blood sugars., Disp: 1 kit, Rfl: 0    Ferrous Gluconate 225  (27 Fe) MG Oral Tab, Take 27 mg by mouth daily., Disp: , Rfl:     Omega-3 Fatty Acids (FISH OIL) 600 MG Oral Cap, Take 1 capsule by mouth nightly.  , Disp: , Rfl:     Multiple Vitamins-Minerals (CENTRUM SILVER) Oral Tab, Take 1 tablet by mouth daily., Disp: , Rfl:     Current Outpatient Medications on File Prior to Visit   Medication Sig Dispense Refill    PREGABALIN 300 MG Oral Cap Take 1 capsule by mouth once daily 30 capsule 0    HYDROcodone-acetaminophen (NORCO)  MG Oral Tab Take 1 tablet by mouth every 8 (eight) hours as needed for Pain. 90 tablet 0    MAGNESIUM-OXIDE 400 (240 Mg) MG Oral Tab Take 1 tablet by mouth once daily 90 tablet 1    LANTUS 100 UNIT/ML Subcutaneous Solution Inject 40 Units into the skin nightly. 36 mL 1    AMITRIPTYLINE 25 MG Oral Tab Take 1 tablet by mouth nightly 90 tablet 1    NOVOLOG 100 UNIT/ML Injection Solution INJECT 50 UNITS SUBCUTANEOUSLY ONCE DAILY VIA  CORRECTION  FACTOR 40 mL 1    BD INSULIN SYRINGE U/F 31G X 5/16\" 0.5 ML Does not apply Misc USE 1 SYRINGE 4 TIMES DAILY 400 each 3    Mometasone Furoate 0.1 % External Solution Use bid to ears as directd 60 mL 3    FREESTYLE LITE TEST In Vitro Strip USE 1 STRIP TO CHECK BLOOD GLUCOSE 3 TIMES DAILY. DX: E11.65, insulin dependent 300 strip 1    sodium zirconium cyclosilicate (LOKELMA) 10 g Oral Powd Pack Take 1 packet (10 g total) by mouth twice a week. 30 packet 0    azelastine 137 MCG/SPRAY Nasal Solution 1-2 sprays by Nasal route in the morning and 1-2 sprays before bedtime. FOR SINUS SYMPTOMS/NASAL CONGESTION.. 30 mL 3    fluticasone propionate 50 MCG/ACT Nasal Suspension 2 sprays by Each Nare route daily. FOR NASAL CONGESTION/SINUS SYMPTOMS. 3 each 3    atorvastatin 40 MG Oral Tab Take 1 tablet (40 mg total) by mouth daily. FOR CHOLESTEROL. 90 tablet 9    fenofibrate 48 MG Oral Tab Take 1 tablet (48 mg total) by mouth daily. FOR TRIGLYCERIDES. 90 tablet 9    pantoprazole 40 MG Oral Tab EC Take 1 tablet (40 mg total) by  mouth before breakfast. FOR ACID REFLUX. 90 tablet 9    cyanocobalamin 1000 MCG Oral Tab Take 1 tablet (1,000 mcg total) by mouth daily. 90 tablet 4    Betamethasone Dipropionate Aug 0.05 % External Cream Apply 1 Application topically 2 (two) times daily. APPLY TO AFFECTED AREA 50 g 1    metRONIDAZOLE 0.75 % External Cream Apply 1 Application topically daily. For breakout on face 45 g 0    Insulin Syringe-Needle U-100 (RELION INSULIN SYRINGE) 31G X 15/64\" 0.5 ML Does not apply Misc 1 Syringe by Does not apply route 4 (four) times daily. 400 each 0    Pimecrolimus 1 % External Cream APPLY   TOPICALLY AFFECTED AREA ON FACE ONCE DAILY TO TWICE DAILY 60 g 0    Tazarotene 0.1 % External Cream Apply to chest nightly (Patient taking differently: Apply to chest nightly PRN) 60 g 3    Insulin Syringe-Needle U-100 (RELION INSULIN SYRINGE) 31G X 15/64\" 0.5 ML Does not apply Misc 1 Syringe by Does not apply route 4 (four) times daily. 400 each 0    Blood Glucose Monitoring Suppl (ACCU-CHEK INÉS PLUS) w/Device Does not apply Kit Use as directed to check blood sugars. 1 kit 0    Ferrous Gluconate 225 (27 Fe) MG Oral Tab Take 27 mg by mouth daily.      Omega-3 Fatty Acids (FISH OIL) 600 MG Oral Cap Take 1 capsule by mouth nightly.        Multiple Vitamins-Minerals (CENTRUM SILVER) Oral Tab Take 1 tablet by mouth daily.       Current Facility-Administered Medications on File Prior to Visit   Medication Dose Route Frequency Provider Last Rate Last Admin    darbepoetin grey-polysorbate (Aranesp-Albumin Free) 500 MCG/ML injection 500 mcg  500 mcg Subcutaneous Once Clarisa Gallego APRN        darbepoetin grey-polysorbate (Aranesp-Albumin Free) 500 MCG/ML injection             [COMPLETED] darbepoetin grey-polysorbate (Aranesp-Albumin Free) 500 MCG/ML injection 500 mcg  500 mcg Subcutaneous Once Clarisa Gallego APRN   500 mcg at 11/01/24 1310    [COMPLETED] darbepoetin grey-polysorbate (Aranesp-Albumin Free) 500 MCG/ML injection 500  mcg  500 mcg Subcutaneous Once Clarisa Gallego APRN   500 mcg at 10/18/24 1355    darbepoetin grey (Aranesp) 200 MCG/0.4ML injection 200 mcg  200 mcg Subcutaneous Q21 Days Jaun Enciso MD   200 mcg at 03/24/23 1028         Allergies:  Allergies   Allergen Reactions    Cefdinir DIARRHEA    Zosyn [Piperacillin Sod-Tazobactam So] OTHER (SEE COMMENTS)     Heightened sense of smell; dry heaves, vomiting        Review of Systems:  All other systems reviewed and negative x12    Vital Signs:  /70 (BP Location: Left arm, Patient Position: Sitting, Cuff Size: large)   Pulse 79   Temp 98.1 °F (36.7 °C) (Oral)   Resp 18   Ht 1.753 m (5' 9\")   Wt 108.4 kg (239 lb)   SpO2 98%   BMI 35.29 kg/m²     Physical Examination:  General: Patient is alert and oriented x 3, not in acute distress.  Psych:  Mood and affect appropriate  HEENT: EOMs intact. Oropharynx is clear.   Neck: No palpable lymphadenopathy. Neck is supple.  Lymphatics: There is no palpable peripheral lymphadenopathy   Chest: Clear to auscultation, nonlabored breathing  Cardiovascular: Regular with S1/S2  Abdomen: Soft, non tender.   Extremities: No edema.  Neurological: 5/5 motor x4.        Laboratory:  Lab Results   Component Value Date    WBC 2.3 (L) 11/13/2024    RBC 2.70 (L) 11/13/2024    HGB 9.5 (L) 11/13/2024    HCT 30.4 (L) 11/13/2024    .6 (H) 11/13/2024    MCH 35.2 (H) 11/13/2024    MCHC 31.3 11/13/2024    RDW 16.6 (H) 11/13/2024    PLT 84.0 (L) 11/13/2024    MPV 9.6 01/24/2019         Lab Results   Component Value Date     (H) 10/31/2024    BUN 64 (H) 10/31/2024    BUNCREA 18.4 10/31/2024    CREATSERUM 3.48 (H) 10/31/2024    ANIONGAP 8 10/31/2024    GFRNAA 21 (L) 07/23/2022    GFRAA 24 (L) 07/23/2022    CA 8.9 10/31/2024    OSMOCALC 326 (H) 10/31/2024    ALKPHO 74 02/21/2024    AST 23 02/21/2024    ALT 21 02/21/2024    ALKPHOS 114 (H) 04/25/2013    BILT 0.8 02/21/2024    TP 6.2 02/21/2024    ALB 3.9 10/31/2024    GLOBULIN 2.4  (L) 02/21/2024    AGRATIO 1.3 04/25/2013     (H) 10/31/2024    K 5.1 10/31/2024     (H) 10/31/2024    CO2 23.0 10/31/2024       Radiology:       Cancer Staging   No matching staging information was found for the patient.      Impression and Plan:  69 year old  With chronic kidney disease been divided by hematology for pancytopenia. Bone marrow 3/23 showed MDS ipss-r low risk (del 20, 1%blasts)    1.) MDS  - NGS as above  -CED hemoglobin now significantly improved we will continue darbepoetin  --hgb is at goal, do not want to exceed value >11g  --discussed hat following discussed with Dr. Sandra Mlils at [facility], the pt is not eligible for stem cell transplant, so continue current management  --we can consider GCSF therapy if white blood cell count declines further or TPO therapy to improve platelet counts as needed  --if counts drastically change, will need repeat bone marrow before consider switching to HMA like azacitadine or decitabine to help the pancytopenia  --continue next injection of CED; pt is followed monthly    2.) CKD  --follows with Dr. Enciso for this condition; also likely contributing to his anemia         MDM: Moderate  : Ongoing continuing of complex care      Payam Rdz MD  [facility]  [address], [place] [address]

## 2024-11-15 NOTE — TELEPHONE ENCOUNTER
Vanesa requesting refills for Hydrocodone and Pregabalin.  Please call.  Thankkenneth mackey.    Current Outpatient Medications   Medication Sig Dispense Refill    PREGABALIN 300 MG Oral Cap Take 1 capsule by mouth once daily 30 capsule 0    HYDROcodone-acetaminophen (NORCO)  MG Oral Tab Take 1 tablet by mouth every 8 (eight) hours as needed for Pain. 90 tablet 0

## 2024-11-15 NOTE — PROGRESS NOTES
Pt here for Q2 week aranesp injection - gets labs outpt at [facility]  Hgl 9.5 yesterday outpt lab  Reports feeling fairly good - no changes/ concerns    Aranesp 500mcg given right upper arm subcutaneous, tolerated well - much less painful if given slow, warmed   Must give super slow, warm capped needle/ syringe in hand, do not apply bandage d/t hairy arms per pt.  Parameters are to hold if hgb is 11 or greater - pt is aware    Discharged stable to home with future appts. Given AVS - prefers appts after 1030 d/t parking  Gait steady, indep

## 2024-11-18 ENCOUNTER — TELEPHONE (OUTPATIENT)
Dept: NEPHROLOGY | Facility: CLINIC | Age: 69
End: 2024-11-18

## 2024-11-18 DIAGNOSIS — G62.9 NEUROPATHY: ICD-10-CM

## 2024-11-18 DIAGNOSIS — E11.3293 TYPE 2 DIABETES MELLITUS WITH BOTH EYES AFFECTED BY MILD NONPROLIFERATIVE RETINOPATHY WITHOUT MACULAR EDEMA, WITHOUT LONG-TERM CURRENT USE OF INSULIN (HCC): ICD-10-CM

## 2024-11-18 NOTE — TELEPHONE ENCOUNTER
Left message; refills were requested and routed to Dr. Enciso in a separate TE.  If wife still needs to speak to nurse to call office.

## 2024-11-18 NOTE — TELEPHONE ENCOUNTER
Last office visit -10/4/24  Return to clinic-3 months  Follow up-1/9/25 please sign pending order thanks.

## 2024-11-20 RX ORDER — PREGABALIN 300 MG/1
300 CAPSULE ORAL DAILY
Qty: 90 CAPSULE | Refills: 0 | Status: SHIPPED | OUTPATIENT
Start: 2024-11-20

## 2024-11-20 RX ORDER — PREGABALIN 300 MG/1
300 CAPSULE ORAL DAILY
Qty: 30 CAPSULE | Refills: 0 | Status: SHIPPED | OUTPATIENT
Start: 2024-11-20

## 2024-11-20 RX ORDER — HYDROCODONE BITARTRATE AND ACETAMINOPHEN 10; 325 MG/1; MG/1
1 TABLET ORAL EVERY 8 HOURS PRN
Qty: 90 TABLET | Refills: 0 | Status: SHIPPED | OUTPATIENT
Start: 2024-11-20

## 2024-11-20 NOTE — TELEPHONE ENCOUNTER
Last office visit:  10/04/24    Return to office:  November/2024     Follow up appointment:  1/9/2025

## 2024-11-29 ENCOUNTER — APPOINTMENT (OUTPATIENT)
Dept: HEMATOLOGY/ONCOLOGY | Facility: HOSPITAL | Age: 69
End: 2024-11-29
Attending: INTERNAL MEDICINE
Payer: MEDICARE

## 2024-11-30 ENCOUNTER — LAB ENCOUNTER (OUTPATIENT)
Dept: LAB | Age: 69
End: 2024-11-30
Attending: INTERNAL MEDICINE
Payer: MEDICARE

## 2024-11-30 DIAGNOSIS — D61.818 PANCYTOPENIA (HCC): ICD-10-CM

## 2024-11-30 DIAGNOSIS — D46.9 MDS (MYELODYSPLASTIC SYNDROME) (HCC): ICD-10-CM

## 2024-11-30 LAB
BASOPHILS # BLD AUTO: 0.01 X10(3) UL (ref 0–0.2)
BASOPHILS NFR BLD AUTO: 0.5 %
DEPRECATED RDW RBC AUTO: 65.3 FL (ref 35.1–46.3)
EOSINOPHIL # BLD AUTO: 0.07 X10(3) UL (ref 0–0.7)
EOSINOPHIL NFR BLD AUTO: 3.3 %
ERYTHROCYTE [DISTWIDTH] IN BLOOD BY AUTOMATED COUNT: 16 % (ref 11–15)
HCT VFR BLD AUTO: 31.1 %
HGB BLD-MCNC: 10 G/DL
IMM GRANULOCYTES # BLD AUTO: 0.01 X10(3) UL (ref 0–1)
IMM GRANULOCYTES NFR BLD: 0.5 %
LYMPHOCYTES # BLD AUTO: 1.01 X10(3) UL (ref 1–4)
LYMPHOCYTES NFR BLD AUTO: 47.6 %
MCH RBC QN AUTO: 35.7 PG (ref 26–34)
MCHC RBC AUTO-ENTMCNC: 32.2 G/DL (ref 31–37)
MCV RBC AUTO: 111.1 FL
MONOCYTES # BLD AUTO: 0.25 X10(3) UL (ref 0.1–1)
MONOCYTES NFR BLD AUTO: 11.8 %
NEUTROPHILS # BLD AUTO: 0.77 X10 (3) UL (ref 1.5–7.7)
NEUTROPHILS # BLD AUTO: 0.77 X10(3) UL (ref 1.5–7.7)
NEUTROPHILS NFR BLD AUTO: 36.3 %
PLATELET # BLD AUTO: 76 10(3)UL (ref 150–450)
PLATELET MORPHOLOGY: NORMAL
PLATELETS.RETICULATED NFR BLD AUTO: 6.8 % (ref 0–7)
RBC # BLD AUTO: 2.8 X10(6)UL
WBC # BLD AUTO: 2.1 X10(3) UL (ref 4–11)

## 2024-11-30 PROCEDURE — 36415 COLL VENOUS BLD VENIPUNCTURE: CPT

## 2024-11-30 PROCEDURE — 85025 COMPLETE CBC W/AUTO DIFF WBC: CPT

## 2024-12-02 ENCOUNTER — NURSE ONLY (OUTPATIENT)
Dept: HEMATOLOGY/ONCOLOGY | Facility: HOSPITAL | Age: 69
End: 2024-12-02
Attending: INTERNAL MEDICINE
Payer: MEDICARE

## 2024-12-02 ENCOUNTER — TELEPHONE (OUTPATIENT)
Dept: NEPHROLOGY | Facility: CLINIC | Age: 69
End: 2024-12-02

## 2024-12-02 DIAGNOSIS — D61.818 PANCYTOPENIA (HCC): ICD-10-CM

## 2024-12-02 DIAGNOSIS — D46.9 MDS (MYELODYSPLASTIC SYNDROME) (HCC): Primary | ICD-10-CM

## 2024-12-02 PROCEDURE — 96372 THER/PROPH/DIAG INJ SC/IM: CPT

## 2024-12-02 NOTE — TELEPHONE ENCOUNTER
Patients wife would like to speak to the nurse about the patients medication. Wife is not sure of the name.

## 2024-12-04 ENCOUNTER — TELEPHONE (OUTPATIENT)
Dept: NEPHROLOGY | Facility: CLINIC | Age: 69
End: 2024-12-04

## 2024-12-10 ENCOUNTER — TELEPHONE (OUTPATIENT)
Dept: HEMATOLOGY/ONCOLOGY | Facility: HOSPITAL | Age: 69
End: 2024-12-10

## 2024-12-12 ENCOUNTER — LAB ENCOUNTER (OUTPATIENT)
Dept: LAB | Age: 69
End: 2024-12-12
Attending: STUDENT IN AN ORGANIZED HEALTH CARE EDUCATION/TRAINING PROGRAM
Payer: MEDICARE

## 2024-12-12 ENCOUNTER — OFFICE VISIT (OUTPATIENT)
Dept: INTERNAL MEDICINE CLINIC | Facility: CLINIC | Age: 69
End: 2024-12-12

## 2024-12-12 VITALS
HEIGHT: 69 IN | WEIGHT: 235 LBS | HEART RATE: 83 BPM | DIASTOLIC BLOOD PRESSURE: 67 MMHG | BODY MASS INDEX: 34.8 KG/M2 | SYSTOLIC BLOOD PRESSURE: 111 MMHG

## 2024-12-12 DIAGNOSIS — D63.1 ANEMIA IN STAGE 3B CHRONIC KIDNEY DISEASE (HCC): Chronic | ICD-10-CM

## 2024-12-12 DIAGNOSIS — H43.393 VITREOUS FLOATERS OF BOTH EYES: ICD-10-CM

## 2024-12-12 DIAGNOSIS — Z00.00 MEDICARE ANNUAL WELLNESS VISIT, SUBSEQUENT: ICD-10-CM

## 2024-12-12 DIAGNOSIS — D46.9 MDS (MYELODYSPLASTIC SYNDROME) (HCC): Chronic | ICD-10-CM

## 2024-12-12 DIAGNOSIS — E11.69 HYPERLIPIDEMIA ASSOCIATED WITH TYPE 2 DIABETES MELLITUS (HCC): Chronic | ICD-10-CM

## 2024-12-12 DIAGNOSIS — Z23 FLU VACCINE NEED: ICD-10-CM

## 2024-12-12 DIAGNOSIS — I15.2 HYPERTENSION ASSOCIATED WITH TYPE 2 DIABETES MELLITUS (HCC): Chronic | ICD-10-CM

## 2024-12-12 DIAGNOSIS — K21.00 GASTROESOPHAGEAL REFLUX DISEASE WITH ESOPHAGITIS, UNSPECIFIED WHETHER HEMORRHAGE: ICD-10-CM

## 2024-12-12 DIAGNOSIS — G62.9 NEUROPATHY: Chronic | ICD-10-CM

## 2024-12-12 DIAGNOSIS — L40.8 SEBOPSORIASIS: ICD-10-CM

## 2024-12-12 DIAGNOSIS — H90.3 SENSORINEURAL HEARING LOSS, BILATERAL: ICD-10-CM

## 2024-12-12 DIAGNOSIS — I12.9 TYPE 2 DM WITH CKD STAGE 4 AND HYPERTENSION (HCC): Chronic | ICD-10-CM

## 2024-12-12 DIAGNOSIS — Z12.12 ENCOUNTER FOR COLORECTAL CANCER SCREENING: ICD-10-CM

## 2024-12-12 DIAGNOSIS — E11.59 HYPERTENSION ASSOCIATED WITH TYPE 2 DIABETES MELLITUS (HCC): Chronic | ICD-10-CM

## 2024-12-12 DIAGNOSIS — L70.0 NODULAR ELASTOSIS WITH CYSTS AND COMEDONES OF FAVRE AND RACOUCHOT: ICD-10-CM

## 2024-12-12 DIAGNOSIS — Z12.11 ENCOUNTER FOR COLORECTAL CANCER SCREENING: ICD-10-CM

## 2024-12-12 DIAGNOSIS — E78.5 HYPERLIPIDEMIA ASSOCIATED WITH TYPE 2 DIABETES MELLITUS (HCC): Chronic | ICD-10-CM

## 2024-12-12 DIAGNOSIS — L57.8 NODULAR ELASTOSIS WITH CYSTS AND COMEDONES OF FAVRE AND RACOUCHOT: ICD-10-CM

## 2024-12-12 DIAGNOSIS — E11.22 TYPE 2 DM WITH CKD STAGE 4 AND HYPERTENSION (HCC): Chronic | ICD-10-CM

## 2024-12-12 DIAGNOSIS — N18.32 ANEMIA IN STAGE 3B CHRONIC KIDNEY DISEASE (HCC): Chronic | ICD-10-CM

## 2024-12-12 DIAGNOSIS — E66.811 CLASS 1 OBESITY DUE TO EXCESS CALORIES WITH SERIOUS COMORBIDITY AND BODY MASS INDEX (BMI) OF 34.0 TO 34.9 IN ADULT: Chronic | ICD-10-CM

## 2024-12-12 DIAGNOSIS — L71.9 ROSACEA: ICD-10-CM

## 2024-12-12 DIAGNOSIS — Z00.00 MEDICARE ANNUAL WELLNESS VISIT, SUBSEQUENT: Primary | ICD-10-CM

## 2024-12-12 DIAGNOSIS — E55.9 VITAMIN D DEFICIENCY: ICD-10-CM

## 2024-12-12 DIAGNOSIS — D61.818 PANCYTOPENIA (HCC): Chronic | ICD-10-CM

## 2024-12-12 DIAGNOSIS — E66.09 CLASS 1 OBESITY DUE TO EXCESS CALORIES WITH SERIOUS COMORBIDITY AND BODY MASS INDEX (BMI) OF 34.0 TO 34.9 IN ADULT: Chronic | ICD-10-CM

## 2024-12-12 DIAGNOSIS — N18.4 TYPE 2 DM WITH CKD STAGE 4 AND HYPERTENSION (HCC): Chronic | ICD-10-CM

## 2024-12-12 DIAGNOSIS — Z12.5 SCREENING FOR PROSTATE CANCER: ICD-10-CM

## 2024-12-12 DIAGNOSIS — E11.3293 TYPE 2 DIABETES MELLITUS WITH BOTH EYES AFFECTED BY MILD NONPROLIFERATIVE RETINOPATHY WITHOUT MACULAR EDEMA, WITHOUT LONG-TERM CURRENT USE OF INSULIN (HCC): Chronic | ICD-10-CM

## 2024-12-12 DIAGNOSIS — N18.5 CHRONIC RENAL DISEASE, STAGE V (HCC): Chronic | ICD-10-CM

## 2024-12-12 DIAGNOSIS — Z96.1 PSEUDOPHAKIA OF BOTH EYES: ICD-10-CM

## 2024-12-12 PROBLEM — E10.22 CKD STAGE 4 DUE TO TYPE 1 DIABETES MELLITUS (HCC): Status: RESOLVED | Noted: 2023-11-10 | Resolved: 2024-12-12

## 2024-12-12 LAB
ALBUMIN SERPL-MCNC: 3.9 G/DL (ref 3.2–4.8)
ALBUMIN/GLOB SERPL: 1.6 {RATIO} (ref 1–2)
ALP LIVER SERPL-CCNC: 83 U/L
ALT SERPL-CCNC: 31 U/L
ANION GAP SERPL CALC-SCNC: 5 MMOL/L (ref 0–18)
AST SERPL-CCNC: 29 U/L (ref ?–34)
BASOPHILS # BLD AUTO: 0 X10(3) UL (ref 0–0.2)
BASOPHILS NFR BLD AUTO: 0 %
BILIRUB SERPL-MCNC: 0.7 MG/DL (ref 0.2–1.1)
BUN BLD-MCNC: 55 MG/DL (ref 9–23)
BUN/CREAT SERPL: 15.9 (ref 10–20)
CALCIUM BLD-MCNC: 9.3 MG/DL (ref 8.7–10.4)
CARTRIDGE EXPIRATION DATE: ABNORMAL DATE
CHLORIDE SERPL-SCNC: 112 MMOL/L (ref 98–112)
CHOLEST SERPL-MCNC: 86 MG/DL (ref ?–200)
CO2 SERPL-SCNC: 26 MMOL/L (ref 21–32)
COMPLEXED PSA SERPL-MCNC: 0.16 NG/ML (ref ?–4)
CREAT BLD-MCNC: 3.46 MG/DL
DEPRECATED RDW RBC AUTO: 64.9 FL (ref 35.1–46.3)
EGFRCR SERPLBLD CKD-EPI 2021: 18 ML/MIN/1.73M2 (ref 60–?)
EOSINOPHIL # BLD AUTO: 0.06 X10(3) UL (ref 0–0.7)
EOSINOPHIL NFR BLD AUTO: 2.6 %
ERYTHROCYTE [DISTWIDTH] IN BLOOD BY AUTOMATED COUNT: 16.2 % (ref 11–15)
FASTING PATIENT LIPID ANSWER: NO
FASTING STATUS PATIENT QL REPORTED: NO
GLOBULIN PLAS-MCNC: 2.5 G/DL (ref 2–3.5)
GLUCOSE BLD-MCNC: 220 MG/DL (ref 70–99)
HCT VFR BLD AUTO: 30.8 %
HDLC SERPL-MCNC: 32 MG/DL (ref 40–59)
HEMOGLOBIN A1C: 6.5 % (ref 4.3–5.6)
HGB BLD-MCNC: 9.8 G/DL
IMM GRANULOCYTES # BLD AUTO: 0.01 X10(3) UL (ref 0–1)
IMM GRANULOCYTES NFR BLD: 0.4 %
LDLC SERPL CALC-MCNC: 32 MG/DL (ref ?–100)
LYMPHOCYTES # BLD AUTO: 0.95 X10(3) UL (ref 1–4)
LYMPHOCYTES NFR BLD AUTO: 40.9 %
MCH RBC QN AUTO: 34.9 PG (ref 26–34)
MCHC RBC AUTO-ENTMCNC: 31.8 G/DL (ref 31–37)
MCV RBC AUTO: 109.6 FL
MONOCYTES # BLD AUTO: 0.24 X10(3) UL (ref 0.1–1)
MONOCYTES NFR BLD AUTO: 10.3 %
NEUTROPHILS # BLD AUTO: 1.06 X10 (3) UL (ref 1.5–7.7)
NEUTROPHILS # BLD AUTO: 1.06 X10(3) UL (ref 1.5–7.7)
NEUTROPHILS NFR BLD AUTO: 45.8 %
NONHDLC SERPL-MCNC: 54 MG/DL (ref ?–130)
OSMOLALITY SERPL CALC.SUM OF ELEC: 318 MOSM/KG (ref 275–295)
PLATELET # BLD AUTO: 90 10(3)UL (ref 150–450)
PLATELETS.RETICULATED NFR BLD AUTO: 7.8 % (ref 0–7)
POTASSIUM SERPL-SCNC: 5.8 MMOL/L (ref 3.5–5.1)
PROT SERPL-MCNC: 6.4 G/DL (ref 5.7–8.2)
RBC # BLD AUTO: 2.81 X10(6)UL
SODIUM SERPL-SCNC: 143 MMOL/L (ref 136–145)
TRIGL SERPL-MCNC: 122 MG/DL (ref 30–149)
TSI SER-ACNC: 2.52 UIU/ML (ref 0.55–4.78)
VIT D+METAB SERPL-MCNC: 45 NG/ML (ref 30–100)
VLDLC SERPL CALC-MCNC: 16 MG/DL (ref 0–30)
WBC # BLD AUTO: 2.3 X10(3) UL (ref 4–11)

## 2024-12-12 PROCEDURE — 80053 COMPREHEN METABOLIC PANEL: CPT

## 2024-12-12 PROCEDURE — 80061 LIPID PANEL: CPT

## 2024-12-12 PROCEDURE — 84443 ASSAY THYROID STIM HORMONE: CPT

## 2024-12-12 PROCEDURE — 36415 COLL VENOUS BLD VENIPUNCTURE: CPT

## 2024-12-12 PROCEDURE — 82306 VITAMIN D 25 HYDROXY: CPT

## 2024-12-12 PROCEDURE — 85025 COMPLETE CBC W/AUTO DIFF WBC: CPT

## 2024-12-12 NOTE — PATIENT INSTRUCTIONS
[employer] [facility] near you for [facility]  Address: [address], [place] [address]  Hours:   Open ? Closes 7:30?PM  Phone: [phone]

## 2024-12-12 NOTE — PROGRESS NOTES
Please relay to pt:  Destin Zamorano,   Your Complete blood work anshows overall stable labs from past 2 weeks, your kidney function is also near your baseline, with borderline elevated potassium please make sure to follow up with your hematologist and nephrologist (Dr. Enciso)    Your PSA prostate level is stable, and Lipid panel are stable, as is your vitamin D and thyroid function

## 2024-12-12 NOTE — PROGRESS NOTES
Subjective:   Jaun Burton is a 69 year old male with PMHx MDS (followed by Hematology Dr. Rdz), HTN, CKD stage 5 (followed by nephrology Dr Enciso ), Controlled Insulin dependent Type 2 Diabetes Mellitus (followed by Dr. 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       A1C today 6.5    Pt doing well overall, watches a lot of [employer]. Has neuropathy, but well controlled on lyrica.   Trying to eat better with his wife        History/Other:   Fall Risk Assessment:   He has been screened for Falls and is High Risk. Fall Prevention information provided to patient in After Visit Summary.    Do you feel unsteady when standing or walking?: No  Do you worry about falling?: No  Have you fallen in the past year?: Yes  How many times have you fallen?: 1  Were you injured?: No     Cognitive Assessment:   He had a completely normal cognitive assessment - see flowsheet entries       Functional Ability/Status:   Jaun Burton has some abnormal functions as listed below:  He has difficulties Managing Money/Bills based on screening of functional status.He has Hearing problems based on screening of functional status.He has Vision problems based on screening of functional status.       Depression Screening (PHQ):  PHQ-2 SCORE: 0  , done 12/12/2024   Last [place] Suicide Screening on 12/12/2024 was No Risk.     <5 minutes spent screening and counseling for depression    Advanced Directives:   He does have a Living Will but we do NOT have it on file in Epic.    He does have a POA but we do NOT have it on file in Epic.    Discussed Advance Care Planning with patient (and family/surrogate if present). Standard forms made available to patient in After Visit  Summary.      Patient Active Problem List   Diagnosis    Sebopsoriasis    Pseudophakia of both eyes    Rosacea    Nodular elastosis with cysts and comedones of Favre and Racouchot    Vitreous floaters    Class 1 obesity due to excess calories with serious comorbidity in adult    Type 2 diabetes mellitus with both eyes affected by mild nonproliferative retinopathy without macular edema, without long-term current use of insulin (HCC)    Hypertension associated with type 2 diabetes mellitus (HCC)    Hyperlipidemia associated with type 2 diabetes mellitus (HCC)    Gastroesophageal reflux disease with esophagitis    Type 2 DM with CKD stage 4 and hypertension (HCC)    MDS (myelodysplastic syndrome) (HCC)    Pancytopenia (HCC)    Chronic renal disease, stage V (HCC)    Sensorineural hearing loss, bilateral    Neuropathy    Anemia in stage 3b chronic kidney disease (HCC)     Allergies:  He is allergic to cefdinir and zosyn [piperacillin sod-tazobactam so].    Current Medications:  Outpatient Medications Marked as Taking for the 12/12/24 encounter (Office Visit) with Paul Aguilar MD   Medication Sig    pregabalin 300 MG Oral Cap Take 1 capsule (300 mg total) by mouth daily.    HYDROcodone-acetaminophen  MG Oral Tab Take 1 tablet by mouth every 8 (eight) hours as needed for Pain.    PREGABALIN 300 MG Oral Cap Take 1 capsule by mouth once daily    HYDROcodone-acetaminophen (NORCO)  MG Oral Tab Take 1 tablet by mouth every 8 (eight) hours as needed for Pain.    MAGNESIUM-OXIDE 400 (240 Mg) MG Oral Tab Take 1 tablet by mouth once daily    LANTUS 100 UNIT/ML Subcutaneous Solution Inject 40 Units into the skin nightly.    AMITRIPTYLINE 25 MG Oral Tab Take 1 tablet by mouth nightly    NOVOLOG 100 UNIT/ML Injection Solution INJECT 50 UNITS SUBCUTANEOUSLY ONCE DAILY VIA  CORRECTION  FACTOR    BD INSULIN SYRINGE U/F 31G X 5/16\" 0.5 ML Does not apply Misc USE 1 SYRINGE 4 TIMES DAILY    Mometasone Furoate 0.1 % External  Solution Use bid to ears as directd    FREESTYLE LITE TEST In Vitro Strip USE 1 STRIP TO CHECK BLOOD GLUCOSE 3 TIMES DAILY. DX: E11.65, insulin dependent    sodium zirconium cyclosilicate (LOKELMA) 10 g Oral Powd Pack Take 1 packet (10 g total) by mouth twice a week.    azelastine 137 MCG/SPRAY Nasal Solution 1-2 sprays by Nasal route in the morning and 1-2 sprays before bedtime. FOR SINUS SYMPTOMS/NASAL CONGESTION..    fluticasone propionate 50 MCG/ACT Nasal Suspension 2 sprays by Each Nare route daily. FOR NASAL CONGESTION/SINUS SYMPTOMS.    atorvastatin 40 MG Oral Tab Take 1 tablet (40 mg total) by mouth daily. FOR CHOLESTEROL.    fenofibrate 48 MG Oral Tab Take 1 tablet (48 mg total) by mouth daily. FOR TRIGLYCERIDES.    pantoprazole 40 MG Oral Tab EC Take 1 tablet (40 mg total) by mouth before breakfast. FOR ACID REFLUX.    cyanocobalamin 1000 MCG Oral Tab Take 1 tablet (1,000 mcg total) by mouth daily.    Betamethasone Dipropionate Aug 0.05 % External Cream Apply 1 Application topically 2 (two) times daily. APPLY TO AFFECTED AREA    metRONIDAZOLE 0.75 % External Cream Apply 1 Application topically daily. For breakout on face    Insulin Syringe-Needle U-100 (RELION INSULIN SYRINGE) 31G X 15/64\" 0.5 ML Does not apply Misc 1 Syringe by Does not apply route 4 (four) times daily.    Pimecrolimus 1 % External Cream APPLY   TOPICALLY AFFECTED AREA ON FACE ONCE DAILY TO TWICE DAILY    Tazarotene 0.1 % External Cream Apply to chest nightly (Patient taking differently: Apply to chest nightly PRN)    Insulin Syringe-Needle U-100 (RELION INSULIN SYRINGE) 31G X 15/64\" 0.5 ML Does not apply Misc 1 Syringe by Does not apply route 4 (four) times daily.    Blood Glucose Monitoring Suppl (ACCU-CHEK INÉS PLUS) w/Device Does not apply Kit Use as directed to check blood sugars.    Ferrous Gluconate 225 (27 Fe) MG Oral Tab Take 27 mg by mouth daily.    Omega-3 Fatty Acids (FISH OIL) 600 MG Oral Cap Take 1 capsule by mouth nightly.       Multiple Vitamins-Minerals (CENTRUM SILVER) Oral Tab Take 1 tablet by mouth daily.     Current Facility-Administered Medications for the 24 encounter (Office Visit) with Paul Aguilar MD   Medication    darbepoetin grey (Aranesp) 200 MCG/0.4ML injection 200 mcg       Medical History:  He  has a past medical history of C2-3 mild central, C3-4 mild-mod diffuse, C4-5 mild diffuse, C5-6 mod diffuse bulging discs (3/15/2018), C5-6 mod central & bilateral mild foraminal, C4-5 left mild foraminal stenosis (2018), C6-7 mild-mod central herniated disc (3/15/2018), Cataract, Chronic kidney disease (CKD), Diabetes mellitus (), Diabetes type 2, uncontrolled, Diabetic retinopathy (), Hyperlipidemia, Meibomian gland dysfunction, Osteoarthritis of spine with radiculopathy, cervical region (2018), Pancreatitis ([facility]) (), Primary osteoarthritis of both shoulders (2018), Proteinuria, Type II or unspecified type diabetes mellitus without mention of complication, not stated as uncontrolled, and Vitreous floaters.  Surgical History:  He  has a past surgical history that includes cholecystectomy (); appendectomy; hernia surgery; electrocardiogram, complete (2012); Cataract extraction w/  intraocular lens implant (Right, 2018); and Cataract extraction w/  intraocular lens implant (Left, 2018).   Family History:  His family history includes Diabetes in his father, maternal grandmother, and another family member.  Social History:  He  reports that he quit smoking about 35 years ago. His smoking use included cigarettes. He has quit using smokeless tobacco. He reports that he does not drink alcohol and does not use drugs.    Tobacco:  He smoked tobacco in the past but quit greater than 12 months ago.  Social History     Tobacco Use   Smoking Status Former    Current packs/day: 0.00    Types: Cigarettes    Quit date: 1989    Years since quittin.1   Smokeless Tobacco Former    Tobacco Comments    quit about 30 years ago.        CAGE Alcohol Screen:   CAGE screening score of 0 on 12/12/2024, showing low risk of alcohol abuse.      Patient Care Team:  Paul Aguilar MD as PCP - General (Internal Medicine)  Antwan Tate MD (Physical Medicine)  Bala Lei MD (SURGERY, ORTHOPEDIC)    Review of Systems       Objective:   Physical Exam  Vitals reviewed.   Constitutional:       Appearance: He is well-developed.   HENT:      Head: Normocephalic and atraumatic.   Eyes:      Extraocular Movements: Extraocular movements intact.      Pupils: Pupils are equal, round, and reactive to light.   Cardiovascular:      Rate and Rhythm: Normal rate and regular rhythm.      Heart sounds: Normal heart sounds.   Pulmonary:      Effort: Pulmonary effort is normal.      Breath sounds: Normal breath sounds.   Abdominal:      General: Bowel sounds are normal.      Palpations: Abdomen is soft.      Tenderness: There is no abdominal tenderness.   Musculoskeletal:         General: Normal range of motion.   Feet:      Right foot:      Protective Sensation: 5 sites tested.  4 sites sensed.      Skin integrity: Callus and dry skin present.      Toenail Condition: Right toenails are abnormally thick.      Left foot:      Protective Sensation: 5 sites tested.  3 sites sensed.      Skin integrity: Callus and dry skin present.      Toenail Condition: Left toenails are abnormally thick.      Comments:     Bilateral barefoot skin diabetic exam is abnormal with diabetic monofilament/sensation testing abnormal and dystrophic nails and/or dry skin   Skin:     General: Skin is warm and dry.   Neurological:      Mental Status: He is alert and oriented to person, place, and time.      Deep Tendon Reflexes: Reflexes are normal and symmetric.          /67 (BP Location: Right arm, Patient Position: Sitting, Cuff Size: large)   Pulse 83   Ht 5' 9\" (1.753 m)   Wt 235 lb (106.6 kg)   BMI 34.70 kg/m²  Estimated body  mass index is 34.7 kg/m² as calculated from the following:    Height as of this encounter: 5' 9\" (1.753 m).    Weight as of this encounter: 235 lb (106.6 kg).    Medicare Hearing Assessment:   Hearing Screening    Screening Method: Questionnaire  I have a problem hearing over the telephone: No I have trouble following the conversations when two or more people are talking at the same time: No   I have trouble understanding things on the TV: No I have to strain to understand conversations: No   I have to worry about missing the telephone ring or doorbell: No I have trouble hearing conversations in a noisy background such as a crowded room or restaurant: No   I get confused about where sounds come from: No I misunderstand some words in a sentence and need to ask people to repeat themselves: No   I especially have trouble understanding the speech of women and children: No I have trouble understanding the speaker in a large room such as at a meeting or place of Voodoo: No   Many people I talk to seem to mumble (or don't speak clearly): No People get annoyed because I misunderstand what they say: No   I misunderstand what others are saying and make inappropriate responses: No I avoid social activities because I cannot hear well and fear I will reply improperly: No   Family members and friends have told me they think I may have hearing loss: No             Visual Acuity:   Right Eye Visual Acuity: Uncorrected Right Eye Chart Acuity: 20/30   Left Eye Visual Acuity: Uncorrected Left Eye Chart Acuity: 20/30   Both Eyes Visual Acuity: Uncorrected Both Eyes Chart Acuity: 20/25   Able To Tolerate Visual Acuity: Yes        Assessment & Plan:   Jaun Burton is a 69 year old male who presents for a Medicare Assessment.     1. Medicare annual wellness visit, subsequent (Primary)  -     Cancel: GASTRO - INTERNAL  -     COLOGUARD COLON CANCER SCREENING (EXTERNAL)  -     Occult Blood, Fecal, FIT Immunoassay; Future; Expected  date: 12/12/2024  -     Cancel: CT CALCIUM SCORING; Future; Expected date: 12/12/2024  -     Cancel: OPHTHALMOLOGY - INTERNAL  -     Cancel: EKG 12 Lead; Future; Expected date: 12/12/2024  -     PSA Total, Screen; Future; Expected date: 12/12/2024  -     Lipid Panel; Future; Expected date: 12/12/2024  -     TSH W Reflex To Free T4; Future; Expected date: 12/12/2024  -     Comp Metabolic Panel (14); Future; Expected date: 12/12/2024  -     CBC With Differential With Platelet; Future; Expected date: 12/12/2024  -     Vitamin D; Future; Expected date: 12/12/2024  -     Cancel: OPHTHALMOLOGY - INTERNAL  -     OPHTHALMOLOGY - INTERNAL  Patient here for physical exam and due for following.  Plan:  -order the following Labs: CBC, CMP, Lipid Panel, A1C, TSH, Vitamin D,  PSA in males.   -I recommend to eat a more balanced mediterranean diet (eat more vegetables and fruits) more omega 3 fatty acids, lean protein (chicken, turkey, seafood), less process/fried fatty foods, less red met, and mixed aerobic exercise 30 minutes a day and intermittent strength training.        2. Type 2 diabetes mellitus with both eyes affected by mild nonproliferative retinopathy without macular edema, without long-term current use of insulin ([facility])  Assessment & Plan:  Followed by Endocrine Dr. Naranjo  Orders:  -     POC Hemoglobin A1C  -     Microalb/Creat Ratio, Random Urine; Future; Expected date: 12/12/2024  -     Podiatry Referral  Plan  Chronic, well controlled on current medications, denies adverse effects, continue with present management per endocrine  -follow up with podiatry for foot screening and dr. Denis for retinal screening June 2025  3. Hyperlipidemia associated with type 2 diabetes mellitus (HCC)  -     Lipid Panel; Future; Expected date: 12/12/2024  Plan  Chronic, well controlled on current medications, denies adverse effects, continue with present management.      4. Hypertension associated with type 2 diabetes mellitus (HCC)  -      Comp Metabolic Panel (14); Future; Expected date: 12/12/2024  -     Microalb/Creat Ratio, Random Urine; Future; Expected date: 12/12/2024  Plan  Chronic, well controlled on current medications, denies adverse effects, continue with present management.      5. MDS (myelodysplastic syndrome) (HCC)  Assessment & Plan:  With MDS followed by Hematology Dr. Rdz  Orders:  -     CBC With Differential With Platelet; Future; Expected date: 12/12/2024  Followed by Heme  Continue management per heme    6. Pancytopenia (HCC)  Assessment & Plan:  With MDS followed by Hematology Dr. Rdz  Orders:  -     CBC With Differential With Platelet; Future; Expected date: 12/12/2024  Followed by heme, continue management per heme  7. Type 2 DM with CKD stage 4 and hypertension ([facility])  Assessment & Plan:  Followed by endocrine Dr. Naranjo on insulin, and Nephrology Dr. Enciso  Orders:  -     Comp Metabolic Panel (14); Future; Expected date: 12/12/2024  -     POC Hemoglobin A1C  -     Microalb/Creat Ratio, Random Urine; Future; Expected date: 12/12/2024  Plan  Chronic, well controlled on current medications, denies adverse effects, continue with present management by endocrine and nephrology, and hematology    8. Anemia in stage 3b chronic kidney disease (HCC)  -     CBC With Differential With Platelet; Future; Expected date: 12/12/2024  Plan  Chronic, well controlled on current medications, denies adverse effects, continue with present management by endocrine and nephrology, and hematology    9. Class 1 obesity due to excess calories with serious comorbidity and body mass index (BMI) of 34.0 to 34.9 in adult  I recommend to eat a more balanced mediterranean diet (eat more vegetables and fruits) more omega 3 fatty acids, lean protein (chicken, turkey, seafood), less process/fried fatty foods, less red met, and mixed aerobic exercise 30 minutes a day and intermittent strength training.      10. Chronic renal disease, stage V  ([facility])  Assessment & Plan:  Followed by nephrology Dr. Enciso  Plan  Chronic, well controlled on current medications, denies adverse effects, continue with present management by endocrine and nephrology, and hematology    11. Gastroesophageal reflux disease with esophagitis, unspecified whether hemorrhage  Plan  Chronic, well controlled on current medications, denies adverse effects, continue with present management.      12. Neuropathy  Assessment & Plan:  On chronic lyrica managed by Neprhology Dr. Enciso  Orders:  -     Comp Metabolic Panel (14); Future; Expected date: 12/12/2024  Plan  Chronic, well controlled on current medications, denies adverse effects, continue with present management by endocrine and nephrology, and hematology    13. Sensorineural hearing loss, bilateral  Stable    14. Pseudophakia of both eyes  -     Cancel: OPHTHALMOLOGY - INTERNAL  -     Cancel: OPHTHALMOLOGY - INTERNAL  -     OPHTHALMOLOGY - INTERNAL  Refer to ophthalmology for further screening and retinal screening  15. Vitreous floaters of both eyes  -     Cancel: OPHTHALMOLOGY - INTERNAL  -     Cancel: OPHTHALMOLOGY - INTERNAL  -     OPHTHALMOLOGY - INTERNAL  Refer to ophthalmology for further screening and retinal screening    16. Encounter for colorectal cancer screening  -     Cancel: GASTRO - INTERNAL  -     COLOGUARD COLON CANCER SCREENING (EXTERNAL)  -     Occult Blood, Fecal, FIT Immunoassay; Future; Expected date: 12/12/2024  Pt overdue for colorectal cancer screening, declined colonoscopy  Plan  Reinforced need for colorectal cancer screening, cologuard ordered    17. Screening for prostate cancer  -     PSA Total, Screen; Future; Expected date: 12/12/2024  Check screening PSA for prostate cancer screening  -if abnormal notified pt will repeat, and/or if experiencing any urinary issues or abnormal scrotal mass will refer to urology for evaluation.     18. Vitamin D deficiency  -     Vitamin D; Future; Expected date:  12/12/2024  Pt with Vitamin D def due for screening:  Plan;  -Check vitamin D level, depending on level will give guidance on what dose to recommend per guidelines.      19. Sebopsoriasis  Plan  Chronic, well controlled on current medications, denies adverse effects, continue with present management.    20. Rosacea  Plan  Chronic, well controlled on current medications, denies adverse effects, continue with present management.      21. Nodular elastosis with cysts and comedones of Favre and Racouchot  Plan  Chronic, well controlled on current medications, denies adverse effects, continue with present management.      22. Flu vaccine need  -     Cancel: High Dose Fluzone trivalent influenza, 65yrs+ PFS (63492)  Declined flu vaccine    The patient indicates understanding of these issues and agrees to the plan.  Reinforced healthy diet, lifestyle, and exercise.      No follow-ups on file.     Paul Aguilar MD, 12/12/2024     Supplementary Documentation:   General Health:  In the past six months, have you lost more than 10 pounds without trying?: 2 - No  Has your appetite been poor?: No  Type of Diet: Diabetic  How does the patient maintain a good energy level?: Other  How would you describe your daily physical activity?: Light  How would you describe your current health state?: Fair  How do you maintain positive mental well-being?: Visiting Friends  On a scale of 0 to 10, with 0 being no pain and 10 being severe pain, what is your pain level?: 6 - (Moderate)  In the past six months, have you experienced urine leakage?: 0-No  At any time do you feel concerned for the safety/well-being of yourself and/or your children, in your home or elsewhere?: No  Have you had any immunizations at another office such as Influenza, Hepatitis B, Tetanus, or Pneumococcal?: Yes (influenza and pneumococcal)    Health Maintenance   Topic Date Due    Diabetes Care Foot Exam  Never done    Zoster Vaccines (1 of 2) Never done    Colorectal  Cancer Screening  04/20/2013    Fall Risk Screening (Annual)  01/01/2024    Diabetes Care: Microalb/Creat Ratio  07/26/2024    COVID-19 Vaccine (7 - 2024-25 season) 09/01/2024    Influenza Vaccine (1) 10/01/2024    Diabetes Care A1C  11/21/2024    Annual Physical  12/07/2024    Diabetes Care Dilated Eye Exam  06/26/2025    Diabetes Care: GFR  10/31/2025    PSA  12/13/2025    Annual Depression Screening  Completed    Pneumococcal Vaccine: 65+ Years  Completed

## 2024-12-13 ENCOUNTER — NURSE ONLY (OUTPATIENT)
Dept: HEMATOLOGY/ONCOLOGY | Facility: HOSPITAL | Age: 69
End: 2024-12-13
Attending: INTERNAL MEDICINE
Payer: MEDICARE

## 2024-12-13 DIAGNOSIS — D46.9 MDS (MYELODYSPLASTIC SYNDROME) (HCC): Primary | ICD-10-CM

## 2024-12-13 DIAGNOSIS — D61.818 PANCYTOPENIA (HCC): ICD-10-CM

## 2024-12-13 PROCEDURE — 96372 THER/PROPH/DIAG INJ SC/IM: CPT

## 2024-12-23 NOTE — TELEPHONE ENCOUNTER
Endocrine Refill protocol for oral medications    Protocol Criteria:  PASSED      If below requirement is met, send a 90-day supply with 1 refill per provider protocol.    Verify appointment with Endocrinology completed in the last 6 months or scheduled in the next 3 months.    Last completed office visit: 8/21/2024 Tanya Naranjo MD   Next scheduled Follow up:   Future Appointments   Date Time Provider Department Center   1/8/2025  1:30 PM Tanya Naranjo MD [facility]

## 2024-12-26 ENCOUNTER — LAB ENCOUNTER (OUTPATIENT)
Dept: LAB | Age: 69
End: 2024-12-26
Attending: NURSE PRACTITIONER
Payer: MEDICARE

## 2024-12-26 DIAGNOSIS — D61.818 PANCYTOPENIA (HCC): ICD-10-CM

## 2024-12-26 DIAGNOSIS — D46.9 MDS (MYELODYSPLASTIC SYNDROME) (HCC): ICD-10-CM

## 2024-12-26 LAB
BASOPHILS # BLD AUTO: 0 X10(3) UL (ref 0–0.2)
BASOPHILS NFR BLD AUTO: 0 %
DEPRECATED RDW RBC AUTO: 66.3 FL (ref 35.1–46.3)
EOSINOPHIL # BLD AUTO: 0.07 X10(3) UL (ref 0–0.7)
EOSINOPHIL NFR BLD AUTO: 3 %
ERYTHROCYTE [DISTWIDTH] IN BLOOD BY AUTOMATED COUNT: 16.3 % (ref 11–15)
HCT VFR BLD AUTO: 31.6 %
HGB BLD-MCNC: 10 G/DL
IMM GRANULOCYTES # BLD AUTO: 0.01 X10(3) UL (ref 0–1)
IMM GRANULOCYTES NFR BLD: 0.4 %
LYMPHOCYTES # BLD AUTO: 1.05 X10(3) UL (ref 1–4)
LYMPHOCYTES NFR BLD AUTO: 44.3 %
MCH RBC QN AUTO: 34.8 PG (ref 26–34)
MCHC RBC AUTO-ENTMCNC: 31.6 G/DL (ref 31–37)
MCV RBC AUTO: 110.1 FL
MONOCYTES # BLD AUTO: 0.19 X10(3) UL (ref 0.1–1)
MONOCYTES NFR BLD AUTO: 8 %
NEUTROPHILS # BLD AUTO: 1.05 X10 (3) UL (ref 1.5–7.7)
NEUTROPHILS # BLD AUTO: 1.05 X10(3) UL (ref 1.5–7.7)
NEUTROPHILS NFR BLD AUTO: 44.3 %
PLATELET # BLD AUTO: 80 10(3)UL (ref 150–450)
PLATELET MORPHOLOGY: NORMAL
PLATELETS.RETICULATED NFR BLD AUTO: 8.1 % (ref 0–7)
RBC # BLD AUTO: 2.87 X10(6)UL
WBC # BLD AUTO: 2.4 X10(3) UL (ref 4–11)

## 2024-12-26 PROCEDURE — 85025 COMPLETE CBC W/AUTO DIFF WBC: CPT

## 2024-12-26 PROCEDURE — 85060 BLOOD SMEAR INTERPRETATION: CPT

## 2024-12-26 PROCEDURE — 36415 COLL VENOUS BLD VENIPUNCTURE: CPT

## 2024-12-27 ENCOUNTER — OFFICE VISIT (OUTPATIENT)
Age: 69
End: 2024-12-27
Attending: INTERNAL MEDICINE
Payer: MEDICARE

## 2024-12-27 ENCOUNTER — NURSE ONLY (OUTPATIENT)
Age: 69
End: 2024-12-27
Attending: INTERNAL MEDICINE
Payer: MEDICARE

## 2024-12-27 ENCOUNTER — APPOINTMENT (OUTPATIENT)
Age: 69
End: 2024-12-27
Attending: INTERNAL MEDICINE
Payer: MEDICARE

## 2024-12-27 VITALS
OXYGEN SATURATION: 100 % | TEMPERATURE: 98 F | SYSTOLIC BLOOD PRESSURE: 172 MMHG | RESPIRATION RATE: 16 BRPM | BODY MASS INDEX: 34.86 KG/M2 | HEIGHT: 69 IN | DIASTOLIC BLOOD PRESSURE: 78 MMHG | HEART RATE: 77 BPM | WEIGHT: 235.38 LBS

## 2024-12-27 DIAGNOSIS — Z51.81 MEDICATION MONITORING ENCOUNTER: ICD-10-CM

## 2024-12-27 DIAGNOSIS — D46.9 MDS (MYELODYSPLASTIC SYNDROME) (HCC): Primary | ICD-10-CM

## 2024-12-27 DIAGNOSIS — N18.9 CHRONIC KIDNEY DISEASE, UNSPECIFIED CKD STAGE: ICD-10-CM

## 2024-12-27 DIAGNOSIS — D61.818 PANCYTOPENIA (HCC): ICD-10-CM

## 2024-12-27 NOTE — PROGRESS NOTES
[facility]cer Center Progress Note    Patient Name: Jaun Burton   YOB: 1955   Medical Record Number: [id number]   Attending Physician: Payam Rdz M.D.     Chief Complaint:  Pancytopenia due to MDS and CKD    History of Present Illness:  69 year old  With chronic kidney disease been evaluate by hematology for pancytopenia.    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 U2AF1 c.101C>T, p.Yrh69Cya ([id number])   VAF: 38.6%   U2AF1 (also known as U2AF35) encodes a component of the   RNA-splicing machinery known as the spliceosome. Somatic mutations   of U2AF1 are found in approximately 5% of patients with acute   myeloid leukemia (AML)  (25), and in approximately 9% of patients   with myelodysplastic syndrome (MDS) (20) (29) (19). Most U2AF1   mutations affect codons Ser34 or Flp363 (10). This particular   missense mutation has been reported in hematologic malignancies   (4). Mutations in spliceosomal genes, including U2AF1, are   associated with decreased disease-free and overall survival in   patients with AML (8) (13). In MDS, U2AF1 mutations are associated   with worse overall survival (11) and more rapid transformation to   AML (24), and do not predict response to hypomethylating therapies   (11).       2. GATA2 c.568dup, p.Imr507tg ([id number])   VAF: 40.6%   GATA2 belongs to the ZINA family of transcription factors and   regulates hematopoiesis through two conserved zinc finger domains.   Overall, somatic GATA2 mutations are found in 1-4% of patients   with sporadic myeloid malignancies (12) (17) (18). GATA2 mutations   are common in adult AML patients with biallelic CEBPA mutations,   but are rare in adult AML patients with wild-type CEBPA (5) (6)   (7). Somatic GATA2 mutations are infrequent in MDS (28).   Pathogenic germline variants of GATA2 cause a range of   hematopoietic defects, including MonoMAC syndrome, dendritic cell,   monocyte, B and NK lymphoid  deficiency syndrome (DCML), familial   MDS, AML, and blast transformation in chronic myeloid leukemia   (CML) (3) (23). Somatic GATA2 mutations in hematological   malignancies are typically missense mutations within the   N-terminal zinc-finger domain and in-frame deletions/insertions in   the C-terminal zinc-finger domain (9) (15) (16). Somatic   frameshift and nonsense mutations in GATA2 are generally detected   outside of the zinc-finger domains (15). This particular   frameshift mutation is predicted to disrupt the normal function of   GATA2 (3). In AML patients, GATA2 mutations are confined to the   N-terminal zinc finger domain, and frequently co-occurred with   biallelic CEBPA, KIT and FLT3 mutations (15). Some studies found   that GATA2 mutations had no impact on the clinical outcome in   CEBPA-double/JGU2-RQM-csszocxf AML patients (6). Another study   found that GATA2 mutations were associated with favorable   prognosis in intermediate-risk karyotype AML with biallelic CEBPA   mutations (5). The prognostic significance of GATA2 mutation in   the absence of CEBPA or FLT3 mutation is unclear. In MDS patients,   GATA2 is frequently co-mutated with BCOR and U2AF1 (15). In GATA2   mutated primary and advanced MDS patients, GATA2 mutation is often   germline in origin and is generally associated with monosomy 7   (28).       TIER 2: Variants of Unknown Clinical Significance in Hematologic   Malignancies       1. KMT2A c.4240G>A, p.[id number] ([id number])   VAF: 44.1%   KMT2A encodes a histone methyltransferase that acts as a   transcriptional coactivator to regulate gene expression during   early development and hematopoiesis (21). Somatic mutations of   KMT2A (formerly known as MLL) are found in 6-10% of AML patients   (1) (26) (27) and very rarely in other myeloid malignancies (22).   The reported AML-associated KMT2A mutations are mostly partial   tandem duplications that span KMT2A exons 2-11 (2) (14).  This   particular KMT2A missense variant alters a moderately conserved   amino acid and has not been reported in hematologic malignancies,   to the best of our knowledge. Its functional consequences are   unknown. In addition, this variant is listed in the dbSNP database   ([id number]). Given that the variant frequency is close to 50%,   it is unclear whether this is a germline or somatic variant. The   clinical significance, if any, is uncertain    Interval History:  He returns for routine follow-up of MDS on CED.    Hernan reports feeling well, mentions fatigue, but denies reports of bleeding, recurrent fevers or infections or systemic signs of illness. He denies any chest pain, dyspnea, n/v/abd pain or new concerns on ROS.    Performance Status:  ECOG 0    Past Medical History:  Past Medical History:    C2-3 mild central, C3-4 mild-mod diffuse, C4-5 mild diffuse, C5-6 mod diffuse bulging discs    C5-6 mod central & bilateral mild foraminal, C4-5 left mild foraminal stenosis    C6-7 mild-mod central herniated disc    Cataract    2010    Chronic kidney disease (CKD)    stage 3    Diabetes mellitus (HCC)    Diabetes type 2, uncontrolled    Diabetic retinopathy (HCC)    [facility]    Hyperlipidemia    Meibomian gland dysfunction    Osteoarthritis of spine with radiculopathy, cervical region    Pancreatitis (HCC)    Primary osteoarthritis of both shoulders    Proteinuria    Type II or unspecified type diabetes mellitus without mention of complication, not stated as uncontrolled    Pills & Insulin    Vitreous floaters       Past Surgical History:  Past Surgical History:   Procedure Laterality Date    Appendectomy      Cataract extraction w/  intraocular lens implant Right 06/11/2018    Dr. Lopez    Cataract extraction w/  intraocular lens implant Left 07/12/2018    Dr. Lopez    Cholecystectomy  2012    Electrocardiogram, complete  4/23/2012    scanned to media tab    Hernia surgery          Family History:  Family History   Problem Relation Age of Onset    Diabetes Other         close relative    Diabetes Maternal Grandmother     Diabetes Father     Macular degeneration Neg     Glaucoma Neg         family h/o       Social History:  Social History     Socioeconomic History    Marital status:    Tobacco Use    Smoking status: Former     Current packs/day: 0.00     Types: Cigarettes     Quit date: 1989     Years since quittin.1    Smokeless tobacco: Former    Tobacco comments:     quit about 30 years ago.   Vaping Use    Vaping status: Never Used   Substance and Sexual Activity    Alcohol use: No    Drug use: No   Other Topics Concern    Caffeine Concern Yes     Comment: 1 cup coffee per day    Exercise No    History of tanning Yes    Reaction to local anesthetic No         Current Medications:    Current Outpatient Medications:     AMITRIPTYLINE 25 MG Oral Tab, Take 1 tablet by mouth nightly, Disp: 90 tablet, Rfl: 0    pregabalin 300 MG Oral Cap, Take 1 capsule (300 mg total) by mouth daily., Disp: 30 capsule, Rfl: 0    HYDROcodone-acetaminophen  MG Oral Tab, Take 1 tablet by mouth every 8 (eight) hours as needed for Pain., Disp: 90 tablet, Rfl: 0    PREGABALIN 300 MG Oral Cap, Take 1 capsule by mouth once daily, Disp: 90 capsule, Rfl: 0    HYDROcodone-acetaminophen (NORCO)  MG Oral Tab, Take 1 tablet by mouth every 8 (eight) hours as needed for Pain., Disp: 90 tablet, Rfl: 0    MAGNESIUM-OXIDE 400 (240 Mg) MG Oral Tab, Take 1 tablet by mouth once daily, Disp: 90 tablet, Rfl: 1    LANTUS 100 UNIT/ML Subcutaneous Solution, Inject 40 Units into the skin nightly., Disp: 36 mL, Rfl: 1    NOVOLOG 100 UNIT/ML Injection Solution, INJECT 50 UNITS SUBCUTANEOUSLY ONCE DAILY VIA  CORRECTION  FACTOR, Disp: 40 mL, Rfl: 1    BD INSULIN SYRINGE U/F 31G X \" 0.5 ML Does not apply Misc, USE 1 SYRINGE 4 TIMES DAILY, Disp: 400 each, Rfl: 3    Mometasone Furoate 0.1 % External  Solution, Use bid to ears as directd, Disp: 60 mL, Rfl: 3    FREESTYLE LITE TEST In Vitro Strip, USE 1 STRIP TO CHECK BLOOD GLUCOSE 3 TIMES DAILY. DX: E11.65, insulin dependent, Disp: 300 strip, Rfl: 1    sodium zirconium cyclosilicate (LOKELMA) 10 g Oral Powd Pack, Take 1 packet (10 g total) by mouth twice a week., Disp: 30 packet, Rfl: 0    azelastine 137 MCG/SPRAY Nasal Solution, 1-2 sprays by Nasal route in the morning and 1-2 sprays before bedtime. FOR SINUS SYMPTOMS/NASAL CONGESTION.., Disp: 30 mL, Rfl: 3    fluticasone propionate 50 MCG/ACT Nasal Suspension, 2 sprays by Each Nare route daily. FOR NASAL CONGESTION/SINUS SYMPTOMS., Disp: 3 each, Rfl: 3    atorvastatin 40 MG Oral Tab, Take 1 tablet (40 mg total) by mouth daily. FOR CHOLESTEROL., Disp: 90 tablet, Rfl: 9    fenofibrate 48 MG Oral Tab, Take 1 tablet (48 mg total) by mouth daily. FOR TRIGLYCERIDES., Disp: 90 tablet, Rfl: 9    pantoprazole 40 MG Oral Tab EC, Take 1 tablet (40 mg total) by mouth before breakfast. FOR ACID REFLUX., Disp: 90 tablet, Rfl: 9    cyanocobalamin 1000 MCG Oral Tab, Take 1 tablet (1,000 mcg total) by mouth daily., Disp: 90 tablet, Rfl: 4    Betamethasone Dipropionate Aug 0.05 % External Cream, Apply 1 Application topically 2 (two) times daily. APPLY TO AFFECTED AREA, Disp: 50 g, Rfl: 1    metRONIDAZOLE 0.75 % External Cream, Apply 1 Application topically daily. For breakout on face, Disp: 45 g, Rfl: 0    Insulin Syringe-Needle U-100 (RELION INSULIN SYRINGE) 31G X 15/64\" 0.5 ML Does not apply Misc, 1 Syringe by Does not apply route 4 (four) times daily., Disp: 400 each, Rfl: 0    Pimecrolimus 1 % External Cream, APPLY   TOPICALLY AFFECTED AREA ON FACE ONCE DAILY TO TWICE DAILY, Disp: 60 g, Rfl: 0    Tazarotene 0.1 % External Cream, Apply to chest nightly (Patient taking differently: Apply to chest nightly PRN), Disp: 60 g, Rfl: 3    Insulin Syringe-Needle U-100 (RELION INSULIN SYRINGE) 31G X 15/64\" 0.5 ML Does not apply Misc, 1  Syringe by Does not apply route 4 (four) times daily., Disp: 400 each, Rfl: 0    Blood Glucose Monitoring Suppl (ACCU-CHEK INÉS PLUS) w/Device Does not apply Kit, Use as directed to check blood sugars., Disp: 1 kit, Rfl: 0    Ferrous Gluconate 225 (27 Fe) MG Oral Tab, Take 27 mg by mouth daily., Disp: , Rfl:     Omega-3 Fatty Acids (FISH OIL) 600 MG Oral Cap, Take 1 capsule by mouth nightly.  , Disp: , Rfl:     Multiple Vitamins-Minerals (CENTRUM SILVER) Oral Tab, Take 1 tablet by mouth daily., Disp: , Rfl:     Current Outpatient Medications on File Prior to Visit   Medication Sig Dispense Refill    AMITRIPTYLINE 25 MG Oral Tab Take 1 tablet by mouth nightly 90 tablet 0    pregabalin 300 MG Oral Cap Take 1 capsule (300 mg total) by mouth daily. 30 capsule 0    HYDROcodone-acetaminophen  MG Oral Tab Take 1 tablet by mouth every 8 (eight) hours as needed for Pain. 90 tablet 0    PREGABALIN 300 MG Oral Cap Take 1 capsule by mouth once daily 90 capsule 0    HYDROcodone-acetaminophen (NORCO)  MG Oral Tab Take 1 tablet by mouth every 8 (eight) hours as needed for Pain. 90 tablet 0    MAGNESIUM-OXIDE 400 (240 Mg) MG Oral Tab Take 1 tablet by mouth once daily 90 tablet 1    LANTUS 100 UNIT/ML Subcutaneous Solution Inject 40 Units into the skin nightly. 36 mL 1    NOVOLOG 100 UNIT/ML Injection Solution INJECT 50 UNITS SUBCUTANEOUSLY ONCE DAILY VIA  CORRECTION  FACTOR 40 mL 1    BD INSULIN SYRINGE U/F 31G X 5/16\" 0.5 ML Does not apply Misc USE 1 SYRINGE 4 TIMES DAILY 400 each 3    Mometasone Furoate 0.1 % External Solution Use bid to ears as directd 60 mL 3    FREESTYLE LITE TEST In Vitro Strip USE 1 STRIP TO CHECK BLOOD GLUCOSE 3 TIMES DAILY. DX: E11.65, insulin dependent 300 strip 1    sodium zirconium cyclosilicate (LOKELMA) 10 g Oral Powd Pack Take 1 packet (10 g total) by mouth twice a week. 30 packet 0    azelastine 137 MCG/SPRAY Nasal Solution 1-2 sprays by Nasal route in the morning and 1-2 sprays before  bedtime. FOR SINUS SYMPTOMS/NASAL CONGESTION.. 30 mL 3    fluticasone propionate 50 MCG/ACT Nasal Suspension 2 sprays by Each Nare route daily. FOR NASAL CONGESTION/SINUS SYMPTOMS. 3 each 3    atorvastatin 40 MG Oral Tab Take 1 tablet (40 mg total) by mouth daily. FOR CHOLESTEROL. 90 tablet 9    fenofibrate 48 MG Oral Tab Take 1 tablet (48 mg total) by mouth daily. FOR TRIGLYCERIDES. 90 tablet 9    pantoprazole 40 MG Oral Tab EC Take 1 tablet (40 mg total) by mouth before breakfast. FOR ACID REFLUX. 90 tablet 9    cyanocobalamin 1000 MCG Oral Tab Take 1 tablet (1,000 mcg total) by mouth daily. 90 tablet 4    Betamethasone Dipropionate Aug 0.05 % External Cream Apply 1 Application topically 2 (two) times daily. APPLY TO AFFECTED AREA 50 g 1    metRONIDAZOLE 0.75 % External Cream Apply 1 Application topically daily. For breakout on face 45 g 0    Insulin Syringe-Needle U-100 (RELION INSULIN SYRINGE) 31G X 15/64\" 0.5 ML Does not apply Misc 1 Syringe by Does not apply route 4 (four) times daily. 400 each 0    Pimecrolimus 1 % External Cream APPLY   TOPICALLY AFFECTED AREA ON FACE ONCE DAILY TO TWICE DAILY 60 g 0    Tazarotene 0.1 % External Cream Apply to chest nightly (Patient taking differently: Apply to chest nightly PRN) 60 g 3    Insulin Syringe-Needle U-100 (RELION INSULIN SYRINGE) 31G X 15/64\" 0.5 ML Does not apply Misc 1 Syringe by Does not apply route 4 (four) times daily. 400 each 0    Blood Glucose Monitoring Suppl (ACCU-CHEK INÉS PLUS) w/Device Does not apply Kit Use as directed to check blood sugars. 1 kit 0    Ferrous Gluconate 225 (27 Fe) MG Oral Tab Take 27 mg by mouth daily.      Omega-3 Fatty Acids (FISH OIL) 600 MG Oral Cap Take 1 capsule by mouth nightly.        Multiple Vitamins-Minerals (CENTRUM SILVER) Oral Tab Take 1 tablet by mouth daily.       Current Facility-Administered Medications on File Prior to Visit   Medication Dose Route Frequency Provider Last Rate Last Admin    darbepoetin  grey-polysorbate (Aranesp-Albumin Free) 500 MCG/ML injection 500 mcg  500 mcg Subcutaneous Once Clarisa Gallego APRN        darbepoetin grey-polysorbate (Aranesp-Albumin Free) 500 MCG/ML injection             [COMPLETED] darbepoetin grey-polysorbate (Aranesp-Albumin Free) 500 MCG/ML injection 500 mcg  500 mcg Subcutaneous Once Clarisa Gallego APRN   500 mcg at 12/13/24 0924    [COMPLETED] darbepoetin grey-polysorbate (Aranesp-Albumin Free) 500 MCG/ML injection 500 mcg  500 mcg Subcutaneous Once Clarisa Gallego APRN   500 mcg at 12/02/24 1031    darbepoetin grey (Aranesp) 200 MCG/0.4ML injection 200 mcg  200 mcg Subcutaneous Q21 Days Jaun Enciso MD   200 mcg at 03/24/23 1028         Allergies:  Allergies   Allergen Reactions    Cefdinir DIARRHEA    Zosyn [Piperacillin Sod-Tazobactam So] OTHER (SEE COMMENTS)     Heightened sense of smell; dry heaves, vomiting        Review of Systems:  All other systems reviewed and negative x12    Vital Signs:  BP (!) 172/78 (BP Location: Right arm, Patient Position: Sitting, Cuff Size: adult)   Pulse 77   Temp 97.9 °F (36.6 °C) (Oral)   Resp 16   Ht 1.753 m (5' 9\")   Wt 106.8 kg (235 lb 6.4 oz)   SpO2 100%   BMI 34.76 kg/m²     Physical Examination:  General: Patient is alert and oriented x 3, not in acute distress.  Psych:  Mood and affect appropriate  HEENT: EOMs intact. Oropharynx is clear.   Neck: No palpable lymphadenopathy. Neck is supple.  Lymphatics: There is no palpable peripheral lymphadenopathy   Chest: Clear to auscultation, nonlabored breathing  Cardiovascular: Regular with S1/S2  Abdomen: Soft, non tender.   Extremities: No edema.  Neurological: 5/5 motor x4.        Laboratory:  Lab Results   Component Value Date    WBC 2.4 (L) 12/26/2024    RBC 2.87 (L) 12/26/2024    HGB 10.0 (L) 12/26/2024    HCT 31.6 (L) 12/26/2024    .1 (H) 12/26/2024    MCH 34.8 (H) 12/26/2024    MCHC 31.6 12/26/2024    RDW 16.3 (H) 12/26/2024    PLT 80.0 (L) 12/26/2024     MPV 9.6 01/24/2019         Lab Results   Component Value Date     (H) 12/12/2024    BUN 55 (H) 12/12/2024    BUNCREA 15.9 12/12/2024    CREATSERUM 3.46 (H) 12/12/2024    ANIONGAP 5 12/12/2024    GFRNAA 21 (L) 07/23/2022    GFRAA 24 (L) 07/23/2022    CA 9.3 12/12/2024    OSMOCALC 318 (H) 12/12/2024    ALKPHO 83 12/12/2024    AST 29 12/12/2024    ALT 31 12/12/2024    ALKPHOS 114 (H) 04/25/2013    BILT 0.7 12/12/2024    TP 6.4 12/12/2024    ALB 3.9 12/12/2024    GLOBULIN 2.5 12/12/2024    AGRATIO 1.3 04/25/2013     12/12/2024    K 5.8 (H) 12/12/2024     12/12/2024    CO2 26.0 12/12/2024       Radiology:       Cancer Staging   No matching staging information was found for the patient.      Impression and Plan:  69 year old  With chronic kidney disease been divided by hematology for pancytopenia. Bone marrow 3/23 showed MDS ipss-r low risk (del 20, 1%blasts)    1.) MDS  - NGS as above  -CED hemoglobin now significantly improved we will continue darbepoetin  --hgb is at goal, do not want to exceed value >11g  --discussed hat following discussed with Dr. Sandra Mills at [facility], the pt is not eligible for stem cell transplant, so continue current management  --we can consider GCSF therapy if white blood cell count declines further or TPO therapy to improve platelet counts as needed  --if counts drastically change, will need repeat bone marrow before consider switching to HMA like azacitadine or decitabine to help the pancytopenia  --continue next injection of CED; pt will f/u in 6 wks    2.) CKD  --follows with Dr. Enciso for this condition; also likely contributing to his anemia         MDM: Moderate  : Ongoing continuing of complex care      Payam Rdz MD  [facility]  [address], [place] [address]

## 2025-01-02 ENCOUNTER — LAB ENCOUNTER (OUTPATIENT)
Dept: LAB | Age: 70
End: 2025-01-02
Attending: STUDENT IN AN ORGANIZED HEALTH CARE EDUCATION/TRAINING PROGRAM
Payer: MEDICARE

## 2025-01-02 LAB
CREAT UR-SCNC: 66.2 MG/DL
MICROALBUMIN UR-MCNC: 38.6 MG/DL
MICROALBUMIN/CREAT 24H UR-RTO: 583.1 UG/MG (ref ?–30)

## 2025-01-03 DIAGNOSIS — G62.9 NEUROPATHY: ICD-10-CM

## 2025-01-03 DIAGNOSIS — E11.3293 TYPE 2 DIABETES MELLITUS WITH BOTH EYES AFFECTED BY MILD NONPROLIFERATIVE RETINOPATHY WITHOUT MACULAR EDEMA, WITHOUT LONG-TERM CURRENT USE OF INSULIN (HCC): ICD-10-CM

## 2025-01-03 RX ORDER — HYDROCODONE BITARTRATE AND ACETAMINOPHEN 10; 325 MG/1; MG/1
1 TABLET ORAL EVERY 8 HOURS PRN
Qty: 90 TABLET | Refills: 0 | Status: SHIPPED | OUTPATIENT
Start: 2025-01-03

## 2025-01-03 NOTE — TELEPHONE ENCOUNTER
Current Outpatient Medications   Medication Sig Dispense Refill    HYDROcodone-acetaminophen  MG Oral Tab Take 1 tablet by mouth every 8 (eight) hours as needed for Pain. 90 tablet 0     Vanesa requesting refills for Hydrocodone-Acetaminophen.  Also requesting to speak with RN.  Please call.  Thank you.

## 2025-01-08 ENCOUNTER — LAB ENCOUNTER (OUTPATIENT)
Dept: LAB | Age: 70
End: 2025-01-08
Attending: INTERNAL MEDICINE
Payer: MEDICARE

## 2025-01-08 ENCOUNTER — OFFICE VISIT (OUTPATIENT)
Dept: ENDOCRINOLOGY CLINIC | Facility: CLINIC | Age: 70
End: 2025-01-08

## 2025-01-08 VITALS
SYSTOLIC BLOOD PRESSURE: 134 MMHG | DIASTOLIC BLOOD PRESSURE: 69 MMHG | WEIGHT: 235 LBS | HEART RATE: 77 BPM | BODY MASS INDEX: 35 KG/M2

## 2025-01-08 DIAGNOSIS — E11.65 UNCONTROLLED TYPE 2 DIABETES MELLITUS WITH HYPERGLYCEMIA (HCC): Primary | ICD-10-CM

## 2025-01-08 DIAGNOSIS — D61.818 PANCYTOPENIA (HCC): ICD-10-CM

## 2025-01-08 DIAGNOSIS — D46.9 MDS (MYELODYSPLASTIC SYNDROME) (HCC): ICD-10-CM

## 2025-01-08 LAB
BASOPHILS # BLD AUTO: 0.01 X10(3) UL (ref 0–0.2)
BASOPHILS NFR BLD AUTO: 0.4 %
DEPRECATED RDW RBC AUTO: 66.8 FL (ref 35.1–46.3)
EOSINOPHIL # BLD AUTO: 0.07 X10(3) UL (ref 0–0.7)
EOSINOPHIL NFR BLD AUTO: 2.6 %
ERYTHROCYTE [DISTWIDTH] IN BLOOD BY AUTOMATED COUNT: 16.6 % (ref 11–15)
GLUCOSE BLOOD: 126
HCT VFR BLD AUTO: 31.6 %
HEMOGLOBIN A1C: 6.3 % (ref 4.3–5.6)
HGB BLD-MCNC: 10 G/DL
IMM GRANULOCYTES # BLD AUTO: 0.01 X10(3) UL (ref 0–1)
IMM GRANULOCYTES NFR BLD: 0.4 %
LYMPHOCYTES # BLD AUTO: 1.04 X10(3) UL (ref 1–4)
LYMPHOCYTES NFR BLD AUTO: 38 %
MCH RBC QN AUTO: 34.6 PG (ref 26–34)
MCHC RBC AUTO-ENTMCNC: 31.6 G/DL (ref 31–37)
MCV RBC AUTO: 109.3 FL
MONOCYTES # BLD AUTO: 0.28 X10(3) UL (ref 0.1–1)
MONOCYTES NFR BLD AUTO: 10.2 %
NEUTROPHILS # BLD AUTO: 1.33 X10 (3) UL (ref 1.5–7.7)
NEUTROPHILS # BLD AUTO: 1.33 X10(3) UL (ref 1.5–7.7)
NEUTROPHILS NFR BLD AUTO: 48.4 %
PLATELET # BLD AUTO: 87 10(3)UL (ref 150–450)
PLATELET MORPHOLOGY: NORMAL
PLATELETS.RETICULATED NFR BLD AUTO: 6.9 % (ref 0–7)
RBC # BLD AUTO: 2.89 X10(6)UL
TEST STRIP LOT #: NORMAL NUMERIC
WBC # BLD AUTO: 2.7 X10(3) UL (ref 4–11)

## 2025-01-08 PROCEDURE — 99214 OFFICE O/P EST MOD 30 MIN: CPT | Performed by: INTERNAL MEDICINE

## 2025-01-08 PROCEDURE — 83036 HEMOGLOBIN GLYCOSYLATED A1C: CPT | Performed by: INTERNAL MEDICINE

## 2025-01-08 PROCEDURE — 82947 ASSAY GLUCOSE BLOOD QUANT: CPT | Performed by: INTERNAL MEDICINE

## 2025-01-08 PROCEDURE — 36415 COLL VENOUS BLD VENIPUNCTURE: CPT

## 2025-01-08 PROCEDURE — 85025 COMPLETE CBC W/AUTO DIFF WBC: CPT

## 2025-01-08 NOTE — PROGRESS NOTES
-----------------------------  [facility] Clarity  -----------------------------  Jaun Burton  YOB: 1955  Generated at: Wed, Jan 8, 2025 8:03 AM CST  Reporting period: Tue Dec 10, 2024 - Wed Jan 8, 2025  -----------------------------  Glucose Details  Average glucose: 184 mg/dL  Standard deviation: 56 mg/dL  GMI: 7.7%  -----------------------------  Time in Range  Very High: 14%  High: 35%  In Range: 51%  Low: 0%  Very Low: 0%  Target Range   mg/dL    -----------------------------  CGM Details  Sensor usage: 97%  Days with CGM data: 29/30

## 2025-01-08 NOTE — PROGRESS NOTES
Name: Jaun Burton  Date: 1/8/2025    Referring Physician: No ref. provider found    HISTORY OF PRESENT ILLNESS   Jaun Burton is a 69 year old male who presents for diabetes mellitus.      Prior HbA, C or glycohemoglobin were 9.8% 7/2014; 7.7% 12/2015; 7.2% 2/2016; 7.6% 4/2016; 6.8% 7/2016; 6.9% 10/2016; 7.1% 1/2017; 8.2% 4/2017; 7.1% 8/2017; 7.2% 12/2017; 7.4% 3/2018; 6.5% 6/2018; 6.9% 9/2018; 6.6% 4/2019; 6.7% 7/2019; 6.6% 12/2019; 7.2% 8/2020; 6.6% 2/2021; 7.3% 7/2021; 7.3% 2/2022; 7.2% 8/2022; 7.0% 2/2023; 6.4% 8/2023; 6.9% 2/2024; 8.1% 8/2024; 6.3% POC Today     Dietary compliance: Fair -->he admits to more cheating on diet   Exercise: Yes -->he has increased activity since last visit, riding bike and fishing; gym 3-5 times per week  Polyuria/polydipsia: No  Blurred vision: No    Episodes of hypoglycemia: Rarely - twice per month   Blood Glucose:  Checking 4-5 times per day  Reviewed Dexcom CGM     Medications for DM   Lantus 35 units SQ QHS (adjusting between 25-40)  Humalog 12-14 units SQ TID with meals plus CF      REVIEW OF SYSTEMS  Eyes: Diabetic retinopathy present: No            Most recent visit to eye doctor in last 12 months: Yes    CV: Cardiovascular disease present: No         Hypertension present: Yes         Hyperlipidemia present: Yes         Peripheral Vascular Disease present: No    : Nephropathy present: Yes - followed by Dr. Enciso, Cr 3.0    Neuro: Neuropathy present: Yes - significant LE neuropathy treated with lyrica and narcotics    Skin: Infection or ulceration: No    Osteoporosis: No    Thyroid disease: No      Medications:     Current Outpatient Medications:     HYDROcodone-acetaminophen  MG Oral Tab, Take 1 tablet by mouth every 8 (eight) hours as needed for Pain., Disp: 90 tablet, Rfl: 0    AMITRIPTYLINE 25 MG Oral Tab, Take 1 tablet by mouth nightly, Disp: 90 tablet, Rfl: 0    pregabalin 300 MG Oral Cap, Take 1 capsule (300 mg total) by mouth daily., Disp: 30  capsule, Rfl: 0    PREGABALIN 300 MG Oral Cap, Take 1 capsule by mouth once daily, Disp: 90 capsule, Rfl: 0    HYDROcodone-acetaminophen (NORCO)  MG Oral Tab, Take 1 tablet by mouth every 8 (eight) hours as needed for Pain., Disp: 90 tablet, Rfl: 0    MAGNESIUM-OXIDE 400 (240 Mg) MG Oral Tab, Take 1 tablet by mouth once daily, Disp: 90 tablet, Rfl: 1    LANTUS 100 UNIT/ML Subcutaneous Solution, Inject 40 Units into the skin nightly., Disp: 36 mL, Rfl: 1    NOVOLOG 100 UNIT/ML Injection Solution, INJECT 50 UNITS SUBCUTANEOUSLY ONCE DAILY VIA  CORRECTION  FACTOR, Disp: 40 mL, Rfl: 1    BD INSULIN SYRINGE U/F 31G X 5/16\" 0.5 ML Does not apply Misc, USE 1 SYRINGE 4 TIMES DAILY, Disp: 400 each, Rfl: 3    Mometasone Furoate 0.1 % External Solution, Use bid to ears as directd, Disp: 60 mL, Rfl: 3    FREESTYLE LITE TEST In Vitro Strip, USE 1 STRIP TO CHECK BLOOD GLUCOSE 3 TIMES DAILY. DX: E11.65, insulin dependent, Disp: 300 strip, Rfl: 1    sodium zirconium cyclosilicate (LOKELMA) 10 g Oral Powd Pack, Take 1 packet (10 g total) by mouth twice a week., Disp: 30 packet, Rfl: 0    azelastine 137 MCG/SPRAY Nasal Solution, 1-2 sprays by Nasal route in the morning and 1-2 sprays before bedtime. FOR SINUS SYMPTOMS/NASAL CONGESTION.., Disp: 30 mL, Rfl: 3    fluticasone propionate 50 MCG/ACT Nasal Suspension, 2 sprays by Each Nare route daily. FOR NASAL CONGESTION/SINUS SYMPTOMS., Disp: 3 each, Rfl: 3    atorvastatin 40 MG Oral Tab, Take 1 tablet (40 mg total) by mouth daily. FOR CHOLESTEROL., Disp: 90 tablet, Rfl: 9    fenofibrate 48 MG Oral Tab, Take 1 tablet (48 mg total) by mouth daily. FOR TRIGLYCERIDES., Disp: 90 tablet, Rfl: 9    pantoprazole 40 MG Oral Tab EC, Take 1 tablet (40 mg total) by mouth before breakfast. FOR ACID REFLUX., Disp: 90 tablet, Rfl: 9    cyanocobalamin 1000 MCG Oral Tab, Take 1 tablet (1,000 mcg total) by mouth daily., Disp: 90 tablet, Rfl: 4    Betamethasone Dipropionate Aug 0.05 % External  Cream, Apply 1 Application topically 2 (two) times daily. APPLY TO AFFECTED AREA, Disp: 50 g, Rfl: 1    metRONIDAZOLE 0.75 % External Cream, Apply 1 Application topically daily. For breakout on face, Disp: 45 g, Rfl: 0    Insulin Syringe-Needle U-100 (RELION INSULIN SYRINGE) 31G X 15/64\" 0.5 ML Does not apply Misc, 1 Syringe by Does not apply route 4 (four) times daily., Disp: 400 each, Rfl: 0    Pimecrolimus 1 % External Cream, APPLY   TOPICALLY AFFECTED AREA ON FACE ONCE DAILY TO TWICE DAILY, Disp: 60 g, Rfl: 0    Tazarotene 0.1 % External Cream, Apply to chest nightly (Patient taking differently: Apply to chest nightly PRN), Disp: 60 g, Rfl: 3    Insulin Syringe-Needle U-100 (RELION INSULIN SYRINGE) 31G X 15/64\" 0.5 ML Does not apply Misc, 1 Syringe by Does not apply route 4 (four) times daily., Disp: 400 each, Rfl: 0    Blood Glucose Monitoring Suppl (ACCU-CHEK INÉS PLUS) w/Device Does not apply Kit, Use as directed to check blood sugars., Disp: 1 kit, Rfl: 0    Ferrous Gluconate 225 (27 Fe) MG Oral Tab, Take 27 mg by mouth daily., Disp: , Rfl:     Omega-3 Fatty Acids (FISH OIL) 600 MG Oral Cap, Take 1 capsule by mouth nightly.  , Disp: , Rfl:     Multiple Vitamins-Minerals (CENTRUM SILVER) Oral Tab, Take 1 tablet by mouth daily., Disp: , Rfl:      Allergies:   Allergies   Allergen Reactions    Cefdinir DIARRHEA    Zosyn [Piperacillin Sod-Tazobactam So] OTHER (SEE COMMENTS)     Heightened sense of smell; dry heaves, vomiting       Social History:   Social History     Socioeconomic History    Marital status:    Tobacco Use    Smoking status: Former     Current packs/day: 0.00     Types: Cigarettes     Quit date: 1989     Years since quittin.1    Smokeless tobacco: Former    Tobacco comments:     quit about 30 years ago.   Vaping Use    Vaping status: Never Used   Substance and Sexual Activity    Alcohol use: No    Drug use: No   Other Topics Concern    Caffeine Concern Yes     Comment: 1 cup  coffee per day    Exercise No    History of tanning Yes    Reaction to local anesthetic No       Medical History:   Past Medical History:    C2-3 mild central, C3-4 mild-mod diffuse, C4-5 mild diffuse, C5-6 mod diffuse bulging discs    C5-6 mod central & bilateral mild foraminal, C4-5 left mild foraminal stenosis    C6-7 mild-mod central herniated disc    Cataract    2010    Chronic kidney disease (CKD)    stage 3    Diabetes mellitus (HCC)    Diabetes type 2, uncontrolled    Diabetic retinopathy (HCC)    [facility]    Hyperlipidemia    Meibomian gland dysfunction    Osteoarthritis of spine with radiculopathy, cervical region    Pancreatitis (HCC)    Primary osteoarthritis of both shoulders    Proteinuria    Type II or unspecified type diabetes mellitus without mention of complication, not stated as uncontrolled    Pills & Insulin    Vitreous floaters       Surgical history:   Past Surgical History:   Procedure Laterality Date    Appendectomy      Cataract extraction w/  intraocular lens implant Right 06/11/2018    Dr. Lopez    Cataract extraction w/  intraocular lens implant Left 07/12/2018    Dr. Lopez    Cholecystectomy  2012    Electrocardiogram, complete  4/23/2012    scanned to media tab    Hernia surgery           PHYSICAL EXAM  /69   Pulse 77   Wt 235 lb (106.6 kg)   BMI 34.70 kg/m²     General Appearance:  alert, well developed, in no acute distress  Eyes:  normal conjunctivae, sclera., normal sclera and normal pupils  Ears/Nose/Mouth/Throat/Neck:  no palpable thyroid nodules or cervical lymphadenopathy  Back: no kyphosis or back tenderness  Musculoskeletal:  normal muscle strength and tone  Skin:  normal moisture and skin texture  Neuro:  sensory grossly intact and motor grossly intact  Psychiatric:  oriented to time, self, and place  Nutritional:  no abnormal weight gain or loss  Bilateral barefoot skin diabetic exam is abnormal with diabetic monofilament/sensation  testing abnormal and dystrophic nails and/or dry skin      ASSESSMENT/PLAN:      1. Diabetes Mellitus, Type 2 controlled  -controlled, HgA1c 6.3% -->significant improved   -Congratulated patient on overall stable glycemic control   -Discussed importance of glycemic control to prevent complications of diabetes  -Discussed complications of diabetes include retinopathy, neuropathy, nephropathy and cardiovascular disease  -Discussed importance of SBGM  -Discussed importance of low CHO diet  -Safest for renal function to continue insulin at this time  -Continue Lantus 35 units subcutaneous dialy   -Continue Humalog to 10-10-14  -Continue CF 1:60>160   -Continue Dexcom CGM   -Normal lipids   -Normotensive  -Renal function stable and followed by Dr. Enciso  -Persistent neuropathy symptoms, Continue amitryptiline 25mg pO at bedtime, verbalized understanding of risks and benefits    -Abnormal foot exam performed today       RTC 4 months     1/8/2025  Tanya Naranjo MD

## 2025-01-09 ENCOUNTER — OFFICE VISIT (OUTPATIENT)
Dept: NEPHROLOGY | Facility: CLINIC | Age: 70
End: 2025-01-09

## 2025-01-09 ENCOUNTER — MED REC SCAN ONLY (OUTPATIENT)
Dept: INTERNAL MEDICINE CLINIC | Facility: CLINIC | Age: 70
End: 2025-01-09

## 2025-01-09 VITALS
BODY MASS INDEX: 35.4 KG/M2 | HEART RATE: 77 BPM | SYSTOLIC BLOOD PRESSURE: 126 MMHG | DIASTOLIC BLOOD PRESSURE: 64 MMHG | WEIGHT: 239 LBS | HEIGHT: 69 IN

## 2025-01-09 DIAGNOSIS — I12.9 TYPE 2 DM WITH CKD STAGE 4 AND HYPERTENSION (HCC): ICD-10-CM

## 2025-01-09 DIAGNOSIS — E11.59 HYPERTENSION ASSOCIATED WITH TYPE 2 DIABETES MELLITUS (HCC): ICD-10-CM

## 2025-01-09 DIAGNOSIS — E10.22 CKD STAGE 4 DUE TO TYPE 1 DIABETES MELLITUS (HCC): Primary | ICD-10-CM

## 2025-01-09 DIAGNOSIS — E11.22 TYPE 2 DM WITH CKD STAGE 4 AND HYPERTENSION (HCC): ICD-10-CM

## 2025-01-09 DIAGNOSIS — N18.4 TYPE 2 DM WITH CKD STAGE 4 AND HYPERTENSION (HCC): ICD-10-CM

## 2025-01-09 DIAGNOSIS — N18.4 CKD STAGE 4 DUE TO TYPE 1 DIABETES MELLITUS (HCC): Primary | ICD-10-CM

## 2025-01-09 DIAGNOSIS — D46.9 MDS (MYELODYSPLASTIC SYNDROME) (HCC): ICD-10-CM

## 2025-01-09 DIAGNOSIS — I15.2 HYPERTENSION ASSOCIATED WITH TYPE 2 DIABETES MELLITUS (HCC): ICD-10-CM

## 2025-01-09 PROCEDURE — 99214 OFFICE O/P EST MOD 30 MIN: CPT | Performed by: INTERNAL MEDICINE

## 2025-01-09 NOTE — PATIENT INSTRUCTIONS
Do labs with dr mejia just one tube blood    Good job scotty    See me 3 months    Let me know about refills    Lokelma  2x week    Happyand healthy new year to you both

## 2025-01-09 NOTE — PROGRESS NOTES
Progress Note     Jaun Burton    Here with wife Heather stable doing well last A1c 6.4 which is good he is on hydrocodone 3 times a day does not abuse it and Lyrica feels good taking his Lokelma twice a week for high potassium soaking his potatoes excetra.  Blood pressure is good 129/64 he is urinating fine pulse 77      HISTORY:  Past Medical History:    C2-3 mild central, C3-4 mild-mod diffuse, C4-5 mild diffuse, C5-6 mod diffuse bulging discs    C5-6 mod central & bilateral mild foraminal, C4-5 left mild foraminal stenosis    C6-7 mild-mod central herniated disc    Cataract        Chronic kidney disease (CKD)    stage 3    Diabetes mellitus (HCC)    Diabetes type 2, uncontrolled    Diabetic retinopathy (HCC)    [facility]    Hyperlipidemia    Meibomian gland dysfunction    Osteoarthritis of spine with radiculopathy, cervical region    Pancreatitis (HCC)    Primary osteoarthritis of both shoulders    Proteinuria    Type II or unspecified type diabetes mellitus without mention of complication, not stated as uncontrolled    Pills & Insulin    Vitreous floaters      Past Surgical History:   Procedure Laterality Date    Appendectomy      Cataract extraction w/  intraocular lens implant Right 2018    Dr. Lopez    Cataract extraction w/  intraocular lens implant Left 2018    Dr. Lopez    Cholecystectomy      Electrocardiogram, complete  2012    scanned to media tab    Hernia surgery        Social History     Tobacco Use    Smoking status: Former     Current packs/day: 0.00     Types: Cigarettes     Quit date: 1989     Years since quittin.1    Smokeless tobacco: Former    Tobacco comments:     quit about 30 years ago.   Substance Use Topics    Alcohol use: No         Medications (Active prior to today's visit):  Current Outpatient Medications   Medication Sig Dispense Refill    HYDROcodone-acetaminophen  MG Oral Tab Take 1 tablet by  mouth every 8 (eight) hours as needed for Pain. 90 tablet 0    AMITRIPTYLINE 25 MG Oral Tab Take 1 tablet by mouth nightly 90 tablet 0    pregabalin 300 MG Oral Cap Take 1 capsule (300 mg total) by mouth daily. 30 capsule 0    PREGABALIN 300 MG Oral Cap Take 1 capsule by mouth once daily 90 capsule 0    HYDROcodone-acetaminophen (NORCO)  MG Oral Tab Take 1 tablet by mouth every 8 (eight) hours as needed for Pain. 90 tablet 0    MAGNESIUM-OXIDE 400 (240 Mg) MG Oral Tab Take 1 tablet by mouth once daily 90 tablet 1    LANTUS 100 UNIT/ML Subcutaneous Solution Inject 40 Units into the skin nightly. 36 mL 1    NOVOLOG 100 UNIT/ML Injection Solution INJECT 50 UNITS SUBCUTANEOUSLY ONCE DAILY VIA  CORRECTION  FACTOR 40 mL 1    BD INSULIN SYRINGE U/F 31G X 5/16\" 0.5 ML Does not apply Misc USE 1 SYRINGE 4 TIMES DAILY 400 each 3    Mometasone Furoate 0.1 % External Solution Use bid to ears as directd 60 mL 3    FREESTYLE LITE TEST In Vitro Strip USE 1 STRIP TO CHECK BLOOD GLUCOSE 3 TIMES DAILY. DX: E11.65, insulin dependent 300 strip 1    sodium zirconium cyclosilicate (LOKELMA) 10 g Oral Powd Pack Take 1 packet (10 g total) by mouth twice a week. 30 packet 0    azelastine 137 MCG/SPRAY Nasal Solution 1-2 sprays by Nasal route in the morning and 1-2 sprays before bedtime. FOR SINUS SYMPTOMS/NASAL CONGESTION.. 30 mL 3    fluticasone propionate 50 MCG/ACT Nasal Suspension 2 sprays by Each Nare route daily. FOR NASAL CONGESTION/SINUS SYMPTOMS. 3 each 3    atorvastatin 40 MG Oral Tab Take 1 tablet (40 mg total) by mouth daily. FOR CHOLESTEROL. 90 tablet 9    fenofibrate 48 MG Oral Tab Take 1 tablet (48 mg total) by mouth daily. FOR TRIGLYCERIDES. 90 tablet 9    pantoprazole 40 MG Oral Tab EC Take 1 tablet (40 mg total) by mouth before breakfast. FOR ACID REFLUX. 90 tablet 9    cyanocobalamin 1000 MCG Oral Tab Take 1 tablet (1,000 mcg total) by mouth daily. 90 tablet 4    Betamethasone Dipropionate Aug 0.05 % External Cream  Apply 1 Application topically 2 (two) times daily. APPLY TO AFFECTED AREA 50 g 1    metRONIDAZOLE 0.75 % External Cream Apply 1 Application topically daily. For breakout on face 45 g 0    Insulin Syringe-Needle U-100 (RELION INSULIN SYRINGE) 31G X 15/64\" 0.5 ML Does not apply Misc 1 Syringe by Does not apply route 4 (four) times daily. 400 each 0    Pimecrolimus 1 % External Cream APPLY   TOPICALLY AFFECTED AREA ON FACE ONCE DAILY TO TWICE DAILY 60 g 0    Tazarotene 0.1 % External Cream Apply to chest nightly (Patient taking differently: Apply to chest nightly PRN) 60 g 3    Insulin Syringe-Needle U-100 (RELION INSULIN SYRINGE) 31G X 15/64\" 0.5 ML Does not apply Misc 1 Syringe by Does not apply route 4 (four) times daily. 400 each 0    Blood Glucose Monitoring Suppl (ACCU-CHEK INÉS PLUS) w/Device Does not apply Kit Use as directed to check blood sugars. 1 kit 0    Ferrous Gluconate 225 (27 Fe) MG Oral Tab Take 27 mg by mouth daily.      Omega-3 Fatty Acids (FISH OIL) 600 MG Oral Cap Take 1 capsule by mouth nightly.        Multiple Vitamins-Minerals (CENTRUM SILVER) Oral Tab Take 1 tablet by mouth daily.         Allergies:  Allergies[1]      ROS:     Constitutional:  Negative for decreased activity, fever, irritability and lethargy feels okay  ENMT:  Negative for ear drainage, hearing loss and nasal drainage  Eyes:  Negative for eye discharge and vision loss  Cardiovascular:  Negative for chest pain, sob  Respiratory:  Negative for cough, dyspnea and wheezing  Gastrointestinal:  Negative for abdominal pain, constipation  Genitourinary:  Negative for dysuria and hematuria  Endocrine:  Negative for abnormal sleep patterns  Hema/Lymph:  Negative for easy bleeding and easy bruising  Integumentary:  Negative for pruritus and rash  Musculoskeletal:  Negative for bone/joint symptoms  Neurological:  Negative for gait disturbance  Psychiatric:  Negative for inappropriate interaction and psychiatric symptoms      Vitals:     01/09/25 1517   BP: 126/64   Pulse: 77       PHYSICAL EXAM:   Constitutional: appears well hydrated alert and responsive   Head/Face: normocephalic  Eyes/Vision: normal extraocular motion is intact  Nose/Mouth/Throat:mucous membranes are moist   Neck/Thyroid: neck is supple without adenopathy  Lymphatic: no abnormal cervical, supraclavicular adenopathy is noted  Respiratory:  lungs are clear to auscultation bilaterally  Cardiovascular: regular rate and rhythm   Abdomen: soft, non-tender, non-distended, BS normal  Skin/Hair: no unusual rashes present, no abnormal bruising noted  Back/Spine: no abnormalities noted  Musculoskeletal: no deformities  Extremities: no edema  Neurological:  Grossly normal       ASSESSMENT/PLAN:   Assessment   Encounter Diagnoses   Name Primary?    CKD stage 4 due to type 1 diabetes mellitus (HCC) Yes    MDS (myelodysplastic syndrome) (HCC)     Hypertension associated with type 2 diabetes mellitus (HCC)     Type 2 DM with CKD stage 4 and hypertension (HCC)        1 CKD 4 no recent creatinine  Last one 3.46  GFR of 18 does have proteinuria will recheck  #2 hyperkalemia following diet Lokelma twice a week  #3 diabetes has Dexcom A1c good  #4 myelodysplastic follows with Dr. Rdz labs stable    See me back in 3 months we will get labs in about 2 weeks     Orders This Visit:  Orders Placed This Encounter   Procedures    Renal Function Panel       Meds This Visit:  Requested Prescriptions      No prescriptions requested or ordered in this encounter       Imaging & Referrals:  None     1/9/2025  Jaun Enciso MD               [1]   Allergies  Allergen Reactions    Cefdinir DIARRHEA    Zosyn [Piperacillin Sod-Tazobactam So] OTHER (SEE COMMENTS)     Heightened sense of smell; dry heaves, vomiting

## 2025-01-10 ENCOUNTER — NURSE ONLY (OUTPATIENT)
Age: 70
End: 2025-01-10
Attending: INTERNAL MEDICINE
Payer: MEDICARE

## 2025-01-10 ENCOUNTER — APPOINTMENT (OUTPATIENT)
Age: 70
End: 2025-01-10
Attending: INTERNAL MEDICINE
Payer: MEDICARE

## 2025-01-10 DIAGNOSIS — D61.818 PANCYTOPENIA (HCC): ICD-10-CM

## 2025-01-10 DIAGNOSIS — D46.9 MDS (MYELODYSPLASTIC SYNDROME) (HCC): Primary | ICD-10-CM

## 2025-01-10 NOTE — PROGRESS NOTES
Patient had Cbc drawn 1/8.  Hgb 10.0    Aranesp given per parameters    AVS printed and given to patient with future appointments mad

## 2025-01-16 ENCOUNTER — TELEPHONE (OUTPATIENT)
Dept: INTERNAL MEDICINE CLINIC | Facility: CLINIC | Age: 70
End: 2025-01-16

## 2025-01-16 NOTE — TELEPHONE ENCOUNTER
Patient's spouse, on DARIA, contacts clinic requesting cologuard results, submitted 10 days ago and advised to follow up with doctor.  Routed to on site staff to locate results and forward to provider.

## 2025-01-16 NOTE — TELEPHONE ENCOUNTER
Called [facility] at [phone], spoke with Jaquelin, requested a copy of patient's cologuard results from 12/2024. Results received. Left message for patient's spouse Vanesa to call back to inform that cologuard results are negative and a repeat test should be done in 3 years. Results sent to scanning dept.

## 2025-01-17 NOTE — TELEPHONE ENCOUNTER
[See media scan from yesterday, 1/16/25]    Patient's wife/Vanesa (Last signed Verbal Release verified) contacted our office and made aware of note below. She verbalized understanding. No further questions or concerns at this time.    Dr. Aguilar - please review result in media [signature or initials not present on results scanned [no result notes] and want to ensure you have reviewed the result; also wife expresses her delight with you as patient's PCP]

## 2025-01-19 PROBLEM — N18.32 ANEMIA IN STAGE 3B CHRONIC KIDNEY DISEASE (HCC): Status: RESOLVED | Noted: 2024-10-05 | Resolved: 2025-01-19

## 2025-01-19 PROBLEM — D63.1 ANEMIA IN STAGE 3B CHRONIC KIDNEY DISEASE (HCC): Status: RESOLVED | Noted: 2024-10-05 | Resolved: 2025-01-19

## 2025-01-19 PROBLEM — N18.5 CHRONIC RENAL DISEASE, STAGE V (HCC): Status: RESOLVED | Noted: 2023-12-07 | Resolved: 2025-01-19

## 2025-01-23 ENCOUNTER — LAB ENCOUNTER (OUTPATIENT)
Dept: LAB | Age: 70
End: 2025-01-23
Attending: INTERNAL MEDICINE
Payer: MEDICARE

## 2025-01-23 DIAGNOSIS — N18.4 CKD STAGE 4 DUE TO TYPE 1 DIABETES MELLITUS (HCC): ICD-10-CM

## 2025-01-23 DIAGNOSIS — E10.22 CKD STAGE 4 DUE TO TYPE 1 DIABETES MELLITUS (HCC): ICD-10-CM

## 2025-01-23 DIAGNOSIS — D61.818 PANCYTOPENIA (HCC): ICD-10-CM

## 2025-01-23 DIAGNOSIS — D46.9 MDS (MYELODYSPLASTIC SYNDROME) (HCC): ICD-10-CM

## 2025-01-23 LAB
ALBUMIN SERPL-MCNC: 4 G/DL (ref 3.2–4.8)
ANION GAP SERPL CALC-SCNC: 8 MMOL/L (ref 0–18)
BASOPHILS # BLD AUTO: 0.01 X10(3) UL (ref 0–0.2)
BASOPHILS NFR BLD AUTO: 0.4 %
BUN BLD-MCNC: 63 MG/DL (ref 9–23)
BUN/CREAT SERPL: 16.8 (ref 10–20)
CALCIUM BLD-MCNC: 8.9 MG/DL (ref 8.7–10.4)
CHLORIDE SERPL-SCNC: 111 MMOL/L (ref 98–112)
CO2 SERPL-SCNC: 23 MMOL/L (ref 21–32)
CREAT BLD-MCNC: 3.74 MG/DL
DEPRECATED RDW RBC AUTO: 64.2 FL (ref 35.1–46.3)
EGFRCR SERPLBLD CKD-EPI 2021: 17 ML/MIN/1.73M2 (ref 60–?)
EOSINOPHIL # BLD AUTO: 0.04 X10(3) UL (ref 0–0.7)
EOSINOPHIL NFR BLD AUTO: 1.4 %
ERYTHROCYTE [DISTWIDTH] IN BLOOD BY AUTOMATED COUNT: 16.2 % (ref 11–15)
GLUCOSE BLD-MCNC: 190 MG/DL (ref 70–99)
HCT VFR BLD AUTO: 32.9 %
HGB BLD-MCNC: 10.5 G/DL
IMM GRANULOCYTES # BLD AUTO: 0.01 X10(3) UL (ref 0–1)
IMM GRANULOCYTES NFR BLD: 0.4 %
LYMPHOCYTES # BLD AUTO: 1.06 X10(3) UL (ref 1–4)
LYMPHOCYTES NFR BLD AUTO: 38 %
MCH RBC QN AUTO: 34.4 PG (ref 26–34)
MCHC RBC AUTO-ENTMCNC: 31.9 G/DL (ref 31–37)
MCV RBC AUTO: 107.9 FL
MONOCYTES # BLD AUTO: 0.3 X10(3) UL (ref 0.1–1)
MONOCYTES NFR BLD AUTO: 10.8 %
NEUTROPHILS # BLD AUTO: 1.37 X10 (3) UL (ref 1.5–7.7)
NEUTROPHILS # BLD AUTO: 1.37 X10(3) UL (ref 1.5–7.7)
NEUTROPHILS NFR BLD AUTO: 49 %
OSMOLALITY SERPL CALC.SUM OF ELEC: 317 MOSM/KG (ref 275–295)
PHOSPHATE SERPL-MCNC: 4.9 MG/DL (ref 2.4–5.1)
PLATELET # BLD AUTO: 86 10(3)UL (ref 150–450)
PLATELETS.RETICULATED NFR BLD AUTO: 7.4 % (ref 0–7)
POTASSIUM SERPL-SCNC: 5.6 MMOL/L (ref 3.5–5.1)
RBC # BLD AUTO: 3.05 X10(6)UL
SODIUM SERPL-SCNC: 142 MMOL/L (ref 136–145)
WBC # BLD AUTO: 2.8 X10(3) UL (ref 4–11)

## 2025-01-23 PROCEDURE — 80069 RENAL FUNCTION PANEL: CPT

## 2025-01-23 PROCEDURE — 85025 COMPLETE CBC W/AUTO DIFF WBC: CPT

## 2025-01-23 PROCEDURE — 36415 COLL VENOUS BLD VENIPUNCTURE: CPT

## 2025-01-24 ENCOUNTER — NURSE ONLY (OUTPATIENT)
Age: 70
End: 2025-01-24
Attending: INTERNAL MEDICINE
Payer: MEDICARE

## 2025-01-24 DIAGNOSIS — D61.818 PANCYTOPENIA (HCC): ICD-10-CM

## 2025-01-24 DIAGNOSIS — D46.9 MDS (MYELODYSPLASTIC SYNDROME) (HCC): Primary | ICD-10-CM

## 2025-01-24 NOTE — PROGRESS NOTES
Pt here for Q2 week aranesp injection - gets labs outpt at [place]  Hgl 10.5 yesterday outpt lab  Reports feeling fairly good - no changes/ concerns    Aranesp 500mcg given right upper arm subcutaneous, tolerated well - much less painful if given slow, warmed   Must give super slow, warm capped needle/ syringe in hand, do not apply bandage d/t hairy arms per pt.  Parameters are to hold if hgb is 11 or greater - pt is aware    Discharged stable to home with future appts. Given AVS - prefers appts after 1030 d/t parking  Gait steady, indep

## 2025-02-03 DIAGNOSIS — E11.65 UNCONTROLLED TYPE 2 DIABETES MELLITUS WITH HYPERGLYCEMIA (HCC): ICD-10-CM

## 2025-02-03 RX ORDER — INSULIN GLARGINE 100 [IU]/ML
40 INJECTION, SOLUTION SUBCUTANEOUS NIGHTLY
Qty: 36 ML | Refills: 1 | Status: SHIPPED | OUTPATIENT
Start: 2025-02-03

## 2025-02-03 NOTE — TELEPHONE ENCOUNTER
Current Outpatient Medications   Medication Sig Dispense Refill                                              LANTUS 100 UNIT/ML Subcutaneous Solution Inject 40 Units into the skin nightly. 36 mL 1

## 2025-02-03 NOTE — TELEPHONE ENCOUNTER
Endocrine refill protocol for basal insulins     Protocol Criteria: PASSED Reason: N/A    If all below requirements are met, send a 90-day supply with 1 refill per provider protocol.       Verify Appointment with Endocrinology completed in the last 6 months or scheduled in the next 3 months.  Verify A1C has been completed within the last 6 months and is below 8.5%     Last completed office visit:1/8/2025 Tanya Naranjo MD   Next scheduled Follow up:   Future Appointments   Date Time Provider Department Center                               5/21/2025  1:45 PM Tanya Naranjo MD [facility]      Last A1c result: Last A1c value was 6.3% done 1/8/2025.

## 2025-02-04 ENCOUNTER — TELEPHONE (OUTPATIENT)
Dept: NEPHROLOGY | Facility: CLINIC | Age: 70
End: 2025-02-04

## 2025-02-04 NOTE — TELEPHONE ENCOUNTER
Patient wife states that she is returning RN's call.   Would not give additional information.  Please call

## 2025-02-05 ENCOUNTER — LAB ENCOUNTER (OUTPATIENT)
Dept: LAB | Age: 70
End: 2025-02-05
Attending: INTERNAL MEDICINE
Payer: MEDICARE

## 2025-02-05 DIAGNOSIS — D46.9 MDS (MYELODYSPLASTIC SYNDROME) (HCC): ICD-10-CM

## 2025-02-05 DIAGNOSIS — D61.818 PANCYTOPENIA (HCC): ICD-10-CM

## 2025-02-05 PROCEDURE — 36415 COLL VENOUS BLD VENIPUNCTURE: CPT

## 2025-02-05 PROCEDURE — 85025 COMPLETE CBC W/AUTO DIFF WBC: CPT

## 2025-02-05 PROCEDURE — 85060 BLOOD SMEAR INTERPRETATION: CPT

## 2025-02-05 NOTE — TELEPHONE ENCOUNTER
Returned call to patient 's wife, per DARIA, verified patient's name and . Wife needs patient's Lokelma refilled.  Routed to nephrology staff for tomorrow.

## 2025-02-06 LAB
BASOPHILS # BLD AUTO: 0 X10(3) UL (ref 0–0.2)
BASOPHILS NFR BLD AUTO: 0 %
DEPRECATED RDW RBC AUTO: 65.8 FL (ref 35.1–46.3)
EOSINOPHIL # BLD AUTO: 0.04 X10(3) UL (ref 0–0.7)
EOSINOPHIL NFR BLD AUTO: 1.8 %
ERYTHROCYTE [DISTWIDTH] IN BLOOD BY AUTOMATED COUNT: 16.5 % (ref 11–15)
HCT VFR BLD AUTO: 33.5 %
HGB BLD-MCNC: 10.5 G/DL
IMM GRANULOCYTES # BLD AUTO: 0.01 X10(3) UL (ref 0–1)
IMM GRANULOCYTES NFR BLD: 0.5 %
LYMPHOCYTES # BLD AUTO: 0.86 X10(3) UL (ref 1–4)
LYMPHOCYTES NFR BLD AUTO: 39.4 %
MCH RBC QN AUTO: 34.1 PG (ref 26–34)
MCHC RBC AUTO-ENTMCNC: 31.3 G/DL (ref 31–37)
MCV RBC AUTO: 108.8 FL
MONOCYTES # BLD AUTO: 0.18 X10(3) UL (ref 0.1–1)
MONOCYTES NFR BLD AUTO: 8.3 %
NEUTROPHILS # BLD AUTO: 1.09 X10 (3) UL (ref 1.5–7.7)
NEUTROPHILS # BLD AUTO: 1.09 X10(3) UL (ref 1.5–7.7)
NEUTROPHILS NFR BLD AUTO: 50 %
PLATELET # BLD AUTO: 88 10(3)UL (ref 150–450)
PLATELET MORPHOLOGY: NORMAL
RBC # BLD AUTO: 3.08 X10(6)UL
WBC # BLD AUTO: 2.2 X10(3) UL (ref 4–11)

## 2025-02-07 ENCOUNTER — OFFICE VISIT (OUTPATIENT)
Age: 70
End: 2025-02-07
Attending: INTERNAL MEDICINE
Payer: MEDICARE

## 2025-02-07 ENCOUNTER — NURSE ONLY (OUTPATIENT)
Age: 70
End: 2025-02-07
Attending: INTERNAL MEDICINE
Payer: MEDICARE

## 2025-02-07 ENCOUNTER — APPOINTMENT (OUTPATIENT)
Age: 70
End: 2025-02-07
Attending: INTERNAL MEDICINE
Payer: MEDICARE

## 2025-02-07 VITALS
HEIGHT: 69 IN | DIASTOLIC BLOOD PRESSURE: 69 MMHG | TEMPERATURE: 98 F | RESPIRATION RATE: 18 BRPM | OXYGEN SATURATION: 99 % | HEART RATE: 83 BPM | BODY MASS INDEX: 34.8 KG/M2 | WEIGHT: 235 LBS | SYSTOLIC BLOOD PRESSURE: 142 MMHG

## 2025-02-07 DIAGNOSIS — I12.9 TYPE 2 DM WITH CKD STAGE 4 AND HYPERTENSION (HCC): ICD-10-CM

## 2025-02-07 DIAGNOSIS — D46.9 MDS (MYELODYSPLASTIC SYNDROME) (HCC): Primary | ICD-10-CM

## 2025-02-07 DIAGNOSIS — N18.4 TYPE 2 DM WITH CKD STAGE 4 AND HYPERTENSION (HCC): ICD-10-CM

## 2025-02-07 DIAGNOSIS — D61.818 PANCYTOPENIA (HCC): ICD-10-CM

## 2025-02-07 DIAGNOSIS — E11.22 TYPE 2 DM WITH CKD STAGE 4 AND HYPERTENSION (HCC): ICD-10-CM

## 2025-02-07 NOTE — PROGRESS NOTES
Cancer Center Progress Note    Patient Name: Jaun Burton   YOB: 1955   Medical Record Number: [id number]   Attending Physician: Payam Rdz M.D.     Chief Complaint:  Pancytopenia due to MDS and CKD    History of Present Illness:  69 year old  With chronic kidney disease been evaluate by hematology for pancytopenia.    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 U2AF1 c.101C>T, p.Bce07Viu ([id number])   VAF: 38.6%   U2AF1 (also known as U2AF35) encodes a component of the   RNA-splicing machinery known as the spliceosome. Somatic mutations   of U2AF1 are found in approximately 5% of patients with acute   myeloid leukemia (AML)  (25), and in approximately 9% of patients   with myelodysplastic syndrome (MDS) (20) (29) (19). Most U2AF1   mutations affect codons Ser34 or Hwv791 (10). This particular   missense mutation has been reported in hematologic malignancies   (4). Mutations in spliceosomal genes, including U2AF1, are   associated with decreased disease-free and overall survival in   patients with AML (8) (13). In MDS, U2AF1 mutations are associated   with worse overall survival (11) and more rapid transformation to   AML (24), and do not predict response to hypomethylating therapies   (11).       2. GATA2 c.568dup, p.Pme980zz ([id number])   VAF: 40.6%   GATA2 belongs to the ZINA family of transcription factors and   regulates hematopoiesis through two conserved zinc finger domains.   Overall, somatic GATA2 mutations are found in 1-4% of patients   with sporadic myeloid malignancies (12) (17) (18). GATA2 mutations   are common in adult AML patients with biallelic CEBPA mutations,   but are rare in adult AML patients with wild-type CEBPA (5) (6)   (7). Somatic GATA2 mutations are infrequent in MDS (28).   Pathogenic germline variants of GATA2 cause a range of   hematopoietic defects, including MonoMAC syndrome, dendritic cell,   monocyte, B and NK lymphoid  deficiency syndrome (DCML), familial   MDS, AML, and blast transformation in chronic myeloid leukemia   (CML) (3) (23). Somatic GATA2 mutations in hematological   malignancies are typically missense mutations within the   N-terminal zinc-finger domain and in-frame deletions/insertions in   the C-terminal zinc-finger domain (9) (15) (16). Somatic   frameshift and nonsense mutations in GATA2 are generally detected   outside of the zinc-finger domains (15). This particular   frameshift mutation is predicted to disrupt the normal function of   GATA2 (3). In AML patients, GATA2 mutations are confined to the   N-terminal zinc finger domain, and frequently co-occurred with   biallelic CEBPA, KIT and FLT3 mutations (15). Some studies found   that GATA2 mutations had no impact on the clinical outcome in   CEBPA-double/PMN0-VWS-iomlzrgi AML patients (6). Another study   found that GATA2 mutations were associated with favorable   prognosis in intermediate-risk karyotype AML with biallelic CEBPA   mutations (5). The prognostic significance of GATA2 mutation in   the absence of CEBPA or FLT3 mutation is unclear. In MDS patients,   GATA2 is frequently co-mutated with BCOR and U2AF1 (15). In GATA2   mutated primary and advanced MDS patients, GATA2 mutation is often   germline in origin and is generally associated with monosomy 7   (28).       TIER 2: Variants of Unknown Clinical Significance in Hematologic   Malignancies       1. KMT2A c.4240G>A, p.Dmk1132Sqk ([id number])   VAF: 44.1%   KMT2A encodes a histone methyltransferase that acts as a   transcriptional coactivator to regulate gene expression during   early development and hematopoiesis (21). Somatic mutations of   KMT2A (formerly known as MLL) are found in 6-10% of AML patients   (1) (26) (27) and very rarely in other myeloid malignancies (22).   The reported AML-associated KMT2A mutations are mostly partial   tandem duplications that span KMT2A exons 2-11 (2) (14).  This   particular KMT2A missense variant alters a moderately conserved   amino acid and has not been reported in hematologic malignancies,   to the best of our knowledge. Its functional consequences are   unknown. In addition, this variant is listed in the dbSNP database   ([id number]). Given that the variant frequency is close to 50%,   it is unclear whether this is a germline or somatic variant. The   clinical significance, if any, is uncertain    Interval History:  He returns for routine follow-up of MDS on CED. HGB 10.5 continue CED today and every 2 weeks. Wt stable.   Hernan reports feeling well, mentions fatigue, but denies reports of bleeding, recurrent fevers or infections, night sweats or systemic signs of illness. He denies any chest pain, dyspnea, n/v/abd pain or new concerns on ROS.     Performance Status:  ECOG 0    Past Medical History:  Past Medical History:    C2-3 mild central, C3-4 mild-mod diffuse, C4-5 mild diffuse, C5-6 mod diffuse bulging discs    C5-6 mod central & bilateral mild foraminal, C4-5 left mild foraminal stenosis    C6-7 mild-mod central herniated disc    Cataract    2010    Chronic kidney disease (CKD)    stage 3    Diabetes mellitus (HCC)    Diabetes type 2, uncontrolled    Diabetic retinopathy (HCC)    [facility] to [facility] for eval    Hyperlipidemia    Meibomian gland dysfunction    Osteoarthritis of spine with radiculopathy, cervical region    Pancreatitis (HCC)    Primary osteoarthritis of both shoulders    Proteinuria    Type II or unspecified type diabetes mellitus without mention of complication, not stated as uncontrolled    Pills & Insulin    Vitreous floaters       Past Surgical History:  Past Surgical History:   Procedure Laterality Date    Appendectomy      Cataract extraction w/  intraocular lens implant Right 06/11/2018    Dr. Lopez    Cataract extraction w/  intraocular lens implant Left 07/12/2018    Dr. Lopez    Cholecystectomy  2012     Electrocardiogram, complete  2012    scanned to media tab    Hernia surgery         Family History:  Family History   Problem Relation Age of Onset    Diabetes Other         close relative    Diabetes Maternal Grandmother     Diabetes Father     Macular degeneration Neg     Glaucoma Neg         family h/o       Social History:  Social History     Socioeconomic History    Marital status:    Tobacco Use    Smoking status: Former     Current packs/day: 0.00     Types: Cigarettes     Quit date: 1989     Years since quittin.2    Smokeless tobacco: Former    Tobacco comments:     quit about 30 years ago.   Vaping Use    Vaping status: Never Used   Substance and Sexual Activity    Alcohol use: No    Drug use: No   Other Topics Concern    Caffeine Concern Yes     Comment: 1 cup coffee per day    Exercise No    History of tanning Yes    Reaction to local anesthetic No         Current Medications:    Current Outpatient Medications:     LANTUS 100 UNIT/ML Subcutaneous Solution, Inject 40 Units into the skin nightly., Disp: 36 mL, Rfl: 1    HYDROcodone-acetaminophen  MG Oral Tab, Take 1 tablet by mouth every 8 (eight) hours as needed for Pain., Disp: 90 tablet, Rfl: 0    AMITRIPTYLINE 25 MG Oral Tab, Take 1 tablet by mouth nightly, Disp: 90 tablet, Rfl: 0    pregabalin 300 MG Oral Cap, Take 1 capsule (300 mg total) by mouth daily., Disp: 30 capsule, Rfl: 0    PREGABALIN 300 MG Oral Cap, Take 1 capsule by mouth once daily, Disp: 90 capsule, Rfl: 0    HYDROcodone-acetaminophen (NORCO)  MG Oral Tab, Take 1 tablet by mouth every 8 (eight) hours as needed for Pain., Disp: 90 tablet, Rfl: 0    MAGNESIUM-OXIDE 400 (240 Mg) MG Oral Tab, Take 1 tablet by mouth once daily, Disp: 90 tablet, Rfl: 1    NOVOLOG 100 UNIT/ML Injection Solution, INJECT 50 UNITS SUBCUTANEOUSLY ONCE DAILY VIA  CORRECTION  FACTOR, Disp: 40 mL, Rfl: 1    BD INSULIN SYRINGE U/F 31G X \" 0.5 ML Does not apply Misc, USE 1 SYRINGE  4 TIMES DAILY, Disp: 400 each, Rfl: 3    Mometasone Furoate 0.1 % External Solution, Use bid to ears as directd, Disp: 60 mL, Rfl: 3    FREESTYLE LITE TEST In Vitro Strip, USE 1 STRIP TO CHECK BLOOD GLUCOSE 3 TIMES DAILY. DX: E11.65, insulin dependent, Disp: 300 strip, Rfl: 1    sodium zirconium cyclosilicate (LOKELMA) 10 g Oral Powd Pack, Take 1 packet (10 g total) by mouth twice a week., Disp: 30 packet, Rfl: 0    azelastine 137 MCG/SPRAY Nasal Solution, 1-2 sprays by Nasal route in the morning and 1-2 sprays before bedtime. FOR SINUS SYMPTOMS/NASAL CONGESTION.., Disp: 30 mL, Rfl: 3    fluticasone propionate 50 MCG/ACT Nasal Suspension, 2 sprays by Each Nare route daily. FOR NASAL CONGESTION/SINUS SYMPTOMS., Disp: 3 each, Rfl: 3    atorvastatin 40 MG Oral Tab, Take 1 tablet (40 mg total) by mouth daily. FOR CHOLESTEROL., Disp: 90 tablet, Rfl: 9    fenofibrate 48 MG Oral Tab, Take 1 tablet (48 mg total) by mouth daily. FOR TRIGLYCERIDES., Disp: 90 tablet, Rfl: 9    pantoprazole 40 MG Oral Tab EC, Take 1 tablet (40 mg total) by mouth before breakfast. FOR ACID REFLUX., Disp: 90 tablet, Rfl: 9    cyanocobalamin 1000 MCG Oral Tab, Take 1 tablet (1,000 mcg total) by mouth daily., Disp: 90 tablet, Rfl: 4    Betamethasone Dipropionate Aug 0.05 % External Cream, Apply 1 Application topically 2 (two) times daily. APPLY TO AFFECTED AREA, Disp: 50 g, Rfl: 1    metRONIDAZOLE 0.75 % External Cream, Apply 1 Application topically daily. For breakout on face, Disp: 45 g, Rfl: 0    Insulin Syringe-Needle U-100 (RELION INSULIN SYRINGE) 31G X 15/64\" 0.5 ML Does not apply Misc, 1 Syringe by Does not apply route 4 (four) times daily., Disp: 400 each, Rfl: 0    Pimecrolimus 1 % External Cream, APPLY   TOPICALLY AFFECTED AREA ON FACE ONCE DAILY TO TWICE DAILY, Disp: 60 g, Rfl: 0    Tazarotene 0.1 % External Cream, Apply to chest nightly, Disp: 60 g, Rfl: 3    Insulin Syringe-Needle U-100 (RELION INSULIN SYRINGE) 31G X 15/64\" 0.5 ML Does  not apply Misc, 1 Syringe by Does not apply route 4 (four) times daily., Disp: 400 each, Rfl: 0    Blood Glucose Monitoring Suppl (ACCU-CHEK INÉS PLUS) w/Device Does not apply Kit, Use as directed to check blood sugars., Disp: 1 kit, Rfl: 0    Ferrous Gluconate 225 (27 Fe) MG Oral Tab, Take 27 mg by mouth daily., Disp: , Rfl:     Omega-3 Fatty Acids (FISH OIL) 600 MG Oral Cap, Take 1 capsule by mouth nightly.  , Disp: , Rfl:     Multiple Vitamins-Minerals (CENTRUM SILVER) Oral Tab, Take 1 tablet by mouth daily., Disp: , Rfl:     Current Outpatient Medications on File Prior to Visit   Medication Sig Dispense Refill    LANTUS 100 UNIT/ML Subcutaneous Solution Inject 40 Units into the skin nightly. 36 mL 1    HYDROcodone-acetaminophen  MG Oral Tab Take 1 tablet by mouth every 8 (eight) hours as needed for Pain. 90 tablet 0    AMITRIPTYLINE 25 MG Oral Tab Take 1 tablet by mouth nightly 90 tablet 0    pregabalin 300 MG Oral Cap Take 1 capsule (300 mg total) by mouth daily. 30 capsule 0    PREGABALIN 300 MG Oral Cap Take 1 capsule by mouth once daily 90 capsule 0    HYDROcodone-acetaminophen (NORCO)  MG Oral Tab Take 1 tablet by mouth every 8 (eight) hours as needed for Pain. 90 tablet 0    MAGNESIUM-OXIDE 400 (240 Mg) MG Oral Tab Take 1 tablet by mouth once daily 90 tablet 1    NOVOLOG 100 UNIT/ML Injection Solution INJECT 50 UNITS SUBCUTANEOUSLY ONCE DAILY VIA  CORRECTION  FACTOR 40 mL 1    BD INSULIN SYRINGE U/F 31G X 5/16\" 0.5 ML Does not apply Misc USE 1 SYRINGE 4 TIMES DAILY 400 each 3    Mometasone Furoate 0.1 % External Solution Use bid to ears as directd 60 mL 3    FREESTYLE LITE TEST In Vitro Strip USE 1 STRIP TO CHECK BLOOD GLUCOSE 3 TIMES DAILY. DX: E11.65, insulin dependent 300 strip 1    sodium zirconium cyclosilicate (LOKELMA) 10 g Oral Powd Pack Take 1 packet (10 g total) by mouth twice a week. 30 packet 0    azelastine 137 MCG/SPRAY Nasal Solution 1-2 sprays by Nasal route in the morning and  1-2 sprays before bedtime. FOR SINUS SYMPTOMS/NASAL CONGESTION.. 30 mL 3    fluticasone propionate 50 MCG/ACT Nasal Suspension 2 sprays by Each Nare route daily. FOR NASAL CONGESTION/SINUS SYMPTOMS. 3 each 3    atorvastatin 40 MG Oral Tab Take 1 tablet (40 mg total) by mouth daily. FOR CHOLESTEROL. 90 tablet 9    fenofibrate 48 MG Oral Tab Take 1 tablet (48 mg total) by mouth daily. FOR TRIGLYCERIDES. 90 tablet 9    pantoprazole 40 MG Oral Tab EC Take 1 tablet (40 mg total) by mouth before breakfast. FOR ACID REFLUX. 90 tablet 9    cyanocobalamin 1000 MCG Oral Tab Take 1 tablet (1,000 mcg total) by mouth daily. 90 tablet 4    Betamethasone Dipropionate Aug 0.05 % External Cream Apply 1 Application topically 2 (two) times daily. APPLY TO AFFECTED AREA 50 g 1    metRONIDAZOLE 0.75 % External Cream Apply 1 Application topically daily. For breakout on face 45 g 0    Insulin Syringe-Needle U-100 (RELION INSULIN SYRINGE) 31G X 15/64\" 0.5 ML Does not apply Misc 1 Syringe by Does not apply route 4 (four) times daily. 400 each 0    Pimecrolimus 1 % External Cream APPLY   TOPICALLY AFFECTED AREA ON FACE ONCE DAILY TO TWICE DAILY 60 g 0    Tazarotene 0.1 % External Cream Apply to chest nightly 60 g 3    Insulin Syringe-Needle U-100 (RELION INSULIN SYRINGE) 31G X 15/64\" 0.5 ML Does not apply Misc 1 Syringe by Does not apply route 4 (four) times daily. 400 each 0    Blood Glucose Monitoring Suppl (ACCU-CHEK INÉS PLUS) w/Device Does not apply Kit Use as directed to check blood sugars. 1 kit 0    Ferrous Gluconate 225 (27 Fe) MG Oral Tab Take 27 mg by mouth daily.      Omega-3 Fatty Acids (FISH OIL) 600 MG Oral Cap Take 1 capsule by mouth nightly.        Multiple Vitamins-Minerals (CENTRUM SILVER) Oral Tab Take 1 tablet by mouth daily.       Current Facility-Administered Medications on File Prior to Visit   Medication Dose Route Frequency Provider Last Rate Last Admin    [COMPLETED] darbepoetin grey-polysorbate (Aranesp-Albumin  Free) 500 MCG/ML injection 500 mcg  500 mcg Subcutaneous Once KalebeleClarisa APRN   500 mcg at 02/07/25 1418    [COMPLETED] darbepoetin grey-polysorbate (Aranesp-Albumin Free) 500 MCG/ML injection 500 mcg  500 mcg Subcutaneous Once Lashonda Clarisa, APRN   500 mcg at 01/24/25 1343    [COMPLETED] darbepoetin grey-polysorbate (Aranesp-Albumin Free) 500 MCG/ML injection 500 mcg  500 mcg Subcutaneous Once Clarisa Gallego APRN   500 mcg at 01/10/25 1346    darbepoetin grey (Aranesp) 200 MCG/0.4ML injection 200 mcg  200 mcg Subcutaneous Q21 Days Jaun Enciso MD   200 mcg at 03/24/23 1028         Allergies:  Allergies   Allergen Reactions    Cefdinir DIARRHEA    Zosyn [Piperacillin Sod-Tazobactam So] OTHER (SEE COMMENTS)     Heightened sense of smell; dry heaves, vomiting        Review of Systems:  All other systems reviewed and negative x12    Vital Signs:  /69 (BP Location: Right arm, Patient Position: Sitting, Cuff Size: large)   Pulse 83   Temp 97.7 °F (36.5 °C) (Oral)   Resp 18   Ht 1.753 m (5' 9\")   Wt 106.6 kg (235 lb)   SpO2 99%   BMI 34.70 kg/m²     Physical Examination:  General: Patient is alert and oriented x 3, not in acute distress.  Psych:  Mood and affect appropriate  HEENT: EOMs intact. Oropharynx is clear.   Neck: No palpable lymphadenopathy. Neck is supple.  Lymphatics: There is no palpable peripheral lymphadenopathy   Chest: Clear to auscultation, nonlabored breathing  Cardiovascular: Regular with S1/S2  Abdomen: Soft, non tender.   Extremities: No edema. Warm and pink skin, no ecchymoses  Neurological: 5/5 motor x4.        Laboratory:  Lab Results   Component Value Date    WBC 2.2 (L) 02/05/2025    RBC 3.08 (L) 02/05/2025    HGB 10.5 (L) 02/05/2025    HCT 33.5 (L) 02/05/2025    .8 (H) 02/05/2025    MCH 34.1 (H) 02/05/2025    MCHC 31.3 02/05/2025    RDW 16.5 (H) 02/05/2025    PLT 88.0 (L) 02/05/2025    MPV 9.6 01/24/2019         Lab Results   Component Value Date      (H) 01/23/2025    BUN 63 (H) 01/23/2025    BUNCREA 16.8 01/23/2025    CREATSERUM 3.74 (H) 01/23/2025    ANIONGAP 8 01/23/2025    GFRNAA 21 (L) 07/23/2022    GFRAA 24 (L) 07/23/2022    CA 8.9 01/23/2025    OSMOCALC 317 (H) 01/23/2025    ALKPHO 83 12/12/2024    AST 29 12/12/2024    ALT 31 12/12/2024    ALKPHOS 114 (H) 04/25/2013    BILT 0.7 12/12/2024    TP 6.4 12/12/2024    ALB 4.0 01/23/2025    GLOBULIN 2.5 12/12/2024    AGRATIO 1.3 04/25/2013     01/23/2025    K 5.6 (H) 01/23/2025     01/23/2025    CO2 23.0 01/23/2025       Radiology:       Cancer Staging   No matching staging information was found for the patient.      Impression and Plan:  69 year old  With chronic kidney disease been divided by hematology for pancytopenia. Bone marrow 3/23 showed MDS ipss-r low risk (del 20, 1%blasts)    1.) MDS  - NGS as above  -CED hemoglobin now significantly improved we will continue darbepoetin  --hgb is at goal, do not want to exceed value >11g  --discussed hat following discussed with Dr. Sandra Mills at [facility], the pt is not eligible for stem cell transplant, so continue current management  --we can consider GCSF therapy if white blood cell count declines further or TPO therapy to improve platelet counts as needed  --if counts drastically change, will need repeat bone marrow before consider switching to HMA like azacitadine or decitabine to help the pancytopenia    -HGB stable 10.5, continue CED, WBC and ANC stable, wt stable, No recent infections, Denies B symptoms, denies bleeding  --continue next injection of CED; pt will f/u in 6 wks with labs prior    2.) CKD  --follows with Dr. Enciso for this condition; also likely contributing to his anemia  --last saw Dr. Enciso 1/2025, follows low potassium diet, to follow up in 3 months Dr. Enciso         MDM: Moderate  : Ongoing continuing of complex care      [facility]  [facility]  [address] [address]

## 2025-02-17 DIAGNOSIS — E11.65 UNCONTROLLED TYPE 2 DIABETES MELLITUS WITH HYPERGLYCEMIA (HCC): ICD-10-CM

## 2025-02-17 RX ORDER — INSULIN ASPART 100 [IU]/ML
INJECTION, SOLUTION INTRAVENOUS; SUBCUTANEOUS
Qty: 40 ML | Refills: 0 | Status: SHIPPED | OUTPATIENT
Start: 2025-02-17

## 2025-02-19 ENCOUNTER — LAB ENCOUNTER (OUTPATIENT)
Dept: LAB | Age: 70
End: 2025-02-19
Attending: INTERNAL MEDICINE
Payer: MEDICARE

## 2025-02-19 DIAGNOSIS — D46.9 MDS (MYELODYSPLASTIC SYNDROME) (HCC): ICD-10-CM

## 2025-02-19 DIAGNOSIS — D61.818 PANCYTOPENIA (HCC): ICD-10-CM

## 2025-02-19 LAB
BASOPHILS # BLD AUTO: 0.01 X10(3) UL (ref 0–0.2)
BASOPHILS NFR BLD AUTO: 0.4 %
DEPRECATED RDW RBC AUTO: 66.3 FL (ref 35.1–46.3)
EOSINOPHIL # BLD AUTO: 0.07 X10(3) UL (ref 0–0.7)
EOSINOPHIL NFR BLD AUTO: 2.7 %
ERYTHROCYTE [DISTWIDTH] IN BLOOD BY AUTOMATED COUNT: 16.6 % (ref 11–15)
HCT VFR BLD AUTO: 31.4 %
HGB BLD-MCNC: 9.7 G/DL
IMM GRANULOCYTES # BLD AUTO: 0.01 X10(3) UL (ref 0–1)
IMM GRANULOCYTES NFR BLD: 0.4 %
LYMPHOCYTES # BLD AUTO: 1.1 X10(3) UL (ref 1–4)
LYMPHOCYTES NFR BLD AUTO: 43 %
MCH RBC QN AUTO: 33.9 PG (ref 26–34)
MCHC RBC AUTO-ENTMCNC: 30.9 G/DL (ref 31–37)
MCV RBC AUTO: 109.8 FL
MONOCYTES # BLD AUTO: 0.29 X10(3) UL (ref 0.1–1)
MONOCYTES NFR BLD AUTO: 11.3 %
NEUTROPHILS # BLD AUTO: 1.08 X10 (3) UL (ref 1.5–7.7)
NEUTROPHILS # BLD AUTO: 1.08 X10(3) UL (ref 1.5–7.7)
NEUTROPHILS NFR BLD AUTO: 42.2 %
PLATELET # BLD AUTO: 76 10(3)UL (ref 150–450)
PLATELET MORPHOLOGY: NORMAL
PLATELETS.RETICULATED NFR BLD AUTO: 8.4 % (ref 0–7)
RBC # BLD AUTO: 2.86 X10(6)UL
WBC # BLD AUTO: 2.6 X10(3) UL (ref 4–11)

## 2025-02-19 PROCEDURE — 85025 COMPLETE CBC W/AUTO DIFF WBC: CPT

## 2025-02-19 PROCEDURE — 36415 COLL VENOUS BLD VENIPUNCTURE: CPT

## 2025-02-21 ENCOUNTER — NURSE ONLY (OUTPATIENT)
Age: 70
End: 2025-02-21
Attending: INTERNAL MEDICINE
Payer: MEDICARE

## 2025-02-21 ENCOUNTER — TELEPHONE (OUTPATIENT)
Dept: NEPHROLOGY | Facility: CLINIC | Age: 70
End: 2025-02-21

## 2025-02-21 ENCOUNTER — APPOINTMENT (OUTPATIENT)
Age: 70
End: 2025-02-21
Attending: INTERNAL MEDICINE
Payer: MEDICARE

## 2025-02-21 DIAGNOSIS — E11.3293 TYPE 2 DIABETES MELLITUS WITH BOTH EYES AFFECTED BY MILD NONPROLIFERATIVE RETINOPATHY WITHOUT MACULAR EDEMA, WITHOUT LONG-TERM CURRENT USE OF INSULIN (HCC): ICD-10-CM

## 2025-02-21 DIAGNOSIS — D61.818 PANCYTOPENIA (HCC): ICD-10-CM

## 2025-02-21 DIAGNOSIS — D46.9 MDS (MYELODYSPLASTIC SYNDROME) (HCC): Primary | ICD-10-CM

## 2025-02-21 DIAGNOSIS — G62.9 NEUROPATHY: ICD-10-CM

## 2025-02-21 NOTE — TELEPHONE ENCOUNTER
Prescription refill request- confirmed dosage- patient takes 1 tablet every 8 hours    LOV: 1/9/25  F/U: 4/17/25     HYDROcodone-acetaminophen  MG Oral Tab   Sig - Route: Take 1 tablet by mouth every 8 (eight) hours as needed for Pain     diagnosis  -Neuropathy  -Type 2 diabetes mellitus with both eyes affected by mild nonproliferative retinopathy without macular edema, without long-term current use of insulin (HCC)

## 2025-02-21 NOTE — TELEPHONE ENCOUNTER
Patients spouse calling to request script Hydrocodone, [facility]. Please update at [phone],thanks.

## 2025-02-25 RX ORDER — HYDROCODONE BITARTRATE AND ACETAMINOPHEN 10; 325 MG/1; MG/1
1 TABLET ORAL EVERY 8 HOURS PRN
Qty: 90 TABLET | Refills: 0 | OUTPATIENT
Start: 2025-02-25

## 2025-02-25 RX ORDER — PREGABALIN 300 MG/1
300 CAPSULE ORAL DAILY
Qty: 90 CAPSULE | Refills: 0 | OUTPATIENT
Start: 2025-02-25

## 2025-02-25 NOTE — TELEPHONE ENCOUNTER
Patient wife is requesting a call back with status on refill.  Patient is out of medication.  Please call

## 2025-02-25 NOTE — TELEPHONE ENCOUNTER
Spoke to patient informed request would be sent to Dr. Enciso. Verbalized understanding.     Prescription refill request- confirmed dosages- patient takes Norco 1 tablet every 8 hours patient also requesting refill on pregabalin 300mg. Pended for review and sign off if appropriate. Thank you.     LOV: 1/9/25  F/U: 4/17/25     HYDROcodone-acetaminophen  MG Oral Tab   Sig - Route: Take 1 tablet by mouth every 8 (eight) hours as needed for Pain

## 2025-03-05 ENCOUNTER — TELEPHONE (OUTPATIENT)
Dept: NEPHROLOGY | Facility: CLINIC | Age: 70
End: 2025-03-05

## 2025-03-06 ENCOUNTER — LAB ENCOUNTER (OUTPATIENT)
Dept: LAB | Age: 70
End: 2025-03-06
Attending: INTERNAL MEDICINE
Payer: MEDICARE

## 2025-03-06 DIAGNOSIS — D46.9 MDS (MYELODYSPLASTIC SYNDROME) (HCC): ICD-10-CM

## 2025-03-06 DIAGNOSIS — D61.818 PANCYTOPENIA (HCC): ICD-10-CM

## 2025-03-06 LAB
BASOPHILS # BLD AUTO: 0.01 X10(3) UL (ref 0–0.2)
BASOPHILS NFR BLD AUTO: 0.4 %
DEPRECATED RDW RBC AUTO: 65.9 FL (ref 35.1–46.3)
EOSINOPHIL # BLD AUTO: 0.07 X10(3) UL (ref 0–0.7)
EOSINOPHIL NFR BLD AUTO: 2.7 %
ERYTHROCYTE [DISTWIDTH] IN BLOOD BY AUTOMATED COUNT: 16.5 % (ref 11–15)
HCT VFR BLD AUTO: 31.3 %
HGB BLD-MCNC: 9.8 G/DL
IMM GRANULOCYTES # BLD AUTO: 0.01 X10(3) UL (ref 0–1)
IMM GRANULOCYTES NFR BLD: 0.4 %
LYMPHOCYTES # BLD AUTO: 1.14 X10(3) UL (ref 1–4)
LYMPHOCYTES NFR BLD AUTO: 44 %
MCH RBC QN AUTO: 34.3 PG (ref 26–34)
MCHC RBC AUTO-ENTMCNC: 31.3 G/DL (ref 31–37)
MCV RBC AUTO: 109.4 FL
MONOCYTES # BLD AUTO: 0.27 X10(3) UL (ref 0.1–1)
MONOCYTES NFR BLD AUTO: 10.4 %
NEUTROPHILS # BLD AUTO: 1.09 X10 (3) UL (ref 1.5–7.7)
NEUTROPHILS # BLD AUTO: 1.09 X10(3) UL (ref 1.5–7.7)
NEUTROPHILS NFR BLD AUTO: 42.1 %
PLATELET # BLD AUTO: 80 10(3)UL (ref 150–450)
PLATELET MORPHOLOGY: NORMAL
PLATELETS.RETICULATED NFR BLD AUTO: 6.6 % (ref 0–7)
RBC # BLD AUTO: 2.86 X10(6)UL
WBC # BLD AUTO: 2.6 X10(3) UL (ref 4–11)

## 2025-03-06 PROCEDURE — 36415 COLL VENOUS BLD VENIPUNCTURE: CPT

## 2025-03-06 PROCEDURE — 85025 COMPLETE CBC W/AUTO DIFF WBC: CPT

## 2025-03-07 ENCOUNTER — NURSE ONLY (OUTPATIENT)
Age: 70
End: 2025-03-07
Attending: INTERNAL MEDICINE
Payer: MEDICARE

## 2025-03-07 DIAGNOSIS — D46.9 MDS (MYELODYSPLASTIC SYNDROME) (HCC): Primary | ICD-10-CM

## 2025-03-07 DIAGNOSIS — D61.818 PANCYTOPENIA (HCC): ICD-10-CM

## 2025-03-07 NOTE — PROGRESS NOTES
Pt here for Q2 week aranesp injection - gets labs outpt at [facility]  Hgl 9.8 outpt on 3/6  Reports feeling fairly good - no changes/ concerns    Aranesp 500mcg given right upper arm subcutaneous, tolerated well - much less painful if given slow, warmed   Must give super slow, warm capped needle/ syringe in hand, do not apply bandage d/t hairy arms per pt.  Parameters are to hold if hgb is 11 or greater - pt is aware    Discharged stable to home with future appts. Given AVS - prefers appts after 1030 d/t parking  Gait steady, indep

## 2025-03-09 DIAGNOSIS — E11.69 HYPERLIPIDEMIA ASSOCIATED WITH TYPE 2 DIABETES MELLITUS (HCC): ICD-10-CM

## 2025-03-09 DIAGNOSIS — K21.00 GASTROESOPHAGEAL REFLUX DISEASE WITH ESOPHAGITIS, UNSPECIFIED WHETHER HEMORRHAGE: ICD-10-CM

## 2025-03-09 DIAGNOSIS — E11.3293 TYPE 2 DIABETES MELLITUS WITH BOTH EYES AFFECTED BY MILD NONPROLIFERATIVE RETINOPATHY WITHOUT MACULAR EDEMA, WITHOUT LONG-TERM CURRENT USE OF INSULIN (HCC): ICD-10-CM

## 2025-03-09 DIAGNOSIS — E78.5 HYPERLIPIDEMIA ASSOCIATED WITH TYPE 2 DIABETES MELLITUS (HCC): ICD-10-CM

## 2025-03-11 ENCOUNTER — TELEPHONE (OUTPATIENT)
Dept: NEPHROLOGY | Facility: CLINIC | Age: 70
End: 2025-03-11

## 2025-03-12 RX ORDER — PANTOPRAZOLE SODIUM 40 MG/1
TABLET, DELAYED RELEASE ORAL
Qty: 90 TABLET | Refills: 3 | Status: SHIPPED | OUTPATIENT
Start: 2025-03-12

## 2025-03-12 RX ORDER — FENOFIBRATE 48 MG/1
48 TABLET, COATED ORAL DAILY
Qty: 90 TABLET | Refills: 3 | Status: SHIPPED | OUTPATIENT
Start: 2025-03-12

## 2025-03-12 NOTE — TELEPHONE ENCOUNTER
Dr Enciso please see note below lab done on 1/23/25  Component      Latest Ref Rng 12/12/2024 1/23/2025   Glucose      70 - 99 mg/dL 220 (H)  190 (H)    Sodium      136 - 145 mmol/L 143  142    Potassium      3.5 - 5.1 mmol/L 5.8 (H)  5.6 (H)    Chloride      98 - 112 mmol/L 112  111    Carbon Dioxide, Total      21.0 - 32.0 mmol/L 26.0  23.0    ANION GAP      0 - 18 mmol/L 5  8    BUN      9 - 23 mg/dL 55 (H)  63 (H)    CREATININE      0.70 - 1.30 mg/dL 3.46 (H)  3.74 (H)    BUN/CREATININE RATIO      10.0 - 20.0  15.9  16.8    CALCIUM      8.7 - 10.4 mg/dL 9.3  8.9    CALCULATED OSMOLALITY      275 - 295 mOsm/kg 318 (H)  317 (H)    Albumin      3.2 - 4.8 g/dL 3.9  4.0    PHOSPHORUS      2.4 - 5.1 mg/dL  4.9    EGFR      >=60 mL/min/1.73m2 18 (L)  17 (L)       Legend:  (H) High  (L) Low

## 2025-03-12 NOTE — TELEPHONE ENCOUNTER
Dr Enciso made aware of the note below stated already spoke to patient wife about the result ,that lab is stable and to continue taking Lokelma 10 g twice weekly.Patient wife Vanesa informed and verbalized understanding.

## 2025-03-17 NOTE — TELEPHONE ENCOUNTER
Endocrine Refill protocol for oral medications    Protocol Criteria:  PASSED  Reason: N/A    If below requirement is met, send a 90-day supply with 1 refill per provider protocol.    Verify appointment with Endocrinology completed in the last 6 months or scheduled in the next 3 months.    Last completed office visit: 1/8/2025 Tanya Naranjo MD   Next scheduled Follow up:   Future Appointments   Date Time Provider Department Center   3/21/2025  1:30 PM [facility] LAB2 [facility]   3/21/2025  2:40 PM Payam Rdz MD [facility]   4/4/2025  2:00 PM [facility] LAB2 [facility]   4/17/2025  2:30 PM Jaun Enciso MD [id number]   5/21/2025  1:45 PM [facility]

## 2025-03-19 RX ORDER — MAGNESIUM OXIDE TAB 400 MG (241.3 MG ELEMENTAL MG) 400 (241.3 MG) MG
1 TAB ORAL DAILY
Qty: 90 TABLET | Refills: 1 | Status: SHIPPED | OUTPATIENT
Start: 2025-03-19

## 2025-03-20 ENCOUNTER — LAB ENCOUNTER (OUTPATIENT)
Dept: LAB | Age: 70
End: 2025-03-20
Attending: INTERNAL MEDICINE
Payer: MEDICARE

## 2025-03-20 DIAGNOSIS — D61.818 PANCYTOPENIA (HCC): ICD-10-CM

## 2025-03-20 DIAGNOSIS — D46.9 MDS (MYELODYSPLASTIC SYNDROME) (HCC): ICD-10-CM

## 2025-03-20 PROCEDURE — 36415 COLL VENOUS BLD VENIPUNCTURE: CPT

## 2025-03-20 PROCEDURE — 85060 BLOOD SMEAR INTERPRETATION: CPT

## 2025-03-20 PROCEDURE — 85025 COMPLETE CBC W/AUTO DIFF WBC: CPT

## 2025-03-21 ENCOUNTER — TELEPHONE (OUTPATIENT)
Dept: ENDOCRINOLOGY CLINIC | Facility: CLINIC | Age: 70
End: 2025-03-21

## 2025-03-21 ENCOUNTER — NURSE ONLY (OUTPATIENT)
Age: 70
End: 2025-03-21
Attending: INTERNAL MEDICINE
Payer: MEDICARE

## 2025-03-21 ENCOUNTER — OFFICE VISIT (OUTPATIENT)
Age: 70
End: 2025-03-21
Attending: INTERNAL MEDICINE
Payer: MEDICARE

## 2025-03-21 VITALS
DIASTOLIC BLOOD PRESSURE: 78 MMHG | WEIGHT: 241 LBS | BODY MASS INDEX: 35.7 KG/M2 | HEART RATE: 76 BPM | RESPIRATION RATE: 18 BRPM | SYSTOLIC BLOOD PRESSURE: 157 MMHG | TEMPERATURE: 98 F | HEIGHT: 69 IN | OXYGEN SATURATION: 100 %

## 2025-03-21 DIAGNOSIS — D46.9 MDS (MYELODYSPLASTIC SYNDROME) (HCC): Primary | ICD-10-CM

## 2025-03-21 DIAGNOSIS — Z51.81 MEDICATION MONITORING ENCOUNTER: ICD-10-CM

## 2025-03-21 DIAGNOSIS — D61.818 PANCYTOPENIA (HCC): ICD-10-CM

## 2025-03-21 LAB
BASOPHILS # BLD AUTO: 0 X10(3) UL (ref 0–0.2)
BASOPHILS NFR BLD AUTO: 0 %
DEPRECATED RDW RBC AUTO: 65.4 FL (ref 35.1–46.3)
EOSINOPHIL # BLD AUTO: 0.06 X10(3) UL (ref 0–0.7)
EOSINOPHIL NFR BLD AUTO: 2.5 %
ERYTHROCYTE [DISTWIDTH] IN BLOOD BY AUTOMATED COUNT: 16.6 % (ref 11–15)
HCT VFR BLD AUTO: 31.2 %
HGB BLD-MCNC: 9.8 G/DL
IMM GRANULOCYTES # BLD AUTO: 0.01 X10(3) UL (ref 0–1)
IMM GRANULOCYTES NFR BLD: 0.4 %
LYMPHOCYTES # BLD AUTO: 0.83 X10(3) UL (ref 1–4)
LYMPHOCYTES NFR BLD AUTO: 34.2 %
MCH RBC QN AUTO: 33.8 PG (ref 26–34)
MCHC RBC AUTO-ENTMCNC: 31.4 G/DL (ref 31–37)
MCV RBC AUTO: 107.6 FL
MONOCYTES # BLD AUTO: 0.22 X10(3) UL (ref 0.1–1)
MONOCYTES NFR BLD AUTO: 9.1 %
NEUTROPHILS # BLD AUTO: 1.31 X10 (3) UL (ref 1.5–7.7)
NEUTROPHILS # BLD AUTO: 1.31 X10(3) UL (ref 1.5–7.7)
NEUTROPHILS NFR BLD AUTO: 53.8 %
PLATELET # BLD AUTO: 79 10(3)UL (ref 150–450)
PLATELETS.RETICULATED NFR BLD AUTO: 6.3 % (ref 0–7)
RBC # BLD AUTO: 2.9 X10(6)UL
WBC # BLD AUTO: 2.4 X10(3) UL (ref 4–11)

## 2025-03-21 NOTE — TELEPHONE ENCOUNTER
Received a fax from [employer], requesting a physician order, be filled out regarding pt's diabetic supplies. Also attached, is pt 's most recent ov 1/08/2025.     Paperwork placed in providers folder for signature and review.       Spoke with pt's wife, she states dr. Naranjo set this up from [employer] so that pt can receive his dexcom G7

## 2025-03-21 NOTE — PROGRESS NOTES
[facility]cer Center Progress Note    Patient Name: Jaun Burton   YOB: 1955   Medical Record Number: [id number]   Attending Physician: Payam Rdz M.D.     Chief Complaint:  Pancytopenia due to MDS and CKD    History of Present Illness:  69 year old  With chronic kidney disease been evaluate by hematology for pancytopenia.    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 U2AF1 c.101C>T, p.[id number] ([id number])   VAF: 38.6%   U2AF1 (also known as U2AF35) encodes a component of the   RNA-splicing machinery known as the spliceosome. Somatic mutations   of U2AF1 are found in approximately 5% of patients with acute   myeloid leukemia (AML)  (25), and in approximately 9% of patients   with myelodysplastic syndrome (MDS) (20) (29) (19). Most U2AF1   mutations affect codons Ser34 or Fte008 (10). This particular   missense mutation has been reported in hematologic malignancies   (4). Mutations in spliceosomal genes, including U2AF1, are   associated with decreased disease-free and overall survival in   patients with AML (8) (13). In MDS, U2AF1 mutations are associated   with worse overall survival (11) and more rapid transformation to   AML (24), and do not predict response to hypomethylating therapies   (11).       2. GATA2 c.568dup, p.[id number] ([id number])   VAF: 40.6%   GATA2 belongs to the ZINA family of transcription factors and   regulates hematopoiesis through two conserved zinc finger domains.   Overall, somatic GATA2 mutations are found in 1-4% of patients   with sporadic myeloid malignancies (12) (17) (18). GATA2 mutations   are common in adult AML patients with biallelic CEBPA mutations,   but are rare in adult AML patients with wild-type CEBPA (5) (6)   (7). Somatic GATA2 mutations are infrequent in MDS (28).   Pathogenic germline variants of GATA2 cause a range of   hematopoietic defects, including MonoMAC syndrome, dendritic cell,   monocyte, B and NK lymphoid  deficiency syndrome (DCML), familial   MDS, AML, and blast transformation in chronic myeloid leukemia   (CML) (3) (23). Somatic GATA2 mutations in hematological   malignancies are typically missense mutations within the   N-terminal zinc-finger domain and in-frame deletions/insertions in   the C-terminal zinc-finger domain (9) (15) (16). Somatic   frameshift and nonsense mutations in GATA2 are generally detected   outside of the zinc-finger domains (15). This particular   frameshift mutation is predicted to disrupt the normal function of   GATA2 (3). In AML patients, GATA2 mutations are confined to the   N-terminal zinc finger domain, and frequently co-occurred with   biallelic CEBPA, KIT and FLT3 mutations (15). Some studies found   that GATA2 mutations had no impact on the clinical outcome in   CEBPA-double/OJI2-OJC-zqhmubxa AML patients (6). Another study   found that GATA2 mutations were associated with favorable   prognosis in intermediate-risk karyotype AML with biallelic CEBPA   mutations (5). The prognostic significance of GATA2 mutation in   the absence of CEBPA or FLT3 mutation is unclear. In MDS patients,   GATA2 is frequently co-mutated with BCOR and U2AF1 (15). In GATA2   mutated primary and advanced MDS patients, GATA2 mutation is often   germline in origin and is generally associated with monosomy 7   (28).       TIER 2: Variants of Unknown Clinical Significance in Hematologic   Malignancies       1. KMT2A c.4240G>A, p.[id number] ([id number])   VAF: 44.1%   KMT2A encodes a histone methyltransferase that acts as a   transcriptional coactivator to regulate gene expression during   early development and hematopoiesis (21). Somatic mutations of   KMT2A (formerly known as MLL) are found in 6-10% of AML patients   (1) (26) (27) and very rarely in other myeloid malignancies (22).   The reported AML-associated KMT2A mutations are mostly partial   tandem duplications that span KMT2A exons 2-11 (2) (14).  This   particular KMT2A missense variant alters a moderately conserved   amino acid and has not been reported in hematologic malignancies,   to the best of our knowledge. Its functional consequences are   unknown. In addition, this variant is listed in the dbSNP database   ([id number]). Given that the variant frequency is close to 50%,   it is unclear whether this is a germline or somatic variant. The   clinical significance, if any, is uncertain    Interval History:  He returns for routine follow-up of MDS on CED every 2 weeks. Wt increased some, no systemic signs of illness.     Hernan reports feeling well, mentions fatigue, but denies reports of bleeding, fevers or infections. He denies any chest pain, dyspnea, n/v/abd pain or new concerns on ROS.     Performance Status:  ECOG 0    Past Medical History:  Past Medical History:    C2-3 mild central, C3-4 mild-mod diffuse, C4-5 mild diffuse, C5-6 mod diffuse bulging discs    C5-6 mod central & bilateral mild foraminal, C4-5 left mild foraminal stenosis    C6-7 mild-mod central herniated disc    Cataract    2010    Chronic kidney disease (CKD)    stage 3    Diabetes mellitus (HCC)    Diabetes type 2, uncontrolled    Diabetic retinopathy (HCC)    [facility] for eval    Hyperlipidemia    Meibomian gland dysfunction    Osteoarthritis of spine with radiculopathy, cervical region    Pancreatitis (HCC)    Primary osteoarthritis of both shoulders    Proteinuria    Type II or unspecified type diabetes mellitus without mention of complication, not stated as uncontrolled    Pills & Insulin    Vitreous floaters       Past Surgical History:  Past Surgical History:   Procedure Laterality Date    Appendectomy      Cataract extraction w/  intraocular lens implant Right 06/11/2018    Dr. Lopez    Cataract extraction w/  intraocular lens implant Left 07/12/2018    Dr. Lopez    Cholecystectomy  2012    Electrocardiogram, complete  4/23/2012    scanned to media  tab    Hernia surgery         Family History:  Family History   Problem Relation Age of Onset    Diabetes Other         close relative    Diabetes Maternal Grandmother     Diabetes Father     Macular degeneration Neg     Glaucoma Neg         family h/o       Social History:  Social History     Socioeconomic History    Marital status:    Tobacco Use    Smoking status: Former     Current packs/day: 0.00     Types: Cigarettes     Quit date: 1989     Years since quittin.3    Smokeless tobacco: Former    Tobacco comments:     quit about 30 years ago.   Vaping Use    Vaping status: Never Used   Substance and Sexual Activity    Alcohol use: No    Drug use: No   Other Topics Concern    Caffeine Concern Yes     Comment: 1 cup coffee per day    Exercise No    History of tanning Yes    Reaction to local anesthetic No         Current Medications:    Current Outpatient Medications:     MAGNESIUM-OXIDE 400 (240 Mg) MG Oral Tab, Take 1 tablet by mouth once daily, Disp: 90 tablet, Rfl: 1    AMITRIPTYLINE 25 MG Oral Tab, Take 1 tablet by mouth nightly, Disp: 90 tablet, Rfl: 0    pantoprazole 40 MG Oral Tab EC, TAKE 1 TABLET BY MOUTH BEFORE BREAKFAST FOR  ACID  REFLUX, Disp: 90 tablet, Rfl: 3    fenofibrate 48 MG Oral Tab, Take 1 tablet by mouth once daily, Disp: 90 tablet, Rfl: 3    pregabalin 300 MG Oral Cap, Take 1 capsule (300 mg total) by mouth daily., Disp: 90 capsule, Rfl: 0    HYDROcodone-acetaminophen  MG Oral Tab, Take 1 tablet by mouth every 8 (eight) hours as needed for Pain., Disp: 90 tablet, Rfl: 0    NOVOLOG 100 UNIT/ML Injection Solution, INJECT 50 UNITS SUBCUTANEOUSLY ONCE DAILY VIA CORRECTION FACTOR, Disp: 40 mL, Rfl: 0    LANTUS 100 UNIT/ML Subcutaneous Solution, Inject 40 Units into the skin nightly., Disp: 36 mL, Rfl: 1    pregabalin 300 MG Oral Cap, Take 1 capsule (300 mg total) by mouth daily., Disp: 30 capsule, Rfl: 0    HYDROcodone-acetaminophen (NORCO)  MG Oral Tab, Take 1  tablet by mouth every 8 (eight) hours as needed for Pain., Disp: 90 tablet, Rfl: 0    BD INSULIN SYRINGE U/F 31G X 5/16\" 0.5 ML Does not apply Misc, USE 1 SYRINGE 4 TIMES DAILY, Disp: 400 each, Rfl: 3    Mometasone Furoate 0.1 % External Solution, Use bid to ears as directd, Disp: 60 mL, Rfl: 3    FREESTYLE LITE TEST In Vitro Strip, USE 1 STRIP TO CHECK BLOOD GLUCOSE 3 TIMES DAILY. DX: E11.65, insulin dependent, Disp: 300 strip, Rfl: 1    sodium zirconium cyclosilicate (LOKELMA) 10 g Oral Powd Pack, Take 1 packet (10 g total) by mouth twice a week., Disp: 30 packet, Rfl: 0    azelastine 137 MCG/SPRAY Nasal Solution, 1-2 sprays by Nasal route in the morning and 1-2 sprays before bedtime. FOR SINUS SYMPTOMS/NASAL CONGESTION.., Disp: 30 mL, Rfl: 3    fluticasone propionate 50 MCG/ACT Nasal Suspension, 2 sprays by Each Nare route daily. FOR NASAL CONGESTION/SINUS SYMPTOMS., Disp: 3 each, Rfl: 3    atorvastatin 40 MG Oral Tab, Take 1 tablet (40 mg total) by mouth daily. FOR CHOLESTEROL., Disp: 90 tablet, Rfl: 9    Betamethasone Dipropionate Aug 0.05 % External Cream, Apply 1 Application topically 2 (two) times daily. APPLY TO AFFECTED AREA, Disp: 50 g, Rfl: 1    metRONIDAZOLE 0.75 % External Cream, Apply 1 Application topically daily. For breakout on face, Disp: 45 g, Rfl: 0    Pimecrolimus 1 % External Cream, APPLY   TOPICALLY AFFECTED AREA ON FACE ONCE DAILY TO TWICE DAILY, Disp: 60 g, Rfl: 0    Tazarotene 0.1 % External Cream, Apply to chest nightly, Disp: 60 g, Rfl: 3    Insulin Syringe-Needle U-100 (RELION INSULIN SYRINGE) 31G X 15/64\" 0.5 ML Does not apply Misc, 1 Syringe by Does not apply route 4 (four) times daily., Disp: 400 each, Rfl: 0    Blood Glucose Monitoring Suppl (ACCU-CHEK INÉS PLUS) w/Device Does not apply Kit, Use as directed to check blood sugars., Disp: 1 kit, Rfl: 0    Ferrous Gluconate 225 (27 Fe) MG Oral Tab, Take 27 mg by mouth daily., Disp: , Rfl:     Omega-3 Fatty Acids (FISH OIL) 600 MG Oral  Cap, Take 1 capsule by mouth nightly.  , Disp: , Rfl:     Multiple Vitamins-Minerals (CENTRUM SILVER) Oral Tab, Take 1 tablet by mouth daily., Disp: , Rfl:     Current Outpatient Medications on File Prior to Visit   Medication Sig Dispense Refill    MAGNESIUM-OXIDE 400 (240 Mg) MG Oral Tab Take 1 tablet by mouth once daily 90 tablet 1    AMITRIPTYLINE 25 MG Oral Tab Take 1 tablet by mouth nightly 90 tablet 0    pantoprazole 40 MG Oral Tab EC TAKE 1 TABLET BY MOUTH BEFORE BREAKFAST FOR  ACID  REFLUX 90 tablet 3    fenofibrate 48 MG Oral Tab Take 1 tablet by mouth once daily 90 tablet 3    pregabalin 300 MG Oral Cap Take 1 capsule (300 mg total) by mouth daily. 90 capsule 0    HYDROcodone-acetaminophen  MG Oral Tab Take 1 tablet by mouth every 8 (eight) hours as needed for Pain. 90 tablet 0    NOVOLOG 100 UNIT/ML Injection Solution INJECT 50 UNITS SUBCUTANEOUSLY ONCE DAILY VIA CORRECTION FACTOR 40 mL 0    LANTUS 100 UNIT/ML Subcutaneous Solution Inject 40 Units into the skin nightly. 36 mL 1    pregabalin 300 MG Oral Cap Take 1 capsule (300 mg total) by mouth daily. 30 capsule 0    HYDROcodone-acetaminophen (NORCO)  MG Oral Tab Take 1 tablet by mouth every 8 (eight) hours as needed for Pain. 90 tablet 0    BD INSULIN SYRINGE U/F 31G X 5/16\" 0.5 ML Does not apply Misc USE 1 SYRINGE 4 TIMES DAILY 400 each 3    Mometasone Furoate 0.1 % External Solution Use bid to ears as directd 60 mL 3    FREESTYLE LITE TEST In Vitro Strip USE 1 STRIP TO CHECK BLOOD GLUCOSE 3 TIMES DAILY. DX: E11.65, insulin dependent 300 strip 1    sodium zirconium cyclosilicate (LOKELMA) 10 g Oral Powd Pack Take 1 packet (10 g total) by mouth twice a week. 30 packet 0    azelastine 137 MCG/SPRAY Nasal Solution 1-2 sprays by Nasal route in the morning and 1-2 sprays before bedtime. FOR SINUS SYMPTOMS/NASAL CONGESTION.. 30 mL 3    fluticasone propionate 50 MCG/ACT Nasal Suspension 2 sprays by Each Nare route daily. FOR NASAL  CONGESTION/SINUS SYMPTOMS. 3 each 3    atorvastatin 40 MG Oral Tab Take 1 tablet (40 mg total) by mouth daily. FOR CHOLESTEROL. 90 tablet 9    Betamethasone Dipropionate Aug 0.05 % External Cream Apply 1 Application topically 2 (two) times daily. APPLY TO AFFECTED AREA 50 g 1    metRONIDAZOLE 0.75 % External Cream Apply 1 Application topically daily. For breakout on face 45 g 0    Pimecrolimus 1 % External Cream APPLY   TOPICALLY AFFECTED AREA ON FACE ONCE DAILY TO TWICE DAILY 60 g 0    Tazarotene 0.1 % External Cream Apply to chest nightly 60 g 3    Insulin Syringe-Needle U-100 (RELION INSULIN SYRINGE) 31G X 15/64\" 0.5 ML Does not apply Misc 1 Syringe by Does not apply route 4 (four) times daily. 400 each 0    Blood Glucose Monitoring Suppl (ACCU-CHEK NIÉS PLUS) w/Device Does not apply Kit Use as directed to check blood sugars. 1 kit 0    Ferrous Gluconate 225 (27 Fe) MG Oral Tab Take 27 mg by mouth daily.      Omega-3 Fatty Acids (FISH OIL) 600 MG Oral Cap Take 1 capsule by mouth nightly.        Multiple Vitamins-Minerals (CENTRUM SILVER) Oral Tab Take 1 tablet by mouth daily.      [] cyanocobalamin 1000 MCG Oral Tab Take 1 tablet (1,000 mcg total) by mouth daily. 90 tablet 4     Current Facility-Administered Medications on File Prior to Visit   Medication Dose Route Frequency Provider Last Rate Last Admin    [COMPLETED] darbepoetin grey-polysorbate (Aranesp-Albumin Free) 500 MCG/ML injection 500 mcg  500 mcg Subcutaneous Once Clarisa Gallego APRN   500 mcg at 25 1346    [COMPLETED] darbepoetin grey-polysorbate (Aranesp-Albumin Free) 500 MCG/ML injection 500 mcg  500 mcg Subcutaneous Once Clarisa Gallego APRN   500 mcg at 25 1429    [COMPLETED] darbepoetin grey-polysorbate (Aranesp-Albumin Free) 500 MCG/ML injection 500 mcg  500 mcg Subcutaneous Once Clarisa Gallego APRN   500 mcg at 25 1424    darbepoetin grey (Aranesp) 200 MCG/0.4ML injection 200 mcg  200 mcg Subcutaneous Q21 Days  Jaun Enciso MD   200 mcg at 03/24/23 1028         Allergies:  Allergies   Allergen Reactions    Cefdinir DIARRHEA    Zosyn [Piperacillin Sod-Tazobactam So] OTHER (SEE COMMENTS)     Heightened sense of smell; dry heaves, vomiting        Review of Systems:  All other systems reviewed and negative x12    Vital Signs:  /78 (BP Location: Left arm, Patient Position: Sitting, Cuff Size: large)   Pulse 76   Temp 97.8 °F (36.6 °C) (Oral)   Resp 18   Ht 1.753 m (5' 9\")   Wt 109.3 kg (241 lb)   SpO2 100%   BMI 35.59 kg/m²     Physical Examination:  General: Patient is alert and oriented x 3, not in acute distress.  Psych:  Mood and affect appropriate  HEENT: EOMs intact. Oropharynx is clear.   Neck: No palpable lymphadenopathy. Neck is supple.  Lymphatics: There is no palpable peripheral lymphadenopathy   Chest: Clear to auscultation, nonlabored breathing  Cardiovascular: Regular with S1/S2  Abdomen: Soft, non tender.   Extremities: No edema. Warm and pink skin, no ecchymoses  Neurological: 5/5 motor x4.        Laboratory:  Lab Results   Component Value Date    WBC 2.4 (L) 03/20/2025    RBC 2.90 (L) 03/20/2025    HGB 9.8 (L) 03/20/2025    HCT 31.2 (L) 03/20/2025    .6 (H) 03/20/2025    MCH 33.8 03/20/2025    MCHC 31.4 03/20/2025    RDW 16.6 (H) 03/20/2025    PLT 79.0 (L) 03/20/2025    MPV 9.6 01/24/2019         Lab Results   Component Value Date     (H) 01/23/2025    BUN 63 (H) 01/23/2025    BUNCREA 16.8 01/23/2025    CREATSERUM 3.74 (H) 01/23/2025    ANIONGAP 8 01/23/2025    GFRNAA 21 (L) 07/23/2022    GFRAA 24 (L) 07/23/2022    CA 8.9 01/23/2025    OSMOCALC 317 (H) 01/23/2025    ALKPHO 83 12/12/2024    AST 29 12/12/2024    ALT 31 12/12/2024    ALKPHOS 114 (H) 04/25/2013    BILT 0.7 12/12/2024    TP 6.4 12/12/2024    ALB 4.0 01/23/2025    GLOBULIN 2.5 12/12/2024    AGRATIO 1.3 04/25/2013     01/23/2025    K 5.6 (H) 01/23/2025     01/23/2025    CO2 23.0 01/23/2025        Radiology:       Cancer Staging   No matching staging information was found for the patient.      Impression and Plan:  69 year old  With chronic kidney disease been divided by hematology for pancytopenia. Bone marrow 3/23 showed MDS ipss-r low risk (del 20, 1%blasts)    1.) MDS  - NGS as above  -CED hemoglobin now significantly improved we will continue darbepoetin  --hgb is at goal, do not want to exceed value >11g  --discussed hat following discussed with Dr. Sandra Mills at [facility], the pt is not eligible for stem cell transplant, so continue current management  --we can consider GCSF therapy if white blood cell count declines further or TPO therapy to improve platelet counts as needed  --if counts drastically change, will need repeat bone marrow before consider switching to HMA like azacitadine or decitabine to help the pancytopenia    -HGB stable, continue CED, WBC and ANC stable, wt increased some, No recent infections, Denies B symptoms, denies bleeding  --continue next injection of CED; pt will f/u in 6 wks with labs prior    2.) CKD  --follows with Dr. Enciso for this condition; also likely contributing to his anemia  --continue to f/u with Dr. Enciso as planned, follows low potassium diet       MDM: Moderate  : Ongoing continuing of complex care      Payam Rdz MD  [facility]  [address] [facility] [place]  [address]  [phone]

## 2025-03-24 ENCOUNTER — NURSE TRIAGE (OUTPATIENT)
Dept: INTERNAL MEDICINE CLINIC | Facility: CLINIC | Age: 70
End: 2025-03-24

## 2025-03-24 ENCOUNTER — OFFICE VISIT (OUTPATIENT)
Dept: INTERNAL MEDICINE CLINIC | Facility: CLINIC | Age: 70
End: 2025-03-24

## 2025-03-24 VITALS
TEMPERATURE: 98 F | HEIGHT: 69 IN | HEART RATE: 89 BPM | WEIGHT: 238.19 LBS | SYSTOLIC BLOOD PRESSURE: 138 MMHG | DIASTOLIC BLOOD PRESSURE: 72 MMHG | OXYGEN SATURATION: 99 % | BODY MASS INDEX: 35.28 KG/M2

## 2025-03-24 DIAGNOSIS — S50.322A: ICD-10-CM

## 2025-03-24 DIAGNOSIS — M70.22 OLECRANON BURSITIS OF LEFT ELBOW: Primary | ICD-10-CM

## 2025-03-24 PROBLEM — N40.0 BENIGN PROSTATIC HYPERPLASIA: Status: ACTIVE | Noted: 2025-03-24

## 2025-03-24 PROBLEM — R53.83 FATIGUE: Status: ACTIVE | Noted: 2025-03-24

## 2025-03-24 PROBLEM — E78.5 DYSLIPIDEMIA: Status: ACTIVE | Noted: 2025-03-24

## 2025-03-24 RX ORDER — SULFAMETHOXAZOLE AND TRIMETHOPRIM 800; 160 MG/1; MG/1
1 TABLET ORAL 2 TIMES DAILY
Qty: 10 TABLET | Refills: 0 | Status: SHIPPED | OUTPATIENT
Start: 2025-03-24 | End: 2025-03-29

## 2025-03-24 NOTE — PATIENT INSTRUCTIONS
Take bactrim two times daily for 5 days  Leave the blister intact and keep the area clean/dry/covered  Ok if it pops on its own  Follow up in 1 week if not improved, or sooner for worsening discomfort / redness

## 2025-03-24 NOTE — PROGRESS NOTES
Subjective:   Jaun Burton is a 69 year old male with PMH T2DM with CKD St 4 and HTN, MDS, who presents for Cyst (LT elbow, 2 weeks has grown significantly and is painful.)     C/o mild general swelling to his L elbow for the past 2 weeks with a blister/cyst that has grown in size. +mild surrounding redness and itching. No recent injury or trauma.    Blood sugars have been very elevated lately - 249 in office right now (dexcom)  Fasting 236 this morning. 2025 A1C was 6.3.  Per wife, he forgets to take his shots prior to eating    Currently being tx with aranesp biweekly for MDS.        History/Other:    Chief Complaint Reviewed and Verified  Nursing Notes Reviewed and   Verified  Tobacco Reviewed  Allergies Reviewed  Medications Reviewed    Problem List Reviewed  Medical History Reviewed  Surgical History   Reviewed  Family History Reviewed  Social History Reviewed         Tobacco:  He smoked tobacco in the past but quit greater than 12 months ago.  Social History     Tobacco Use   Smoking Status Former    Current packs/day: 0.00    Types: Cigarettes    Quit date: 1989    Years since quittin.3   Smokeless Tobacco Former   Tobacco Comments    quit about 30 years ago.        Current Outpatient Medications   Medication Sig Dispense Refill    sulfamethoxazole-trimethoprim -160 MG Oral Tab per tablet Take 1 tablet by mouth 2 (two) times daily for 5 days. 10 tablet 0    MAGNESIUM-OXIDE 400 (240 Mg) MG Oral Tab Take 1 tablet by mouth once daily 90 tablet 1    AMITRIPTYLINE 25 MG Oral Tab Take 1 tablet by mouth nightly 90 tablet 0    pantoprazole 40 MG Oral Tab EC TAKE 1 TABLET BY MOUTH BEFORE BREAKFAST FOR  ACID  REFLUX 90 tablet 3    fenofibrate 48 MG Oral Tab Take 1 tablet by mouth once daily 90 tablet 3    pregabalin 300 MG Oral Cap Take 1 capsule (300 mg total) by mouth daily. 90 capsule 0    HYDROcodone-acetaminophen  MG Oral Tab Take 1 tablet by mouth every 8 (eight)  hours as needed for Pain. 90 tablet 0    NOVOLOG 100 UNIT/ML Injection Solution INJECT 50 UNITS SUBCUTANEOUSLY ONCE DAILY VIA CORRECTION FACTOR 40 mL 0    LANTUS 100 UNIT/ML Subcutaneous Solution Inject 40 Units into the skin nightly. 36 mL 1    pregabalin 300 MG Oral Cap Take 1 capsule (300 mg total) by mouth daily. 30 capsule 0    HYDROcodone-acetaminophen (NORCO)  MG Oral Tab Take 1 tablet by mouth every 8 (eight) hours as needed for Pain. 90 tablet 0    BD INSULIN SYRINGE U/F 31G X 5/16\" 0.5 ML Does not apply Misc USE 1 SYRINGE 4 TIMES DAILY 400 each 3    Mometasone Furoate 0.1 % External Solution Use bid to ears as directd 60 mL 3    FREESTYLE LITE TEST In Vitro Strip USE 1 STRIP TO CHECK BLOOD GLUCOSE 3 TIMES DAILY. DX: E11.65, insulin dependent 300 strip 1    sodium zirconium cyclosilicate (LOKELMA) 10 g Oral Powd Pack Take 1 packet (10 g total) by mouth twice a week. 30 packet 0    azelastine 137 MCG/SPRAY Nasal Solution 1-2 sprays by Nasal route in the morning and 1-2 sprays before bedtime. FOR SINUS SYMPTOMS/NASAL CONGESTION.. 30 mL 3    fluticasone propionate 50 MCG/ACT Nasal Suspension 2 sprays by Each Nare route daily. FOR NASAL CONGESTION/SINUS SYMPTOMS. 3 each 3    atorvastatin 40 MG Oral Tab Take 1 tablet (40 mg total) by mouth daily. FOR CHOLESTEROL. 90 tablet 9    Betamethasone Dipropionate Aug 0.05 % External Cream Apply 1 Application topically 2 (two) times daily. APPLY TO AFFECTED AREA 50 g 1    metRONIDAZOLE 0.75 % External Cream Apply 1 Application topically daily. For breakout on face 45 g 0    Pimecrolimus 1 % External Cream APPLY   TOPICALLY AFFECTED AREA ON FACE ONCE DAILY TO TWICE DAILY 60 g 0    Tazarotene 0.1 % External Cream Apply to chest nightly 60 g 3    Insulin Syringe-Needle U-100 (RELION INSULIN SYRINGE) 31G X 15/64\" 0.5 ML Does not apply Misc 1 Syringe by Does not apply route 4 (four) times daily. 400 each 0    Blood Glucose Monitoring Suppl (ACCU-CHEK INÉS PLUS) w/Device  Does not apply Kit Use as directed to check blood sugars. 1 kit 0    Ferrous Gluconate 225 (27 Fe) MG Oral Tab Take 27 mg by mouth daily.      Omega-3 Fatty Acids (FISH OIL) 600 MG Oral Cap Take 1 capsule by mouth nightly.        Multiple Vitamins-Minerals (CENTRUM SILVER) Oral Tab Take 1 tablet by mouth daily.           Review of Systems:  Review of Systems  10 point review of systems otherwise negative with the exception of HPI and assessment and plan.    Objective:   /72   Pulse 89   Temp 97.7 °F (36.5 °C) (Temporal)   Ht 5' 9\" (1.753 m)   Wt 238 lb 3.2 oz (108 kg)   SpO2 99%   BMI 35.18 kg/m²  Estimated body mass index is 35.18 kg/m² as calculated from the following:    Height as of this encounter: 5' 9\" (1.753 m).    Weight as of this encounter: 238 lb 3.2 oz (108 kg).      Physical Exam  Vitals reviewed.   Constitutional:       General: He is not in acute distress.  Cardiovascular:      Rate and Rhythm: Normal rate.   Pulmonary:      Effort: Pulmonary effort is normal. No respiratory distress.   Skin:     Comments: See below photo of arm. +mild swelling/tenderness to olecranon bursa. No warmth. Fluid-filled blister with mild surrounding erythema and warmth.   Neurological:      Mental Status: He is alert.         Assessment & Plan:   1. Olecranon bursitis of left elbow (Primary)  -  Sulfamethoxazole-Trimethoprim; Take 1 tablet by mouth 2 (two) times daily for 5 days.  Dispense: 10 tablet; Refill: 0  - will treat with antibiotics given erythema/warmth, proximity to bursa, diabetic with currently poorly controlled blood sugars.- CKD St 4, unable to take NSAIDs. Advised ice several times daily, elevation, rest.  - advised not to pop the blister, but ok if it pops on its own. May leave open to air, keep clean with gentle soap/water. Cover with bandage if it bursts to keep clean.  - follow up if no improvement in 48 hours. Follow up in 1 week for wound recheck.  2. Blister (nonthermal) of left elbow,  initial encounter  - as above.          Return in about 1 week (around 3/31/2025) for wound check.    FANY Judge, 3/24/2025, 2:26 PM     This note was prepared using Dragon Medical voice recognition dictation software. As a result errors may occur. When identified, these errors have been corrected. While every attempt is made to correct errors during dictation discrepancies may still exist.

## 2025-03-24 NOTE — TELEPHONE ENCOUNTER
Action Requested: Summary for Provider     []  Critical Lab, Recommendations Needed  [] Need Additional Advice  []   FYI    []   Need Orders  [] Need Medications Sent to Pharmacy  []  Other     SUMMARY: Per Protocol disposition advised to be seen in the office for fluid filled bump on his elbow that is getting bigger. Denies fever, rash or red streaks leaving the area.    Assisted patient with appt scheduling, verbalized understanding and agrees to plan.   Future Appointments   Date Time Provider Department Center   3/24/2025  4:00 PM Natasha Lincoln APRN [facility]   3/31/2025 11:00 AM Carissa Paulino MD [facility]   2025  2:00 PM [facility] [place] [facility]   2025  2:30 PM Jaun Enciso MD [id number]   2025  2:00 PM [facility] [place] [facility]   2025  1:30 PM [facility] [place] [facility]   2025  2:00 PM Kaykay Humphrey APRN [facility] [place] [facility]   2025  1:45 PM Tanya Naranjo MD [facility]     Reason for call: Skin Problem  Onset: 4 days    Patient's wife calling (name and , DARIA verified) for patient that has a painful bump on his elbow.    Reason for Disposition   Swelling is painful to touch and no fever    Protocols used: Skin Lump or Localized Swelling-A-OH

## 2025-03-31 ENCOUNTER — OFFICE VISIT (OUTPATIENT)
Dept: INTERNAL MEDICINE CLINIC | Facility: CLINIC | Age: 70
End: 2025-03-31

## 2025-03-31 VITALS
HEIGHT: 69 IN | DIASTOLIC BLOOD PRESSURE: 72 MMHG | OXYGEN SATURATION: 99 % | WEIGHT: 242.81 LBS | SYSTOLIC BLOOD PRESSURE: 132 MMHG | HEART RATE: 69 BPM | RESPIRATION RATE: 20 BRPM | TEMPERATURE: 97 F | BODY MASS INDEX: 35.96 KG/M2

## 2025-03-31 DIAGNOSIS — S50.322D: Primary | ICD-10-CM

## 2025-03-31 DIAGNOSIS — M70.22 OLECRANON BURSITIS OF LEFT ELBOW: ICD-10-CM

## 2025-03-31 PROCEDURE — 99214 OFFICE O/P EST MOD 30 MIN: CPT | Performed by: NURSE PRACTITIONER

## 2025-03-31 RX ORDER — SULFAMETHOXAZOLE AND TRIMETHOPRIM 800; 160 MG/1; MG/1
1 TABLET ORAL 2 TIMES DAILY
Qty: 10 TABLET | Refills: 0 | Status: SHIPPED | OUTPATIENT
Start: 2025-03-31 | End: 2025-04-05

## 2025-03-31 NOTE — PATIENT INSTRUCTIONS
Bactrim 1 tab twice daily for 5 days    I will call you at 12:15pm on Friday to check on the progress - if there is any worsening before then, please call the office.

## 2025-03-31 NOTE — PROGRESS NOTES
Subjective:   Jaun Burton is a 69 year old male with PMH MDS, poorly controlled T2DM who presents for Follow - Up (F/u on cyst.)     Was seen by me 1 week ago for swelling and blistering to L elbow.  Completed 5 days of bactrim.  Blister popped on its own while he was in bed one night - unsure what type of fluid came out of blister.  Has been feeling better since then. No more pain in the elbow.  Still has a hard area with some redness and warmth. No fevers/chills/body aches.          History/Other:    Chief Complaint Reviewed and Verified  Nursing Notes Reviewed and   Verified  Tobacco Reviewed  Allergies Reviewed  Medications Reviewed    Problem List Reviewed  Medical History Reviewed  Surgical History   Reviewed  Family History Reviewed  Social History Reviewed         Tobacco:  He smoked tobacco in the past but quit greater than 12 months ago.  Social History     Tobacco Use   Smoking Status Former    Current packs/day: 0.00    Types: Cigarettes    Quit date: 1989    Years since quittin.4   Smokeless Tobacco Former   Tobacco Comments    quit about 30 years ago.        Current Outpatient Medications   Medication Sig Dispense Refill    sulfamethoxazole-trimethoprim -160 MG Oral Tab per tablet Take 1 tablet by mouth 2 (two) times daily for 5 days. 10 tablet 0    MAGNESIUM-OXIDE 400 (240 Mg) MG Oral Tab Take 1 tablet by mouth once daily 90 tablet 1    AMITRIPTYLINE 25 MG Oral Tab Take 1 tablet by mouth nightly 90 tablet 0    pantoprazole 40 MG Oral Tab EC TAKE 1 TABLET BY MOUTH BEFORE BREAKFAST FOR  ACID  REFLUX 90 tablet 3    fenofibrate 48 MG Oral Tab Take 1 tablet by mouth once daily 90 tablet 3    pregabalin 300 MG Oral Cap Take 1 capsule (300 mg total) by mouth daily. 90 capsule 0    HYDROcodone-acetaminophen  MG Oral Tab Take 1 tablet by mouth every 8 (eight) hours as needed for Pain. 90 tablet 0    NOVOLOG 100 UNIT/ML Injection Solution INJECT 50 UNITS SUBCUTANEOUSLY  ONCE DAILY VIA CORRECTION FACTOR 40 mL 0    LANTUS 100 UNIT/ML Subcutaneous Solution Inject 40 Units into the skin nightly. 36 mL 1    pregabalin 300 MG Oral Cap Take 1 capsule (300 mg total) by mouth daily. 30 capsule 0    HYDROcodone-acetaminophen (NORCO)  MG Oral Tab Take 1 tablet by mouth every 8 (eight) hours as needed for Pain. 90 tablet 0    BD INSULIN SYRINGE U/F 31G X 5/16\" 0.5 ML Does not apply Misc USE 1 SYRINGE 4 TIMES DAILY 400 each 3    Mometasone Furoate 0.1 % External Solution Use bid to ears as directd 60 mL 3    FREESTYLE LITE TEST In Vitro Strip USE 1 STRIP TO CHECK BLOOD GLUCOSE 3 TIMES DAILY. DX: E11.65, insulin dependent 300 strip 1    sodium zirconium cyclosilicate (LOKELMA) 10 g Oral Powd Pack Take 1 packet (10 g total) by mouth twice a week. 30 packet 0    azelastine 137 MCG/SPRAY Nasal Solution 1-2 sprays by Nasal route in the morning and 1-2 sprays before bedtime. FOR SINUS SYMPTOMS/NASAL CONGESTION.. 30 mL 3    fluticasone propionate 50 MCG/ACT Nasal Suspension 2 sprays by Each Nare route daily. FOR NASAL CONGESTION/SINUS SYMPTOMS. 3 each 3    atorvastatin 40 MG Oral Tab Take 1 tablet (40 mg total) by mouth daily. FOR CHOLESTEROL. 90 tablet 9    Betamethasone Dipropionate Aug 0.05 % External Cream Apply 1 Application topically 2 (two) times daily. APPLY TO AFFECTED AREA 50 g 1    metRONIDAZOLE 0.75 % External Cream Apply 1 Application topically daily. For breakout on face 45 g 0    Pimecrolimus 1 % External Cream APPLY   TOPICALLY AFFECTED AREA ON FACE ONCE DAILY TO TWICE DAILY 60 g 0    Tazarotene 0.1 % External Cream Apply to chest nightly 60 g 3    Insulin Syringe-Needle U-100 (RELION INSULIN SYRINGE) 31G X 15/64\" 0.5 ML Does not apply Misc 1 Syringe by Does not apply route 4 (four) times daily. 400 each 0    Blood Glucose Monitoring Suppl (ACCU-CHEK INÉS PLUS) w/Device Does not apply Kit Use as directed to check blood sugars. 1 kit 0    Ferrous Gluconate 225 (27 Fe) MG Oral Tab  Take 27 mg by mouth daily.      Omega-3 Fatty Acids (FISH OIL) 600 MG Oral Cap Take 1 capsule by mouth nightly.        Multiple Vitamins-Minerals (CENTRUM SILVER) Oral Tab Take 1 tablet by mouth daily.           Review of Systems:  Review of Systems  10 point review of systems otherwise negative with the exception of HPI and assessment and plan.    Objective:   /72   Pulse 69   Temp 97.1 °F (36.2 °C) (Temporal)   Resp 20   Ht 5' 9\" (1.753 m)   Wt 242 lb 12.8 oz (110.1 kg)   SpO2 99%   BMI 35.86 kg/m²  Estimated body mass index is 35.86 kg/m² as calculated from the following:    Height as of this encounter: 5' 9\" (1.753 m).    Weight as of this encounter: 242 lb 12.8 oz (110.1 kg).      Physical Exam  Vitals reviewed.   Cardiovascular:      Rate and Rhythm: Normal rate.   Pulmonary:      Effort: Pulmonary effort is normal. No respiratory distress.   Musculoskeletal:      Comments: See below photo for reference - area of induration between where my fingers are in the photo. +mild surrounding erythema and warmth. No fluctuance, no tenderness. Normal ROM.   Skin:     General: Skin is warm and dry.   Neurological:      Mental Status: He is alert.         Assessment & Plan:   1. Blister (nonthermal) of left elbow, subsequent encounter (Primary)  -     Sulfamethoxazole-Trimethoprim; Take 1 tablet by mouth 2 (two) times daily for 5 days.  Dispense: 10 tablet; Refill: 0  - Appears improved since last week, however, consulted with Dr. Lemos due to continued erythema/warmth to the area in an immunocompromised patient with MDS (pancytopenia 3/20 CBC) and poorly controlled DM -- will treat for an additional 5 days with bactrim. Pt to monitor the area for increased redness or warmth and reach out if pain returns. I will call him Friday for an update. Contact the office sooner if worse.   2. Olecranon bursitis of left elbow        - Improved. Pt to monitor.        Return if symptoms worsen or fail to  improve.    Natasha Lincoln, APRN, 3/31/2025, 11:35 AM     This note was prepared using Dragon Medical voice recognition dictation software. As a result errors may occur. When identified, these errors have been corrected. While every attempt is made to correct errors during dictation discrepancies may still exist.

## 2025-04-03 ENCOUNTER — LAB ENCOUNTER (OUTPATIENT)
Dept: LAB | Age: 70
End: 2025-04-03
Attending: INTERNAL MEDICINE
Payer: MEDICARE

## 2025-04-03 DIAGNOSIS — D61.818 PANCYTOPENIA (HCC): ICD-10-CM

## 2025-04-03 DIAGNOSIS — D46.9 MDS (MYELODYSPLASTIC SYNDROME) (HCC): ICD-10-CM

## 2025-04-03 LAB
BASOPHILS # BLD AUTO: 0.01 X10(3) UL (ref 0–0.2)
BASOPHILS NFR BLD AUTO: 0.5 %
DEPRECATED RDW RBC AUTO: 68.4 FL (ref 35.1–46.3)
EOSINOPHIL # BLD AUTO: 0.08 X10(3) UL (ref 0–0.7)
EOSINOPHIL NFR BLD AUTO: 3.8 %
ERYTHROCYTE [DISTWIDTH] IN BLOOD BY AUTOMATED COUNT: 16.9 % (ref 11–15)
HCT VFR BLD AUTO: 30.5 %
HGB BLD-MCNC: 9.4 G/DL
IMM GRANULOCYTES # BLD AUTO: 0.01 X10(3) UL (ref 0–1)
IMM GRANULOCYTES NFR BLD: 0.5 %
LYMPHOCYTES # BLD AUTO: 0.99 X10(3) UL (ref 1–4)
LYMPHOCYTES NFR BLD AUTO: 46.5 %
MCH RBC QN AUTO: 34.2 PG (ref 26–34)
MCHC RBC AUTO-ENTMCNC: 30.8 G/DL (ref 31–37)
MCV RBC AUTO: 110.9 FL
MONOCYTES # BLD AUTO: 0.23 X10(3) UL (ref 0.1–1)
MONOCYTES NFR BLD AUTO: 10.8 %
NEUTROPHILS # BLD AUTO: 0.81 X10 (3) UL (ref 1.5–7.7)
NEUTROPHILS # BLD AUTO: 0.81 X10(3) UL (ref 1.5–7.7)
NEUTROPHILS NFR BLD AUTO: 37.9 %
PLATELET # BLD AUTO: 102 10(3)UL (ref 150–450)
PLATELET MORPHOLOGY: NORMAL
RBC # BLD AUTO: 2.75 X10(6)UL
WBC # BLD AUTO: 2.1 X10(3) UL (ref 4–11)

## 2025-04-03 PROCEDURE — 36415 COLL VENOUS BLD VENIPUNCTURE: CPT

## 2025-04-03 PROCEDURE — 85060 BLOOD SMEAR INTERPRETATION: CPT

## 2025-04-03 PROCEDURE — 85025 COMPLETE CBC W/AUTO DIFF WBC: CPT

## 2025-04-04 ENCOUNTER — NURSE ONLY (OUTPATIENT)
Age: 70
End: 2025-04-04
Attending: INTERNAL MEDICINE
Payer: MEDICARE

## 2025-04-04 DIAGNOSIS — D61.818 PANCYTOPENIA (HCC): ICD-10-CM

## 2025-04-04 DIAGNOSIS — D46.9 MDS (MYELODYSPLASTIC SYNDROME) (HCC): Primary | ICD-10-CM

## 2025-04-08 DIAGNOSIS — E11.3293 TYPE 2 DIABETES MELLITUS WITH BOTH EYES AFFECTED BY MILD NONPROLIFERATIVE RETINOPATHY WITHOUT MACULAR EDEMA, WITHOUT LONG-TERM CURRENT USE OF INSULIN (HCC): ICD-10-CM

## 2025-04-08 DIAGNOSIS — G62.9 NEUROPATHY: ICD-10-CM

## 2025-04-08 NOTE — TELEPHONE ENCOUNTER
Dr. Enciso,    Refill request for Hydrocodone-acetaminophen  mg.  Last refill request: 2/26/25  Last office visit: 1/9/25  Order pended.    Thank you.

## 2025-04-08 NOTE — TELEPHONE ENCOUNTER
Wife is requesting for refill HYDROcodone-acetaminophen  MG Oral Tab it is urgent please follow up

## 2025-04-11 RX ORDER — HYDROCODONE BITARTRATE AND ACETAMINOPHEN 10; 325 MG/1; MG/1
1 TABLET ORAL EVERY 8 HOURS PRN
Qty: 90 TABLET | Refills: 0 | Status: SHIPPED | OUTPATIENT
Start: 2025-04-11

## 2025-04-17 ENCOUNTER — OFFICE VISIT (OUTPATIENT)
Dept: NEPHROLOGY | Facility: CLINIC | Age: 70
End: 2025-04-17

## 2025-04-17 ENCOUNTER — LAB ENCOUNTER (OUTPATIENT)
Dept: LAB | Age: 70
End: 2025-04-17
Attending: INTERNAL MEDICINE
Payer: MEDICARE

## 2025-04-17 VITALS
WEIGHT: 237 LBS | DIASTOLIC BLOOD PRESSURE: 72 MMHG | HEART RATE: 70 BPM | BODY MASS INDEX: 35.1 KG/M2 | HEIGHT: 69 IN | SYSTOLIC BLOOD PRESSURE: 133 MMHG

## 2025-04-17 DIAGNOSIS — N18.4 ANEMIA OF CHRONIC RENAL FAILURE, STAGE 4 (SEVERE) (HCC): ICD-10-CM

## 2025-04-17 DIAGNOSIS — D46.9 MDS (MYELODYSPLASTIC SYNDROME) (HCC): ICD-10-CM

## 2025-04-17 DIAGNOSIS — G62.9 NEUROPATHY: ICD-10-CM

## 2025-04-17 DIAGNOSIS — R06.02 SOB (SHORTNESS OF BREATH): ICD-10-CM

## 2025-04-17 DIAGNOSIS — E10.22 CKD STAGE 4 DUE TO TYPE 1 DIABETES MELLITUS (HCC): Primary | ICD-10-CM

## 2025-04-17 DIAGNOSIS — D61.818 PANCYTOPENIA (HCC): ICD-10-CM

## 2025-04-17 DIAGNOSIS — D63.1 ANEMIA OF CHRONIC RENAL FAILURE, STAGE 4 (SEVERE) (HCC): ICD-10-CM

## 2025-04-17 DIAGNOSIS — I15.2 HYPERTENSION ASSOCIATED WITH TYPE 2 DIABETES MELLITUS (HCC): ICD-10-CM

## 2025-04-17 DIAGNOSIS — E11.3293 TYPE 2 DIABETES MELLITUS WITH BOTH EYES AFFECTED BY MILD NONPROLIFERATIVE RETINOPATHY WITHOUT MACULAR EDEMA, WITHOUT LONG-TERM CURRENT USE OF INSULIN (HCC): ICD-10-CM

## 2025-04-17 DIAGNOSIS — E11.59 HYPERTENSION ASSOCIATED WITH TYPE 2 DIABETES MELLITUS (HCC): ICD-10-CM

## 2025-04-17 DIAGNOSIS — N18.4 CKD STAGE 4 DUE TO TYPE 1 DIABETES MELLITUS (HCC): Primary | ICD-10-CM

## 2025-04-17 LAB
BASOPHILS # BLD AUTO: 0 X10(3) UL (ref 0–0.2)
BASOPHILS NFR BLD AUTO: 0 %
DEPRECATED RDW RBC AUTO: 64.1 FL (ref 35.1–46.3)
EOSINOPHIL # BLD AUTO: 0.02 X10(3) UL (ref 0–0.7)
EOSINOPHIL NFR BLD AUTO: 1 %
ERYTHROCYTE [DISTWIDTH] IN BLOOD BY AUTOMATED COUNT: 16.4 % (ref 11–15)
HCT VFR BLD AUTO: 29.1 % (ref 39–53)
HGB BLD-MCNC: 9.1 G/DL (ref 13–17.5)
IMM GRANULOCYTES # BLD AUTO: 0.02 X10(3) UL (ref 0–1)
IMM GRANULOCYTES NFR BLD: 1 %
LYMPHOCYTES # BLD AUTO: 0.84 X10(3) UL (ref 1–4)
LYMPHOCYTES NFR BLD AUTO: 43.5 %
MCH RBC QN AUTO: 33.8 PG (ref 26–34)
MCHC RBC AUTO-ENTMCNC: 31.3 G/DL (ref 31–37)
MCV RBC AUTO: 108.2 FL (ref 80–100)
MONOCYTES # BLD AUTO: 0.23 X10(3) UL (ref 0.1–1)
MONOCYTES NFR BLD AUTO: 11.9 %
NEUTROPHILS # BLD AUTO: 0.82 X10 (3) UL (ref 1.5–7.7)
NEUTROPHILS # BLD AUTO: 0.82 X10(3) UL (ref 1.5–7.7)
NEUTROPHILS NFR BLD AUTO: 42.6 %
PLATELET # BLD AUTO: 73 10(3)UL (ref 150–450)
PLATELETS.RETICULATED NFR BLD AUTO: 7.2 % (ref 0–7)
RBC # BLD AUTO: 2.69 X10(6)UL (ref 3.8–5.8)
WBC # BLD AUTO: 1.9 X10(3) UL (ref 4–11)

## 2025-04-17 PROCEDURE — 85025 COMPLETE CBC W/AUTO DIFF WBC: CPT

## 2025-04-17 PROCEDURE — 99214 OFFICE O/P EST MOD 30 MIN: CPT | Performed by: INTERNAL MEDICINE

## 2025-04-17 PROCEDURE — 36415 COLL VENOUS BLD VENIPUNCTURE: CPT

## 2025-04-17 NOTE — PATIENT INSTRUCTIONS
Please do a blood test for me when you for Dr. Rdz I will call with results    For now [facility] once a week but when may increase from there    Schedule 2D echo of heart to call for an appointment      See me back in 3 months    Let me know if you need anything in the meantime good to see you both have  a nice [place]

## 2025-04-17 NOTE — PROGRESS NOTES
Progress Note     Jaun Burton    Is doing well here with wife Heather history of hypertension type 2 diabetes CKD 4 and myelodysplasia.  Labs are followed by Dr. Rdz white counts low hemoglobin is low he takes Aranesp and his platelets are low but they have been stable currently on Lyrica once a day Norco every 8 hours as needed's monitored well he is on insulin takes Lokelma once or twice a week for high potassium tries to follow a diet chronic neuropathy present mild shortness of breath when he has been exercising lately pulse ox today was 97% denies edema      HISTORY:  Past Medical History[1]   Past Surgical History[2]   Social History     Tobacco Use    Smoking status: Former     Current packs/day: 0.00     Types: Cigarettes     Quit date: 1989     Years since quittin.4    Smokeless tobacco: Former    Tobacco comments:     quit about 30 years ago.   Substance Use Topics    Alcohol use: No         Medications (Active prior to today's visit):  Current Medications[3]    Allergies:  Allergies[4]      ROS:     Constitutional:  Negative for decreased activity, fever, irritability and lethargy  ENMT:  Negative for ear drainage, hearing loss and nasal drainage  Eyes:  Negative for eye discharge and vision loss  Cardiovascular:  Negative for chest pain, s  Respiratory: Shortness of breath with exertion  Gastrointestinal:  Negative for abdominal pain, constipation  Genitourinary:  Negative for dysuria and hematuria  Endocrine:  Negative for abnormal sleep patterns  Hema/Lymph:  Negative for easy bleeding and easy bruising  Integumentary:  Negative for pruritus and rash  Musculoskeletal:  Negative for bone/joint symptoms  Neurological:  Negative for gait disturbance  Psychiatric:  Negative for inappropriate interaction and psychiatric symptoms      Vitals:    25 1442   BP: 133/72   Pulse: 70       PHYSICAL EXAM:   Constitutional: appears well hydrated alert and responsive looks  good  Head/Face: normocephalic  Eyes/Vision: normal extraocular motion is intact  Nose/Mouth/Throat:mucous membranes are moist   Neck/Thyroid: neck is supple without adenopathy  Lymphatic: no abnormal cervical, supraclavicular adenopathy is noted  Respiratory:  lungs are clear to auscultation bilaterally  Cardiovascular: regular rate and rhythm   Abdomen: soft, non-tender, non-distended, BS normal  Skin/Hair: no unusual rashes present, no abnormal bruising noted  Back/Spine: no abnormalities noted  Musculoskeletal: no deformities  Extremities: no edema  Neurological:  Grossly normal       ASSESSMENT/PLAN:   Assessment   Encounter Diagnoses   Name Primary?    CKD stage 4 due to type 1 diabetes mellitus (HCC) Yes    Type 2 diabetes mellitus with both eyes affected by mild nonproliferative retinopathy without macular edema, without long-term current use of insulin (HCC)     Neuropathy     Hypertension associated with type 2 diabetes mellitus (HCC)     Anemia of chronic renal failure, stage 4 (severe) (HCC)     SOB (shortness of breath)        More mild shortness of breath no history of coronary disease lung disease former smoker many years ago will get an echo of the heart pulse ox 97%  #2 diabetes follows with endocrine A1c 6.3  #3 CKD 4 last creatinine 3.7 GFR of 17 no dialysis needed  #4 hyperkalemia follow special diet on Lokelma once or twice a week we will do labs call with results see me back in 3 months refilled his Norco which he does not abuse   Not on SGLT2 due to decreased renal function and cost not on ACE or ARB due to hyperkalemia  Orders This Visit:  Orders Placed This Encounter   Procedures    Renal Function Panel    PTH, Intact       Meds This Visit:  Requested Prescriptions      No prescriptions requested or ordered in this encounter       Imaging & Referrals:  CARD ECHO 2D DOPPLER (CPT=93306)     4/17/2025  Jaun Enciso MD               [1]   Past Medical History:   C2-3 mild central, C3-4  mild-mod diffuse, C4-5 mild diffuse, C5-6 mod diffuse bulging discs    C5-6 mod central & bilateral mild foraminal, C4-5 left mild foraminal stenosis    C6-7 mild-mod central herniated disc    Cataract    2010    Chronic kidney disease (CKD)    stage 3    Diabetes mellitus (HCC)    Diabetes type 2, uncontrolled    Diabetic retinopathy (HCC)    [facility]    Hyperlipidemia    Meibomian gland dysfunction    Osteoarthritis of spine with radiculopathy, cervical region    Pancreatitis (HCC)    Primary osteoarthritis of both shoulders    Proteinuria    Type II or unspecified type diabetes mellitus without mention of complication, not stated as uncontrolled    Pills & Insulin    Vitreous floaters   [2]   Past Surgical History:  Procedure Laterality Date    Appendectomy      Cataract extraction w/  intraocular lens implant Right 06/11/2018    Dr. Lopez    Cataract extraction w/  intraocular lens implant Left 07/12/2018    Dr. Lopez    Cholecystectomy  2012    Electrocardiogram, complete  4/23/2012    scanned to media tab    Hernia surgery     [3]   Current Outpatient Medications   Medication Sig Dispense Refill    HYDROcodone-acetaminophen  MG Oral Tab Take 1 tablet by mouth every 8 (eight) hours as needed for Pain. 90 tablet 0    MAGNESIUM-OXIDE 400 (240 Mg) MG Oral Tab Take 1 tablet by mouth once daily 90 tablet 1    AMITRIPTYLINE 25 MG Oral Tab Take 1 tablet by mouth nightly 90 tablet 0    pantoprazole 40 MG Oral Tab EC TAKE 1 TABLET BY MOUTH BEFORE BREAKFAST FOR  ACID  REFLUX 90 tablet 3    fenofibrate 48 MG Oral Tab Take 1 tablet by mouth once daily 90 tablet 3    pregabalin 300 MG Oral Cap Take 1 capsule (300 mg total) by mouth daily. 90 capsule 0    NOVOLOG 100 UNIT/ML Injection Solution INJECT 50 UNITS SUBCUTANEOUSLY ONCE DAILY VIA CORRECTION FACTOR 40 mL 0    LANTUS 100 UNIT/ML Subcutaneous Solution Inject 40 Units into the skin nightly. 36 mL 1    pregabalin 300 MG Oral Cap  Take 1 capsule (300 mg total) by mouth daily. 30 capsule 0    HYDROcodone-acetaminophen (NORCO)  MG Oral Tab Take 1 tablet by mouth every 8 (eight) hours as needed for Pain. 90 tablet 0    BD INSULIN SYRINGE U/F 31G X 5/16\" 0.5 ML Does not apply Misc USE 1 SYRINGE 4 TIMES DAILY 400 each 3    Mometasone Furoate 0.1 % External Solution Use bid to ears as directd 60 mL 3    FREESTYLE LITE TEST In Vitro Strip USE 1 STRIP TO CHECK BLOOD GLUCOSE 3 TIMES DAILY. DX: E11.65, insulin dependent 300 strip 1    sodium zirconium cyclosilicate (LOKELMA) 10 g Oral Powd Pack Take 1 packet (10 g total) by mouth twice a week. 30 packet 0    azelastine 137 MCG/SPRAY Nasal Solution 1-2 sprays by Nasal route in the morning and 1-2 sprays before bedtime. FOR SINUS SYMPTOMS/NASAL CONGESTION.. 30 mL 3    fluticasone propionate 50 MCG/ACT Nasal Suspension 2 sprays by Each Nare route daily. FOR NASAL CONGESTION/SINUS SYMPTOMS. 3 each 3    atorvastatin 40 MG Oral Tab Take 1 tablet (40 mg total) by mouth daily. FOR CHOLESTEROL. 90 tablet 9    Betamethasone Dipropionate Aug 0.05 % External Cream Apply 1 Application topically 2 (two) times daily. APPLY TO AFFECTED AREA 50 g 1    metRONIDAZOLE 0.75 % External Cream Apply 1 Application topically daily. For breakout on face 45 g 0    Pimecrolimus 1 % External Cream APPLY   TOPICALLY AFFECTED AREA ON FACE ONCE DAILY TO TWICE DAILY 60 g 0    Tazarotene 0.1 % External Cream Apply to chest nightly 60 g 3    Insulin Syringe-Needle U-100 (RELION INSULIN SYRINGE) 31G X 15/64\" 0.5 ML Does not apply Misc 1 Syringe by Does not apply route 4 (four) times daily. 400 each 0    Blood Glucose Monitoring Suppl (ACCU-CHEK INÉS PLUS) w/Device Does not apply Kit Use as directed to check blood sugars. 1 kit 0    Ferrous Gluconate 225 (27 Fe) MG Oral Tab Take 27 mg by mouth daily.      Omega-3 Fatty Acids (FISH OIL) 600 MG Oral Cap Take 1 capsule by mouth nightly.        Multiple Vitamins-Minerals (CENTRUM  SILVER) Oral Tab Take 1 tablet by mouth daily.     [4]   Allergies  Allergen Reactions    Cefdinir DIARRHEA, NAUSEA AND VOMITING and HALLUCINATION    Zosyn [Piperacillin Sod-Tazobactam So] OTHER (SEE COMMENTS)     Heightened sense of smell; dry heaves, vomiting

## 2025-04-18 ENCOUNTER — NURSE ONLY (OUTPATIENT)
Age: 70
End: 2025-04-18
Attending: INTERNAL MEDICINE
Payer: MEDICARE

## 2025-04-18 DIAGNOSIS — D46.9 MDS (MYELODYSPLASTIC SYNDROME) (HCC): Primary | ICD-10-CM

## 2025-04-18 DIAGNOSIS — D61.818 PANCYTOPENIA (HCC): ICD-10-CM

## 2025-04-29 ENCOUNTER — TELEPHONE (OUTPATIENT)
Dept: ENDOCRINOLOGY CLINIC | Facility: CLINIC | Age: 70
End: 2025-04-29

## 2025-04-29 DIAGNOSIS — E10.22 CKD STAGE 4 DUE TO TYPE 1 DIABETES MELLITUS (HCC): ICD-10-CM

## 2025-04-29 DIAGNOSIS — N18.4 CKD STAGE 4 DUE TO TYPE 1 DIABETES MELLITUS (HCC): ICD-10-CM

## 2025-04-29 DIAGNOSIS — G62.9 NEUROPATHY: Primary | ICD-10-CM

## 2025-04-29 RX ORDER — HYDROCODONE BITARTRATE AND ACETAMINOPHEN 10; 325 MG/1; MG/1
1 TABLET ORAL EVERY 8 HOURS PRN
Qty: 90 TABLET | Refills: 0 | OUTPATIENT
Start: 2025-04-29

## 2025-04-29 NOTE — TELEPHONE ENCOUNTER
Cardiac Wife is requesting for HYDROcodone-acetaminophen  MG Oral Tab patient is running low please follow up

## 2025-04-29 NOTE — TELEPHONE ENCOUNTER
Last visit -4/17/25  Return to clinic-3 months  Follow up-8/13/25  Please sign pending order if appropriate   Noted last refilled was on 4/11/25.

## 2025-04-29 NOTE — TELEPHONE ENCOUNTER
Patient's spouse calling regards questions on G7. Please call. States is urgent did not disclose any further.

## 2025-05-01 ENCOUNTER — LAB ENCOUNTER (OUTPATIENT)
Dept: LAB | Age: 70
End: 2025-05-01
Attending: INTERNAL MEDICINE
Payer: MEDICARE

## 2025-05-01 DIAGNOSIS — D63.1 ANEMIA OF CHRONIC RENAL FAILURE, STAGE 4 (SEVERE) (HCC): ICD-10-CM

## 2025-05-01 DIAGNOSIS — D61.818 PANCYTOPENIA (HCC): ICD-10-CM

## 2025-05-01 DIAGNOSIS — D46.9 MDS (MYELODYSPLASTIC SYNDROME) (HCC): ICD-10-CM

## 2025-05-01 DIAGNOSIS — N18.4 ANEMIA OF CHRONIC RENAL FAILURE, STAGE 4 (SEVERE) (HCC): ICD-10-CM

## 2025-05-01 DIAGNOSIS — E10.22 CKD STAGE 4 DUE TO TYPE 1 DIABETES MELLITUS (HCC): ICD-10-CM

## 2025-05-01 DIAGNOSIS — R06.02 SOB (SHORTNESS OF BREATH): ICD-10-CM

## 2025-05-01 DIAGNOSIS — E11.59 HYPERTENSION ASSOCIATED WITH TYPE 2 DIABETES MELLITUS (HCC): ICD-10-CM

## 2025-05-01 DIAGNOSIS — E11.3293 TYPE 2 DIABETES MELLITUS WITH BOTH EYES AFFECTED BY MILD NONPROLIFERATIVE RETINOPATHY WITHOUT MACULAR EDEMA, WITHOUT LONG-TERM CURRENT USE OF INSULIN (HCC): ICD-10-CM

## 2025-05-01 DIAGNOSIS — G62.9 NEUROPATHY: ICD-10-CM

## 2025-05-01 DIAGNOSIS — N18.4 CKD STAGE 4 DUE TO TYPE 1 DIABETES MELLITUS (HCC): ICD-10-CM

## 2025-05-01 DIAGNOSIS — I15.2 HYPERTENSION ASSOCIATED WITH TYPE 2 DIABETES MELLITUS (HCC): ICD-10-CM

## 2025-05-01 LAB
ALBUMIN SERPL-MCNC: 3.9 G/DL (ref 3.2–4.8)
ANION GAP SERPL CALC-SCNC: 8 MMOL/L (ref 0–18)
BASOPHILS # BLD AUTO: 0.01 X10(3) UL (ref 0–0.2)
BASOPHILS NFR BLD AUTO: 0.4 %
BUN BLD-MCNC: 62 MG/DL (ref 9–23)
BUN/CREAT SERPL: 15.5 (ref 10–20)
CALCIUM BLD-MCNC: 8.3 MG/DL (ref 8.7–10.4)
CHLORIDE SERPL-SCNC: 116 MMOL/L (ref 98–112)
CO2 SERPL-SCNC: 22 MMOL/L (ref 21–32)
CREAT BLD-MCNC: 4 MG/DL (ref 0.7–1.3)
DEPRECATED RDW RBC AUTO: 69 FL (ref 35.1–46.3)
EGFRCR SERPLBLD CKD-EPI 2021: 15 ML/MIN/1.73M2 (ref 60–?)
EOSINOPHIL # BLD AUTO: 0.06 X10(3) UL (ref 0–0.7)
EOSINOPHIL NFR BLD AUTO: 2.4 %
ERYTHROCYTE [DISTWIDTH] IN BLOOD BY AUTOMATED COUNT: 16.8 % (ref 11–15)
GLUCOSE BLD-MCNC: 66 MG/DL (ref 70–99)
HCT VFR BLD AUTO: 31.4 % (ref 39–53)
HGB BLD-MCNC: 9.7 G/DL (ref 13–17.5)
IMM GRANULOCYTES # BLD AUTO: 0.01 X10(3) UL (ref 0–1)
IMM GRANULOCYTES NFR BLD: 0.4 %
LYMPHOCYTES # BLD AUTO: 0.91 X10(3) UL (ref 1–4)
LYMPHOCYTES NFR BLD AUTO: 36.3 %
MCH RBC QN AUTO: 34.4 PG (ref 26–34)
MCHC RBC AUTO-ENTMCNC: 30.9 G/DL (ref 31–37)
MCV RBC AUTO: 111.3 FL (ref 80–100)
MONOCYTES # BLD AUTO: 0.3 X10(3) UL (ref 0.1–1)
MONOCYTES NFR BLD AUTO: 12 %
NEUTROPHILS # BLD AUTO: 1.22 X10 (3) UL (ref 1.5–7.7)
NEUTROPHILS # BLD AUTO: 1.22 X10(3) UL (ref 1.5–7.7)
NEUTROPHILS NFR BLD AUTO: 48.5 %
OSMOLALITY SERPL CALC.SUM OF ELEC: 318 MOSM/KG (ref 275–295)
PHOSPHATE SERPL-MCNC: 5.2 MG/DL (ref 2.4–5.1)
PLATELET # BLD AUTO: 86 10(3)UL (ref 150–450)
PLATELET MORPHOLOGY: NORMAL
PLATELETS.RETICULATED NFR BLD AUTO: 5.6 % (ref 0–7)
POTASSIUM SERPL-SCNC: 5.6 MMOL/L (ref 3.5–5.1)
PTH-INTACT SERPL-MCNC: 151 PG/ML (ref 18.5–88)
RBC # BLD AUTO: 2.82 X10(6)UL (ref 3.8–5.8)
SODIUM SERPL-SCNC: 146 MMOL/L (ref 136–145)
WBC # BLD AUTO: 2.5 X10(3) UL (ref 4–11)

## 2025-05-01 PROCEDURE — 85025 COMPLETE CBC W/AUTO DIFF WBC: CPT

## 2025-05-01 PROCEDURE — 83970 ASSAY OF PARATHORMONE: CPT

## 2025-05-01 PROCEDURE — 80069 RENAL FUNCTION PANEL: CPT

## 2025-05-01 PROCEDURE — 85060 BLOOD SMEAR INTERPRETATION: CPT

## 2025-05-01 PROCEDURE — 36415 COLL VENOUS BLD VENIPUNCTURE: CPT

## 2025-05-01 NOTE — TELEPHONE ENCOUNTER
Rn spoke to patient wife Camila DARIA verifiedthat refill was just sent on 4/11/25 ,reported did not receive any message from pharmacy.,advised to check with pharmacy and let office call for any questions,pt wife verbalized understanding.

## 2025-05-02 ENCOUNTER — APPOINTMENT (OUTPATIENT)
Age: 70
End: 2025-05-02
Attending: INTERNAL MEDICINE
Payer: MEDICARE

## 2025-05-02 ENCOUNTER — OFFICE VISIT (OUTPATIENT)
Age: 70
End: 2025-05-02
Attending: INTERNAL MEDICINE
Payer: MEDICARE

## 2025-05-02 ENCOUNTER — NURSE ONLY (OUTPATIENT)
Age: 70
End: 2025-05-02
Attending: INTERNAL MEDICINE
Payer: MEDICARE

## 2025-05-02 VITALS
WEIGHT: 238.19 LBS | OXYGEN SATURATION: 100 % | RESPIRATION RATE: 18 BRPM | DIASTOLIC BLOOD PRESSURE: 74 MMHG | HEART RATE: 69 BPM | SYSTOLIC BLOOD PRESSURE: 147 MMHG | HEIGHT: 69 IN | BODY MASS INDEX: 35.28 KG/M2 | TEMPERATURE: 98 F

## 2025-05-02 DIAGNOSIS — Z51.81 MEDICATION MONITORING ENCOUNTER: ICD-10-CM

## 2025-05-02 DIAGNOSIS — D46.9 MDS (MYELODYSPLASTIC SYNDROME) (HCC): Primary | ICD-10-CM

## 2025-05-02 DIAGNOSIS — D61.818 PANCYTOPENIA (HCC): ICD-10-CM

## 2025-05-02 NOTE — PROGRESS NOTES
[facility]cer Center Progress Note    Patient Name: Jaun Burton   YOB: 1955   Medical Record Number: [id number]   Attending Physician: Payam Rdz M.D.     Chief Complaint:  Pancytopenia due to MDS and CKD    History of Present Illness:  69 year old  With chronic kidney disease been evaluate by hematology for pancytopenia.    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 U2AF1 c.101C>T, p.Fhx87Dgs ([id number])   VAF: 38.6%   U2AF1 (also known as U2AF35) encodes a component of the   RNA-splicing machinery known as the spliceosome. Somatic mutations   of U2AF1 are found in approximately 5% of patients with acute   myeloid leukemia (AML)  (25), and in approximately 9% of patients   with myelodysplastic syndrome (MDS) (20) (29) (19). Most U2AF1   mutations affect codons Ser34 or Itj561 (10). This particular   missense mutation has been reported in hematologic malignancies   (4). Mutations in spliceosomal genes, including U2AF1, are   associated with decreased disease-free and overall survival in   patients with AML (8) (13). In MDS, U2AF1 mutations are associated   with worse overall survival (11) and more rapid transformation to   AML (24), and do not predict response to hypomethylating therapies   (11).       2. GATA2 c.568dup, p.Rfd396nw ([id number])   VAF: 40.6%   GATA2 belongs to the ZINA family of transcription factors and   regulates hematopoiesis through two conserved zinc finger domains.   Overall, somatic GATA2 mutations are found in 1-4% of patients   with sporadic myeloid malignancies (12) (17) (18). GATA2 mutations   are common in adult AML patients with biallelic CEBPA mutations,   but are rare in adult AML patients with wild-type CEBPA (5) (6)   (7). Somatic GATA2 mutations are infrequent in MDS (28).   Pathogenic germline variants of GATA2 cause a range of   hematopoietic defects, including MonoMAC syndrome, dendritic cell,   monocyte, B and NK lymphoid  deficiency syndrome (DCML), familial   MDS, AML, and blast transformation in chronic myeloid leukemia   (CML) (3) (23). Somatic GATA2 mutations in hematological   malignancies are typically missense mutations within the   N-terminal zinc-finger domain and in-frame deletions/insertions in   the C-terminal zinc-finger domain (9) (15) (16). Somatic   frameshift and nonsense mutations in GATA2 are generally detected   outside of the zinc-finger domains (15). This particular   frameshift mutation is predicted to disrupt the normal function of   GATA2 (3). In AML patients, GATA2 mutations are confined to the   N-terminal zinc finger domain, and frequently co-occurred with   biallelic CEBPA, KIT and FLT3 mutations (15). Some studies found   that GATA2 mutations had no impact on the clinical outcome in   CEBPA-double/PYQ0-HNK-mjnyqjwk AML patients (6). Another study   found that GATA2 mutations were associated with favorable   prognosis in intermediate-risk karyotype AML with biallelic CEBPA   mutations (5). The prognostic significance of GATA2 mutation in   the absence of CEBPA or FLT3 mutation is unclear. In MDS patients,   GATA2 is frequently co-mutated with BCOR and U2AF1 (15). In GATA2   mutated primary and advanced MDS patients, GATA2 mutation is often   germline in origin and is generally associated with monosomy 7   (28).       TIER 2: Variants of Unknown Clinical Significance in Hematologic   Malignancies       1. KMT2A c.4240G>A, p.[id number] ([id number])   VAF: 44.1%   KMT2A encodes a histone methyltransferase that acts as a   transcriptional coactivator to regulate gene expression during   early development and hematopoiesis (21). Somatic mutations of   KMT2A (formerly known as MLL) are found in 6-10% of AML patients   (1) (26) (27) and very rarely in other myeloid malignancies (22).   The reported AML-associated KMT2A mutations are mostly partial   tandem duplications that span KMT2A exons 2-11 (2) (14).  This   particular KMT2A missense variant alters a moderately conserved   amino acid and has not been reported in hematologic malignancies,   to the best of our knowledge. Its functional consequences are   unknown. In addition, this variant is listed in the dbSNP database   ([id number]). Given that the variant frequency is close to 50%,   it is unclear whether this is a germline or somatic variant. The   clinical significance, if any, is uncertain    Interval History:  He returns for routine follow-up of MDS on CED every 2 weeks. Wt increased some, no systemic signs of illness.     Hernan reports feeling well, mentions fatigue, but denies reports of bleeding, fevers or infections. Denies B symptoms. He denies any chest pain, dyspnea, n/v/abd pain or new concerns on ROS.     Performance Status:  ECOG 0    Past Medical History:  Past Medical History:    C2-3 mild central, C3-4 mild-mod diffuse, C4-5 mild diffuse, C5-6 mod diffuse bulging discs    C5-6 mod central & bilateral mild foraminal, C4-5 left mild foraminal stenosis    C6-7 mild-mod central herniated disc    Cataract    2010    Chronic kidney disease (CKD)    stage 3    Diabetes mellitus (HCC)    Diabetes type 2, uncontrolled    Diabetic retinopathy (HCC)    [facility]    Hyperlipidemia    Meibomian gland dysfunction    Osteoarthritis of spine with radiculopathy, cervical region    Pancreatitis (HCC)    Primary osteoarthritis of both shoulders    Proteinuria    Type II or unspecified type diabetes mellitus without mention of complication, not stated as uncontrolled    Pills & Insulin    Vitreous floaters       Past Surgical History:  Past Surgical History:   Procedure Laterality Date    Appendectomy      Cataract extraction w/  intraocular lens implant Right 06/11/2018    Dr. Lopez    Cataract extraction w/  intraocular lens implant Left 07/12/2018    Dr. Lopez    Cholecystectomy  2012    Electrocardiogram, complete  4/23/2012     scanned to media tab    Hernia surgery         Family History:  Family History   Problem Relation Age of Onset    Diabetes Other         close relative    Diabetes Maternal Grandmother     Diabetes Father     Macular degeneration Neg     Glaucoma Neg         family h/o       Social History:  Social History     Socioeconomic History    Marital status:    Tobacco Use    Smoking status: Former     Current packs/day: 0.00     Types: Cigarettes     Quit date: 1989     Years since quittin.4    Smokeless tobacco: Former    Tobacco comments:     quit about 30 years ago.   Vaping Use    Vaping status: Never Used   Substance and Sexual Activity    Alcohol use: No    Drug use: No   Other Topics Concern    Caffeine Concern Yes     Comment: 1 cup coffee per day    Exercise No    History of tanning Yes    Reaction to local anesthetic No         Current Medications:    Current Outpatient Medications:     HYDROcodone-acetaminophen  MG Oral Tab, Take 1 tablet by mouth every 8 (eight) hours as needed for Pain., Disp: 90 tablet, Rfl: 0    MAGNESIUM-OXIDE 400 (240 Mg) MG Oral Tab, Take 1 tablet by mouth once daily, Disp: 90 tablet, Rfl: 1    AMITRIPTYLINE 25 MG Oral Tab, Take 1 tablet by mouth nightly, Disp: 90 tablet, Rfl: 0    pantoprazole 40 MG Oral Tab EC, TAKE 1 TABLET BY MOUTH BEFORE BREAKFAST FOR  ACID  REFLUX, Disp: 90 tablet, Rfl: 3    fenofibrate 48 MG Oral Tab, Take 1 tablet by mouth once daily, Disp: 90 tablet, Rfl: 3    pregabalin 300 MG Oral Cap, Take 1 capsule (300 mg total) by mouth daily., Disp: 90 capsule, Rfl: 0    NOVOLOG 100 UNIT/ML Injection Solution, INJECT 50 UNITS SUBCUTANEOUSLY ONCE DAILY VIA CORRECTION FACTOR, Disp: 40 mL, Rfl: 0    LANTUS 100 UNIT/ML Subcutaneous Solution, Inject 40 Units into the skin nightly., Disp: 36 mL, Rfl: 1    pregabalin 300 MG Oral Cap, Take 1 capsule (300 mg total) by mouth daily., Disp: 30 capsule, Rfl: 0    HYDROcodone-acetaminophen (NORCO)  MG Oral  Tab, Take 1 tablet by mouth every 8 (eight) hours as needed for Pain., Disp: 90 tablet, Rfl: 0    BD INSULIN SYRINGE U/F 31G X 5/16\" 0.5 ML Does not apply Misc, USE 1 SYRINGE 4 TIMES DAILY, Disp: 400 each, Rfl: 3    Mometasone Furoate 0.1 % External Solution, Use bid to ears as directd, Disp: 60 mL, Rfl: 3    FREESTYLE LITE TEST In Vitro Strip, USE 1 STRIP TO CHECK BLOOD GLUCOSE 3 TIMES DAILY. DX: E11.65, insulin dependent, Disp: 300 strip, Rfl: 1    sodium zirconium cyclosilicate (LOKELMA) 10 g Oral Powd Pack, Take 1 packet (10 g total) by mouth twice a week., Disp: 30 packet, Rfl: 0    azelastine 137 MCG/SPRAY Nasal Solution, 1-2 sprays by Nasal route in the morning and 1-2 sprays before bedtime. FOR SINUS SYMPTOMS/NASAL CONGESTION.., Disp: 30 mL, Rfl: 3    fluticasone propionate 50 MCG/ACT Nasal Suspension, 2 sprays by Each Nare route daily. FOR NASAL CONGESTION/SINUS SYMPTOMS., Disp: 3 each, Rfl: 3    atorvastatin 40 MG Oral Tab, Take 1 tablet (40 mg total) by mouth daily. FOR CHOLESTEROL., Disp: 90 tablet, Rfl: 9    Betamethasone Dipropionate Aug 0.05 % External Cream, Apply 1 Application topically 2 (two) times daily. APPLY TO AFFECTED AREA, Disp: 50 g, Rfl: 1    metRONIDAZOLE 0.75 % External Cream, Apply 1 Application topically daily. For breakout on face, Disp: 45 g, Rfl: 0    Pimecrolimus 1 % External Cream, APPLY   TOPICALLY AFFECTED AREA ON FACE ONCE DAILY TO TWICE DAILY, Disp: 60 g, Rfl: 0    Tazarotene 0.1 % External Cream, Apply to chest nightly, Disp: 60 g, Rfl: 3    Insulin Syringe-Needle U-100 (RELION INSULIN SYRINGE) 31G X 15/64\" 0.5 ML Does not apply Misc, 1 Syringe by Does not apply route 4 (four) times daily., Disp: 400 each, Rfl: 0    Blood Glucose Monitoring Suppl (ACCU-CHEK INÉS PLUS) w/Device Does not apply Kit, Use as directed to check blood sugars., Disp: 1 kit, Rfl: 0    Ferrous Gluconate 225 (27 Fe) MG Oral Tab, Take 27 mg by mouth in the morning., Disp: , Rfl:     Omega-3 Fatty Acids  (FISH OIL) 600 MG Oral Cap, Take 1 capsule by mouth nightly., Disp: , Rfl:     Multiple Vitamins-Minerals (CENTRUM SILVER) Oral Tab, Take 1 tablet by mouth in the morning., Disp: , Rfl:     Current Outpatient Medications on File Prior to Visit   Medication Sig Dispense Refill    HYDROcodone-acetaminophen  MG Oral Tab Take 1 tablet by mouth every 8 (eight) hours as needed for Pain. 90 tablet 0    MAGNESIUM-OXIDE 400 (240 Mg) MG Oral Tab Take 1 tablet by mouth once daily 90 tablet 1    AMITRIPTYLINE 25 MG Oral Tab Take 1 tablet by mouth nightly 90 tablet 0    pantoprazole 40 MG Oral Tab EC TAKE 1 TABLET BY MOUTH BEFORE BREAKFAST FOR  ACID  REFLUX 90 tablet 3    fenofibrate 48 MG Oral Tab Take 1 tablet by mouth once daily 90 tablet 3    pregabalin 300 MG Oral Cap Take 1 capsule (300 mg total) by mouth daily. 90 capsule 0    NOVOLOG 100 UNIT/ML Injection Solution INJECT 50 UNITS SUBCUTANEOUSLY ONCE DAILY VIA CORRECTION FACTOR 40 mL 0    LANTUS 100 UNIT/ML Subcutaneous Solution Inject 40 Units into the skin nightly. 36 mL 1    pregabalin 300 MG Oral Cap Take 1 capsule (300 mg total) by mouth daily. 30 capsule 0    HYDROcodone-acetaminophen (NORCO)  MG Oral Tab Take 1 tablet by mouth every 8 (eight) hours as needed for Pain. 90 tablet 0    BD INSULIN SYRINGE U/F 31G X 5/16\" 0.5 ML Does not apply Misc USE 1 SYRINGE 4 TIMES DAILY 400 each 3    Mometasone Furoate 0.1 % External Solution Use bid to ears as directd 60 mL 3    FREESTYLE LITE TEST In Vitro Strip USE 1 STRIP TO CHECK BLOOD GLUCOSE 3 TIMES DAILY. DX: E11.65, insulin dependent 300 strip 1    sodium zirconium cyclosilicate (LOKELMA) 10 g Oral Powd Pack Take 1 packet (10 g total) by mouth twice a week. 30 packet 0    azelastine 137 MCG/SPRAY Nasal Solution 1-2 sprays by Nasal route in the morning and 1-2 sprays before bedtime. FOR SINUS SYMPTOMS/NASAL CONGESTION.. 30 mL 3    fluticasone propionate 50 MCG/ACT Nasal Suspension 2 sprays by Each Nare route  daily. FOR NASAL CONGESTION/SINUS SYMPTOMS. 3 each 3    atorvastatin 40 MG Oral Tab Take 1 tablet (40 mg total) by mouth daily. FOR CHOLESTEROL. 90 tablet 9    Betamethasone Dipropionate Aug 0.05 % External Cream Apply 1 Application topically 2 (two) times daily. APPLY TO AFFECTED AREA 50 g 1    metRONIDAZOLE 0.75 % External Cream Apply 1 Application topically daily. For breakout on face 45 g 0    Pimecrolimus 1 % External Cream APPLY   TOPICALLY AFFECTED AREA ON FACE ONCE DAILY TO TWICE DAILY 60 g 0    Tazarotene 0.1 % External Cream Apply to chest nightly 60 g 3    Insulin Syringe-Needle U-100 (RELION INSULIN SYRINGE) 31G X 15/64\" 0.5 ML Does not apply Misc 1 Syringe by Does not apply route 4 (four) times daily. 400 each 0    Blood Glucose Monitoring Suppl (ACCU-CHEK INÉS PLUS) w/Device Does not apply Kit Use as directed to check blood sugars. 1 kit 0    Ferrous Gluconate 225 (27 Fe) MG Oral Tab Take 27 mg by mouth in the morning.      Omega-3 Fatty Acids (FISH OIL) 600 MG Oral Cap Take 1 capsule by mouth nightly.      Multiple Vitamins-Minerals (CENTRUM SILVER) Oral Tab Take 1 tablet by mouth in the morning.      [] sulfamethoxazole-trimethoprim -160 MG Oral Tab per tablet Take 1 tablet by mouth 2 (two) times daily for 5 days. 10 tablet 0     Current Facility-Administered Medications on File Prior to Visit   Medication Dose Route Frequency Provider Last Rate Last Admin    [COMPLETED] darbepoetin grey-polysorbate (Aranesp-Albumin Free) 500 MCG/ML injection 500 mcg  500 mcg Subcutaneous Once Clarisa Gallego APRN   500 mcg at 25 1402    [COMPLETED] darbepoetin grey-polysorbate (Aranesp-Albumin Free) 500 MCG/ML injection 500 mcg  500 mcg Subcutaneous Once Clarisa Gallego APRN   500 mcg at 25 1404    darbepoetin grey (Aranesp) 200 MCG/0.4ML injection 200 mcg  200 mcg Subcutaneous Q21 Days Jaun Enciso MD   200 mcg at 23 1028         Allergies:  Allergies   Allergen Reactions     Cefdinir DIARRHEA, NAUSEA AND VOMITING and HALLUCINATION    Zosyn [Piperacillin Sod-Tazobactam So] OTHER (SEE COMMENTS)     Heightened sense of smell; dry heaves, vomiting        Review of Systems:  All other systems reviewed and negative x12    Vital Signs:  /74 (BP Location: Right arm, Patient Position: Sitting, Cuff Size: adult)   Pulse 69   Temp 97.9 °F (36.6 °C) (Oral)   Resp 18   Ht 1.753 m (5' 9\")   Wt 108 kg (238 lb 3.2 oz)   SpO2 100%   BMI 35.18 kg/m²     Physical Examination:  General: Patient is alert and oriented x 3, not in acute distress.  Psych:  Mood and affect appropriate  HEENT: EOMs intact. Oropharynx is clear.   Neck: No palpable lymphadenopathy. Neck is supple.  Lymphatics: There is no palpable peripheral lymphadenopathy   Chest: Clear to auscultation, nonlabored breathing  Cardiovascular: Regular with S1/S2  Abdomen: Soft, non tender.   Extremities: No edema. Warm and pink skin, no ecchymoses  Neurological: 5/5 motor x4.        Laboratory:  Lab Results   Component Value Date    WBC 2.5 (L) 05/01/2025    RBC 2.82 (L) 05/01/2025    HGB 9.7 (L) 05/01/2025    HCT 31.4 (L) 05/01/2025    .3 (H) 05/01/2025    MCH 34.4 (H) 05/01/2025    MCHC 30.9 (L) 05/01/2025    RDW 16.8 (H) 05/01/2025    PLT 86.0 (L) 05/01/2025    MPV 9.6 01/24/2019         Lab Results   Component Value Date    GLU 66 (L) 05/01/2025    BUN 62 (H) 05/01/2025    BUNCREA 15.5 05/01/2025    CREATSERUM 4.00 (H) 05/01/2025    ANIONGAP 8 05/01/2025    GFRNAA 21 (L) 07/23/2022    GFRAA 24 (L) 07/23/2022    CA 8.3 (L) 05/01/2025    OSMOCALC 318 (H) 05/01/2025    ALKPHO 83 12/12/2024    AST 29 12/12/2024    ALT 31 12/12/2024    ALKPHOS 114 (H) 04/25/2013    BILT 0.7 12/12/2024    TP 6.4 12/12/2024    ALB 3.9 05/01/2025    GLOBULIN 2.5 12/12/2024    AGRATIO 1.3 04/25/2013     (H) 05/01/2025    K 5.6 (H) 05/01/2025     (H) 05/01/2025    CO2 22.0 05/01/2025       Radiology:       Cancer Staging   No matching  staging information was found for the patient.      Impression and Plan:  69 year old  With chronic kidney disease been divided by hematology for pancytopenia. Bone marrow 3/23 showed MDS ipss-r low risk (del 20, 1%blasts)    1.) MDS  - NGS as above  -CED hemoglobin now significantly improved we will continue darbepoetin  --hgb is at goal, do not want to exceed value >11g  --discussed hat following discussed with Dr. Sandra Mills at [facility], the pt is not eligible for stem cell transplant, so continue current management  --we can consider GCSF therapy if white blood cell count declines further or TPO therapy to improve platelet counts as needed  --if counts drastically change, will need repeat bone marrow before consider switching to HMA like azacitadine or decitabine to help the pancytopenia    -HGB stable, continue CED, WBC and ANC stable, wt increased some, No recent infections, Denies B symptoms, denies bleeding  -HGB increasing now 9.7  --continue next injection of CED every 2 weeks; pt will f/u in 6 wks with labs prior    2.) CKD  --follows with Dr. Enciso for this condition; also likely contributing to his anemia  --continue to f/u with Dr. Enciso as planned, follows low potassium diet       MDM: Moderate  : Ongoing continuing of complex care      FANY Hooker    [facility]  [address] [facility] [place]  [address]  [phone]

## 2025-05-04 DIAGNOSIS — E78.5 HYPERLIPIDEMIA ASSOCIATED WITH TYPE 2 DIABETES MELLITUS (HCC): ICD-10-CM

## 2025-05-04 DIAGNOSIS — E11.3293 TYPE 2 DIABETES MELLITUS WITH BOTH EYES AFFECTED BY MILD NONPROLIFERATIVE RETINOPATHY WITHOUT MACULAR EDEMA, WITHOUT LONG-TERM CURRENT USE OF INSULIN (HCC): ICD-10-CM

## 2025-05-04 DIAGNOSIS — E11.69 HYPERLIPIDEMIA ASSOCIATED WITH TYPE 2 DIABETES MELLITUS (HCC): ICD-10-CM

## 2025-05-06 RX ORDER — ATORVASTATIN CALCIUM 40 MG/1
40 TABLET, FILM COATED ORAL DAILY
Qty: 90 TABLET | Refills: 3 | Status: SHIPPED | OUTPATIENT
Start: 2025-05-06

## 2025-05-07 ENCOUNTER — TELEPHONE (OUTPATIENT)
Dept: NEPHROLOGY | Facility: CLINIC | Age: 70
End: 2025-05-07

## 2025-05-07 DIAGNOSIS — G62.9 NEUROPATHY: ICD-10-CM

## 2025-05-07 DIAGNOSIS — E10.22 CKD STAGE 4 DUE TO TYPE 1 DIABETES MELLITUS (HCC): Primary | ICD-10-CM

## 2025-05-07 DIAGNOSIS — N18.4 CKD STAGE 4 DUE TO TYPE 1 DIABETES MELLITUS (HCC): Primary | ICD-10-CM

## 2025-05-07 NOTE — TELEPHONE ENCOUNTER
Dr Enciso informed patient wife of the note below DARIA verified ,verbalized understanding.is also requesting refill for hydrocodone please sign pending order .

## 2025-05-07 NOTE — TELEPHONE ENCOUNTER
Jaun Enciso MD Rios, Jennette B, RN  Please tell pt k is still high   Needs lokelma 3x week please    Repeat renal panel, first week of June  thanks

## 2025-05-09 RX ORDER — HYDROCODONE BITARTRATE AND ACETAMINOPHEN 10; 325 MG/1; MG/1
1 TABLET ORAL EVERY 8 HOURS PRN
Qty: 90 TABLET | Refills: 0 | Status: SHIPPED | OUTPATIENT
Start: 2025-05-09

## 2025-05-12 NOTE — TELEPHONE ENCOUNTER
Jaun Enciso MD to Me (Selected Message)        5/9/25  8:00 PM  I called in narcotic   Pt should take lokelma 2-3x week   Repeat renalpanel 3 weeks thanks

## 2025-05-15 ENCOUNTER — LAB ENCOUNTER (OUTPATIENT)
Dept: LAB | Age: 70
End: 2025-05-15
Attending: INTERNAL MEDICINE
Payer: MEDICARE

## 2025-05-15 DIAGNOSIS — D46.9 MDS (MYELODYSPLASTIC SYNDROME) (HCC): ICD-10-CM

## 2025-05-15 DIAGNOSIS — D61.818 PANCYTOPENIA (HCC): ICD-10-CM

## 2025-05-15 LAB
BASOPHILS # BLD AUTO: 0.01 X10(3) UL (ref 0–0.2)
BASOPHILS NFR BLD AUTO: 0.4 %
DEPRECATED RDW RBC AUTO: 67.3 FL (ref 35.1–46.3)
EOSINOPHIL # BLD AUTO: 0.08 X10(3) UL (ref 0–0.7)
EOSINOPHIL NFR BLD AUTO: 3.4 %
ERYTHROCYTE [DISTWIDTH] IN BLOOD BY AUTOMATED COUNT: 16.6 % (ref 11–15)
HCT VFR BLD AUTO: 29.4 % (ref 39–53)
HGB BLD-MCNC: 9.1 G/DL (ref 13–17.5)
IMM GRANULOCYTES # BLD AUTO: 0.01 X10(3) UL (ref 0–1)
IMM GRANULOCYTES NFR BLD: 0.4 %
LYMPHOCYTES # BLD AUTO: 1.18 X10(3) UL (ref 1–4)
LYMPHOCYTES NFR BLD AUTO: 49.8 %
MCH RBC QN AUTO: 34.3 PG (ref 26–34)
MCHC RBC AUTO-ENTMCNC: 31 G/DL (ref 31–37)
MCV RBC AUTO: 110.9 FL (ref 80–100)
MONOCYTES # BLD AUTO: 0.22 X10(3) UL (ref 0.1–1)
MONOCYTES NFR BLD AUTO: 9.3 %
NEUTROPHILS # BLD AUTO: 0.87 X10 (3) UL (ref 1.5–7.7)
NEUTROPHILS # BLD AUTO: 0.87 X10(3) UL (ref 1.5–7.7)
NEUTROPHILS NFR BLD AUTO: 36.7 %
PLATELET # BLD AUTO: 79 10(3)UL (ref 150–450)
PLATELET MORPHOLOGY: NORMAL
PLATELETS.RETICULATED NFR BLD AUTO: 6.9 % (ref 0–7)
RBC # BLD AUTO: 2.65 X10(6)UL (ref 3.8–5.8)
WBC # BLD AUTO: 2.4 X10(3) UL (ref 4–11)

## 2025-05-15 PROCEDURE — 36415 COLL VENOUS BLD VENIPUNCTURE: CPT

## 2025-05-15 PROCEDURE — 85060 BLOOD SMEAR INTERPRETATION: CPT

## 2025-05-15 PROCEDURE — 85025 COMPLETE CBC W/AUTO DIFF WBC: CPT

## 2025-05-16 ENCOUNTER — NURSE ONLY (OUTPATIENT)
Age: 70
End: 2025-05-16
Attending: INTERNAL MEDICINE
Payer: MEDICARE

## 2025-05-16 DIAGNOSIS — D46.9 MDS (MYELODYSPLASTIC SYNDROME) (HCC): Primary | ICD-10-CM

## 2025-05-16 DIAGNOSIS — D61.818 PANCYTOPENIA (HCC): ICD-10-CM

## 2025-05-21 ENCOUNTER — OFFICE VISIT (OUTPATIENT)
Dept: ENDOCRINOLOGY CLINIC | Facility: CLINIC | Age: 70
End: 2025-05-21

## 2025-05-21 VITALS — DIASTOLIC BLOOD PRESSURE: 58 MMHG | SYSTOLIC BLOOD PRESSURE: 109 MMHG | HEART RATE: 81 BPM

## 2025-05-21 DIAGNOSIS — E11.65 UNCONTROLLED TYPE 2 DIABETES MELLITUS WITH HYPERGLYCEMIA (HCC): Primary | ICD-10-CM

## 2025-05-21 LAB
GLUCOSE BLOOD: 259
HEMOGLOBIN A1C: 5.8 % (ref 4.3–5.6)
TEST STRIP LOT #: NORMAL NUMERIC

## 2025-05-21 PROCEDURE — 83036 HEMOGLOBIN GLYCOSYLATED A1C: CPT | Performed by: INTERNAL MEDICINE

## 2025-05-21 PROCEDURE — 99214 OFFICE O/P EST MOD 30 MIN: CPT | Performed by: INTERNAL MEDICINE

## 2025-05-21 PROCEDURE — 82947 ASSAY GLUCOSE BLOOD QUANT: CPT | Performed by: INTERNAL MEDICINE

## 2025-05-21 NOTE — PROGRESS NOTES
Name: Jaun Burton  Date: 5/21/2025    Referring Physician: No ref. provider found    HISTORY OF PRESENT ILLNESS   Jaun Burton is a 69 year old male who presents for diabetes mellitus.      Prior HbA, C or glycohemoglobin were 9.8% 7/2014; 7.7% 12/2015; 7.2% 2/2016; 7.6% 4/2016; 6.8% 7/2016; 6.9% 10/2016; 7.1% 1/2017; 8.2% 4/2017; 7.1% 8/2017; 7.2% 12/2017; 7.4% 3/2018; 6.5% 6/2018; 6.9% 9/2018; 6.6% 4/2019; 6.7% 7/2019; 6.6% 12/2019; 7.2% 8/2020; 6.6% 2/2021; 7.3% 7/2021; 7.3% 2/2022; 7.2% 8/2022; 7.0% 2/2023; 6.4% 8/2023; 6.9% 2/2024; 8.1% 8/2024; 6.3% 1/2025; 5.8% POC Today     Dietary compliance: Fair -->he admits to more cheating on diet   Exercise: Yes -->he has increased activity since last visit, riding bike and fishing; gym 3-5 times per week  Polyuria/polydipsia: No  Blurred vision: No    Episodes of hypoglycemia: Rarely - twice per month   Blood Glucose:  Checking 4-5 times per day  Reviewed Dexcom CGM     Medications for DM   Lantus 35 units SQ QHS (adjusting between 25-35)  Humalog 12-14 units SQ TID with meals plus CF      REVIEW OF SYSTEMS  Eyes: Diabetic retinopathy present: No            Most recent visit to eye doctor in last 12 months: Yes    CV: Cardiovascular disease present: No         Hypertension present: Yes         Hyperlipidemia present: Yes         Peripheral Vascular Disease present: No    : Nephropathy present: Yes - followed by Dr. Enciso, Cr 3.0    Neuro: Neuropathy present: Yes - significant LE neuropathy treated with lyrica and narcotics    Skin: Infection or ulceration: No    Osteoporosis: No    Thyroid disease: No      Medications:     Current Outpatient Medications:     sodium zirconium cyclosilicate (LOKELMA) 10 g Oral Powd Pack, Take 1 packet (10 g total) by mouth 3 (three) times a week. Can take 2-3 times a week., Disp: 13 packet, Rfl: 2    HYDROcodone-acetaminophen  MG Oral Tab, Take 1 tablet by mouth every 8 (eight) hours as needed for Pain., Disp: 90  tablet, Rfl: 0    atorvastatin 40 MG Oral Tab, TAKE 1 TABLET BY MOUTH ONCE DAILY FOR CHOLESTEROL, Disp: 90 tablet, Rfl: 3    HYDROcodone-acetaminophen  MG Oral Tab, Take 1 tablet by mouth every 8 (eight) hours as needed for Pain., Disp: 90 tablet, Rfl: 0    MAGNESIUM-OXIDE 400 (240 Mg) MG Oral Tab, Take 1 tablet by mouth once daily, Disp: 90 tablet, Rfl: 1    AMITRIPTYLINE 25 MG Oral Tab, Take 1 tablet by mouth nightly, Disp: 90 tablet, Rfl: 0    pantoprazole 40 MG Oral Tab EC, TAKE 1 TABLET BY MOUTH BEFORE BREAKFAST FOR  ACID  REFLUX, Disp: 90 tablet, Rfl: 3    fenofibrate 48 MG Oral Tab, Take 1 tablet by mouth once daily, Disp: 90 tablet, Rfl: 3    pregabalin 300 MG Oral Cap, Take 1 capsule (300 mg total) by mouth daily., Disp: 90 capsule, Rfl: 0    NOVOLOG 100 UNIT/ML Injection Solution, INJECT 50 UNITS SUBCUTANEOUSLY ONCE DAILY VIA CORRECTION FACTOR, Disp: 40 mL, Rfl: 0    LANTUS 100 UNIT/ML Subcutaneous Solution, Inject 40 Units into the skin nightly., Disp: 36 mL, Rfl: 1    pregabalin 300 MG Oral Cap, Take 1 capsule (300 mg total) by mouth daily., Disp: 30 capsule, Rfl: 0    HYDROcodone-acetaminophen (NORCO)  MG Oral Tab, Take 1 tablet by mouth every 8 (eight) hours as needed for Pain., Disp: 90 tablet, Rfl: 0    BD INSULIN SYRINGE U/F 31G X 5/16\" 0.5 ML Does not apply Misc, USE 1 SYRINGE 4 TIMES DAILY, Disp: 400 each, Rfl: 3    Mometasone Furoate 0.1 % External Solution, Use bid to ears as directd, Disp: 60 mL, Rfl: 3    FREESTYLE LITE TEST In Vitro Strip, USE 1 STRIP TO CHECK BLOOD GLUCOSE 3 TIMES DAILY. DX: E11.65, insulin dependent, Disp: 300 strip, Rfl: 1    azelastine 137 MCG/SPRAY Nasal Solution, 1-2 sprays by Nasal route in the morning and 1-2 sprays before bedtime. FOR SINUS SYMPTOMS/NASAL CONGESTION.., Disp: 30 mL, Rfl: 3    fluticasone propionate 50 MCG/ACT Nasal Suspension, 2 sprays by Each Nare route daily. FOR NASAL CONGESTION/SINUS SYMPTOMS., Disp: 3 each, Rfl: 3    Betamethasone  Dipropionate Aug 0.05 % External Cream, Apply 1 Application topically 2 (two) times daily. APPLY TO AFFECTED AREA, Disp: 50 g, Rfl: 1    metRONIDAZOLE 0.75 % External Cream, Apply 1 Application topically daily. For breakout on face, Disp: 45 g, Rfl: 0    Pimecrolimus 1 % External Cream, APPLY   TOPICALLY AFFECTED AREA ON FACE ONCE DAILY TO TWICE DAILY, Disp: 60 g, Rfl: 0    Tazarotene 0.1 % External Cream, Apply to chest nightly, Disp: 60 g, Rfl: 3    Insulin Syringe-Needle U-100 (RELION INSULIN SYRINGE) 31G X 15/64\" 0.5 ML Does not apply Misc, 1 Syringe by Does not apply route 4 (four) times daily., Disp: 400 each, Rfl: 0    Blood Glucose Monitoring Suppl (ACCU-CHEK INÉS PLUS) w/Device Does not apply Kit, Use as directed to check blood sugars., Disp: 1 kit, Rfl: 0    Ferrous Gluconate 225 (27 Fe) MG Oral Tab, Take 27 mg by mouth in the morning., Disp: , Rfl:     Omega-3 Fatty Acids (FISH OIL) 600 MG Oral Cap, Take 1 capsule by mouth nightly., Disp: , Rfl:     Multiple Vitamins-Minerals (CENTRUM SILVER) Oral Tab, Take 1 tablet by mouth in the morning., Disp: , Rfl:      Allergies:   Allergies   Allergen Reactions    Cefdinir DIARRHEA, NAUSEA AND VOMITING and HALLUCINATION    Zosyn [Piperacillin Sod-Tazobactam So] OTHER (SEE COMMENTS)     Heightened sense of smell; dry heaves, vomiting       Social History:   Social History     Socioeconomic History    Marital status:    Tobacco Use    Smoking status: Former     Current packs/day: 0.00     Types: Cigarettes     Quit date: 1989     Years since quittin.5    Smokeless tobacco: Former    Tobacco comments:     quit about 30 years ago.   Vaping Use    Vaping status: Never Used   Substance and Sexual Activity    Alcohol use: No    Drug use: No   Other Topics Concern    Caffeine Concern Yes     Comment: 1 cup coffee per day    Exercise No    History of tanning Yes    Reaction to local anesthetic No       Medical History:   Past Medical History:    C2-3  mild central, C3-4 mild-mod diffuse, C4-5 mild diffuse, C5-6 mod diffuse bulging discs    C5-6 mod central & bilateral mild foraminal, C4-5 left mild foraminal stenosis    C6-7 mild-mod central herniated disc    Cataract    2010    Chronic kidney disease (CKD)    stage 3    Diabetes mellitus (HCC)    Diabetes type 2, uncontrolled    Diabetic retinopathy (HCC)    [facility]    Hyperlipidemia    Meibomian gland dysfunction    Osteoarthritis of spine with radiculopathy, cervical region    Pancreatitis (HCC)    Primary osteoarthritis of both shoulders    Proteinuria    Type II or unspecified type diabetes mellitus without mention of complication, not stated as uncontrolled    Pills & Insulin    Vitreous floaters       Surgical history:   Past Surgical History:   Procedure Laterality Date    Appendectomy      Cataract extraction w/  intraocular lens implant Right 06/11/2018    Dr. Lopez    Cataract extraction w/  intraocular lens implant Left 07/12/2018    Dr. Lopez    Cholecystectomy  2012    Electrocardiogram, complete  4/23/2012    scanned to media tab    Hernia surgery           PHYSICAL EXAM  /58   Pulse 81     General Appearance:  alert, well developed, in no acute distress  Eyes:  normal conjunctivae, sclera., normal sclera and normal pupils  Ears/Nose/Mouth/Throat/Neck:  no palpable thyroid nodules or cervical lymphadenopathy  Back: no kyphosis or back tenderness  Musculoskeletal:  normal muscle strength and tone  Skin:  normal moisture and skin texture  Neuro:  sensory grossly intact and motor grossly intact  Psychiatric:  oriented to time, self, and place  Nutritional:  no abnormal weight gain or loss      ASSESSMENT/PLAN:      1. Diabetes Mellitus, Type 2 controlled  -controlled, HgA1c 5.8% -->significant improved   -Congratulated patient on overall stable glycemic control   -Discussed importance of glycemic control to prevent complications of diabetes  -Discussed  complications of diabetes include retinopathy, neuropathy, nephropathy and cardiovascular disease  -Discussed importance of SBGM  -Discussed importance of low CHO diet  -Safest for renal function to continue insulin at this time  -Continue Lantus 35 units subcutaneous daily   -Continue Humalog to 10-10-14  -Continue CF 1:60>160   -Continue Dexcom CGM   -Normal lipids   -Normotensive  -Renal function stable and followed by Dr. Enciso  -Persistent neuropathy symptoms, Continue amitryptiline 25mg pO at bedtime, verbalized understanding of risks and benefits    -Abnormal foot exam performed 1/2025      RTC 4 months     5/21/2025  Tanya Naranjo MD

## 2025-05-21 NOTE — PROGRESS NOTES
-----------------------------  Dexcom Clarity  -----------------------------  Jaun Micheal    YOB: 1955    Generated at: Wed, May 21, 2025 1:42 PM CDT    Reporting period: Tue Apr 22, 2025 - Wed May 21, 2025  -----------------------------  Glucose Details    Average glucose: 172 mg/dL    GMI: 7.4%    Standard deviation: 54 mg/dL    Coefficient of Variation: 31.7%  -----------------------------  Time in Range    Very High: 8%    High: 33%    In Range: 58%    Low: 1%    Very Low: <1%    Target Range   mg/dL    -----------------------------  Sensor usage    Days with data: 30/30    Time active: 97%    Avg. calibrations per day: 0.0

## 2025-05-29 ENCOUNTER — LAB ENCOUNTER (OUTPATIENT)
Dept: LAB | Age: 70
End: 2025-05-29
Attending: INTERNAL MEDICINE
Payer: MEDICARE

## 2025-05-29 DIAGNOSIS — D61.818 PANCYTOPENIA (HCC): ICD-10-CM

## 2025-05-29 DIAGNOSIS — D46.9 MDS (MYELODYSPLASTIC SYNDROME) (HCC): ICD-10-CM

## 2025-05-29 LAB
BASOPHILS # BLD AUTO: 0.01 X10(3) UL (ref 0–0.2)
BASOPHILS NFR BLD AUTO: 0.4 %
DEPRECATED RDW RBC AUTO: 67 FL (ref 35.1–46.3)
EOSINOPHIL # BLD AUTO: 0.05 X10(3) UL (ref 0–0.7)
EOSINOPHIL NFR BLD AUTO: 2.2 %
ERYTHROCYTE [DISTWIDTH] IN BLOOD BY AUTOMATED COUNT: 16.7 % (ref 11–15)
HCT VFR BLD AUTO: 31 % (ref 39–53)
HGB BLD-MCNC: 9.7 G/DL (ref 13–17.5)
IMM GRANULOCYTES # BLD AUTO: 0.02 X10(3) UL (ref 0–1)
IMM GRANULOCYTES NFR BLD: 0.9 %
LYMPHOCYTES # BLD AUTO: 0.98 X10(3) UL (ref 1–4)
LYMPHOCYTES NFR BLD AUTO: 42.6 %
MCH RBC QN AUTO: 34.4 PG (ref 26–34)
MCHC RBC AUTO-ENTMCNC: 31.3 G/DL (ref 31–37)
MCV RBC AUTO: 109.9 FL (ref 80–100)
MONOCYTES # BLD AUTO: 0.25 X10(3) UL (ref 0.1–1)
MONOCYTES NFR BLD AUTO: 10.9 %
NEUTROPHILS # BLD AUTO: 0.99 X10 (3) UL (ref 1.5–7.7)
NEUTROPHILS # BLD AUTO: 0.99 X10(3) UL (ref 1.5–7.7)
NEUTROPHILS NFR BLD AUTO: 43 %
PLATELET # BLD AUTO: 86 10(3)UL (ref 150–450)
PLATELETS.RETICULATED NFR BLD AUTO: 6.5 % (ref 0–7)
RBC # BLD AUTO: 2.82 X10(6)UL (ref 3.8–5.8)
WBC # BLD AUTO: 2.3 X10(3) UL (ref 4–11)

## 2025-05-29 PROCEDURE — 36415 COLL VENOUS BLD VENIPUNCTURE: CPT

## 2025-05-29 PROCEDURE — 85025 COMPLETE CBC W/AUTO DIFF WBC: CPT

## 2025-05-30 ENCOUNTER — NURSE ONLY (OUTPATIENT)
Age: 70
End: 2025-05-30
Attending: INTERNAL MEDICINE
Payer: MEDICARE

## 2025-05-30 DIAGNOSIS — D61.818 PANCYTOPENIA (HCC): ICD-10-CM

## 2025-05-30 DIAGNOSIS — D46.9 MDS (MYELODYSPLASTIC SYNDROME) (HCC): Primary | ICD-10-CM

## 2025-05-30 LAB — PLATELET MORPHOLOGY: NORMAL

## 2025-05-30 NOTE — PROGRESS NOTES
[facility] for Aranesp injection  HGB of 9.7 on outpatient labs from 5/29    Aranesp administered to R upper arm per parameters  Pt appeared to tolerate injection without difficulty or complaint  Discharged in stable condition. Pt aware of future appts via printed AVS

## 2025-05-31 DIAGNOSIS — E11.65 UNCONTROLLED TYPE 2 DIABETES MELLITUS WITH HYPERGLYCEMIA (HCC): ICD-10-CM

## 2025-05-31 RX ORDER — INSULIN ASPART 100 [IU]/ML
INJECTION, SOLUTION INTRAVENOUS; SUBCUTANEOUS
Qty: 40 ML | Refills: 0 | Status: SHIPPED | OUTPATIENT
Start: 2025-05-31

## 2025-05-31 NOTE — TELEPHONE ENCOUNTER
Endocrine refill protocol for basal insulins     Protocol Criteria: PASSED Reason: N/A    If all below requirements are met, send a 90-day supply with 1 refill per provider protocol.       Verify Appointment with Endocrinology completed in the last 6 months or scheduled in the next 3 months.  Verify A1C has been completed within the last 6 months and is below 8.5%     Last completed office visit:5/21/2025 Tanya Naranjo MD   Last completed telemed visit: Visit date not found  Next scheduled Follow up:   Future Appointments   Date Time Provider Department Center                                      10/1/2025  1:30 PM Tanya Naranjo MD [facility]      Last A1c result: Last A1c value was 5.8% done 5/21/2025.

## 2025-06-09 ENCOUNTER — TELEPHONE (OUTPATIENT)
Dept: ENDOCRINOLOGY CLINIC | Facility: CLINIC | Age: 70
End: 2025-06-09

## 2025-06-09 DIAGNOSIS — E11.65 UNCONTROLLED TYPE 2 DIABETES MELLITUS WITH HYPERGLYCEMIA (HCC): ICD-10-CM

## 2025-06-09 RX ORDER — SYRINGE AND NEEDLE,INSULIN,1ML 31GX15/64"
1 SYRINGE, EMPTY DISPOSABLE MISCELLANEOUS 4 TIMES DAILY
Qty: 400 EACH | Refills: 1 | Status: SHIPPED | OUTPATIENT
Start: 2025-06-09

## 2025-06-09 NOTE — TELEPHONE ENCOUNTER
Wife (OK per Release Of Information) was upset as pharmacy said they send us 3 faxes when she sent refill request 10 days ago - no related refill request documented recently on this patient's chart. Our fax number was provided to her who said she would update pharmacy.    --  Endocrine Refill protocol for Glucose testing supplies   Insulin Syringes - sent.    Protocol Criteria: PASSED     If below requirement is met, send a 90-day supply with 1 refill per provider protocol.    Verify appointment with Endocrinology completed in the last 6 months or scheduled in the next 3 months.    Last completed office visit:5/21/2025 Tanya Naranjo MD   Last completed telemed visit: Visit date not found  Next scheduled Follow up:   Future Appointments   Date Time Provider Department Center   6/13/2025  1:00 PM [facility] [place] [facility]   6/13/2025  1:40 PM Payam Rdz MD [facility] [place] [facility]   6/27/2025  2:00 PM [facility] [place] [facility]   7/11/2025  2:00 PM [facility] [place] [facility]   8/13/2025  4:00 PM Jaun Enciso MD [id number]   10/1/2025  1:30 PM Tanya Naranjo MD [facility]

## 2025-06-09 NOTE — TELEPHONE ENCOUNTER
Patients spouse spoke to [facility] and they fax e-script for bd syringes. Please update at [phone],thanks.

## 2025-06-10 NOTE — TELEPHONE ENCOUNTER
Pharmacy requesting refill     BD INSULIN SYRINGE U/F 31G X 5/16\" 0.5 ML Does not apply Misc USE 1 SYRINGE 4 TIMES DAILY 400 each 3

## 2025-06-11 RX ORDER — SYRINGE-NEEDLE,INSULIN,0.5 ML 27GX1/2"
SYRINGE, EMPTY DISPOSABLE MISCELLANEOUS
Qty: 400 EACH | Refills: 3 | Status: SHIPPED | OUTPATIENT
Start: 2025-06-11

## 2025-06-12 ENCOUNTER — LAB ENCOUNTER (OUTPATIENT)
Dept: LAB | Age: 70
End: 2025-06-12
Attending: INTERNAL MEDICINE
Payer: MEDICARE

## 2025-06-12 DIAGNOSIS — D46.9 MDS (MYELODYSPLASTIC SYNDROME) (HCC): ICD-10-CM

## 2025-06-12 DIAGNOSIS — D61.818 PANCYTOPENIA (HCC): ICD-10-CM

## 2025-06-12 LAB
BASOPHILS # BLD AUTO: 0 X10(3) UL (ref 0–0.2)
BASOPHILS NFR BLD AUTO: 0 %
DEPRECATED RDW RBC AUTO: 66.2 FL (ref 35.1–46.3)
EOSINOPHIL # BLD AUTO: 0.06 X10(3) UL (ref 0–0.7)
EOSINOPHIL NFR BLD AUTO: 2.7 %
ERYTHROCYTE [DISTWIDTH] IN BLOOD BY AUTOMATED COUNT: 16.4 % (ref 11–15)
HCT VFR BLD AUTO: 30.5 % (ref 39–53)
HGB BLD-MCNC: 9.7 G/DL (ref 13–17.5)
IMM GRANULOCYTES # BLD AUTO: 0.02 X10(3) UL (ref 0–1)
IMM GRANULOCYTES NFR BLD: 0.9 %
LYMPHOCYTES # BLD AUTO: 0.94 X10(3) UL (ref 1–4)
LYMPHOCYTES NFR BLD AUTO: 41.8 %
MCH RBC QN AUTO: 34.5 PG (ref 26–34)
MCHC RBC AUTO-ENTMCNC: 31.8 G/DL (ref 31–37)
MCV RBC AUTO: 108.5 FL (ref 80–100)
MONOCYTES # BLD AUTO: 0.24 X10(3) UL (ref 0.1–1)
MONOCYTES NFR BLD AUTO: 10.7 %
NEUTROPHILS # BLD AUTO: 0.99 X10 (3) UL (ref 1.5–7.7)
NEUTROPHILS # BLD AUTO: 0.99 X10(3) UL (ref 1.5–7.7)
NEUTROPHILS NFR BLD AUTO: 43.9 %
PLATELET # BLD AUTO: 77 10(3)UL (ref 150–450)
PLATELET MORPHOLOGY: NORMAL
PLATELETS.RETICULATED NFR BLD AUTO: 6.2 % (ref 0–7)
RBC # BLD AUTO: 2.81 X10(6)UL (ref 3.8–5.8)
WBC # BLD AUTO: 2.3 X10(3) UL (ref 4–11)

## 2025-06-12 PROCEDURE — 36415 COLL VENOUS BLD VENIPUNCTURE: CPT

## 2025-06-12 PROCEDURE — 85025 COMPLETE CBC W/AUTO DIFF WBC: CPT

## 2025-06-13 ENCOUNTER — NURSE ONLY (OUTPATIENT)
Age: 70
End: 2025-06-13
Attending: INTERNAL MEDICINE
Payer: MEDICARE

## 2025-06-13 ENCOUNTER — OFFICE VISIT (OUTPATIENT)
Age: 70
End: 2025-06-13
Attending: INTERNAL MEDICINE
Payer: MEDICARE

## 2025-06-13 VITALS
HEART RATE: 76 BPM | BODY MASS INDEX: 35 KG/M2 | DIASTOLIC BLOOD PRESSURE: 78 MMHG | RESPIRATION RATE: 18 BRPM | OXYGEN SATURATION: 98 % | SYSTOLIC BLOOD PRESSURE: 131 MMHG | WEIGHT: 238 LBS

## 2025-06-13 DIAGNOSIS — D46.9 MDS (MYELODYSPLASTIC SYNDROME) (HCC): Primary | ICD-10-CM

## 2025-06-13 DIAGNOSIS — D61.818 PANCYTOPENIA (HCC): ICD-10-CM

## 2025-06-13 DIAGNOSIS — N18.9 CHRONIC KIDNEY DISEASE, UNSPECIFIED CKD STAGE: ICD-10-CM

## 2025-06-13 DIAGNOSIS — Z51.81 MEDICATION MONITORING ENCOUNTER: ICD-10-CM

## 2025-06-13 NOTE — PROGRESS NOTES
Cancer Center Progress Note    Patient Name: Jaun Burton   YOB: 1955   Medical Record Number: [id number]   Attending Physician: Payam Rdz M.D.     Chief Complaint:  Pancytopenia due to MDS and CKD    History of Present Illness:  69 year old  With chronic kidney disease been evaluate by hematology for pancytopenia.    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 U2AF1 c.101C>T, p.Mbp95Ygn ([id number])   VAF: 38.6%   U2AF1 (also known as U2AF35) encodes a component of the   RNA-splicing machinery known as the spliceosome. Somatic mutations   of U2AF1 are found in approximately 5% of patients with acute   myeloid leukemia (AML)  (25), and in approximately 9% of patients   with myelodysplastic syndrome (MDS) (20) (29) (19). Most U2AF1   mutations affect codons Ser34 or Vjl253 (10). This particular   missense mutation has been reported in hematologic malignancies   (4). Mutations in spliceosomal genes, including U2AF1, are   associated with decreased disease-free and overall survival in   patients with AML (8) (13). In MDS, U2AF1 mutations are associated   with worse overall survival (11) and more rapid transformation to   AML (24), and do not predict response to hypomethylating therapies   (11).       2. GATA2 c.568dup, p.Smm987mp ([id number])   VAF: 40.6%   GATA2 belongs to the ZINA family of transcription factors and   regulates hematopoiesis through two conserved zinc finger domains.   Overall, somatic GATA2 mutations are found in 1-4% of patients   with sporadic myeloid malignancies (12) (17) (18). GATA2 mutations   are common in adult AML patients with biallelic CEBPA mutations,   but are rare in adult AML patients with wild-type CEBPA (5) (6)   (7). Somatic GATA2 mutations are infrequent in MDS (28).   Pathogenic germline variants of GATA2 cause a range of   hematopoietic defects, including MonoMAC syndrome, dendritic cell,   monocyte, B and NK lymphoid  deficiency syndrome (DCML), familial   MDS, AML, and blast transformation in chronic myeloid leukemia   (CML) (3) (23). Somatic GATA2 mutations in hematological   malignancies are typically missense mutations within the   N-terminal zinc-finger domain and in-frame deletions/insertions in   the C-terminal zinc-finger domain (9) (15) (16). Somatic   frameshift and nonsense mutations in GATA2 are generally detected   outside of the zinc-finger domains (15). This particular   frameshift mutation is predicted to disrupt the normal function of   GATA2 (3). In AML patients, GATA2 mutations are confined to the   N-terminal zinc finger domain, and frequently co-occurred with   biallelic CEBPA, KIT and FLT3 mutations (15). Some studies found   that GATA2 mutations had no impact on the clinical outcome in   CEBPA-double/DFK0-NXE-qbwjrkot AML patients (6). Another study   found that GATA2 mutations were associated with favorable   prognosis in intermediate-risk karyotype AML with biallelic CEBPA   mutations (5). The prognostic significance of GATA2 mutation in   the absence of CEBPA or FLT3 mutation is unclear. In MDS patients,   GATA2 is frequently co-mutated with BCOR and U2AF1 (15). In GATA2   mutated primary and advanced MDS patients, GATA2 mutation is often   germline in origin and is generally associated with monosomy 7   (28).       TIER 2: Variants of Unknown Clinical Significance in Hematologic   Malignancies       1. KMT2A c.4240G>A, p.Pfe9242Xds ([id number])   VAF: 44.1%   KMT2A encodes a histone methyltransferase that acts as a   transcriptional coactivator to regulate gene expression during   early development and hematopoiesis (21). Somatic mutations of   KMT2A (formerly known as MLL) are found in 6-10% of AML patients   (1) (26) (27) and very rarely in other myeloid malignancies (22).   The reported AML-associated KMT2A mutations are mostly partial   tandem duplications that span KMT2A exons 2-11 (2) (14).  This   particular KMT2A missense variant alters a moderately conserved   amino acid and has not been reported in hematologic malignancies,   to the best of our knowledge. Its functional consequences are   unknown. In addition, this variant is listed in the dbSNP database   ([id number]). Given that the variant frequency is close to 50%,   it is unclear whether this is a germline or somatic variant. The   clinical significance, if any, is uncertain    Interval History:  He returns for routine follow-up of MDS on CED every 2 weeks. Pt reports some arthralgia pains from a recent boating experience; no systemic signs of illness.     Hernan reports feeling well, mentions fatigue, but denies reports of bleeding, fevers or infections. He denies any chest pain, dyspnea, n/v/abd pain or new concerns on ROS.     Performance Status:  ECOG 0    Allergies:  Allergies   Allergen Reactions    Cefdinir DIARRHEA, NAUSEA AND VOMITING and HALLUCINATION    Zosyn [Piperacillin Sod-Tazobactam So] OTHER (SEE COMMENTS)     Heightened sense of smell; dry heaves, vomiting       Current Medications:    Current Outpatient Medications:     Insulin Syringe-Needle U-100 (BD INSULIN SYRINGE ULTRAFINE) 31G X 5/16\" 0.5 ML Does not apply Misc, Use 1 syringe 4 times daily, Disp: 400 each, Rfl: 3    Insulin Syringe-Needle U-100 (RELION INSULIN SYRINGE) 31G X 15/64\" 0.5 ML Does not apply Misc, 1 Syringe 4 (four) times daily., Disp: 400 each, Rfl: 1    NOVOLOG 100 UNIT/ML Injection Solution, INJECT 50 UNITS SUBCUTANEOUSLY ONCE DAILY VIA  CORRECTION  FACTOR, Disp: 40 mL, Rfl: 0    sodium zirconium cyclosilicate (LOKELMA) 10 g Oral Powd Pack, Take 1 packet (10 g total) by mouth 3 (three) times a week. Can take 2-3 times a week., Disp: 13 packet, Rfl: 2    HYDROcodone-acetaminophen  MG Oral Tab, Take 1 tablet by mouth every 8 (eight) hours as needed for Pain., Disp: 90 tablet, Rfl: 0    atorvastatin 40 MG Oral Tab, TAKE 1 TABLET BY MOUTH ONCE DAILY FOR  CHOLESTEROL, Disp: 90 tablet, Rfl: 3    HYDROcodone-acetaminophen  MG Oral Tab, Take 1 tablet by mouth every 8 (eight) hours as needed for Pain., Disp: 90 tablet, Rfl: 0    MAGNESIUM-OXIDE 400 (240 Mg) MG Oral Tab, Take 1 tablet by mouth once daily, Disp: 90 tablet, Rfl: 1    AMITRIPTYLINE 25 MG Oral Tab, Take 1 tablet by mouth nightly, Disp: 90 tablet, Rfl: 0    pantoprazole 40 MG Oral Tab EC, TAKE 1 TABLET BY MOUTH BEFORE BREAKFAST FOR  ACID  REFLUX, Disp: 90 tablet, Rfl: 3    fenofibrate 48 MG Oral Tab, Take 1 tablet by mouth once daily, Disp: 90 tablet, Rfl: 3    pregabalin 300 MG Oral Cap, Take 1 capsule (300 mg total) by mouth daily., Disp: 90 capsule, Rfl: 0    LANTUS 100 UNIT/ML Subcutaneous Solution, Inject 40 Units into the skin nightly., Disp: 36 mL, Rfl: 1    pregabalin 300 MG Oral Cap, Take 1 capsule (300 mg total) by mouth daily., Disp: 30 capsule, Rfl: 0    HYDROcodone-acetaminophen (NORCO)  MG Oral Tab, Take 1 tablet by mouth every 8 (eight) hours as needed for Pain., Disp: 90 tablet, Rfl: 0    BD INSULIN SYRINGE U/F 31G X 5/16\" 0.5 ML Does not apply Misc, USE 1 SYRINGE 4 TIMES DAILY, Disp: 400 each, Rfl: 3    Mometasone Furoate 0.1 % External Solution, Use bid to ears as directd, Disp: 60 mL, Rfl: 3    FREESTYLE LITE TEST In Vitro Strip, USE 1 STRIP TO CHECK BLOOD GLUCOSE 3 TIMES DAILY. DX: E11.65, insulin dependent, Disp: 300 strip, Rfl: 1    azelastine 137 MCG/SPRAY Nasal Solution, 1-2 sprays by Nasal route in the morning and 1-2 sprays before bedtime. FOR SINUS SYMPTOMS/NASAL CONGESTION.., Disp: 30 mL, Rfl: 3    fluticasone propionate 50 MCG/ACT Nasal Suspension, 2 sprays by Each Nare route daily. FOR NASAL CONGESTION/SINUS SYMPTOMS., Disp: 3 each, Rfl: 3    Betamethasone Dipropionate Aug 0.05 % External Cream, Apply 1 Application topically 2 (two) times daily. APPLY TO AFFECTED AREA, Disp: 50 g, Rfl: 1    metRONIDAZOLE 0.75 % External Cream, Apply 1 Application topically daily. For  breakout on face, Disp: 45 g, Rfl: 0    Pimecrolimus 1 % External Cream, APPLY   TOPICALLY AFFECTED AREA ON FACE ONCE DAILY TO TWICE DAILY, Disp: 60 g, Rfl: 0    Tazarotene 0.1 % External Cream, Apply to chest nightly, Disp: 60 g, Rfl: 3    Blood Glucose Monitoring Suppl (ACCU-CHEK INÉS PLUS) w/Device Does not apply Kit, Use as directed to check blood sugars., Disp: 1 kit, Rfl: 0    Ferrous Gluconate 225 (27 Fe) MG Oral Tab, Take 27 mg by mouth in the morning., Disp: , Rfl:     Omega-3 Fatty Acids (FISH OIL) 600 MG Oral Cap, Take 1 capsule by mouth nightly., Disp: , Rfl:     Multiple Vitamins-Minerals (CENTRUM SILVER) Oral Tab, Take 1 tablet by mouth in the morning., Disp: , Rfl:        Review of Systems:  All other systems reviewed and negative x12    Vital Signs:  /78 (BP Location: Left arm, Patient Position: Sitting, Cuff Size: large)   Pulse 76   Resp 18   Wt 108 kg (238 lb)   SpO2 98%   BMI 35.15 kg/m²     Physical Examination:  General: Patient is alert and oriented x 3, not in acute distress.  Psych:  Mood and affect appropriate  HEENT: EOMs intact. Oropharynx is clear.   Neck: No palpable lymphadenopathy. Neck is supple.  Lymphatics: There is no palpable peripheral lymphadenopathy   Chest: Clear to auscultation, nonlabored breathing  Cardiovascular: Regular with S1/S2  Abdomen: Soft, non tender.   Extremities: No edema. Warm and pink skin, no ecchymoses  Neurological: 5/5 motor x4.        Laboratory:  Lab Results   Component Value Date    WBC 2.3 (L) 06/12/2025    RBC 2.81 (L) 06/12/2025    HGB 9.7 (L) 06/12/2025    HCT 30.5 (L) 06/12/2025    .5 (H) 06/12/2025    MCH 34.5 (H) 06/12/2025    MCHC 31.8 06/12/2025    RDW 16.4 (H) 06/12/2025    PLT 77.0 (L) 06/12/2025    MPV 9.6 01/24/2019         Lab Results   Component Value Date    GLU 66 (L) 05/01/2025    BUN 62 (H) 05/01/2025    BUNCREA 15.5 05/01/2025    CREATSERUM 4.00 (H) 05/01/2025    ANIONGAP 8 05/01/2025    GFRNAA 21 (L) 07/23/2022     GFRAA 24 (L) 07/23/2022    CA 8.3 (L) 05/01/2025    OSMOCALC 318 (H) 05/01/2025    ALKPHO 83 12/12/2024    AST 29 12/12/2024    ALT 31 12/12/2024    ALKPHOS 114 (H) 04/25/2013    BILT 0.7 12/12/2024    TP 6.4 12/12/2024    ALB 3.9 05/01/2025    GLOBULIN 2.5 12/12/2024    AGRATIO 1.3 04/25/2013     (H) 05/01/2025    K 5.6 (H) 05/01/2025     (H) 05/01/2025    CO2 22.0 05/01/2025       Radiology:       Cancer Staging   No matching staging information was found for the patient.      Impression and Plan:  69 year old  With chronic kidney disease been divided by hematology for pancytopenia. 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 Sandra Mills at [facility], the pt is not eligible for stem cell transplant, so continue current management  --we can consider GCSF therapy if white blood cell count declines further or TPO therapy to improve platelet counts as needed  --if counts drastically change, will need repeat bone marrow before consider switching to HMA like azacitadine or decitabine to help the pancytopenia    -labs are stable; continue CED; clinically feeling well  --continue next injection of CED every 2 weeks; pt will f/u in 8 wks with labs prior to see a provider    2.) CKD  --follows with Dr. Enciso for this condition; also likely contributing to his anemia  --continue to f/u with Dr. Enciso as planned, follows low potassium diet       MDM: Moderate  : Ongoing continuing of complex care      Payam Rdz MD  [facility]  [address] [facility] [place]  [address]  [phone]

## 2025-06-13 NOTE — PROGRESS NOTES
Patient arrives for aranesp injection. Hemoglobin 9.7- denies any complaints. Tolerated injection. Discharged to waiting area for MD sands.

## 2025-06-16 DIAGNOSIS — G62.9 NEUROPATHY: Primary | ICD-10-CM

## 2025-06-16 NOTE — TELEPHONE ENCOUNTER
Last visit -4/17/25  Return to clinic-3 months  Follow up-8/13/25  Please sign pending order if appropriate

## 2025-06-16 NOTE — TELEPHONE ENCOUNTER
Patients wife called to request refills:     Current Outpatient Medications:       HYDROcodone-acetaminophen  MG Oral Tab, Take 1 tablet by mouth every 8 (eight) hours as needed for Pain., Disp: 90 tablet, Rfl: 0      pregabalin 300 MG Oral Cap, Take 1 capsule (300 mg total) by mouth daily., Disp: 90 capsule, Rfl: 0     Please call.  Patient is almost of out of medication.

## 2025-06-17 RX ORDER — PREGABALIN 300 MG/1
300 CAPSULE ORAL DAILY
Qty: 90 CAPSULE | Refills: 0 | Status: SHIPPED | OUTPATIENT
Start: 2025-06-17

## 2025-06-17 RX ORDER — HYDROCODONE BITARTRATE AND ACETAMINOPHEN 10; 325 MG/1; MG/1
1 TABLET ORAL EVERY 8 HOURS PRN
Qty: 90 TABLET | Refills: 0 | Status: SHIPPED | OUTPATIENT
Start: 2025-06-17

## 2025-06-23 NOTE — TELEPHONE ENCOUNTER
Received a fax from [employer] requesting a physician order, be filled out regarding pt's diabetic supplies. Also attached, is pt 's most recent 5/21/25      All other review of systems negative, except as noted in HPI

## 2025-06-26 ENCOUNTER — LAB ENCOUNTER (OUTPATIENT)
Dept: LAB | Age: 70
End: 2025-06-26
Attending: INTERNAL MEDICINE
Payer: MEDICARE

## 2025-06-26 DIAGNOSIS — D61.818 PANCYTOPENIA (HCC): ICD-10-CM

## 2025-06-26 DIAGNOSIS — D46.9 MDS (MYELODYSPLASTIC SYNDROME) (HCC): ICD-10-CM

## 2025-06-26 LAB
BASOPHILS # BLD AUTO: 0 X10(3) UL (ref 0–0.2)
BASOPHILS NFR BLD AUTO: 0 %
DEPRECATED RDW RBC AUTO: 66.4 FL (ref 35.1–46.3)
EOSINOPHIL # BLD AUTO: 0.05 X10(3) UL (ref 0–0.7)
EOSINOPHIL NFR BLD AUTO: 2.1 %
ERYTHROCYTE [DISTWIDTH] IN BLOOD BY AUTOMATED COUNT: 16.8 % (ref 11–15)
HCT VFR BLD AUTO: 30.7 % (ref 39–53)
HGB BLD-MCNC: 9.7 G/DL (ref 13–17.5)
IMM GRANULOCYTES # BLD AUTO: 0 X10(3) UL (ref 0–1)
IMM GRANULOCYTES NFR BLD: 0 %
LYMPHOCYTES # BLD AUTO: 0.95 X10(3) UL (ref 1–4)
LYMPHOCYTES NFR BLD AUTO: 39.9 %
MCH RBC QN AUTO: 34 PG (ref 26–34)
MCHC RBC AUTO-ENTMCNC: 31.6 G/DL (ref 31–37)
MCV RBC AUTO: 107.7 FL (ref 80–100)
MONOCYTES # BLD AUTO: 0.23 X10(3) UL (ref 0.1–1)
MONOCYTES NFR BLD AUTO: 9.7 %
NEUTROPHILS # BLD AUTO: 1.15 X10 (3) UL (ref 1.5–7.7)
NEUTROPHILS # BLD AUTO: 1.15 X10(3) UL (ref 1.5–7.7)
NEUTROPHILS NFR BLD AUTO: 48.3 %
PLATELET # BLD AUTO: 90 10(3)UL (ref 150–450)
PLATELET MORPHOLOGY: NORMAL
PLATELETS.RETICULATED NFR BLD AUTO: 5.8 % (ref 0–7)
RBC # BLD AUTO: 2.85 X10(6)UL (ref 3.8–5.8)
WBC # BLD AUTO: 2.4 X10(3) UL (ref 4–11)

## 2025-06-26 PROCEDURE — 36415 COLL VENOUS BLD VENIPUNCTURE: CPT

## 2025-06-26 PROCEDURE — 85025 COMPLETE CBC W/AUTO DIFF WBC: CPT

## 2025-06-27 ENCOUNTER — NURSE ONLY (OUTPATIENT)
Age: 70
End: 2025-06-27
Attending: INTERNAL MEDICINE
Payer: MEDICARE

## 2025-06-27 DIAGNOSIS — D46.9 MDS (MYELODYSPLASTIC SYNDROME) (HCC): Primary | ICD-10-CM

## 2025-06-27 DIAGNOSIS — D61.818 PANCYTOPENIA (HCC): ICD-10-CM

## 2025-06-27 NOTE — TELEPHONE ENCOUNTER
Endocrine Refill protocol for oral medications    Protocol Criteria:  PASSED  Reason: N/A    If below requirement is met, send a 90-day supply with 1 refill per provider protocol.    Verify appointment with Endocrinology completed in the last 6 months or scheduled in the next 3 months.    Last completed office visit:5/21/2025 Tanya Naranjo MD   Last completed telemed visit: Visit date not found  Next scheduled Follow up:   Future Appointments   Date Time Provider Department Center   6/27/2025  2:00 PM [facility]   7/11/2025  2:00 PM [facility]   7/25/2025  2:00 PM [facility]   8/8/2025 12:15 PM [facility]   8/8/2025  1:00 PM Kaykay Humphrey APRN [facility]   8/8/2025  1:30 PM [facility]   8/13/2025  4:00 PM Jaun Enciso MD [id number]   8/22/2025  2:00 PM [facility]   10/1/2025  1:30 PM Tanya Naranjo MD [facility]

## 2025-07-01 NOTE — TELEPHONE ENCOUNTER
[facility] acute hospital has questions regarding Lantus rx. Please call. Thank you. Quality 226: Preventive Care And Screening: Tobacco Use: Screening And Cessation Intervention: Patient screened for tobacco use and is an ex/non-smoker Detail Level: Detailed

## 2025-07-10 ENCOUNTER — LAB ENCOUNTER (OUTPATIENT)
Dept: LAB | Age: 70
End: 2025-07-10
Attending: INTERNAL MEDICINE
Payer: MEDICARE

## 2025-07-10 DIAGNOSIS — D61.818 PANCYTOPENIA (HCC): ICD-10-CM

## 2025-07-10 DIAGNOSIS — D46.9 MDS (MYELODYSPLASTIC SYNDROME) (HCC): ICD-10-CM

## 2025-07-10 LAB
BASOPHILS # BLD AUTO: 0 X10(3) UL (ref 0–0.2)
BASOPHILS NFR BLD AUTO: 0 %
DEPRECATED RDW RBC AUTO: 67.3 FL (ref 35.1–46.3)
EOSINOPHIL # BLD AUTO: 0.07 X10(3) UL (ref 0–0.7)
EOSINOPHIL NFR BLD AUTO: 2.9 %
ERYTHROCYTE [DISTWIDTH] IN BLOOD BY AUTOMATED COUNT: 16.7 % (ref 11–15)
HCT VFR BLD AUTO: 29.7 % (ref 39–53)
HGB BLD-MCNC: 9.2 G/DL (ref 13–17.5)
IMM GRANULOCYTES # BLD AUTO: 0.01 X10(3) UL (ref 0–1)
IMM GRANULOCYTES NFR BLD: 0.4 %
LYMPHOCYTES # BLD AUTO: 1.09 X10(3) UL (ref 1–4)
LYMPHOCYTES NFR BLD AUTO: 45.8 %
MCH RBC QN AUTO: 34.2 PG (ref 26–34)
MCHC RBC AUTO-ENTMCNC: 31 G/DL (ref 31–37)
MCV RBC AUTO: 110.4 FL (ref 80–100)
MONOCYTES # BLD AUTO: 0.21 X10(3) UL (ref 0.1–1)
MONOCYTES NFR BLD AUTO: 8.8 %
NEUTROPHILS # BLD AUTO: 1 X10 (3) UL (ref 1.5–7.7)
NEUTROPHILS # BLD AUTO: 1 X10(3) UL (ref 1.5–7.7)
NEUTROPHILS NFR BLD AUTO: 42.1 %
PLATELET # BLD AUTO: 82 10(3)UL (ref 150–450)
PLATELET MORPHOLOGY: NORMAL
PLATELETS.RETICULATED NFR BLD AUTO: 6.8 % (ref 0–7)
RBC # BLD AUTO: 2.69 X10(6)UL (ref 3.8–5.8)
WBC # BLD AUTO: 2.4 X10(3) UL (ref 4–11)

## 2025-07-10 PROCEDURE — 85025 COMPLETE CBC W/AUTO DIFF WBC: CPT

## 2025-07-10 PROCEDURE — 85060 BLOOD SMEAR INTERPRETATION: CPT

## 2025-07-10 PROCEDURE — 36415 COLL VENOUS BLD VENIPUNCTURE: CPT

## 2025-07-11 ENCOUNTER — NURSE ONLY (OUTPATIENT)
Age: 70
End: 2025-07-11
Attending: INTERNAL MEDICINE
Payer: MEDICARE

## 2025-07-11 DIAGNOSIS — D46.9 MDS (MYELODYSPLASTIC SYNDROME) (HCC): Primary | ICD-10-CM

## 2025-07-11 DIAGNOSIS — D61.818 PANCYTOPENIA (HCC): ICD-10-CM

## 2025-07-19 ENCOUNTER — OFFICE VISIT (OUTPATIENT)
Dept: DERMATOLOGY CLINIC | Facility: CLINIC | Age: 70
End: 2025-07-19

## 2025-07-19 DIAGNOSIS — L40.0 PSORIASIS VULGARIS: Primary | ICD-10-CM

## 2025-07-19 DIAGNOSIS — L29.9 PRURITUS: ICD-10-CM

## 2025-07-19 DIAGNOSIS — L40.8 SEBOPSORIASIS: ICD-10-CM

## 2025-07-19 DIAGNOSIS — Z51.81 MEDICATION MONITORING ENCOUNTER: ICD-10-CM

## 2025-07-19 PROCEDURE — 99213 OFFICE O/P EST LOW 20 MIN: CPT | Performed by: DERMATOLOGY

## 2025-07-19 PROCEDURE — G2211 COMPLEX E/M VISIT ADD ON: HCPCS | Performed by: DERMATOLOGY

## 2025-07-19 RX ORDER — SIMVASTATIN 20 MG
TABLET ORAL
COMMUNITY

## 2025-07-19 RX ORDER — METOPROLOL TARTRATE 50 MG
TABLET ORAL
COMMUNITY

## 2025-07-19 RX ORDER — FLUOCINOLONE ACETONIDE 0.11 MG/ML
OIL TOPICAL
Qty: 118 ML | Refills: 1 | Status: SHIPPED | OUTPATIENT
Start: 2025-07-19

## 2025-07-19 NOTE — PROGRESS NOTES
Jaun Burton is a 69 year old male.    CC:    Chief Complaint   Patient presents with    Lesion     NO hx of skin ca.  LOV 2024.  Pt presents for Lesion(S) of concern to the   1. Behind left ear ongoing for 2-5 months.  Itchy at times, growing in size.  Denies any bleeding or pain.          HISTORY:    Past Medical History[1]   Past Surgical History[2]   Family History[3]   Short Social Hx on File[4]     Current Medications[5]  Allergies:   Allergies[6]    Past Medical History[7]  Past Surgical History[8]  Social History     Socioeconomic History    Marital status:      Spouse name: Not on file    Number of children: Not on file    Years of education: Not on file    Highest education level: Not on file   Occupational History    Not on file   Tobacco Use    Smoking status: Former     Current packs/day: 0.00     Types: Cigarettes     Quit date: 1989     Years since quittin.7    Smokeless tobacco: Former    Tobacco comments:     quit about 30 years ago.   Vaping Use    Vaping status: Never Used   Substance and Sexual Activity    Alcohol use: No    Drug use: No    Sexual activity: Not on file   Other Topics Concern     Service Not Asked    Blood Transfusions Not Asked    Caffeine Concern Yes     Comment: 1 cup coffee per day    Occupational Exposure Not Asked    Hobby Hazards Not Asked    Sleep Concern Not Asked    Stress Concern Not Asked    Weight Concern Not Asked    Special Diet Not Asked    Back Care Not Asked    Exercise No    Bike Helmet Not Asked    Seat Belt Not Asked    Self-Exams Not Asked    Grew up on a farm No    History of tanning Yes    Outdoor occupation No    Reaction to local anesthetic No    Pt has a pacemaker No    Pt has a defibrillator No   Social History Narrative    Not on file     Social Drivers of Health     Food Insecurity: No Food Insecurity (3/31/2025)    NCSS - Food Insecurity     Worried About Running Out of Food in the Last Year: No     Ran Out of Food  in the Last Year: No   Transportation Needs: No Transportation Needs (3/31/2025)    NCSS - Transportation     Lack of Transportation: No   Stress: Not on file   Housing Stability: Not At Risk (3/31/2025)    NCSS - Housing/Utilities     Has Housing: Yes     Worried About Losing Housing: No     Unable to Get Utilities: No     Family History[9]    HPI:     HPI:  Chief Complaint   Patient presents with    Lesion     NO hx of skin ca.  LOV 5/2024.  Pt presents for Lesion(S) of concern to the   1. Behind left ear ongoing for 2-5 months.  Itchy at times, growing in size.  Denies any bleeding or pain.        History of Present Illness  Jaun Burton is a 69 year old male with psoriasis who presents with an itchy, scaly rash behind his ear.    He has a scaly, itchy rash located behind his ear, between the hairline and the ear. The itching is severe and has been present for an unspecified duration, causing significant discomfort.    He has previously used a cortisone cream for similar symptoms on his eyebrows, which was effective there but not for the current rash behind his ear. Attempts to use the cream on the affected area have been unsuccessful.    Face ok for now- uses betamethasone intermittently  No other symptoms or changes in his condition have been reported.    He is concerned about the affordability of medications, noting that the price of some treatments has been prohibitive .      Patient presents with concerns above.    Patient has been in their usual state of health.     Past notes/ records and appropriate/relevant lab results including pathology and past body maps reviewed. Including outside notes/ PCP notes as appropriate. Updated and new information noted in current visit.     ROS:  Denies other relevant systemic complaints. See HPI.     History, medications, allergies reviewed as noted.    Allergies:  Cefdinir and Zosyn [piperacillin sod-tazobactam so]      There were no vitals filed for this  visit.    Physical Examination:     Well-developed well-nourished patient alert oriented in no acute distress.  Exam performed of appropriate involved areas    Physical Exam  SKIN: Scaly and itchy skin behind the ear.    Multiple light to medium brown, well marginated, uniformly pigmented, macules and papules 6 mm and less scattered on exam. pigmented lesions examined with dermoscopy benign-appearing patterns.     Waxy tannish keratotic papules scattered, cherry-red vascular papules scattered.    See map today's date for lesions noted .  See assessment and plan below for specific lesions.    Otherwise remarkable for lesions as noted on map.    See A/P  below for additional information:    Assessment / plan:    Results        No orders of the defined types were placed in this encounter.      Meds & Refills for this Visit:  Requested Prescriptions     Signed Prescriptions Disp Refills    Fluocinolone Acetonide Scalp (DERMA-SMOOTHE/FS SCALP) 0.01 % External Oil 118 mL 1     Sig: Use qhs as directed for scalp psoriasis         Encounter Diagnoses   Name Primary?    Psoriasis vulgaris Yes    Sebopsoriasis     Pruritus     Medication monitoring encounter          Fall risk assessment:  Given the results of the fall risk questionnaire given today.   Suggest:   Make sure there is adequate lighting.  Eliminate throw rugs or possible sources of tripping & falling  Consider grab bars on the shower & toilet.  Hand rails on all stair cases  Ambulatory assistance devices such as cane or walker as indicate.  To ensure home safety measures.    Patient seen for follow-up long-term monitoring, treatment of  Psoriasis, SEBO psoriasis, sun damage medication trying  Plan of care:  ongoing surveillance, monitoring including regular follow-up due to longer term risk of recurrence, new lesions.  See previous notes.  There is a longitudinal care relationship with me, the care plan reflects the ongoing nature of the continuous relationship  of care, and the medical record indicates that there is ongoing treatment of a serious/complex medical condition which I am currently managing.  is Applicable     Assessment & Plan  Psoriasis  Psoriasis located behind the ear with exacerbation due to diabetes, leading to increased itching and scaling. Previous cortisone cream treatment was ineffective.  - Prescribe generic oil-based medication for psoriasis, to be applied two to three times daily in a thin layer behind the ear.  - Advise application immediately after showering for better absorption.  - Instruct to rub the medication in thoroughly and use over a larger area if needed, including the scalp and neck.  - Inform that the medication is based on peanut oil but is protein-free, reducing the risk of allergic reactions.  - Suggest using [employer] coupons to reduce medication cost if necessary.  - Ensure medication availability at [facility].    Recording duration: 6 minutes             Psoriasis.  Plaque-type.  10% body surface involvement.  Will treat with medications and grid.  Overall skincare, liberal use of emollients discussed.  Consider more aggressive therapy if worsening.Patient will let us know how they are doing over the next several weeks.  Await clinical response to above therapy.  Recheck in 2-3 months if no improvement.      History of psoriasis/ sebopsoriasis on the face- using betamethasone when more severely itchy - betamethasone did not work on area around left area above- await response to the dermasmoothe oil    Rosacea/ comedones/ sun damage/ nodular solar elastosis stable    History of tinea pedis   Benign-appearing nevi, no atypical features on dermoscopy reassurance given monitor.     Waxy tan keratotic papules lesions in areas of concern as noted reassurance given.  Benign nature discussed.  Possibility of cryo, alphahydroxy acids over-the-counter retinol's discussed.     No other susupicious lesions on todays  exam.    Please refer to  map for specific lesions.  See additional diagnoses.  Pros cons of various therapies, risks benefits discussed.Pathophysiology, terapeutic options reviewed.  See  Medications in grid.  Instructions reviewed at length.    Benign nevi, seborrheic  keratoses, cherry angiomas:  Reassurance regarding other benign skin lesions.    Monitor for new or changing lesions. Signs and symptoms of skin cancer, ABCDE's of melanoma ( additional information available at AAD.org, skincancer.org) Encourage Sunscreen (broad-spectrum, ideally mineral-based-UVA/UVB -SPF 30 or higher) use encouraged, sun protection/sun protective clothing, self exams reviewed Followup as noted RTC ---routine checkup 6 mos -one year or p.r.n.    Encounter Times   Including precharting, reviewing chart, prior notes obtaining history: 10 minutes, medical exam :10 minutes, notes on body map, plan, counseling 10minutes My total time spent caring for the patient on the day of the encounter: 30 minutes     The patient indicates understanding of these issues and agrees to the plan.  The patient is asked to return as noted in follow-up/ above.    This note was generated using Dragon voice recognition software.  Please contact me regarding any confusion resulting from errors in recognition..  Note to patient and family: The 21st Century Cures Act makes medical notes like these available to patients. However, be advised this is a medical document. It is intended as [id number]-tm-[id number] communication and monitoring of a patient's care needs. It is written in medical language and may contain abbreviations or verbiage that are unfamiliar. It may appear blunt or direct. Medical documents are intended to carry relevant information, facts as evident and the clinical opinion of the practitioner.         [1]   Past Medical History:   C2-3 mild central, C3-4 mild-mod diffuse, C4-5 mild diffuse, C5-6 mod diffuse bulging discs    C5-6 mod central & bilateral mild foraminal, C4-5 left  mild foraminal stenosis    C6-7 mild-mod central herniated disc    Cataract        Chronic kidney disease (CKD)    stage 3    Diabetes mellitus (HCC)    Diabetes type 2, uncontrolled    Diabetic retinopathy (HCC)    [facility]    Hyperlipidemia    Meibomian gland dysfunction    Osteoarthritis of spine with radiculopathy, cervical region    Pancreatitis (HCC)    Primary osteoarthritis of both shoulders    Proteinuria    Type II or unspecified type diabetes mellitus without mention of complication, not stated as uncontrolled    Pills & Insulin    Vitreous floaters   [2]   Past Surgical History:  Procedure Laterality Date    Appendectomy      Cataract extraction w/  intraocular lens implant Right 2018    Dr. Lopez    Cataract extraction w/  intraocular lens implant Left 2018    Dr. Lopez    Cholecystectomy      Electrocardiogram, complete  2012    scanned to media tab    Hernia surgery     [3]   Family History  Problem Relation Age of Onset    Diabetes Other         close relative    Diabetes Maternal Grandmother     Diabetes Father     Macular degeneration Neg     Glaucoma Neg         family h/o   [4]   Social History  Socioeconomic History    Marital status:    Tobacco Use    Smoking status: Former     Current packs/day: 0.00     Types: Cigarettes     Quit date: 1989     Years since quittin.7    Smokeless tobacco: Former    Tobacco comments:     quit about 30 years ago.   Vaping Use    Vaping status: Never Used   Substance and Sexual Activity    Alcohol use: No    Drug use: No   Other Topics Concern    Caffeine Concern Yes     Comment: 1 cup coffee per day    Exercise No    Grew up on a farm No    History of tanning Yes    Outdoor occupation No    Reaction to local anesthetic No    Pt has a pacemaker No    Pt has a defibrillator No     Social Drivers of Health     Food Insecurity: No Food Insecurity (3/31/2025)    NCSS - Food Insecurity      Worried About Running Out of Food in the Last Year: No     Ran Out of Food in the Last Year: No   Transportation Needs: No Transportation Needs (3/31/2025)    NCSS - Transportation     Lack of Transportation: No   Housing Stability: Not At Risk (3/31/2025)    NCSS - Housing/Utilities     Has Housing: Yes     Worried About Losing Housing: No     Unable to Get Utilities: No   [5]   Current Outpatient Medications   Medication Sig Dispense Refill    simvastatin 20 MG Oral Tab 1 tablet in the evening Orally Once a day; Duration: 30 day(s)      metoprolol tartrate 50 MG Oral Tab 1 tablet Orally Twice a day; Duration: 30 day(s)      Fluocinolone Acetonide Scalp (DERMA-SMOOTHE/FS SCALP) 0.01 % External Oil Use qhs as directed for scalp psoriasis 118 mL 1    AMITRIPTYLINE 25 MG Oral Tab Take 1 tablet by mouth nightly 90 tablet 0    HYDROcodone-acetaminophen  MG Oral Tab Take 1 tablet by mouth every 8 (eight) hours as needed for Pain. 90 tablet 0    pregabalin 300 MG Oral Cap Take 1 capsule (300 mg total) by mouth daily. 90 capsule 0    Insulin Syringe-Needle U-100 (BD INSULIN SYRINGE ULTRAFINE) 31G X 5/16\" 0.5 ML Does not apply Misc Use 1 syringe 4 times daily 400 each 3    Insulin Syringe-Needle U-100 (RELION INSULIN SYRINGE) 31G X 15/64\" 0.5 ML Does not apply Misc 1 Syringe 4 (four) times daily. 400 each 1    NOVOLOG 100 UNIT/ML Injection Solution INJECT 50 UNITS SUBCUTANEOUSLY ONCE DAILY VIA  CORRECTION  FACTOR 40 mL 0    sodium zirconium cyclosilicate (LOKELMA) 10 g Oral Powd Pack Take 1 packet (10 g total) by mouth 3 (three) times a week. Can take 2-3 times a week. 13 packet 2    HYDROcodone-acetaminophen  MG Oral Tab Take 1 tablet by mouth every 8 (eight) hours as needed for Pain. 90 tablet 0    atorvastatin 40 MG Oral Tab TAKE 1 TABLET BY MOUTH ONCE DAILY FOR CHOLESTEROL 90 tablet 3    HYDROcodone-acetaminophen  MG Oral Tab Take 1 tablet by mouth every 8 (eight) hours as needed for Pain. 90 tablet 0     MAGNESIUM-OXIDE 400 (240 Mg) MG Oral Tab Take 1 tablet by mouth once daily 90 tablet 1    pantoprazole 40 MG Oral Tab EC TAKE 1 TABLET BY MOUTH BEFORE BREAKFAST FOR  ACID  REFLUX 90 tablet 3    fenofibrate 48 MG Oral Tab Take 1 tablet by mouth once daily 90 tablet 3    pregabalin 300 MG Oral Cap Take 1 capsule (300 mg total) by mouth daily. 90 capsule 0    LANTUS 100 UNIT/ML Subcutaneous Solution Inject 40 Units into the skin nightly. 36 mL 1    pregabalin 300 MG Oral Cap Take 1 capsule (300 mg total) by mouth daily. 30 capsule 0    HYDROcodone-acetaminophen (NORCO)  MG Oral Tab Take 1 tablet by mouth every 8 (eight) hours as needed for Pain. 90 tablet 0    BD INSULIN SYRINGE U/F 31G X 5/16\" 0.5 ML Does not apply Misc USE 1 SYRINGE 4 TIMES DAILY 400 each 3    Mometasone Furoate 0.1 % External Solution Use bid to ears as directd 60 mL 3    FREESTYLE LITE TEST In Vitro Strip USE 1 STRIP TO CHECK BLOOD GLUCOSE 3 TIMES DAILY. DX: E11.65, insulin dependent 300 strip 1    azelastine 137 MCG/SPRAY Nasal Solution 1-2 sprays by Nasal route in the morning and 1-2 sprays before bedtime. FOR SINUS SYMPTOMS/NASAL CONGESTION.. 30 mL 3    fluticasone propionate 50 MCG/ACT Nasal Suspension 2 sprays by Each Nare route daily. FOR NASAL CONGESTION/SINUS SYMPTOMS. 3 each 3    Betamethasone Dipropionate Aug 0.05 % External Cream Apply 1 Application topically 2 (two) times daily. APPLY TO AFFECTED AREA 50 g 1    metRONIDAZOLE 0.75 % External Cream Apply 1 Application topically daily. For breakout on face 45 g 0    Pimecrolimus 1 % External Cream APPLY   TOPICALLY AFFECTED AREA ON FACE ONCE DAILY TO TWICE DAILY 60 g 0    Tazarotene 0.1 % External Cream Apply to chest nightly 60 g 3    Blood Glucose Monitoring Suppl (ACCU-CHEK INÉS PLUS) w/Device Does not apply Kit Use as directed to check blood sugars. 1 kit 0    Ferrous Gluconate 225 (27 Fe) MG Oral Tab Take 27 mg by mouth in the morning.      Omega-3 Fatty Acids (FISH OIL) 600  MG Oral Cap Take 1 capsule by mouth nightly.      Multiple Vitamins-Minerals (CENTRUM SILVER) Oral Tab Take 1 tablet by mouth in the morning.      HYDROcodone-acetaminophen  MG Oral Tab Take 1 tablet by mouth every 8 (eight) hours as needed for Pain. 30 tablet 0   [6]   Allergies  Allergen Reactions    Cefdinir DIARRHEA, NAUSEA AND VOMITING and HALLUCINATION    Zosyn [Piperacillin Sod-Tazobactam So] OTHER (SEE COMMENTS)     Heightened sense of smell; dry heaves, vomiting   [7]   Past Medical History:   C2-3 mild central, C3-4 mild-mod diffuse, C4-5 mild diffuse, C5-6 mod diffuse bulging discs    C5-6 mod central & bilateral mild foraminal, C4-5 left mild foraminal stenosis    C6-7 mild-mod central herniated disc    Cataract    2010    Chronic kidney disease (CKD)    stage 3    Diabetes mellitus (HCC)    Diabetes type 2, uncontrolled    Diabetic retinopathy (HCC)    [facility] for eval    Hyperlipidemia    Meibomian gland dysfunction    Osteoarthritis of spine with radiculopathy, cervical region    Pancreatitis (HCC)    Primary osteoarthritis of both shoulders    Proteinuria    Type II or unspecified type diabetes mellitus without mention of complication, not stated as uncontrolled    Pills & Insulin    Vitreous floaters   [8]   Past Surgical History:  Procedure Laterality Date    Appendectomy      Cataract extraction w/  intraocular lens implant Right 06/11/2018    Dr. Lopez    Cataract extraction w/  intraocular lens implant Left 07/12/2018    Dr. Lopez    Cholecystectomy  2012    Electrocardiogram, complete  4/23/2012    scanned to media tab    Hernia surgery     [9]   Family History  Problem Relation Age of Onset    Diabetes Other         close relative    Diabetes Maternal Grandmother     Diabetes Father     Macular degeneration Neg     Glaucoma Neg         family h/o

## 2025-07-19 NOTE — PROGRESS NOTES
The following individual(s) verbally consented to be recorded using ambient AI listening technology and understand that they can each withdraw their consent to this listening technology at any point by asking the clinician to turn off or pause the recording:    Patient name: Jaun GUTIERREZ Micheal  Additional names:

## 2025-07-22 DIAGNOSIS — G62.9 NEUROPATHY: Primary | ICD-10-CM

## 2025-07-22 RX ORDER — HYDROCODONE BITARTRATE AND ACETAMINOPHEN 10; 325 MG/1; MG/1
1 TABLET ORAL EVERY 8 HOURS PRN
Qty: 30 TABLET | Refills: 0 | Status: SHIPPED | OUTPATIENT
Start: 2025-07-22

## 2025-07-22 NOTE — TELEPHONE ENCOUNTER
Dr Mcclure patient of Dr Enciso (out of the office) requesting a refill   Last visit on 4/17/25  Return to clinic- 3 months  Follow up-8/13/25  Please sign pending order thanks.

## 2025-07-22 NOTE — TELEPHONE ENCOUNTER
Per wife requesting a refill of hydrocodone-acetominophen. Please advise   Island Pedicle Flap Text: The defect edges were debeveled with a #15 scalpel blade.  Given the location of the defect, shape of the defect and the proximity to free margins an island pedicle advancement flap was deemed most appropriate.  Using a sterile surgical marker, an appropriate advancement flap was drawn incorporating the defect, outlining the appropriate donor tissue and placing the expected incisions within the relaxed skin tension lines where possible.    The area thus outlined was incised deep to adipose tissue with a #15 scalpel blade.  The skin margins were undermined to an appropriate distance in all directions around the primary defect and laterally outward around the island pedicle utilizing iris scissors.  There was minimal undermining beneath the pedicle flap.

## 2025-07-24 ENCOUNTER — TELEPHONE (OUTPATIENT)
Dept: NEPHROLOGY | Facility: CLINIC | Age: 70
End: 2025-07-24

## 2025-07-24 ENCOUNTER — LAB ENCOUNTER (OUTPATIENT)
Dept: LAB | Age: 70
End: 2025-07-24
Attending: INTERNAL MEDICINE
Payer: MEDICARE

## 2025-07-24 DIAGNOSIS — G62.9 NEUROPATHY: ICD-10-CM

## 2025-07-24 DIAGNOSIS — D46.9 MDS (MYELODYSPLASTIC SYNDROME) (HCC): ICD-10-CM

## 2025-07-24 DIAGNOSIS — Z51.81 MEDICATION MONITORING ENCOUNTER: ICD-10-CM

## 2025-07-24 DIAGNOSIS — D46.9 MDS (MYELODYSPLASTIC SYNDROME) (HCC): Primary | ICD-10-CM

## 2025-07-24 DIAGNOSIS — D61.818 PANCYTOPENIA (HCC): ICD-10-CM

## 2025-07-24 LAB
BASOPHILS # BLD AUTO: 0 X10(3) UL (ref 0–0.2)
BASOPHILS NFR BLD AUTO: 0 %
DEPRECATED RDW RBC AUTO: 66.8 FL (ref 35.1–46.3)
EOSINOPHIL # BLD AUTO: 0.04 X10(3) UL (ref 0–0.7)
EOSINOPHIL NFR BLD AUTO: 1.9 %
ERYTHROCYTE [DISTWIDTH] IN BLOOD BY AUTOMATED COUNT: 16.6 % (ref 11–15)
HCT VFR BLD AUTO: 29.6 % (ref 39–53)
HGB BLD-MCNC: 9.3 G/DL (ref 13–17.5)
IMM GRANULOCYTES # BLD AUTO: 0 X10(3) UL (ref 0–1)
IMM GRANULOCYTES NFR BLD: 0 %
LYMPHOCYTES # BLD AUTO: 0.93 X10(3) UL (ref 1–4)
LYMPHOCYTES NFR BLD AUTO: 44.1 %
MCH RBC QN AUTO: 34.8 PG (ref 26–34)
MCHC RBC AUTO-ENTMCNC: 31.4 G/DL (ref 31–37)
MCV RBC AUTO: 110.9 FL (ref 80–100)
MONOCYTES # BLD AUTO: 0.2 X10(3) UL (ref 0.1–1)
MONOCYTES NFR BLD AUTO: 9.5 %
NEUTROPHILS # BLD AUTO: 0.94 X10 (3) UL (ref 1.5–7.7)
NEUTROPHILS # BLD AUTO: 0.94 X10(3) UL (ref 1.5–7.7)
NEUTROPHILS NFR BLD AUTO: 44.5 %
PLATELET # BLD AUTO: 82 10(3)UL (ref 150–450)
PLATELET MORPHOLOGY: NORMAL
PLATELETS.RETICULATED NFR BLD AUTO: 6 % (ref 0–7)
RBC # BLD AUTO: 2.67 X10(6)UL (ref 3.8–5.8)
WBC # BLD AUTO: 2.1 X10(3) UL (ref 4–11)

## 2025-07-24 PROCEDURE — 36415 COLL VENOUS BLD VENIPUNCTURE: CPT

## 2025-07-24 PROCEDURE — 85025 COMPLETE CBC W/AUTO DIFF WBC: CPT

## 2025-07-25 ENCOUNTER — NURSE ONLY (OUTPATIENT)
Facility: LOCATION | Age: 70
End: 2025-07-25
Attending: INTERNAL MEDICINE
Payer: MEDICARE

## 2025-07-25 DIAGNOSIS — D46.9 MDS (MYELODYSPLASTIC SYNDROME) (HCC): Primary | ICD-10-CM

## 2025-07-25 DIAGNOSIS — D61.818 PANCYTOPENIA (HCC): ICD-10-CM

## 2025-07-30 RX ORDER — HYDROCODONE BITARTRATE AND ACETAMINOPHEN 10; 325 MG/1; MG/1
1 TABLET ORAL EVERY 8 HOURS PRN
Qty: 90 TABLET | Refills: 0 | Status: SHIPPED | OUTPATIENT
Start: 2025-07-30

## 2025-08-01 ENCOUNTER — HOSPITAL ENCOUNTER (OUTPATIENT)
Age: 70
Discharge: HOME | End: 2025-08-01

## 2025-08-01 ENCOUNTER — NURSE TRIAGE (OUTPATIENT)
Dept: INTERNAL MEDICINE CLINIC | Facility: CLINIC | Age: 70
End: 2025-08-01

## 2025-08-01 VITALS
HEART RATE: 102 BPM | DIASTOLIC BLOOD PRESSURE: 62 MMHG | TEMPERATURE: 98 F | SYSTOLIC BLOOD PRESSURE: 160 MMHG | OXYGEN SATURATION: 99 % | RESPIRATION RATE: 18 BRPM

## 2025-08-01 DIAGNOSIS — R42 LIGHTHEADEDNESS: ICD-10-CM

## 2025-08-01 DIAGNOSIS — R19.7 DIARRHEA OF PRESUMED INFECTIOUS ORIGIN: ICD-10-CM

## 2025-08-01 DIAGNOSIS — B34.9 VIRAL ILLNESS: Primary | ICD-10-CM

## 2025-08-01 DIAGNOSIS — R43.9 SENSE OF SMELL ALTERED: ICD-10-CM

## 2025-08-01 LAB
#MXD IC: 0.2 X10ˆ3/UL (ref 0.1–1)
ATRIAL RATE: 94 BPM
BUN BLD-MCNC: 75 MG/DL (ref 7–18)
CHLORIDE BLD-SCNC: 111 MMOL/L (ref 98–112)
CO2 BLD-SCNC: 18 MMOL/L (ref 21–32)
CREAT BLD-MCNC: 4.5 MG/DL (ref 0.7–1.3)
EGFRCR SERPLBLD CKD-EPI 2021: 13 ML/MIN/1.73M2 (ref 60–?)
GLUCOSE BLD-MCNC: 376 MG/DL (ref 70–99)
HCT VFR BLD AUTO: 28.6 % (ref 39–53)
HCT VFR BLD CALC: 28 % (ref 37–53)
HGB BLD-MCNC: 8.8 G/DL (ref 13–17.5)
ISTAT IONIZED CALCIUM FOR CHEM 8: 1.27 MMOL/L (ref 1.12–1.32)
LYMPHOCYTES # BLD AUTO: 0.5 X10ˆ3/UL (ref 1–4)
LYMPHOCYTES NFR BLD AUTO: 25.1 %
MCH RBC QN AUTO: 33.6 PG (ref 26–34)
MCHC RBC AUTO-ENTMCNC: 30.8 G/DL (ref 31–37)
MCV RBC AUTO: 109.2 FL (ref 80–100)
MIXED CELL %: 11.9 %
NEUTROPHILS # BLD AUTO: 1.1 X10ˆ3/UL (ref 1.5–7.7)
NEUTROPHILS NFR BLD AUTO: 63 %
P AXIS: 45 DEGREES
P-R INTERVAL: 162 MS
PLATELET # BLD AUTO: 70 X10ˆ3/UL (ref 150–450)
POTASSIUM BLD-SCNC: 5.6 MMOL/L (ref 3.6–5.1)
Q-T INTERVAL: 344 MS
QRS DURATION: 102 MS
QTC CALCULATION (BEZET): 430 MS
R AXIS: -45 DEGREES
RBC # BLD AUTO: 2.62 X10ˆ6/UL (ref 3.8–5.8)
SARS-COV-2 RNA RESP QL NAA+PROBE: NOT DETECTED
SODIUM BLD-SCNC: 141 MMOL/L (ref 136–145)
T AXIS: 42 DEGREES
VENTRICULAR RATE: 94 BPM
WBC # BLD AUTO: 1.8 X10ˆ3/UL (ref 4–11)

## 2025-08-01 PROCEDURE — 93000 ELECTROCARDIOGRAM COMPLETE: CPT | Performed by: PHYSICIAN ASSISTANT

## 2025-08-01 PROCEDURE — 99214 OFFICE O/P EST MOD 30 MIN: CPT | Performed by: PHYSICIAN ASSISTANT

## 2025-08-01 PROCEDURE — 85025 COMPLETE CBC W/AUTO DIFF WBC: CPT | Performed by: PHYSICIAN ASSISTANT

## 2025-08-01 PROCEDURE — U0002 COVID-19 LAB TEST NON-CDC: HCPCS | Performed by: PHYSICIAN ASSISTANT

## 2025-08-01 PROCEDURE — 80047 BASIC METABLC PNL IONIZED CA: CPT | Performed by: PHYSICIAN ASSISTANT

## 2025-08-05 ENCOUNTER — TELEPHONE (OUTPATIENT)
Dept: INTERNAL MEDICINE CLINIC | Facility: CLINIC | Age: 70
End: 2025-08-05

## 2025-08-07 ENCOUNTER — TELEPHONE (OUTPATIENT)
Facility: LOCATION | Age: 70
End: 2025-08-07

## 2025-08-07 ENCOUNTER — LAB ENCOUNTER (OUTPATIENT)
Dept: LAB | Age: 70
End: 2025-08-07

## 2025-08-07 DIAGNOSIS — N18.4 CKD STAGE 4 DUE TO TYPE 1 DIABETES MELLITUS (HCC): ICD-10-CM

## 2025-08-07 DIAGNOSIS — Z51.81 MEDICATION MONITORING ENCOUNTER: ICD-10-CM

## 2025-08-07 DIAGNOSIS — E10.22 CKD STAGE 4 DUE TO TYPE 1 DIABETES MELLITUS (HCC): ICD-10-CM

## 2025-08-07 DIAGNOSIS — D61.818 PANCYTOPENIA (HCC): ICD-10-CM

## 2025-08-07 DIAGNOSIS — D46.9 MDS (MYELODYSPLASTIC SYNDROME) (HCC): ICD-10-CM

## 2025-08-07 LAB
ALBUMIN SERPL-MCNC: 4 G/DL (ref 3.2–4.8)
ANION GAP SERPL CALC-SCNC: 10 MMOL/L (ref 0–18)
BASOPHILS # BLD AUTO: 0.01 X10(3) UL (ref 0–0.2)
BASOPHILS NFR BLD AUTO: 0.4 %
BUN BLD-MCNC: 78 MG/DL (ref 9–23)
BUN/CREAT SERPL: 16 (ref 10–20)
CALCIUM BLD-MCNC: 8.4 MG/DL (ref 8.7–10.4)
CHLORIDE SERPL-SCNC: 113 MMOL/L (ref 98–112)
CO2 SERPL-SCNC: 21 MMOL/L (ref 21–32)
CREAT BLD-MCNC: 4.87 MG/DL (ref 0.7–1.3)
DEPRECATED RDW RBC AUTO: 64.1 FL (ref 35.1–46.3)
EGFRCR SERPLBLD CKD-EPI 2021: 12 ML/MIN/1.73M2 (ref 60–?)
EOSINOPHIL # BLD AUTO: 0.06 X10(3) UL (ref 0–0.7)
EOSINOPHIL NFR BLD AUTO: 2.5 %
ERYTHROCYTE [DISTWIDTH] IN BLOOD BY AUTOMATED COUNT: 16.2 % (ref 11–15)
GLUCOSE BLD-MCNC: 125 MG/DL (ref 70–99)
HCT VFR BLD AUTO: 28.9 % (ref 39–53)
HGB BLD-MCNC: 9 G/DL (ref 13–17.5)
IMM GRANULOCYTES # BLD AUTO: 0.01 X10(3) UL (ref 0–1)
IMM GRANULOCYTES NFR BLD: 0.4 %
LYMPHOCYTES # BLD AUTO: 0.97 X10(3) UL (ref 1–4)
LYMPHOCYTES NFR BLD AUTO: 39.9 %
MCH RBC QN AUTO: 34 PG (ref 26–34)
MCHC RBC AUTO-ENTMCNC: 31.1 G/DL (ref 31–37)
MCV RBC AUTO: 109.1 FL (ref 80–100)
MONOCYTES # BLD AUTO: 0.24 X10(3) UL (ref 0.1–1)
MONOCYTES NFR BLD AUTO: 9.9 %
NEUTROPHILS # BLD AUTO: 1.14 X10 (3) UL (ref 1.5–7.7)
NEUTROPHILS # BLD AUTO: 1.14 X10(3) UL (ref 1.5–7.7)
NEUTROPHILS NFR BLD AUTO: 46.9 %
OSMOLALITY SERPL CALC.SUM OF ELEC: 323 MOSM/KG (ref 275–295)
PHOSPHATE SERPL-MCNC: 4.9 MG/DL (ref 2.4–5.1)
PLATELET # BLD AUTO: 99 10(3)UL (ref 150–450)
PLATELETS.RETICULATED NFR BLD AUTO: 6.4 % (ref 0–7)
POTASSIUM SERPL-SCNC: 5.4 MMOL/L (ref 3.5–5.1)
RBC # BLD AUTO: 2.65 X10(6)UL (ref 3.8–5.8)
SODIUM SERPL-SCNC: 144 MMOL/L (ref 136–145)
WBC # BLD AUTO: 2.4 X10(3) UL (ref 4–11)

## 2025-08-07 PROCEDURE — 80069 RENAL FUNCTION PANEL: CPT

## 2025-08-07 PROCEDURE — 36415 COLL VENOUS BLD VENIPUNCTURE: CPT

## 2025-08-07 PROCEDURE — 85025 COMPLETE CBC W/AUTO DIFF WBC: CPT

## 2025-08-08 ENCOUNTER — APPOINTMENT (OUTPATIENT)
Facility: LOCATION | Age: 70
End: 2025-08-08
Attending: INTERNAL MEDICINE

## 2025-08-08 ENCOUNTER — NURSE ONLY (OUTPATIENT)
Facility: LOCATION | Age: 70
End: 2025-08-08
Attending: INTERNAL MEDICINE

## 2025-08-08 ENCOUNTER — OFFICE VISIT (OUTPATIENT)
Facility: LOCATION | Age: 70
End: 2025-08-08
Attending: INTERNAL MEDICINE

## 2025-08-08 VITALS
OXYGEN SATURATION: 99 % | HEART RATE: 82 BPM | SYSTOLIC BLOOD PRESSURE: 129 MMHG | DIASTOLIC BLOOD PRESSURE: 66 MMHG | BODY MASS INDEX: 35 KG/M2 | TEMPERATURE: 98 F | HEIGHT: 69 IN | RESPIRATION RATE: 18 BRPM

## 2025-08-08 DIAGNOSIS — Z51.81 MEDICATION MONITORING ENCOUNTER: ICD-10-CM

## 2025-08-08 DIAGNOSIS — N18.9 CHRONIC KIDNEY DISEASE, UNSPECIFIED CKD STAGE: ICD-10-CM

## 2025-08-08 DIAGNOSIS — D46.9 MDS (MYELODYSPLASTIC SYNDROME) (HCC): Primary | ICD-10-CM

## 2025-08-08 DIAGNOSIS — D61.818 PANCYTOPENIA (HCC): ICD-10-CM

## 2025-08-11 ENCOUNTER — TELEPHONE (OUTPATIENT)
Dept: NEPHROLOGY | Facility: CLINIC | Age: 70
End: 2025-08-11

## 2025-08-11 DIAGNOSIS — N18.4 CKD STAGE 4 DUE TO TYPE 1 DIABETES MELLITUS (HCC): Primary | ICD-10-CM

## 2025-08-11 DIAGNOSIS — E10.22 CKD STAGE 4 DUE TO TYPE 1 DIABETES MELLITUS (HCC): Primary | ICD-10-CM

## 2025-08-13 ENCOUNTER — OFFICE VISIT (OUTPATIENT)
Dept: NEPHROLOGY | Facility: CLINIC | Age: 70
End: 2025-08-13

## 2025-08-13 VITALS
WEIGHT: 238 LBS | SYSTOLIC BLOOD PRESSURE: 119 MMHG | HEART RATE: 82 BPM | BODY MASS INDEX: 35 KG/M2 | DIASTOLIC BLOOD PRESSURE: 56 MMHG

## 2025-08-13 DIAGNOSIS — E11.22 CKD STAGE 4 DUE TO TYPE 2 DIABETES MELLITUS (HCC): ICD-10-CM

## 2025-08-13 DIAGNOSIS — N18.4 CKD STAGE 4 DUE TO TYPE 2 DIABETES MELLITUS (HCC): ICD-10-CM

## 2025-08-13 DIAGNOSIS — E11.3293 TYPE 2 DIABETES MELLITUS WITH BOTH EYES AFFECTED BY MILD NONPROLIFERATIVE RETINOPATHY WITHOUT MACULAR EDEMA, WITHOUT LONG-TERM CURRENT USE OF INSULIN (HCC): Primary | ICD-10-CM

## 2025-08-13 DIAGNOSIS — D46.9 MDS (MYELODYSPLASTIC SYNDROME) (HCC): ICD-10-CM

## 2025-08-13 PROCEDURE — 99214 OFFICE O/P EST MOD 30 MIN: CPT | Performed by: INTERNAL MEDICINE

## 2025-08-20 ENCOUNTER — LAB ENCOUNTER (OUTPATIENT)
Dept: LAB | Age: 70
End: 2025-08-20

## 2025-08-20 DIAGNOSIS — Z51.81 MEDICATION MONITORING ENCOUNTER: ICD-10-CM

## 2025-08-20 DIAGNOSIS — D61.818 PANCYTOPENIA (HCC): ICD-10-CM

## 2025-08-20 DIAGNOSIS — D46.9 MDS (MYELODYSPLASTIC SYNDROME) (HCC): ICD-10-CM

## 2025-08-20 PROCEDURE — 85060 BLOOD SMEAR INTERPRETATION: CPT

## 2025-08-20 PROCEDURE — 85025 COMPLETE CBC W/AUTO DIFF WBC: CPT

## 2025-08-20 PROCEDURE — 36415 COLL VENOUS BLD VENIPUNCTURE: CPT

## 2025-08-21 LAB
BASOPHILS # BLD AUTO: 0.01 X10(3) UL (ref 0–0.2)
BASOPHILS NFR BLD AUTO: 0.3 %
DEPRECATED RDW RBC AUTO: 69.9 FL (ref 35.1–46.3)
EOSINOPHIL # BLD AUTO: 0.05 X10(3) UL (ref 0–0.7)
EOSINOPHIL NFR BLD AUTO: 1.7 %
ERYTHROCYTE [DISTWIDTH] IN BLOOD BY AUTOMATED COUNT: 16.9 % (ref 11–15)
HCT VFR BLD AUTO: 29.7 % (ref 39–53)
HGB BLD-MCNC: 9.1 G/DL (ref 13–17.5)
IMM GRANULOCYTES # BLD AUTO: 0.02 X10(3) UL (ref 0–1)
IMM GRANULOCYTES NFR BLD: 0.7 %
LYMPHOCYTES # BLD AUTO: 1.13 X10(3) UL (ref 1–4)
LYMPHOCYTES NFR BLD AUTO: 39.4 %
MCH RBC QN AUTO: 34.9 PG (ref 26–34)
MCHC RBC AUTO-ENTMCNC: 30.6 G/DL (ref 31–37)
MCV RBC AUTO: 113.8 FL (ref 80–100)
MONOCYTES # BLD AUTO: 0.27 X10(3) UL (ref 0.1–1)
MONOCYTES NFR BLD AUTO: 9.4 %
NEUTROPHILS # BLD AUTO: 1.39 X10 (3) UL (ref 1.5–7.7)
NEUTROPHILS # BLD AUTO: 1.39 X10(3) UL (ref 1.5–7.7)
NEUTROPHILS NFR BLD AUTO: 48.5 %
PLATELET # BLD AUTO: 86 10(3)UL (ref 150–450)
PLATELETS.RETICULATED NFR BLD AUTO: 6.6 % (ref 0–7)
RBC # BLD AUTO: 2.61 X10(6)UL (ref 3.8–5.8)
WBC # BLD AUTO: 2.9 X10(3) UL (ref 4–11)

## 2025-08-22 ENCOUNTER — APPOINTMENT (OUTPATIENT)
Facility: LOCATION | Age: 70
End: 2025-08-22
Attending: INTERNAL MEDICINE

## 2025-08-22 ENCOUNTER — NURSE ONLY (OUTPATIENT)
Facility: LOCATION | Age: 70
End: 2025-08-22
Attending: INTERNAL MEDICINE

## 2025-08-22 DIAGNOSIS — D61.818 PANCYTOPENIA (HCC): ICD-10-CM

## 2025-08-22 DIAGNOSIS — D46.9 MDS (MYELODYSPLASTIC SYNDROME) (HCC): Primary | ICD-10-CM

## 2025-08-28 ENCOUNTER — HOSPITAL ENCOUNTER (EMERGENCY)
Facility: HOSPITAL | Age: 70
Discharge: HOME | End: 2025-08-28
Attending: EMERGENCY MEDICINE

## 2025-08-28 ENCOUNTER — HOSPITAL ENCOUNTER (OUTPATIENT)
Dept: ULTRASOUND IMAGING | Age: 70
Discharge: HOME | End: 2025-08-28
Attending: INTERNAL MEDICINE

## 2025-08-28 VITALS
TEMPERATURE: 98 F | DIASTOLIC BLOOD PRESSURE: 76 MMHG | HEART RATE: 70 BPM | SYSTOLIC BLOOD PRESSURE: 154 MMHG | RESPIRATION RATE: 16 BRPM | OXYGEN SATURATION: 100 %

## 2025-08-28 DIAGNOSIS — N18.4 CKD STAGE 4 DUE TO TYPE 1 DIABETES MELLITUS (HCC): ICD-10-CM

## 2025-08-28 DIAGNOSIS — R33.9 URINARY RETENTION: Primary | ICD-10-CM

## 2025-08-28 DIAGNOSIS — E10.22 CKD STAGE 4 DUE TO TYPE 1 DIABETES MELLITUS (HCC): ICD-10-CM

## 2025-08-28 LAB
ANION GAP SERPL CALC-SCNC: 7 MMOL/L (ref 0–18)
BASOPHILS # BLD AUTO: 0 X10(3) UL (ref 0–0.2)
BASOPHILS NFR BLD AUTO: 0 %
BILIRUB UR QL: NEGATIVE
BUN BLD-MCNC: 68 MG/DL (ref 9–23)
BUN/CREAT SERPL: 15.2 (ref 10–20)
CALCIUM BLD-MCNC: 8.8 MG/DL (ref 8.7–10.4)
CHLORIDE SERPL-SCNC: 113 MMOL/L (ref 98–112)
CLARITY UR: CLEAR
CO2 SERPL-SCNC: 22 MMOL/L (ref 21–32)
CREAT BLD-MCNC: 4.47 MG/DL (ref 0.7–1.3)
DEPRECATED RDW RBC AUTO: 67.2 FL (ref 35.1–46.3)
EGFRCR SERPLBLD CKD-EPI 2021: 13 ML/MIN/1.73M2 (ref 60–?)
EOSINOPHIL # BLD AUTO: 0.04 X10(3) UL (ref 0–0.7)
EOSINOPHIL NFR BLD AUTO: 2.6 %
ERYTHROCYTE [DISTWIDTH] IN BLOOD BY AUTOMATED COUNT: 16.6 % (ref 11–15)
GLUCOSE BLD-MCNC: 190 MG/DL (ref 70–99)
GLUCOSE UR-MCNC: 100 MG/DL
HCT VFR BLD AUTO: 26.5 % (ref 39–53)
HGB BLD-MCNC: 8.3 G/DL (ref 13–17.5)
HGB UR QL STRIP.AUTO: NEGATIVE
IMM GRANULOCYTES # BLD AUTO: 0.01 X10(3) UL (ref 0–1)
IMM GRANULOCYTES NFR BLD: 0.6 %
KETONES UR-MCNC: NEGATIVE MG/DL
LEUKOCYTE ESTERASE UR QL STRIP.AUTO: NEGATIVE
LYMPHOCYTES # BLD AUTO: 0.54 X10(3) UL (ref 1–4)
LYMPHOCYTES NFR BLD AUTO: 35.1 %
MCH RBC QN AUTO: 35 PG (ref 26–34)
MCHC RBC AUTO-ENTMCNC: 31.3 G/DL (ref 31–37)
MCV RBC AUTO: 111.8 FL (ref 80–100)
MONOCYTES # BLD AUTO: 0.18 X10(3) UL (ref 0.1–1)
MONOCYTES NFR BLD AUTO: 11.7 %
NEUTROPHILS # BLD AUTO: 0.77 X10 (3) UL (ref 1.5–7.7)
NEUTROPHILS # BLD AUTO: 0.77 X10(3) UL (ref 1.5–7.7)
NEUTROPHILS NFR BLD AUTO: 50 %
NITRITE UR QL STRIP.AUTO: NEGATIVE
OSMOLALITY SERPL CALC.SUM OF ELEC: 319 MOSM/KG (ref 275–295)
PH UR: 6 (ref 5–8)
PLATELET # BLD AUTO: 67 10(3)UL (ref 150–450)
PLATELET MORPHOLOGY: NORMAL
PLATELETS.RETICULATED NFR BLD AUTO: 6.8 % (ref 0–7)
POTASSIUM SERPL-SCNC: 5.7 MMOL/L (ref 3.5–5.1)
PROT UR-MCNC: 50 MG/DL
RBC # BLD AUTO: 2.37 X10(6)UL (ref 3.8–5.8)
SODIUM SERPL-SCNC: 142 MMOL/L (ref 136–145)
SP GR UR STRIP: 1.01 (ref 1–1.03)
UROBILINOGEN UR STRIP-ACNC: NORMAL
WBC # BLD AUTO: 1.5 X10(3) UL (ref 4–11)

## 2025-08-28 PROCEDURE — 81001 URINALYSIS AUTO W/SCOPE: CPT | Performed by: EMERGENCY MEDICINE

## 2025-08-28 PROCEDURE — 85060 BLOOD SMEAR INTERPRETATION: CPT | Performed by: EMERGENCY MEDICINE

## 2025-08-28 PROCEDURE — 99284 EMERGENCY DEPT VISIT MOD MDM: CPT

## 2025-08-28 PROCEDURE — 93010 ELECTROCARDIOGRAM REPORT: CPT

## 2025-08-28 PROCEDURE — 80048 BASIC METABOLIC PNL TOTAL CA: CPT | Performed by: EMERGENCY MEDICINE

## 2025-08-28 PROCEDURE — 99285 EMERGENCY DEPT VISIT HI MDM: CPT

## 2025-08-28 PROCEDURE — 96374 THER/PROPH/DIAG INJ IV PUSH: CPT

## 2025-08-28 PROCEDURE — 85025 COMPLETE CBC W/AUTO DIFF WBC: CPT | Performed by: EMERGENCY MEDICINE

## 2025-08-28 PROCEDURE — 93005 ELECTROCARDIOGRAM TRACING: CPT

## 2025-08-28 PROCEDURE — 96361 HYDRATE IV INFUSION ADD-ON: CPT

## 2025-08-28 PROCEDURE — 51702 INSERT TEMP BLADDER CATH: CPT

## 2025-08-28 PROCEDURE — 76775 US EXAM ABDO BACK WALL LIM: CPT | Performed by: INTERNAL MEDICINE

## 2025-08-28 RX ORDER — TAMSULOSIN HYDROCHLORIDE 0.4 MG/1
0.4 CAPSULE ORAL ONCE
Status: COMPLETED | OUTPATIENT
Start: 2025-08-28 | End: 2025-08-28

## 2025-08-28 RX ORDER — LIDOCAINE HYDROCHLORIDE 20 MG/ML
10 JELLY TOPICAL ONCE
Status: COMPLETED | OUTPATIENT
Start: 2025-08-28 | End: 2025-08-28

## 2025-08-28 RX ORDER — TAMSULOSIN HYDROCHLORIDE 0.4 MG/1
0.4 CAPSULE ORAL DAILY
Qty: 14 CAPSULE | Refills: 0 | Status: SHIPPED | OUTPATIENT
Start: 2025-08-28 | End: 2025-09-11

## 2025-08-28 RX ORDER — FUROSEMIDE 10 MG/ML
20 INJECTION INTRAMUSCULAR; INTRAVENOUS ONCE
Status: COMPLETED | OUTPATIENT
Start: 2025-08-28 | End: 2025-08-28

## 2025-08-29 LAB
ATRIAL RATE: 71 BPM
P AXIS: 50 DEGREES
P-R INTERVAL: 160 MS
Q-T INTERVAL: 396 MS
QRS DURATION: 108 MS
QTC CALCULATION (BEZET): 430 MS
R AXIS: -17 DEGREES
T AXIS: 16 DEGREES
VENTRICULAR RATE: 71 BPM

## 2025-08-31 ENCOUNTER — TELEPHONE (OUTPATIENT)
Dept: INTERNAL MEDICINE CLINIC | Facility: CLINIC | Age: 70
End: 2025-08-31

## 2025-08-31 DIAGNOSIS — H43.393 VITREOUS FLOATERS OF BOTH EYES: ICD-10-CM

## 2025-08-31 DIAGNOSIS — E11.3293 TYPE 2 DIABETES MELLITUS WITH BOTH EYES AFFECTED BY MILD NONPROLIFERATIVE RETINOPATHY WITHOUT MACULAR EDEMA, WITHOUT LONG-TERM CURRENT USE OF INSULIN (HCC): Primary | ICD-10-CM

## (undated) DIAGNOSIS — E11.65 UNCONTROLLED TYPE 2 DIABETES MELLITUS WITH HYPERGLYCEMIA (HCC): ICD-10-CM

## (undated) NOTE — LETTER
March 30, 2022    Dayana Burton  [address] [address]    Dear Dayana Burton:  I wanted to reach out to you personally as I feel you would benefit from our Chronic Care Management program here at [place] [address]. [facility]  has reached out to you on my behalf to introduce the program and all its benefits. To help you take control of your health and provide you with optimal quality care, I am recommending this program to you. If you choose to enroll in this Chronic Care Management program you will be working closely with your very own Health  Care Manager. They will help provide you with the extra support and education needed to keep you healthy, as well as keeping me informed about you and your health in between office visits. I ask that you take the time to review the information the [facility]  sent you, and consider all of the outstanding benefits available to you. Your health is important to me! [facility]  will be calling you again soon to discuss your final decision and any questions you may have. You may also call them at:  [facility], [address] [facility]   Phone: [phone]    Thank you for considering,  Dr. Sarah Ng  [phone]

## (undated) NOTE — MR AVS SNAPSHOT
[facility] SPECIAL[facility] - [address]  [address] [phone]               Thank you for choosing us for your health care visit with Yesenia Easton MD.  We are glad to serve you and happy to provide you with this summary of Inhale 2 puffs into the lungs every 4 (four) hours as needed for Wheezing.    Commonly known as:  PROAIR HFA           atorvastatin 40 MG Tabs   TAKE 1 TABLET BY MOUTH ONE TIME A DAY   Commonly known as:  LIPITOR           cefdinir 300 MG Caps   Take 1 caps Take 500 mg by mouth daily with breakfast.           Pantoprazole Sodium 40 MG Tbec   Take 1 tablet (40 mg total) by mouth 2 (two) times daily before meals.    Commonly known as:  PROTONIX           pregabalin 300 MG Caps   Take 1 capsule (300 mg total) by

## (undated) NOTE — LETTER
November 7, 2019    Sam Carey MD  [address] [facility]     Patient: Diana Long   YOB: 1955   Date of Visit: 11/7/2019       Dear Dr. Alberta Telles MD:    Thank you for referring Montezezequiel Teresa to me for evalua • Glaucoma Neg         family h/o       Social History: Social History    Tobacco Use      Smoking status: Former Smoker        Packs/day: 0.00        Quit date: 1989        Years since quittin.0      Smokeless tobacco: Never Used      Tobacco c • clotrimazole 1 % External Cream Use bid 60 g 3   • selenium sulfide 2.5 % External Lotion Apply topically 2 times every week to affected area(s). 120 mL 3   • HydrOXYzine HCl 25 MG Oral Tab Take 25 mg by mouth 3 (three) times daily as needed for Itching. Iris Transillumination defects from 7-9 o'clock 2nd to cataract surgery  Normal    Lens PC IOL with trace PC opacity  PC IOL with clear capsule     Vitreous Vitreous floaters Vitreous floaters          Fundus Exam       Right Left    Disc Good rim, Tempor Return in about 1 year (around 11/7/2020) for Diabetic eye exam.    11/7/2019  Scribed by: Richard Knowles MD      If you have questions, please do not hesitate to call me. I look forward to following Martin Ochoa along with you.     Sincerely,        Ana Collado

## (undated) NOTE — LETTER
[facility] SURGERY SCHEDULING FORM   [address] [place] [address] [place] [address]   [phone] (scheduling phone) [phone] (scheduling fax)     PATIENT INFORMATION   Patient Name:    Darius Ortiz   :    1955 []  No Anesthesia   [x]  Yes  []  No   Allergies: Cefdinir       Completed by:    Jarrod Liu      Date:    3/15/2018    [facility] will follow its Pre-Admission Assessment and Screening Policy for pre-admission testing.   Physicians that require   additional t

## (undated) NOTE — Clinical Note
Dr. Moises Ambrocio, patient previously declined the CCM program, a letter on your behalf has been sent to patient to reconsider CCM services.

## (undated) NOTE — LETTER
[address], [address], [address] [facility], [address] [place] [address], [address]  [address]  [phone]        Dear Otilia Suggs,      I had the pleasure of seeing your patient, Arnulfo Walters on 3/2/2018.      Below please find a

## (undated) NOTE — LETTER
11/7/2019    Patient: Luci Cobian   MR Number: [id number]   YOB: 1955   Date of Visit: 11/7/2019   Physician: Romie Olson MD     Dear Medicare Patient:  Lacijennifer Kelby TO BENEFICIARY:  Please know that while a refraction is

## (undated) NOTE — MR AVS SNAPSHOT
[facility]  [address] [facility] [address] [place] [address]  [phone]               Thank you for choosing us for your health care visit with Johanne Madrid. Areli Benítez MD.  We are glad to serve you and happy to provide you with this summary of your visit. FREESTYLE FREEDOM LITE W/DEVICE Kit           * FREESTYLE LITE TEST VI   Testing blood sugar 4-5 times daily. * Glucose Blood Strp   Use to check BG level 3 times per day.    Commonly known as:  FREESTYLE LITE TEST           glimepiride 2 MG Tabs These medications were sent to [facility] [address] [facility],[address], [address] [facility] [phone], [address], [address] [id number]     Phone:  [phone]    - Clotrimazole 1 % Oint  - Fluocinonide 0.05 % Oint

## (undated) NOTE — LETTER
June 26, 2024      No Recipients     Patient: Jaun Burton   YOB: 1955   Date of Visit: 6/26/2024       Dear Dr. Damian Recipients:    Thank you for referring Jaun Burton to me for evaluation. Here is my assessment and plan of care:    Jaun Burton is a 68 year old male.    HPI:     HPI    Pt is here for a diabetic eye exam. Pt states vision occasionally gets blurry without glasses. Sates distance vision is stable.    Pt has been a diabetic for 22+years       Pt's diabetes is currently controlled by insulin   Pt checks BS daily   Pt's last blood sugar was 210 this morning   Last HA1C was 7.8 on 6/13/24  Endocrinologist: Dr Naranjo  Last edited by Heather Denson on 6/26/2024 11:22 AM.        Patient History:  Past Medical History:    C2-3 mild central, C3-4 mild-mod diffuse, C4-5 mild diffuse, C5-6 mod diffuse bulging discs    C5-6 mod central & bilateral mild foraminal, C4-5 left mild foraminal stenosis    C6-7 mild-mod central herniated disc    Cataract    2010    Chronic kidney disease (CKD)    stage 3    Diabetes mellitus (HCC)    Diabetes type 2, uncontrolled    Diabetic retinopathy (HCC)    [facility] for eval    Hyperlipidemia    Meibomian gland dysfunction    Osteoarthritis of spine with radiculopathy, cervical region    Pancreatitis (HCC)    Primary osteoarthritis of both shoulders    Proteinuria    Type II or unspecified type diabetes mellitus without mention of complication, not stated as uncontrolled    Pills & Insulin    Vitreous floaters       Surgical History: Jaun Burton has a past surgical history that includes cholecystectomy (2012); appendectomy; hernia surgery; electrocardiogram, complete (4/23/2012) (scanned to media tab); Cataract extraction w/  intraocular lens implant (Right, 06/11/2018) (Dr. Lopez); and Cataract extraction w/  intraocular lens implant (Left, 07/12/2018) (Dr. Lopez).    Family History   Problem Relation Age of Onset     Diabetes Other         close relative    Diabetes Maternal Grandmother     Diabetes Father     Macular degeneration Neg     Glaucoma Neg         family h/o       Social History:   Social History     Socioeconomic History    Marital status:    Tobacco Use    Smoking status: Former     Current packs/day: 0.00     Types: Cigarettes     Quit date: 1989     Years since quittin.6    Smokeless tobacco: Former    Tobacco comments:     quit about 30 years ago.   Vaping Use    Vaping status: Never Used   Substance and Sexual Activity    Alcohol use: No    Drug use: No   Other Topics Concern    Caffeine Concern Yes     Comment: 1 cup coffee per day    Exercise No    History of tanning Yes    Reaction to local anesthetic No       Medications:  Current Outpatient Medications   Medication Sig Dispense Refill    NOVOLOG 100 UNIT/ML Injection Solution INJECT 50 UNITS SUBCUTANEOUSLY ONCE DAILY VIA  CORRECTION  FACTOR 40 mL 1    pregabalin 300 MG Oral Cap Take 1 capsule (300 mg total) by mouth daily. 30 capsule 0    BD INSULIN SYRINGE U/F 31G X \" 0.5 ML Does not apply Misc USE 1 SYRINGE 4 TIMES DAILY 400 each 3    Mometasone Furoate 0.1 % External Solution Use bid to ears as directd 60 mL 3    HYDROcodone-acetaminophen (NORCO)  MG Oral Tab Take 1 tablet by mouth every 8 (eight) hours as needed for Pain. 90 tablet 0    FREESTYLE LITE TEST In Vitro Strip USE 1 STRIP TO CHECK BLOOD GLUCOSE 3 TIMES DAILY. DX: E11.65, insulin dependent 300 strip 1    amitriptyline 25 MG Oral Tab Take 1 tablet (25 mg total) by mouth nightly. 90 tablet 0    sodium zirconium cyclosilicate (LOKELMA) 10 g Oral Powd Pack Take 1 packet (10 g total) by mouth twice a week. 30 packet 0    LANTUS 100 UNIT/ML Subcutaneous Solution Inject 35 Units into the skin nightly. 30 mL 1    azelastine 137 MCG/SPRAY Nasal Solution 1-2 sprays by Nasal route in the morning and 1-2 sprays before bedtime. FOR SINUS SYMPTOMS/NASAL CONGESTION.. 30 mL 3     fluticasone propionate 50 MCG/ACT Nasal Suspension 2 sprays by Each Nare route daily. FOR NASAL CONGESTION/SINUS SYMPTOMS. 3 each 3    fluticasone-salmeterol (ADVAIR DISKUS) 100-50 MCG/ACT Inhalation Aerosol Powder, Breath Activated Inhale 1 puff into the lungs 2 (two) times daily. 1 each 3    metoprolol tartrate 50 MG Oral Tab       atorvastatin 40 MG Oral Tab Take 1 tablet (40 mg total) by mouth daily. FOR CHOLESTEROL. 90 tablet 9    fenofibrate 48 MG Oral Tab Take 1 tablet (48 mg total) by mouth daily. FOR TRIGLYCERIDES. 90 tablet 9    pantoprazole 40 MG Oral Tab EC Take 1 tablet (40 mg total) by mouth before breakfast. FOR ACID REFLUX. 90 tablet 9    cyanocobalamin 1000 MCG Oral Tab Take 1 tablet (1,000 mcg total) by mouth daily. 90 tablet 4    Betamethasone Dipropionate Aug 0.05 % External Cream Apply 1 Application topically 2 (two) times daily. APPLY TO AFFECTED AREA 50 g 1    RELION INSULIN SYRINGE 31G X 15/64\" 0.5 ML Does not apply Misc 1 Syringe 4 (four) times daily. 400 each 2    RELION INSULIN SYRINGE 31G X 15/64\" 0.5 ML Does not apply Misc Inject 1 Syringe into the skin 4 (four) times daily. 400 each 0    RELION INSULIN SYRINGE 31G X 15/64\" 0.5 ML Does not apply Misc USE 1 SYRINGE SUBCUTANEOUSLY 4 TIMES DAILY 200 each 0    metRONIDAZOLE 0.75 % External Cream Apply 1 Application topically daily. For breakout on face 45 g 0    Insulin Syringe-Needle U-100 (RELION INSULIN SYRINGE) 31G X 15/64\" 0.5 ML Does not apply Misc 1 Syringe by Does not apply route 4 (four) times daily. 400 each 0    Pimecrolimus 1 % External Cream APPLY   TOPICALLY AFFECTED AREA ON FACE ONCE DAILY TO TWICE DAILY 60 g 0    Tazarotene 0.1 % External Cream Apply to chest nightly (Patient taking differently: Apply to chest nightly PRN) 60 g 3    Insulin Syringe-Needle U-100 (RELION INSULIN SYRINGE) 31G X 15/64\" 0.5 ML Does not apply Misc 1 Syringe by Does not apply route 4 (four) times daily. 400 each 0    Blood Glucose Monitoring Suppl  (ACCU-CHEK INÉS PLUS) w/Device Does not apply Kit Use as directed to check blood sugars. 1 kit 0    Ferrous Gluconate 225 (27 Fe) MG Oral Tab Take 27 mg by mouth daily.      Omega-3 Fatty Acids (FISH OIL) 600 MG Oral Cap Take 1 capsule by mouth nightly.        Multiple Vitamins-Minerals (CENTRUM SILVER) Oral Tab Take 1 tablet by mouth daily.         Allergies:  Allergies   Allergen Reactions    Cefdinir DIARRHEA    Zosyn [Piperacillin Sod-Tazobactam So] OTHER (SEE COMMENTS)     Heightened sense of smell; dry heaves, vomiting       ROS:     ROS    Positive for: Eyes  Last edited by Sandra Jackson OT on 6/26/2024 10:42 AM.          PHYSICAL EXAM:     Base Eye Exam       Visual Acuity (Snellen - Linear)         Right Left    Dist cc 20/40 20/30    Dist ph cc NI NI    Near cc 20/40 20/25      Correction: Glasses              Tonometry (Applanation, 11:07 AM)         Right Left    Pressure 16 16              Pupils         Pupils    Right PERRL    Left PERRL              Visual Fields         Left Right     Full Full              Extraocular Movement         Right Left     Full, Ortho Full, Ortho              Neuro/Psych       Oriented x3: Yes    Mood/Affect: Normal              Dilation       Both eyes: 1.0% Mydriacyl and 2.5% Markie Synephrine @ 11:07 AM                  Slit Lamp and Fundus Exam       Slit Lamp Exam         Right Left    Lids/Lashes Dermatochalasis, Meibomian gland dysfunction Meibomian gland dysfunction, Dermatochalasis    Conjunctiva/Sclera Nasal/temp pinguecula Temp pinguecula    Cornea Clear Clear    Anterior Chamber Deep and quiet Deep and quiet    Iris Transillumination defects from 7-9 o'clock 2nd to cataract surgery Normal    Lens PC IOL with trace PC opacity- haptic visible through transillumination PC IOL with clear capsule     Vitreous Vitreous floaters Vitreous floaters              Fundus Exam         Right Left    Disc Good rim, Temporal crescent Good rim, Temporal crescent    C/D  Ratio 0.1 0.1    Macula 1 MA below  fovea, few MA and DBH nasal to disc, few MA above and temp few MA nasal to disc, few MA below fovea    Vessels Normal Normal    Periphery Normal Normal                  Refraction       Wearing Rx         Sphere Cylinder Axis Add    Right +0.50 +0.75 135 +2.75    Left +0.75 +0.50 150 +2.75      Age: 1yrs    Type: Flat top bifocal              Manifest Refraction (Auto)         Sphere Cylinder [place] Dist VA Add Near VA    Right +0.50 +1.00 105       Left +0.50 +0.75 120                 Manifest Refraction #2         Sphere Cylinder [place] Dist VA Add Near VA    Right +0.75 +1.00 135 20/40+ +3.00 20/25    Left +0.50 +0.50 150 20/30+ +3.00 20/20              Final Rx         Sphere Cylinder [place] Dist VA Add Near VA    Right +0.75 +1.00 135 20/40+ +3.00 20/25    Left +0.50 +0.50 150 20/30+ +3.00 20/20      Type: Flat top bifocal                     ASSESSMENT/PLAN:     Diagnoses and Plan:     Pseudophakia of both eyes  No treatment.  New glasses today; update as needed.  Discussed that new prescription is only a slight change.       Type 2 diabetes mellitus with both eyes affected by mild nonproliferative retinopathy without macular edema, without long-term current use of insulin (HCC)  Diabetes type II: mild background diabetic retinopathy, no signs of neovascularization noted.  No treatment necessary at this time.  Patient was instructed to monitor vision for sudden changes and to call if visual changes noted.  Discussed ocular and systemic benefits of blood sugar control.  Recommend yearly eye exams with an ophthalmologist.      Vitreous floaters  No treatment.     No orders of the defined types were placed in this encounter.      Meds This Visit:  Requested Prescriptions      No prescriptions requested or ordered in this encounter        Follow up instructions:  Return in about 1 year (around 6/26/2025) for Diabetic eye exam.    6/26/2024  Scribed by: Bret Arauz MD        If  you have questions, please do not hesitate to call me. I look forward to following Jaun along with you.    Sincerely,        Bret Arauz MD        CC:   No Recipients    Document electronically generated by: Bret Arauz MD

## (undated) NOTE — MR AVS SNAPSHOT
[address] [address]  [phone]               Thank you for choosing us for your health care visit with Beata Kerns MD.  We are glad to serve you and happy to provide you with this summary · Relax, lie down. Go to bed if you want. Just get off your feet and rest. Also, drink plenty of fluids to avoid dehydration. · Take acetaminophen or a nonsteroidal anti-inflammatory agent (NSAID), such as ibuprofen.   Treat a troubled nose kindly  · Breat · Signs of dehydration, including extreme thirst, dark urine, infrequent urination, dry mouth  · Spotted, red, or very sore throat   Date Last Reviewed: 6/19/2014  © 5242-6389 [facility] [address] [facility], [address] [place] [address].  All rights 6 HOURS AFTER TAKING THIS MEDICATION. Commonly known as:  CHERATUSSIN AC           HYDROcodone-acetaminophen  MG Tabs   Take 1 tablet by mouth every 8 (eight) hours as needed for Pain.    Commonly known as:  [address]   Start taking on:  6/26/2017 - guaiFENesin-codeine 100-10 MG/5ML Soln            [place]     Call the Mashed Pixel for assistance with your inactive MyChart account    If you have questions, you can call [phone] to talk to our [facility] Staff.  Remember, MyChart is NOT to be

## (undated) NOTE — MR AVS SNAPSHOT
[facility]  [address] [place] [id number] [phone]               Thank you for choosing us for your health care visit with Prakash Arthur. Elizabeth Wilson MD.  We are glad to serve you and happy to provide you with this summary of your visit. TAKE ONE TABLET BY MOUTH ONCE DAILY   Commonly known as:  TRICOR           Fish Oil 600 MG Caps   Take 1 capsule by mouth daily.            Fluocinonide 0.05 % Oint   One application at bed   Commonly known as:  [address] [facility] [address] Where to Get Your Medications      You can get these medications from any pharmacy     Bring a paper prescription for each of these medications    - HYDROcodone-acetaminophen  MG Tabs  - pregabalin 300 MG Caps            MyChart     Call the he

## (undated) NOTE — LETTER
February 18, 2021    Aliya Turpin, [facility]     Patient: Blanca Camacho   YOB: 1955   Date of Visit: 2/18/2021       Dear Dr. Vaishali Camacho MD:    Thank you for referring Chi Manriquez to me for evalu • Primary osteoarthritis of both shoulders 2/1/2018   • Proteinuria    • Type II or unspecified type diabetes mellitus without mention of complication, not stated as uncontrolled     Pills & Insulin   • Vitreous floaters        Surgical History: Carlotta Dwyer • Naloxone HCl 4 MG/0.1ML Nasal Liquid 4 mg by Nasal route as needed. If patient remains unresponsive, repeat dose in other nostril 2-5 minutes after first dose.  1 kit 0   • HYDROcodone-acetaminophen  MG Oral Tab Take 1 tablet by mouth every 8 (eight • BD INSULIN SYRINGE U/F 31G X 5/16\" 0.5 ML Does not apply Misc Inject insulin 4 times per day as instructed. 400 each 0   • tacrolimus (PROTOPIC) 0.1 % External Ointment Apply 1 Application topically 2 (two) times daily.  60 g 3   • clotrimazole 1 % Exter Conjunctiva/Sclera Nasal/temp pinguecula Temp pinguecula    Cornea Clear Clear    Anterior Chamber Deep and quiet Deep and quiet    Iris Transillumination defects from 7-9 o'clock 2nd to cataract surgery  Normal    Lens PC IOL with trace PC opacity  PC IO No prescriptions requested or ordered in this encounter        Follow up instructions:  Return in about 1 year (around 2/18/2022) for Diabetic Eye Exam.    2/18/2021  Scribed by: Vitaliy Patton MD        If you have questions, please do not hesitate to

## (undated) NOTE — ED AVS SNAPSHOT
Damian Osman   MRN: [id number]    Department:  [facility] Emergency Department   Date of Visit:  9/3/2018           Disclosure     Insurance plans vary and the physician(s) referred by the ER may not be covered by your plan.  Please contac within the next three months to obtain basic health screening including reassessment of your blood pressure.     IF THERE IS ANY CHANGE OR WORSENING OF YOUR CONDITION, CALL YOUR PRIMARY CARE PHYSICIAN AT ONCE OR RETURN IMMEDIATELY TO THE EMERGENCY DEPARTMEN

## (undated) NOTE — MR AVS SNAPSHOT
[address], [address]  [address] [facility]  [phone]               Thank you for choosing us for your health care visit with Martha Hernández MD.  We are glad to serve you and happy to provide you with this summary o BP Pulse Height Weight BMI    120/67 mmHg 96 5' 9\" (1.753 m) 245 lb (111.131 kg) 36.16 kg/m2         Current Medications          This list is accurate as of: 4/12/17 10:40 AM.  Always use your most recent med list.                Atorvastatin Calcium 40 Take 1 tablet (40 mg total) by mouth 2 (two) times daily before meals. Commonly known as:  PROTONIX           pregabalin 300 MG Caps   Take 1 capsule (300 mg total) by mouth daily.    Commonly known as:  LYRICA           Vitamin B-12 5000 MCG Lozg   Take

## (undated) NOTE — LETTER
December 1, 2021    [address] [place] [id number]    Dear Freedom Crump: It was a pleasure speaking with you over the phone recently.  To follow up, I wanted to send you some contact information to utilize when you have

## (undated) NOTE — MR AVS SNAPSHOT
[address]  [address] [facility]  [phone]               Thank you for choosing us for your health care visit with Mendez Mar MD.  We are glad to serve you and happy to provide you with this summary o Take 1 tablet (2 mg total) by mouth daily with breakfast.   Commonly known as:  AMARYL           HYDROcodone-acetaminophen  MG Tabs   Take 1 tablet by mouth every 8 (eight) hours as needed for Pain.    Commonly known as:  NORCO           insulin aspar POC Finger stick glucose [84422]    Complete by:  As directed    Assoc Dx:  Uncontrolled type 2 diabetes mellitus with hyperglycemia, with long-term current use of insulin (HCC) [E11.65, Z79.4]                 Follow-up Instructions     Return in about 3 increments are effective and add up over the week   2 ½ hours per week – spread out over time Use a dirk to keep you motivated   Don’t forget strength training with weights and resistance Set goals and track your progress   You don’t need to join a gym.